# Patient Record
Sex: MALE | Race: WHITE | NOT HISPANIC OR LATINO | Employment: OTHER | URBAN - METROPOLITAN AREA
[De-identification: names, ages, dates, MRNs, and addresses within clinical notes are randomized per-mention and may not be internally consistent; named-entity substitution may affect disease eponyms.]

---

## 2019-01-28 ENCOUNTER — HOSPITAL ENCOUNTER (INPATIENT)
Facility: OTHER | Age: 54
LOS: 28 days | Discharge: HOME | DRG: 870 | End: 2019-02-25
Attending: INTERNAL MEDICINE | Admitting: INTERNAL MEDICINE
Payer: MEDICAID

## 2019-01-28 DIAGNOSIS — N17.1 ACUTE RENAL FAILURE WITH ACUTE CORTICAL NECROSIS: Primary | ICD-10-CM

## 2019-01-28 DIAGNOSIS — R00.0 TACHYCARDIA: ICD-10-CM

## 2019-01-28 DIAGNOSIS — E87.1 HYPONATREMIA: ICD-10-CM

## 2019-01-28 DIAGNOSIS — N18.6 ESRD (END STAGE RENAL DISEASE): ICD-10-CM

## 2019-01-28 DIAGNOSIS — M62.82 NON-TRAUMATIC RHABDOMYOLYSIS: ICD-10-CM

## 2019-01-28 DIAGNOSIS — R09.02 HYPOXIA: ICD-10-CM

## 2019-01-28 DIAGNOSIS — L03.115 CELLULITIS OF RIGHT LOWER EXTREMITY: ICD-10-CM

## 2019-01-28 DIAGNOSIS — J96.01 ACUTE RESPIRATORY FAILURE WITH HYPOXIA AND HYPERCAPNIA: ICD-10-CM

## 2019-01-28 DIAGNOSIS — Z49.01 ENCOUNTER FOR FITTING AND ADJUSTMENT OF DIALYSIS CATHETER: ICD-10-CM

## 2019-01-28 DIAGNOSIS — N17.9 ACUTE RENAL FAILURE: ICD-10-CM

## 2019-01-28 DIAGNOSIS — N17.9 ACUTE KIDNEY INJURY: ICD-10-CM

## 2019-01-28 DIAGNOSIS — J96.02 ACUTE RESPIRATORY FAILURE WITH HYPOXIA AND HYPERCAPNIA: ICD-10-CM

## 2019-01-28 DIAGNOSIS — R53.81 DEBILITY: ICD-10-CM

## 2019-01-28 DIAGNOSIS — I50.33 ACUTE ON CHRONIC DIASTOLIC HEART FAILURE: ICD-10-CM

## 2019-01-28 DIAGNOSIS — J96.21 ACUTE ON CHRONIC RESPIRATORY FAILURE WITH HYPOXIA: ICD-10-CM

## 2019-01-28 DIAGNOSIS — R65.21 SEPTIC SHOCK: ICD-10-CM

## 2019-01-28 DIAGNOSIS — A41.9 SEPTIC SHOCK: ICD-10-CM

## 2019-01-28 PROBLEM — N30.00 ACUTE CYSTITIS: Status: ACTIVE | Noted: 2019-01-28

## 2019-01-28 PROBLEM — I50.32 CHRONIC DIASTOLIC HEART FAILURE: Status: ACTIVE | Noted: 2019-01-28

## 2019-01-28 PROBLEM — J96.20 ACUTE ON CHRONIC RESPIRATORY FAILURE: Status: ACTIVE | Noted: 2019-01-28

## 2019-01-28 PROBLEM — I87.2 CHRONIC VENOUS INSUFFICIENCY: Status: ACTIVE | Noted: 2019-01-28

## 2019-01-28 LAB
ALBUMIN SERPL BCP-MCNC: 1.8 G/DL
ALBUMIN SERPL BCP-MCNC: 1.8 G/DL
ALLENS TEST: ABNORMAL
ALLENS TEST: ABNORMAL
ALP SERPL-CCNC: 90 U/L
ALT SERPL W/O P-5'-P-CCNC: 90 U/L
ANION GAP SERPL CALC-SCNC: 15 MMOL/L
ANION GAP SERPL CALC-SCNC: 15 MMOL/L
APTT BLDCRRT: 41 SEC
AST SERPL-CCNC: 59 U/L
BASOPHILS # BLD AUTO: 0.01 K/UL
BASOPHILS NFR BLD: 0.1 %
BILIRUB SERPL-MCNC: 0.5 MG/DL
BNP SERPL-MCNC: 1726 PG/ML
BUN SERPL-MCNC: 96 MG/DL
BUN SERPL-MCNC: 97 MG/DL
CALCIUM SERPL-MCNC: 7.6 MG/DL
CALCIUM SERPL-MCNC: 7.9 MG/DL
CHLORIDE SERPL-SCNC: 89 MMOL/L
CHLORIDE SERPL-SCNC: 89 MMOL/L
CK SERPL-CCNC: 1657 U/L
CO2 SERPL-SCNC: 19 MMOL/L
CO2 SERPL-SCNC: 19 MMOL/L
CREAT SERPL-MCNC: 12.2 MG/DL
CREAT SERPL-MCNC: 12.3 MG/DL
DELSYS: ABNORMAL
DELSYS: ABNORMAL
DIFFERENTIAL METHOD: ABNORMAL
EOSINOPHIL # BLD AUTO: 0 K/UL
EOSINOPHIL NFR BLD: 0.1 %
ERYTHROCYTE [DISTWIDTH] IN BLOOD BY AUTOMATED COUNT: 14.4 %
ERYTHROCYTE [SEDIMENTATION RATE] IN BLOOD BY WESTERGREN METHOD: 30 MM/H
ERYTHROCYTE [SEDIMENTATION RATE] IN BLOOD BY WESTERGREN METHOD: 30 MM/H
EST. GFR  (AFRICAN AMERICAN): 5 ML/MIN/1.73 M^2
EST. GFR  (AFRICAN AMERICAN): 5 ML/MIN/1.73 M^2
EST. GFR  (NON AFRICAN AMERICAN): 4 ML/MIN/1.73 M^2
EST. GFR  (NON AFRICAN AMERICAN): 4 ML/MIN/1.73 M^2
FIO2: 70
FIO2: 70
GLUCOSE SERPL-MCNC: 111 MG/DL
GLUCOSE SERPL-MCNC: 113 MG/DL
HCO3 UR-SCNC: 18.9 MMOL/L (ref 24–28)
HCO3 UR-SCNC: 19.6 MMOL/L (ref 24–28)
HCT VFR BLD AUTO: 35.6 %
HGB BLD-MCNC: 11.3 G/DL
INR PPP: 1
LACTATE SERPL-SCNC: 0.8 MMOL/L
LYMPHOCYTES # BLD AUTO: 0.8 K/UL
LYMPHOCYTES NFR BLD: 6.7 %
MCH RBC QN AUTO: 28.5 PG
MCHC RBC AUTO-ENTMCNC: 31.7 G/DL
MCV RBC AUTO: 90 FL
MODE: ABNORMAL
MODE: ABNORMAL
MONOCYTES # BLD AUTO: 0.7 K/UL
MONOCYTES NFR BLD: 5.6 %
NEUTROPHILS # BLD AUTO: 10.7 K/UL
NEUTROPHILS NFR BLD: 84.8 %
PCO2 BLDA: 65.6 MMHG (ref 35–45)
PCO2 BLDA: 74.4 MMHG (ref 35–45)
PEEP: 10
PEEP: 10
PH SMN: 7.03 [PH] (ref 7.35–7.45)
PH SMN: 7.07 [PH] (ref 7.35–7.45)
PHOSPHATE SERPL-MCNC: 10.4 MG/DL
PLATELET # BLD AUTO: 199 K/UL
PMV BLD AUTO: 10.5 FL
PO2 BLDA: 84 MMHG (ref 80–100)
PO2 BLDA: 86 MMHG (ref 80–100)
POC BE: -11 MMOL/L
POC BE: -11 MMOL/L
POC SATURATED O2: 90 % (ref 95–100)
POC SATURATED O2: 90 % (ref 95–100)
POTASSIUM SERPL-SCNC: 5 MMOL/L
POTASSIUM SERPL-SCNC: 5.1 MMOL/L
PROT SERPL-MCNC: 6.5 G/DL
PROTHROMBIN TIME: 10.6 SEC
RBC # BLD AUTO: 3.96 M/UL
SAMPLE: ABNORMAL
SAMPLE: ABNORMAL
SITE: ABNORMAL
SITE: ABNORMAL
SODIUM SERPL-SCNC: 123 MMOL/L
SODIUM SERPL-SCNC: 123 MMOL/L
SP02: 92
SP02: 92
TROPONIN I SERPL DL<=0.01 NG/ML-MCNC: 0.02 NG/ML
VANCOMYCIN SERPL-MCNC: 23.1 UG/ML
VT: 450
VT: 500
WBC # BLD AUTO: 12.57 K/UL

## 2019-01-28 PROCEDURE — 83735 ASSAY OF MAGNESIUM: CPT

## 2019-01-28 PROCEDURE — 25000003 PHARM REV CODE 250

## 2019-01-28 PROCEDURE — 94002 VENT MGMT INPAT INIT DAY: CPT

## 2019-01-28 PROCEDURE — 85610 PROTHROMBIN TIME: CPT

## 2019-01-28 PROCEDURE — 87205 SMEAR GRAM STAIN: CPT

## 2019-01-28 PROCEDURE — 83880 ASSAY OF NATRIURETIC PEPTIDE: CPT

## 2019-01-28 PROCEDURE — 94761 N-INVAS EAR/PLS OXIMETRY MLT: CPT

## 2019-01-28 PROCEDURE — 83605 ASSAY OF LACTIC ACID: CPT

## 2019-01-28 PROCEDURE — 87040 BLOOD CULTURE FOR BACTERIA: CPT | Mod: 59

## 2019-01-28 PROCEDURE — 87106 FUNGI IDENTIFICATION YEAST: CPT

## 2019-01-28 PROCEDURE — 63600175 PHARM REV CODE 636 W HCPCS: Performed by: STUDENT IN AN ORGANIZED HEALTH CARE EDUCATION/TRAINING PROGRAM

## 2019-01-28 PROCEDURE — 80202 ASSAY OF VANCOMYCIN: CPT

## 2019-01-28 PROCEDURE — 80053 COMPREHEN METABOLIC PANEL: CPT

## 2019-01-28 PROCEDURE — 80069 RENAL FUNCTION PANEL: CPT

## 2019-01-28 PROCEDURE — 25000003 PHARM REV CODE 250: Performed by: PHYSICIAN ASSISTANT

## 2019-01-28 PROCEDURE — 25000003 PHARM REV CODE 250: Performed by: INTERNAL MEDICINE

## 2019-01-28 PROCEDURE — 36415 COLL VENOUS BLD VENIPUNCTURE: CPT

## 2019-01-28 PROCEDURE — 99900026 HC AIRWAY MAINTENANCE (STAT)

## 2019-01-28 PROCEDURE — 99900035 HC TECH TIME PER 15 MIN (STAT)

## 2019-01-28 PROCEDURE — 90945 DIALYSIS ONE EVALUATION: CPT

## 2019-01-28 PROCEDURE — 63600175 PHARM REV CODE 636 W HCPCS

## 2019-01-28 PROCEDURE — 82803 BLOOD GASES ANY COMBINATION: CPT

## 2019-01-28 PROCEDURE — 20000000 HC ICU ROOM

## 2019-01-28 PROCEDURE — 84484 ASSAY OF TROPONIN QUANT: CPT

## 2019-01-28 PROCEDURE — 85730 THROMBOPLASTIN TIME PARTIAL: CPT

## 2019-01-28 PROCEDURE — 82550 ASSAY OF CK (CPK): CPT

## 2019-01-28 PROCEDURE — 87070 CULTURE OTHR SPECIMN AEROBIC: CPT

## 2019-01-28 PROCEDURE — 63600175 PHARM REV CODE 636 W HCPCS: Performed by: PHYSICIAN ASSISTANT

## 2019-01-28 PROCEDURE — 85025 COMPLETE CBC W/AUTO DIFF WBC: CPT

## 2019-01-28 RX ORDER — MAGNESIUM SULFATE HEPTAHYDRATE 40 MG/ML
2 INJECTION, SOLUTION INTRAVENOUS
Status: ACTIVE | OUTPATIENT
Start: 2019-01-28 | End: 2019-01-29

## 2019-01-28 RX ORDER — IPRATROPIUM BROMIDE AND ALBUTEROL SULFATE 2.5; .5 MG/3ML; MG/3ML
3 SOLUTION RESPIRATORY (INHALATION) EVERY 4 HOURS
Status: DISCONTINUED | OUTPATIENT
Start: 2019-01-29 | End: 2019-02-21

## 2019-01-28 RX ORDER — HEPARIN SODIUM 5000 [USP'U]/ML
5000 INJECTION, SOLUTION INTRAVENOUS; SUBCUTANEOUS EVERY 8 HOURS
Status: DISCONTINUED | OUTPATIENT
Start: 2019-01-29 | End: 2019-01-29 | Stop reason: SDUPTHER

## 2019-01-28 RX ORDER — IPRATROPIUM BROMIDE AND ALBUTEROL SULFATE 2.5; .5 MG/3ML; MG/3ML
3 SOLUTION RESPIRATORY (INHALATION) EVERY 6 HOURS
Status: DISCONTINUED | OUTPATIENT
Start: 2019-01-29 | End: 2019-01-28

## 2019-01-28 RX ORDER — PROPOFOL 10 MG/ML
INJECTION, EMULSION INTRAVENOUS
Status: COMPLETED
Start: 2019-01-28 | End: 2019-01-28

## 2019-01-28 RX ORDER — SODIUM CHLORIDE 0.9 % (FLUSH) 0.9 %
3 SYRINGE (ML) INJECTION
Status: DISCONTINUED | OUTPATIENT
Start: 2019-01-28 | End: 2019-02-25 | Stop reason: HOSPADM

## 2019-01-28 RX ORDER — FENTANYL CITRATE 50 UG/ML
100 INJECTION, SOLUTION INTRAMUSCULAR; INTRAVENOUS ONCE
Status: COMPLETED | OUTPATIENT
Start: 2019-01-28 | End: 2019-01-28

## 2019-01-28 RX ORDER — PROPOFOL 10 MG/ML
5 INJECTION, EMULSION INTRAVENOUS CONTINUOUS
Status: DISCONTINUED | OUTPATIENT
Start: 2019-01-28 | End: 2019-01-31

## 2019-01-28 RX ORDER — FENTANYL CITRATE 50 UG/ML
INJECTION, SOLUTION INTRAMUSCULAR; INTRAVENOUS
Status: DISPENSED
Start: 2019-01-28 | End: 2019-01-29

## 2019-01-28 RX ORDER — HEPARIN SODIUM 5000 [USP'U]/ML
5000 INJECTION, SOLUTION INTRAVENOUS; SUBCUTANEOUS EVERY 12 HOURS
Status: DISCONTINUED | OUTPATIENT
Start: 2019-01-28 | End: 2019-01-28

## 2019-01-28 RX ORDER — FENTANYL CITRATE 50 UG/ML
50 INJECTION, SOLUTION INTRAMUSCULAR; INTRAVENOUS
Status: DISCONTINUED | OUTPATIENT
Start: 2019-01-28 | End: 2019-02-02

## 2019-01-28 RX ORDER — NOREPINEPHRINE BITARTRATE 1 MG/ML
INJECTION, SOLUTION INTRAVENOUS
Status: COMPLETED
Start: 2019-01-28 | End: 2019-01-28

## 2019-01-28 RX ORDER — PANTOPRAZOLE SODIUM 40 MG/10ML
40 INJECTION, POWDER, LYOPHILIZED, FOR SOLUTION INTRAVENOUS DAILY
Status: DISCONTINUED | OUTPATIENT
Start: 2019-01-28 | End: 2019-01-29

## 2019-01-28 RX ORDER — FENTANYL CITRATE 50 UG/ML
100 INJECTION, SOLUTION INTRAMUSCULAR; INTRAVENOUS ONCE
Status: DISCONTINUED | OUTPATIENT
Start: 2019-01-28 | End: 2019-01-29

## 2019-01-28 RX ORDER — NOREPINEPHRINE BITARTRATE 1 MG/ML
INJECTION, SOLUTION INTRAVENOUS
Status: DISPENSED
Start: 2019-01-28 | End: 2019-01-29

## 2019-01-28 RX ADMIN — NOREPINEPHRINE BITARTRATE 8000 MCG: 1 INJECTION INTRAVENOUS at 09:01

## 2019-01-28 RX ADMIN — FENTANYL CITRATE 100 MCG: 50 INJECTION, SOLUTION INTRAMUSCULAR; INTRAVENOUS at 10:01

## 2019-01-28 RX ADMIN — PIPERACILLIN AND TAZOBACTAM 4.5 G: 4; .5 INJECTION, POWDER, LYOPHILIZED, FOR SOLUTION INTRAVENOUS; PARENTERAL at 09:01

## 2019-01-28 RX ADMIN — PROPOFOL 25 MCG/KG/MIN: 10 INJECTION, EMULSION INTRAVENOUS at 10:01

## 2019-01-28 RX ADMIN — NOREPINEPHRINE BITARTRATE 0.02 MCG/KG/MIN: 1 INJECTION INTRAVENOUS at 11:01

## 2019-01-28 NOTE — LETTER
January 31, 2019    Jones Red  [address] [address]                [address]  [facility] [address]  Phone: [phone]  Fax: [phone] 1/31/2019  [facility]    Levi Red and Kalyan Pierce acknowledge that they will have transportation from the bus station in [place], [place] to their home in [place], [place].    [facility] will provide a bus ride from [place], [place]  to [place], [place], which is the nearest bus station.  [facility] will transport from hospital to bus station.  These persons will be ready to leave the hospital as soon as arrangements are complete.    Brittnee TOBIN RN  Case Management Dept.  [phone]  [email or web address]

## 2019-01-28 NOTE — PLAN OF CARE
"      Outside Transfer Acceptance Note     Patients name:  Jones Red MRN: [id number]     Transferring Physician or Mid-Level provider/Clinic giving report:   Dr. Larry Romano at [facility] ICU, admitted 1/22/19     Accepting Physician for admission to hospital: Brandyn Carballo M.D.   Accepting Facility and physicisian: [facility].     Consulting Physicians: None    Acceptance date:  01/28/2019    Reason for transfer:   Severe sepsis and shock from right leg cellulitis and +/- CAP    Report from Physician/Mid-Level Provider:  Mr. Red is a 52yo man with a past medical history of morbid obesity and chronic venous stasis of the legs.  On presentation to the ED he was complaining of shortness of breath, cough and brown sputum.  He was altered at the time and placed on BiPAP, so a friend provided most of the history.  He was apparently found down at home and was too weak to even get up.     He was admitted to [facility] on 1/22/19 with septic shock from a right leg cellulitis, rhabdomyolysis, and metabolic encephalopathy.  He was originally placed on pressors (Levophed and Neosynephrine), and intubated the day after admission.  He had been weaned off of all pressor agents and is stable for transfer, though he remains intubated.  He was placed on antibiotic therapy with Zosyn and Vancomycin.  His WBC has decreased, though his leg is, "not really looking that much better."  Of note, he is morbidly obese with chronic woody edema of both legs at baseline.  There is now some blister formation to the RLE, but no crepitus on exam.  No CT of the leg or US doppler has been performed for evaluation of necrotizing fasciitis or DVT.  This far, all of his blood and urine cultures have remained NGTD.      Several days into his ICU stay, he was maxed out on all of his pressor agents.  Subsequent to this his Cr began to rise and has progressed to fulminant kidney failure with a current " creatinine of eleven.  [facility] has no Nephrology services or ability to dialyze, so they need a higher level of care for this.  Last week his K+ josefina to 7, so he has been given intermittent Kayexalate and shifted, now staying steady around five.      His BP was ranging from 80-90 systolic this morning, so the sending MD was considering Dopamine at 5 mics just for transfer in case things decline en route, though this was held as his BP rebounded up.   He is receiving therapy via a PICC line (no TLC in place).      Pt has NGT, mosley, rectal tube, R TLC PICC and L radial A line    Vent settings:     Assist cont 16  FI02 100%   PEEP 8    To Do List upon arrival:    Consult Pulmonary for vent management  Consult Nephrology for ATN and severe NICK needing HD  Consult ID for sepsis  Consider leg CT and US doppler of the right leg to rule out Nec Fasc and DVT  Check stat Random Vanco level on arrival   Continue renal dosed Vancomycin and Zosyn    CLINICAL REVIEW: Please refer to MEDIA tab for original H&P, labs, MAR, radiographs scanned in  Past Medical History:  HTN, Pre-diabetes hyperglycemia, morbid obesity, chronic venous insufficiency of the legs, COPD  Chronic hypoxic respiratory failure on home O2, DCHF, LYNDON  Possible ETOH withdrawal seizure in the past    Past Surgical history:  None    Review of patient's allergies indicates:   NKDA's    Social History:  1/2 PPD cigarettes  Past history of ETOH, but not daily now    Vital Signs:  Temp:  [97 °F (36.1 °C)] 97 °F (36.1 °C)  Pulse:  [84] 84  Resp:  [21] 21  SpO2:  [100 %] 100 %  BP: (140)/(63) 140/63    LABS:  Arrival: WBC 19, BUN 49, CR 4.6, , CL 85, ALB 3.3, , ALT 99, LA 3.4, BNP 3513, CPK 16, 363, TROP 0.04, U/A: 2-5 WBC, 10 RBC, 4+ BACTERIA, inr 1.4  ABG:  PH 7.020, PC02 83.6, P02 187.0, HC03 15.2   VENT SETTINGS: FIO2 100, AC, PEEP 8, RATE 16,   CMP:  , K 8.3, CL 90, C02 19.4, glucose 172   BUN 93, CR 11.4, CA 7.4, , ALT  118, AP 10.7  CBC:  WBC 15.03, H/H 11.9/38.8    Radiographs:   CXR 1/22: Mild chronic lung changes are present with no infiltrates or pleural effusion.  Impression: No acute cardiopulmonary disease   CXR 1/27/18: The heart size is within normal limts for projection.  Aortic arch calcifications ar present - ASVD.  Stble ET tube and NG tube.  The tip of the NG tube can be seen as far as the distal esophagus, with the lower mediastinum and uper abdomen under-penetrated due to lung technique.  Multiple telemetry leads are projected over the chest.  Persistent bilateral infiltrates and or edema with subtle improvement noted in the right lung base.    Medications:  Please see MAR scanned into media tab  Vancomycin and Zosyn

## 2019-01-28 NOTE — LETTER
1/31/2019  [facility]    Levi Red and Kalyan Pierce acknowledge that they will have transportation from the bus station in [place], [place] to their home in [place], [place].    [facility] will provide a bus ride from [place], [place]  to [place], [place], which is the nearest bus station.  [facility] will transport from hospital to bus station.  These persons will be ready to leave the hospital as soon as arrangements are complete.    Brittnee TOBIN RN  Case Management Dept.  [phone]  [email or web address]

## 2019-01-29 LAB
ALBUMIN SERPL BCP-MCNC: 1.5 G/DL
ALBUMIN SERPL BCP-MCNC: 1.8 G/DL
ALBUMIN SERPL BCP-MCNC: 1.9 G/DL
ALLENS TEST: ABNORMAL
ANION GAP SERPL CALC-SCNC: 12 MMOL/L
ANION GAP SERPL CALC-SCNC: 16 MMOL/L
BACTERIA #/AREA URNS HPF: ABNORMAL /HPF
BASOPHILS # BLD AUTO: 0.02 K/UL
BASOPHILS NFR BLD: 0.2 %
BILIRUB UR QL STRIP: ABNORMAL
BUN SERPL-MCNC: 85 MG/DL
BUN SERPL-MCNC: 89 MG/DL
BUN SERPL-MCNC: 94 MG/DL
BUN SERPL-MCNC: 94 MG/DL
CALCIUM SERPL-MCNC: 7.6 MG/DL
CALCIUM SERPL-MCNC: 7.7 MG/DL
CHLORIDE SERPL-SCNC: 91 MMOL/L
CHLORIDE SERPL-SCNC: 91 MMOL/L
CHLORIDE SERPL-SCNC: 92 MMOL/L
CHLORIDE SERPL-SCNC: 95 MMOL/L
CK SERPL-CCNC: 1089 U/L
CLARITY UR: ABNORMAL
CO2 SERPL-SCNC: 17 MMOL/L
CO2 SERPL-SCNC: 18 MMOL/L
CO2 SERPL-SCNC: 18 MMOL/L
CO2 SERPL-SCNC: 23 MMOL/L
COLOR UR: ABNORMAL
CREAT SERPL-MCNC: 10.8 MG/DL
CREAT SERPL-MCNC: 10.8 MG/DL
CREAT SERPL-MCNC: 9 MG/DL
CREAT SERPL-MCNC: 9.5 MG/DL
DELSYS: ABNORMAL
DIFFERENTIAL METHOD: ABNORMAL
EOSINOPHIL # BLD AUTO: 0 K/UL
EOSINOPHIL NFR BLD: 0.1 %
ERYTHROCYTE [DISTWIDTH] IN BLOOD BY AUTOMATED COUNT: 14.4 %
ERYTHROCYTE [SEDIMENTATION RATE] IN BLOOD BY WESTERGREN METHOD: 30 MM/H
ERYTHROCYTE [SEDIMENTATION RATE] IN BLOOD BY WESTERGREN METHOD: 30 MM/H
ERYTHROCYTE [SEDIMENTATION RATE] IN BLOOD BY WESTERGREN METHOD: 34 MM/H
EST. GFR  (AFRICAN AMERICAN): 6 ML/MIN/1.73 M^2
EST. GFR  (AFRICAN AMERICAN): 6 ML/MIN/1.73 M^2
EST. GFR  (AFRICAN AMERICAN): 7 ML/MIN/1.73 M^2
EST. GFR  (AFRICAN AMERICAN): 7 ML/MIN/1.73 M^2
EST. GFR  (NON AFRICAN AMERICAN): 5 ML/MIN/1.73 M^2
EST. GFR  (NON AFRICAN AMERICAN): 5 ML/MIN/1.73 M^2
EST. GFR  (NON AFRICAN AMERICAN): 6 ML/MIN/1.73 M^2
EST. GFR  (NON AFRICAN AMERICAN): 6 ML/MIN/1.73 M^2
FIO2: 70
GLUCOSE SERPL-MCNC: 102 MG/DL
GLUCOSE SERPL-MCNC: 117 MG/DL
GLUCOSE SERPL-MCNC: 122 MG/DL
GLUCOSE SERPL-MCNC: 122 MG/DL
GLUCOSE UR QL STRIP: ABNORMAL
HCO3 UR-SCNC: 18.9 MMOL/L (ref 24–28)
HCO3 UR-SCNC: 19.6 MMOL/L (ref 24–28)
HCO3 UR-SCNC: 21.1 MMOL/L (ref 24–28)
HCT VFR BLD AUTO: 34.8 %
HGB BLD-MCNC: 11.1 G/DL
HGB UR QL STRIP: ABNORMAL
HYALINE CASTS #/AREA URNS LPF: 0 /LPF
KETONES UR QL STRIP: NEGATIVE
LEUKOCYTE ESTERASE UR QL STRIP: ABNORMAL
LYMPHOCYTES # BLD AUTO: 1 K/UL
LYMPHOCYTES NFR BLD: 7.8 %
MAGNESIUM SERPL-MCNC: 2 MG/DL
MAGNESIUM SERPL-MCNC: 2.1 MG/DL
MCH RBC QN AUTO: 28.2 PG
MCHC RBC AUTO-ENTMCNC: 31.9 G/DL
MCV RBC AUTO: 89 FL
MICROSCOPIC COMMENT: ABNORMAL
MIN VOL: 16.3
MODE: ABNORMAL
MONOCYTES # BLD AUTO: 1 K/UL
MONOCYTES NFR BLD: 7.5 %
NEUTROPHILS # BLD AUTO: 10.3 K/UL
NEUTROPHILS NFR BLD: 79.6 %
NITRITE UR QL STRIP: POSITIVE
PCO2 BLDA: 54.3 MMHG (ref 35–45)
PCO2 BLDA: 56.8 MMHG (ref 35–45)
PCO2 BLDA: 58.7 MMHG (ref 35–45)
PEEP: 10
PH SMN: 7.13 [PH] (ref 7.35–7.45)
PH SMN: 7.13 [PH] (ref 7.35–7.45)
PH SMN: 7.2 [PH] (ref 7.35–7.45)
PH UR STRIP: 7 [PH] (ref 5–8)
PHOSPHATE SERPL-MCNC: 6.5 MG/DL
PHOSPHATE SERPL-MCNC: 7.1 MG/DL
PHOSPHATE SERPL-MCNC: 8.5 MG/DL
PHOSPHATE SERPL-MCNC: 8.5 MG/DL
PIP: 53
PLATELET # BLD AUTO: 192 K/UL
PMV BLD AUTO: 10.2 FL
PO2 BLDA: 115 MMHG (ref 80–100)
PO2 BLDA: 86 MMHG (ref 80–100)
PO2 BLDA: 96 MMHG (ref 80–100)
POC BE: -10 MMOL/L
POC BE: -10 MMOL/L
POC BE: -7 MMOL/L
POC SATURATED O2: 92 % (ref 95–100)
POC SATURATED O2: 94 % (ref 95–100)
POC SATURATED O2: 97 % (ref 95–100)
POTASSIUM SERPL-SCNC: 4.3 MMOL/L
POTASSIUM SERPL-SCNC: 4.4 MMOL/L
POTASSIUM SERPL-SCNC: 4.8 MMOL/L
POTASSIUM SERPL-SCNC: 4.8 MMOL/L
PROT UR QL STRIP: ABNORMAL
RBC # BLD AUTO: 3.93 M/UL
RBC #/AREA URNS HPF: >100 /HPF (ref 0–4)
SAMPLE: ABNORMAL
SITE: ABNORMAL
SODIUM SERPL-SCNC: 125 MMOL/L
SODIUM SERPL-SCNC: 125 MMOL/L
SODIUM SERPL-SCNC: 127 MMOL/L
SODIUM SERPL-SCNC: 128 MMOL/L
SP GR UR STRIP: 1.02 (ref 1–1.03)
SP02: 93
SP02: 94
SP02: 97
SQUAMOUS #/AREA URNS HPF: 1 /HPF
URN SPEC COLLECT METH UR: ABNORMAL
UROBILINOGEN UR STRIP-ACNC: NEGATIVE EU/DL
VANCOMYCIN SERPL-MCNC: 18.7 UG/ML
VT: 520
WBC # BLD AUTO: 12.88 K/UL
WBC #/AREA URNS HPF: 6 /HPF (ref 0–5)

## 2019-01-29 PROCEDURE — 25000003 PHARM REV CODE 250: Performed by: STUDENT IN AN ORGANIZED HEALTH CARE EDUCATION/TRAINING PROGRAM

## 2019-01-29 PROCEDURE — 99900035 HC TECH TIME PER 15 MIN (STAT)

## 2019-01-29 PROCEDURE — 94761 N-INVAS EAR/PLS OXIMETRY MLT: CPT

## 2019-01-29 PROCEDURE — 82803 BLOOD GASES ANY COMBINATION: CPT

## 2019-01-29 PROCEDURE — S0028 INJECTION, FAMOTIDINE, 20 MG: HCPCS | Performed by: HOSPITALIST

## 2019-01-29 PROCEDURE — 63600175 PHARM REV CODE 636 W HCPCS: Performed by: INTERNAL MEDICINE

## 2019-01-29 PROCEDURE — C9113 INJ PANTOPRAZOLE SODIUM, VIA: HCPCS | Performed by: INTERNAL MEDICINE

## 2019-01-29 PROCEDURE — 63600175 PHARM REV CODE 636 W HCPCS: Performed by: NURSE PRACTITIONER

## 2019-01-29 PROCEDURE — 82550 ASSAY OF CK (CPK): CPT

## 2019-01-29 PROCEDURE — 99223 PR INITIAL HOSPITAL CARE,LEVL III: ICD-10-PCS | Mod: ,,, | Performed by: PHYSICIAN ASSISTANT

## 2019-01-29 PROCEDURE — 85025 COMPLETE CBC W/AUTO DIFF WBC: CPT

## 2019-01-29 PROCEDURE — 83735 ASSAY OF MAGNESIUM: CPT | Mod: 91

## 2019-01-29 PROCEDURE — 63600175 PHARM REV CODE 636 W HCPCS: Performed by: STUDENT IN AN ORGANIZED HEALTH CARE EDUCATION/TRAINING PROGRAM

## 2019-01-29 PROCEDURE — 25000242 PHARM REV CODE 250 ALT 637 W/ HCPCS: Performed by: HOSPITALIST

## 2019-01-29 PROCEDURE — 90945 DIALYSIS ONE EVALUATION: CPT

## 2019-01-29 PROCEDURE — 99900026 HC AIRWAY MAINTENANCE (STAT)

## 2019-01-29 PROCEDURE — 80100008 HC CRRT DAILY MAINTENANCE

## 2019-01-29 PROCEDURE — 25000003 PHARM REV CODE 250: Performed by: HOSPITALIST

## 2019-01-29 PROCEDURE — 80069 RENAL FUNCTION PANEL: CPT | Mod: 91

## 2019-01-29 PROCEDURE — 20000000 HC ICU ROOM

## 2019-01-29 PROCEDURE — 63600175 PHARM REV CODE 636 W HCPCS: Performed by: PHYSICIAN ASSISTANT

## 2019-01-29 PROCEDURE — 94640 AIRWAY INHALATION TREATMENT: CPT

## 2019-01-29 PROCEDURE — 81000 URINALYSIS NONAUTO W/SCOPE: CPT

## 2019-01-29 PROCEDURE — 80202 ASSAY OF VANCOMYCIN: CPT

## 2019-01-29 PROCEDURE — S0077 INJECTION, CLINDAMYCIN PHOSP: HCPCS | Performed by: STUDENT IN AN ORGANIZED HEALTH CARE EDUCATION/TRAINING PROGRAM

## 2019-01-29 PROCEDURE — 99223 1ST HOSP IP/OBS HIGH 75: CPT | Mod: ,,, | Performed by: PHYSICIAN ASSISTANT

## 2019-01-29 PROCEDURE — 37799 UNLISTED PX VASCULAR SURGERY: CPT

## 2019-01-29 RX ORDER — HEPARIN SODIUM 10000 [USP'U]/100ML
4.5 INJECTION, SOLUTION INTRAVENOUS CONTINUOUS
Status: DISCONTINUED | OUTPATIENT
Start: 2019-01-29 | End: 2019-01-30

## 2019-01-29 RX ORDER — METRONIDAZOLE 500 MG/100ML
500 INJECTION, SOLUTION INTRAVENOUS
Status: DISCONTINUED | OUTPATIENT
Start: 2019-01-29 | End: 2019-01-29

## 2019-01-29 RX ORDER — CHLORHEXIDINE GLUCONATE ORAL RINSE 1.2 MG/ML
15 SOLUTION DENTAL 2 TIMES DAILY
Status: DISCONTINUED | OUTPATIENT
Start: 2019-01-29 | End: 2019-02-02

## 2019-01-29 RX ORDER — HEPARIN SODIUM 5000 [USP'U]/ML
5000 INJECTION, SOLUTION INTRAVENOUS; SUBCUTANEOUS
Status: DISCONTINUED | OUTPATIENT
Start: 2019-01-29 | End: 2019-02-25 | Stop reason: HOSPADM

## 2019-01-29 RX ORDER — CEFEPIME HYDROCHLORIDE 1 G/50ML
1 INJECTION, SOLUTION INTRAVENOUS
Status: DISCONTINUED | OUTPATIENT
Start: 2019-01-29 | End: 2019-01-30

## 2019-01-29 RX ORDER — FAMOTIDINE 10 MG/ML
20 INJECTION INTRAVENOUS DAILY
Status: DISCONTINUED | OUTPATIENT
Start: 2019-01-29 | End: 2019-02-02

## 2019-01-29 RX ORDER — HEPARIN SODIUM 5000 [USP'U]/ML
5000 INJECTION, SOLUTION INTRAVENOUS; SUBCUTANEOUS EVERY 8 HOURS
Status: DISCONTINUED | OUTPATIENT
Start: 2019-01-29 | End: 2019-02-25 | Stop reason: HOSPADM

## 2019-01-29 RX ORDER — MAGNESIUM SULFATE HEPTAHYDRATE 40 MG/ML
2 INJECTION, SOLUTION INTRAVENOUS
Status: DISCONTINUED | OUTPATIENT
Start: 2019-01-29 | End: 2019-01-30

## 2019-01-29 RX ORDER — FOLIC ACID 1 MG/1
1 TABLET ORAL DAILY
Status: DISCONTINUED | OUTPATIENT
Start: 2019-01-29 | End: 2019-02-04

## 2019-01-29 RX ORDER — THIAMINE HCL 100 MG
100 TABLET ORAL DAILY
Status: DISCONTINUED | OUTPATIENT
Start: 2019-01-29 | End: 2019-02-04

## 2019-01-29 RX ORDER — CLINDAMYCIN PHOSPHATE 900 MG/50ML
900 INJECTION, SOLUTION INTRAVENOUS
Status: DISCONTINUED | OUTPATIENT
Start: 2019-01-29 | End: 2019-01-30

## 2019-01-29 RX ORDER — HEPARIN SODIUM 10000 [USP'U]/100ML
1000 INJECTION, SOLUTION INTRAVENOUS CONTINUOUS
Status: DISCONTINUED | OUTPATIENT
Start: 2019-01-29 | End: 2019-01-29

## 2019-01-29 RX ADMIN — PROPOFOL 25 MCG/KG/MIN: 10 INJECTION, EMULSION INTRAVENOUS at 01:01

## 2019-01-29 RX ADMIN — PROPOFOL 30 MCG/KG/MIN: 10 INJECTION, EMULSION INTRAVENOUS at 11:01

## 2019-01-29 RX ADMIN — PIPERACILLIN AND TAZOBACTAM 4.5 G: 4; .5 INJECTION, POWDER, LYOPHILIZED, FOR SOLUTION INTRAVENOUS; PARENTERAL at 08:01

## 2019-01-29 RX ADMIN — PROPOFOL 25 MCG/KG/MIN: 10 INJECTION, EMULSION INTRAVENOUS at 11:01

## 2019-01-29 RX ADMIN — IPRATROPIUM BROMIDE AND ALBUTEROL SULFATE 3 ML: .5; 3 SOLUTION RESPIRATORY (INHALATION) at 01:01

## 2019-01-29 RX ADMIN — HEPARIN SODIUM 5000 UNITS: 5000 INJECTION, SOLUTION INTRAVENOUS; SUBCUTANEOUS at 06:01

## 2019-01-29 RX ADMIN — HEPARIN SODIUM AND DEXTROSE 1000 UNITS/HR: 10000; 5 INJECTION INTRAVENOUS at 11:01

## 2019-01-29 RX ADMIN — PROPOFOL 25 MCG/KG/MIN: 10 INJECTION, EMULSION INTRAVENOUS at 04:01

## 2019-01-29 RX ADMIN — PANTOPRAZOLE SODIUM 40 MG: 40 INJECTION, POWDER, FOR SOLUTION INTRAVENOUS at 01:01

## 2019-01-29 RX ADMIN — HEPARIN SODIUM 5000 UNITS: 5000 INJECTION, SOLUTION INTRAVENOUS; SUBCUTANEOUS at 07:01

## 2019-01-29 RX ADMIN — HEPARIN SODIUM 5000 UNITS: 5000 INJECTION, SOLUTION INTRAVENOUS; SUBCUTANEOUS at 09:01

## 2019-01-29 RX ADMIN — FOLIC ACID 1 MG: 1 TABLET ORAL at 10:01

## 2019-01-29 RX ADMIN — Medication 100 MG: at 10:01

## 2019-01-29 RX ADMIN — ALTEPLASE 1 MG: 2.2 INJECTION, POWDER, LYOPHILIZED, FOR SOLUTION INTRAVENOUS at 07:01

## 2019-01-29 RX ADMIN — CLINDAMYCIN IN 5 PERCENT DEXTROSE 900 MG: 18 INJECTION, SOLUTION INTRAVENOUS at 11:01

## 2019-01-29 RX ADMIN — HEPARIN SODIUM 5000 UNITS: 5000 INJECTION, SOLUTION INTRAVENOUS; SUBCUTANEOUS at 01:01

## 2019-01-29 RX ADMIN — PROPOFOL 25 MCG/KG/MIN: 10 INJECTION, EMULSION INTRAVENOUS at 07:01

## 2019-01-29 RX ADMIN — PROPOFOL 5 MCG/KG/MIN: 10 INJECTION, EMULSION INTRAVENOUS at 02:01

## 2019-01-29 RX ADMIN — IPRATROPIUM BROMIDE AND ALBUTEROL SULFATE 3 ML: .5; 3 SOLUTION RESPIRATORY (INHALATION) at 11:01

## 2019-01-29 RX ADMIN — CLINDAMYCIN IN 5 PERCENT DEXTROSE 900 MG: 18 INJECTION, SOLUTION INTRAVENOUS at 06:01

## 2019-01-29 RX ADMIN — FAMOTIDINE 20 MG: 10 INJECTION, SOLUTION INTRAVENOUS at 10:01

## 2019-01-29 RX ADMIN — PROPOFOL 25 MCG/KG/MIN: 10 INJECTION, EMULSION INTRAVENOUS at 09:01

## 2019-01-29 RX ADMIN — CEFEPIME HYDROCHLORIDE 1 G: 1 INJECTION, SOLUTION INTRAVENOUS at 06:01

## 2019-01-29 RX ADMIN — IPRATROPIUM BROMIDE AND ALBUTEROL SULFATE 3 ML: .5; 3 SOLUTION RESPIRATORY (INHALATION) at 12:01

## 2019-01-29 RX ADMIN — FENTANYL CITRATE 50 MCG: 50 INJECTION INTRAMUSCULAR; INTRAVENOUS at 10:01

## 2019-01-29 RX ADMIN — IPRATROPIUM BROMIDE AND ALBUTEROL SULFATE 3 ML: .5; 3 SOLUTION RESPIRATORY (INHALATION) at 04:01

## 2019-01-29 RX ADMIN — IPRATROPIUM BROMIDE AND ALBUTEROL SULFATE 3 ML: .5; 3 SOLUTION RESPIRATORY (INHALATION) at 07:01

## 2019-01-29 RX ADMIN — CHLORHEXIDINE GLUCONATE 15 ML: 1.2 RINSE ORAL at 11:01

## 2019-01-29 RX ADMIN — CEFEPIME HYDROCHLORIDE 1 G: 1 INJECTION, SOLUTION INTRAVENOUS at 11:01

## 2019-01-29 RX ADMIN — THERA TABS 1 TABLET: TAB at 10:01

## 2019-01-29 RX ADMIN — PROPOFOL 35 MCG/KG/MIN: 10 INJECTION, EMULSION INTRAVENOUS at 04:01

## 2019-01-29 RX ADMIN — IPRATROPIUM BROMIDE AND ALBUTEROL SULFATE 3 ML: .5; 3 SOLUTION RESPIRATORY (INHALATION) at 03:01

## 2019-01-29 NOTE — PLAN OF CARE
Problem: Adult Inpatient Plan of Care  Goal: Plan of Care Review  Outcome: Ongoing (interventions implemented as appropriate)  Pt received via transport intubated and on mechanical ventilation. ABGs done ventilator support adjusted accordingly. Sputum Culture done. Txs given and  tolerated well. Am ABG done. Will continue to monitor.

## 2019-01-29 NOTE — PROCEDURES
"Jones Red is a 53 y.o. male patient.    Temp: (!) 92.7 °F (33.7 °C) (01/28/19 1946)  Pulse: 92 (01/28/19 2200)  Resp: (!) 48 (01/28/19 2200)  BP: (!) 106/55 (01/28/19 2200)  SpO2: (!) 92 % (01/28/19 2200)  Weight: (!) 220 kg (485 lb) (01/28/19 2236)  Height: 6' 3" (190.5 cm) (01/28/19 1945)       Central Line  Date/Time: 1/28/2019 11:37 PM  Performed by: Trace Walker MD  Consent Done: Yes  Time out: Immediately prior to procedure a "time out" was called to verify the correct patient, procedure, equipment, support staff and site/side marked as required.  Indications: hemodialysis  Preparation: skin prepped with ChloraPrep  Skin prep agent dried: skin prep agent completely dried prior to procedure  Sterile barriers: all five maximum sterile barriers used - cap, mask, sterile gown, sterile gloves, and large sterile sheet  Hand hygiene: hand hygiene performed prior to central venous catheter insertion  Location details: right internal jugular  Catheter type: trialysis  Catheter Length: 15cm    Ultrasound guidance: yes  Vessel Caliber: small, patent, compressibility poor (Engorged, plump vessel - difficult to compress)  Needle advanced into vessel with real time Ultrasound guidance.  Guidewire confirmed in vessel.  Sterile sheath used.  Manometry: Yes  Number of attempts: 1  Assessment: placement verified by x-ray  Complications: none  Estimated blood loss (mL): 10  Specimens: No  Implants: No  Post-procedure: line sutured,  chlorhexidine patch,  sterile dressing applied and blood return through all ports  Complications: No        Trace Walker MD  Fellow, Pulmonary & Critical Care Medicine    "

## 2019-01-29 NOTE — NURSING
Assumed care of pt. Resting in bed, CRRT Clotted off, MD notified, order for cathflo received.  Pt on Vent OEET intact Lip line at 24cm  Sedated with propofol.  See flow sheet for full assessment

## 2019-01-29 NOTE — ASSESSMENT & PLAN NOTE
- admission labs with sodium level of 123  - currently on CRRT  - [facility] consulted   - monitor AM labs

## 2019-01-29 NOTE — ASSESSMENT & PLAN NOTE
- Nephrology consulted   - suspect multifactorial from rhabdo, dehydration, infection, sepsis  - trialysis cath placement scheduled for tomorrow  - patient started on CRRT

## 2019-01-29 NOTE — ASSESSMENT & PLAN NOTE
- patient intubated and on vent   - PCC consulted and following  - ABG upon arrival of:   ABG  Recent Labs   Lab 01/28/19 2006   PH 7.029*   PO2 86   PCO2 74.4*   HCO3 19.6*   BE -11

## 2019-01-29 NOTE — ASSESSMENT & PLAN NOTE
- continue vancomycin, dosed based upon daily random vanc level   - cultures from outside facility without growth to date   - US LE negative for DVT   - Can not obtain CT leg in this facility as patient will not fit into CT machine

## 2019-01-29 NOTE — PLAN OF CARE
CM met with pt's family who arrived with pt per ambulance from outside facility in [place], [place].    Pt is on ventilator, pressors and CRRT.    Pt's sons state they have no food, nowhere to stay and no money to purchase food.    Son did state to CM that they had an uncle who was coming and could possibly transport them home.    CM ask CM , Lynnette Teixeira, to determine if we had any resources. We are able to offer a bus ride home. However, CM returned to ICU to discuss with family, and was told the uncle had come and the boys were gone.    CM to follow.     01/29/19 9063   Discharge Assessment   Assessment Type Discharge Planning Assessment   Confirmed/corrected address and phone number on facesheet? Yes   Assessment information obtained from? Medical Record;Caregiver   Prior to hospitilization cognitive status: Coma/Sedated/Intubated   Current Functional Status: Completely Dependent   Able to Return to Prior Arrangements no   Is patient able to care for self after discharge? No   Who are your caregiver(s) and their phone number(s)? Nivia Teofilo. spouse, [phone]   Patient's perception of discharge disposition other (comments)  (pending pt outcome)   Readmission Within the Last 30 Days no previous admission in last 30 days   Patient currently being followed by outpatient case management? No   Patient currently receives any other outside agency services? No   Do you have any problems affording any of your prescribed medications? TBD   Discharge Plan A Other  (pending outcome)   DME Needed Upon Discharge  none

## 2019-01-29 NOTE — H&P
[facility]  History & Physical    Patient Name: Jones Red  MRN: [id number]  Admission Date: 1/28/2019  Attending Physician: Jones Milner MD   Primary Care Provider: No primary care provider on file.         Patient information was obtained from patient, past medical records and ER records.     Subjective:     Principal Problem:Septic shock    Chief Complaint: No chief complaint on file.       HPI: Mr. Jones Red is a 53 y.o. male, with PMH of HTN, COPD, chronic respiratory failure (on home O2), Diastolic CHF, LYNDON, chronic venous insufficiency, and questionable seizures history (suspected to be 2/2 alcohol withdrawal), who preset tia to [facility] on 1/22/19 2/2 SOB. This was associated with cough productive of brown sputum. He was started in BiPap, but did ultimately degrade, and was intubated, and maintained on a ventilator. He was additionally altered upon arrival to the ED. History was reportedly obtained by a friend, and it was reported that he was found down at home, and was unable to get up. He as admitted to the ICU, started on IV fluids and pressors.     No past medical history on file.    No past surgical history on file.    Review of patient's allergies indicates:  No Known Allergies    No current facility-administered medications on file prior to encounter.      No current outpatient medications on file prior to encounter.     Family History     None        Tobacco Use    Smoking status: Current Every Day Smoker     Packs/day: 0.50     Types: Cigarettes   Substance and Sexual Activity    Alcohol use: Yes    Drug use: Not on file    Sexual activity: Not on file     Review of Systems   Unable to perform ROS: Intubated     Objective:     Vital Signs (Most Recent):  Temp: (!) 92.7 °F (33.7 °C) (01/28/19 1946)  Pulse: 88 (01/29/19 0001)  Resp: (!) 30 (01/29/19 0001)  BP: (!) 105/56 (01/28/19 2330)  SpO2: (!) 92 % (01/29/19 0001) Vital Signs  (24h Range):  Temp:  [92.7 °F (33.7 °C)-97 °F (36.1 °C)] 92.7 °F (33.7 °C)  Pulse:  [80-92] 88  Resp:  [21-48] 30  SpO2:  [90 %-100 %] 92 %  BP: ()/(52-80) 105/56     Weight: (!) 148 kg (326 lb 4.5 oz)  Body mass index is 40.78 kg/m².    Physical Exam   Constitutional: Vital signs are normal. He appears well-developed and well-nourished.  Non-toxic appearance. He does not have a sickly appearance. He does not appear ill. No distress.   Morbidly obese male.    HENT:   Head: Normocephalic and atraumatic.   Right Ear: External ear normal.   Left Ear: External ear normal.   Eyes: Conjunctivae and EOM are normal. Pupils are equal, round, and reactive to light. No scleral icterus.   Neck: Normal range of motion. Neck supple. No JVD present. No tracheal deviation present.   Cardiovascular: Normal rate, regular rhythm, normal heart sounds and intact distal pulses. Exam reveals no gallop and no friction rub.   No murmur heard.  Pulmonary/Chest: Effort normal and breath sounds normal. No stridor. No respiratory distress. He has no wheezes. He has no rales.   Examined while intubated.   LLL wheezing. Coarse breath sounds in right lung base.    Abdominal: Soft. Bowel sounds are normal. He exhibits no distension and no mass. There is no tenderness. There is no guarding.   Musculoskeletal: Normal range of motion. He exhibits no edema or deformity.   Neurological: He exhibits normal muscle tone.   Skin: Skin is warm and dry. Rash noted. He is not diaphoretic. There is erythema.   RLE is erythematous, with blistering and edema.    Psychiatric: He has a normal mood and affect. His behavior is normal. Judgment and thought content normal.   Nursing note and vitals reviewed.        CRANIAL NERVES     CN III, IV, VI   Pupils are equal, round, and reactive to light.  Extraocular motions are normal.        Significant Labs:   BMP:   Recent Labs   Lab 01/28/19 2023 01/28/19  2343   *  113*  --    *  123*  --    K 5.1   5.0  --    CL 89*  89*  --    CO2 19*  19*  --    BUN 97*  96*  --    CREATININE 12.2*  12.3*  --    CALCIUM 7.6*  7.9*  --    MG  --  2.1     CBC:   Recent Labs   Lab 01/28/19 2023   WBC 12.57   HGB 11.3*   HCT 35.6*        CMP:   Recent Labs   Lab 01/28/19 2023   *  123*   K 5.1  5.0   CL 89*  89*   CO2 19*  19*   *  113*   BUN 97*  96*   CREATININE 12.2*  12.3*   CALCIUM 7.6*  7.9*   PROT 6.5   ALBUMIN 1.8*  1.8*   BILITOT 0.5   ALKPHOS 90   AST 59*   ALT 90*   ANIONGAP 15  15   EGFRNONAA 4*  4*     Urine Culture: No results for input(s): LABURIN in the last 48 hours.  Urine Studies: No results for input(s): COLORU, APPEARANCEUA, PHUR, SPECGRAV, PROTEINUA, GLUCUA, KETONESU, BILIRUBINUA, OCCULTUA, NITRITE, UROBILINOGEN, LEUKOCYTESUR, RBCUA, WBCUA, BACTERIA, SQUAMEPITHEL, HYALINECASTS in the last 48 hours.    Invalid input(s): WRIGHTSUR  All pertinent labs within the past 24 hours have been reviewed.    Significant Imaging: I have reviewed all pertinent imaging results/findings within the past 24 hours.        EXAMINATION:  US LOWER EXTREMITY VEINS RIGHT    CLINICAL HISTORY:  rule out DVT;    TECHNIQUE:  Duplex and color flow Doppler evaluation and graded compression of the right lower extremity veins was performed.    COMPARISON:  None    FINDINGS:  Examination limited secondary to inability to compress the vessels secondary to body habitus and skin thickening.    Right thigh veins: The common femoral, femoral, popliteal, upper greater saphenous, and deep femoral veins are patent and free of thrombus. The veins have normal phasic flow and augmentation response.  There is limited visualization of the mid and distal femoral vein.    Right calf veins: The visualized calf veins are patent.  The anterior tibial vein is not visualized.    Contralateral CFV: The contralateral (left) common femoral vein is patent and free of thrombus.    Miscellaneous: None      Impression        Limited examination.  No evidence of deep venous thrombosis in the right lower extremity.      Electronically signed by: Vanita Oviedo  Date: 01/28/2019  Time: 21:10         Assessment/Plan:     * Septic shock    - Mr. Jones Red is admitted to inpatient status to the ICU  - suspect 2/2 acute on chronic respiratory failure, UTI, RLE Cellulitis, pneumonia, acute renal failure  - has been off of pressors per sending facility notes, but did require restarting pressors upon arrival  - random vanc level is 23, will order next dose for now, renally dosed   - zosyn ordered   - continue vancomycin    - monitor random level Qday @ 8am   - d/w Dr. Pollard: patient will likely need QOD dosing of vancomycin, monitor random level QD    - dosing based upon random level:     <15 then give 1 g      -15-20 500 mg    - No dose now             Acute on chronic respiratory failure    - patient intubated and on vent   - PCC consulted and following  - ABG upon arrival of:   ABG  Recent Labs   Lab 01/28/19 2006   PH 7.029*   PO2 86   PCO2 74.4*   HCO3 19.6*   BE -11          Cellulitis of right lower extremity    - continue vancomycin, dosed based upon daily random vanc level   - cultures from outside facility without growth to date   - US LE negative for DVT   - Can not obtain CT leg in this facility as patient will not fit into CT machine     Acute cystitis    - continue vancomycin  - urine culture ordered, but patient has already been on vancomycin        Rhabdomyolysis    - continue IV fluids   - daily CPK ordered   No results found for: CKTOTAL  - AST/ALT mildly elevated, monitor       Hyponatremia    - admission labs with sodium level of 123  - currently on CRRT  - [facility] consulted   - monitor AM labs      Acute on chronic diastolic heart failure    - ECHO from 1/23/19 with EF of 63%  - BNP  Recent Labs   Lab 01/28/19 2023   BNP 1,726*      - b/l LE edema, non-pitting   - monitor with dialysis      Acute renal  failure    - Nephrology consulted   - suspect multifactorial from rhabdo, dehydration, infection, sepsis  - trialysis cath placement scheduled for tomorrow  - patient started on CRRT         VTE Risk Mitigation (From admission, onward)        Ordered     heparin (porcine) injection 5,000 Units  Every 8 hours      01/28/19 2201     IP VTE HIGH RISK PATIENT  Once      01/28/19 1947             Mitzy Chapa PA-C  Department of Hospital Medicine   [facility]

## 2019-01-29 NOTE — HPI
Mr. Joens Red is a 53 y.o. male, with PMH of HTN, COPD, chronic respiratory failure (on home O2), Diastolic CHF, LYNDON, chronic venous insufficiency, and questionable seizures history (suspected to be 2/2 alcohol withdrawal), who preset tia to [facility] on 1/22/19 2/2 SOB. This was associated with cough productive of brown sputum. He was started in BiPap, but did ultimately degrade, and was intubated, and maintained on a ventilator. He was additionally altered upon arrival to the ED. History was reportedly obtained by a friend, and it was reported that he was found down at home, and was unable to get up. He as admitted to the ICU, started on IV fluids and pressors.

## 2019-01-29 NOTE — CONSULTS
Consulted for weeping blisters to right lower extremity.  Patient transferred from [facility] 1/28/19.  Patient is vented, sedated and on pressors.  The patient is on low air loss bariatric bed with HOB elevated 30 degrees due to respiratory precautions.  The entire posterior lower leg is macerated and draining serous fluid, the heel and plantar surface of the right foot is macerated. In addition there is a blister on the anterior shin which is open and weeping serous fluid.  The right dorsal foot is covered in macerated edematous tissue.  The patient wt is 148 kg.  Currently pads which pull moisture away from skin are being used for moisture management.  Volume of drainage and size of area to cover exceeds absorbant capacity and size of our available dressings. Plan to discuss options with other wound care departments within the [facility].  Posterior Right leg:    Medial/Anterior right thigh:    Medial right foot/lower leg:

## 2019-01-29 NOTE — EICU
Brief eICU admit note: notes, labs are still pending from ICU team.     53 yr old male with hx of HTN, COPD, LYNDON, on home o2 for type 2 resp failure, Diast CHF, morbid obesity, chronic venous stasis of leg admitted to [facility] on 22 nd for septic shock from cellulitis of leg, Rhabdo and encephalopathy.   Shifted here for worsening renal failure, MOF for further nephro care.     Data:  Chest x ray film seen.   Bilateral air space density catherine hilar,and basal.  ET in place.  US lower extremity  is pending. ABG 7.02/74/86/-11.  New Blood and resp cx: from ICU pending.      Camera Eval:  On /10/30/60%. P peak 38.  Morbidly obese.  In synchrony.   Art line: 107/45, MAP 65 to 70.  On low dose levophed.    A/P:  1. Sepsis from cellulitis of leg, chronic venous stasis.  - on zosyn pending cultures.  From out side hosp cultures negative so far as per notes.     2. Type 2 resp failure with metabolic acidosis from sepsis, COPD/LYNDON/morbid obesity/CHF -diastolic vs HCAP? And renal failure  - on abx.  - follow ABG closely( vent now 500/10/30/70%)      3. On VTE-sq heparin       SUP ordered.     4. Electrolyte imbalance.  Getting Mg/phosphate being replaced.     5. Leg edema: no DVT on sonogram.

## 2019-01-29 NOTE — EICU
Dialysis started.  Non violent soft bilateral wrist restraints ordered to prevent self injury and harm from agitation, pulling lines and tubes. On vent.

## 2019-01-29 NOTE — ASSESSMENT & PLAN NOTE
- ECHO from 1/23/19 with EF of 63%  - BNP  Recent Labs   Lab 01/28/19 2023   BNP 1,726*      - b/l LE edema, non-pitting   - monitor with dialysis

## 2019-01-29 NOTE — EICU
ABG notified. Has metabolic and resp acidosis with BE -10.  Getting better.  Continue same. On CRRT.  Follow ABG at 8 am.

## 2019-01-29 NOTE — SUBJECTIVE & OBJECTIVE
No past medical history on file.    No past surgical history on file.    Review of patient's allergies indicates:  No Known Allergies    No current facility-administered medications on file prior to encounter.      No current outpatient medications on file prior to encounter.     Family History     None        Tobacco Use    Smoking status: Current Every Day Smoker     Packs/day: 0.50     Types: Cigarettes   Substance and Sexual Activity    Alcohol use: Yes    Drug use: Not on file    Sexual activity: Not on file     Review of Systems   Unable to perform ROS: Intubated     Objective:     Vital Signs (Most Recent):  Temp: (!) 92.7 °F (33.7 °C) (01/28/19 1946)  Pulse: 88 (01/29/19 0001)  Resp: (!) 30 (01/29/19 0001)  BP: (!) 105/56 (01/28/19 2330)  SpO2: (!) 92 % (01/29/19 0001) Vital Signs (24h Range):  Temp:  [92.7 °F (33.7 °C)-97 °F (36.1 °C)] 92.7 °F (33.7 °C)  Pulse:  [80-92] 88  Resp:  [21-48] 30  SpO2:  [90 %-100 %] 92 %  BP: ()/(52-80) 105/56     Weight: (!) 148 kg (326 lb 4.5 oz)  Body mass index is 40.78 kg/m².    Physical Exam   Constitutional: Vital signs are normal. He appears well-developed and well-nourished.  Non-toxic appearance. He does not have a sickly appearance. He does not appear ill. No distress.   Morbidly obese male.    HENT:   Head: Normocephalic and atraumatic.   Right Ear: External ear normal.   Left Ear: External ear normal.   Eyes: Conjunctivae and EOM are normal. Pupils are equal, round, and reactive to light. No scleral icterus.   Neck: Normal range of motion. Neck supple. No JVD present. No tracheal deviation present.   Cardiovascular: Normal rate, regular rhythm, normal heart sounds and intact distal pulses. Exam reveals no gallop and no friction rub.   No murmur heard.  Pulmonary/Chest: Effort normal and breath sounds normal. No stridor. No respiratory distress. He has no wheezes. He has no rales.   Examined while intubated.   LLL wheezing. Coarse breath sounds in right  lung base.    Abdominal: Soft. Bowel sounds are normal. He exhibits no distension and no mass. There is no tenderness. There is no guarding.   Musculoskeletal: Normal range of motion. He exhibits no edema or deformity.   Neurological: He exhibits normal muscle tone.   Skin: Skin is warm and dry. Rash noted. He is not diaphoretic. There is erythema.   RLE is erythematous, with blistering and edema.    Psychiatric: He has a normal mood and affect. His behavior is normal. Judgment and thought content normal.   Nursing note and vitals reviewed.        CRANIAL NERVES     CN III, IV, VI   Pupils are equal, round, and reactive to light.  Extraocular motions are normal.        Significant Labs:   BMP:   Recent Labs   Lab 01/28/19 2023 01/28/19  2343   *  113*  --    *  123*  --    K 5.1  5.0  --    CL 89*  89*  --    CO2 19*  19*  --    BUN 97*  96*  --    CREATININE 12.2*  12.3*  --    CALCIUM 7.6*  7.9*  --    MG  --  2.1     CBC:   Recent Labs   Lab 01/28/19 2023   WBC 12.57   HGB 11.3*   HCT 35.6*        CMP:   Recent Labs   Lab 01/28/19 2023   *  123*   K 5.1  5.0   CL 89*  89*   CO2 19*  19*   *  113*   BUN 97*  96*   CREATININE 12.2*  12.3*   CALCIUM 7.6*  7.9*   PROT 6.5   ALBUMIN 1.8*  1.8*   BILITOT 0.5   ALKPHOS 90   AST 59*   ALT 90*   ANIONGAP 15  15   EGFRNONAA 4*  4*     Urine Culture: No results for input(s): LABURIN in the last 48 hours.  Urine Studies: No results for input(s): COLORU, APPEARANCEUA, PHUR, SPECGRAV, PROTEINUA, GLUCUA, KETONESU, BILIRUBINUA, OCCULTUA, NITRITE, UROBILINOGEN, LEUKOCYTESUR, RBCUA, WBCUA, BACTERIA, SQUAMEPITHEL, HYALINECASTS in the last 48 hours.    Invalid input(s): WRIGHTSUR  All pertinent labs within the past 24 hours have been reviewed.    Significant Imaging: I have reviewed all pertinent imaging results/findings within the past 24 hours.        EXAMINATION:  US LOWER EXTREMITY VEINS RIGHT    CLINICAL HISTORY:  rule out  DVT;    TECHNIQUE:  Duplex and color flow Doppler evaluation and graded compression of the right lower extremity veins was performed.    COMPARISON:  None    FINDINGS:  Examination limited secondary to inability to compress the vessels secondary to body habitus and skin thickening.    Right thigh veins: The common femoral, femoral, popliteal, upper greater saphenous, and deep femoral veins are patent and free of thrombus. The veins have normal phasic flow and augmentation response.  There is limited visualization of the mid and distal femoral vein.    Right calf veins: The visualized calf veins are patent.  The anterior tibial vein is not visualized.    Contralateral CFV: The contralateral (left) common femoral vein is patent and free of thrombus.    Miscellaneous: None      Impression       Limited examination.  No evidence of deep venous thrombosis in the right lower extremity.      Electronically signed by: Vanita Oviedo  Date: 01/28/2019  Time: 21:10

## 2019-01-29 NOTE — EICU
Bedside nurse called for a order for more concentrated levo drip to 32 mg/bag. Informed Dr. Alexandre

## 2019-01-29 NOTE — CONSULTS
"Consult Note  Nephrology    Consult Requested By: Selwyn So MD  Reason for Consult: ATN    SUBJECTIVE:     History of Present Illness:  Patient is a 53 y.o. male presents with transfer from [facility] for renal failure evaluation and in need of CRRT.  Pt has had an extensive stay at [facility] with septic shock, rhabdo, worsening renal failure, pressors, hyperkalemia, vented, etc.  Transferred overnight and line placed by [facility].  CRRT started and clotted off earlier this am.      Pt seen and examined.  Updated family and treatment team.      Currently sedated, vented, and on pressors.    [facility] records reviewed.       Report from Physician/Mid-Level Provider:  Mr. Red is a 54yo man with a past medical history of morbid obesity and chronic venous stasis of the legs.  On presentation to the ED he was complaining of shortness of breath, cough and brown sputum.  He was altered at the time and placed on BiPAP, so a friend provided most of the history.  He was apparently found down at home and was too weak to even get up.      He was admitted to [facility] on 1/22/19 with septic shock from a right leg cellulitis, rhabdomyolysis, and metabolic encephalopathy.  He was originally placed on pressors (Levophed and Neosynephrine), and intubated the day after admission.  He had been weaned off of all pressor agents and is stable for transfer, though he remains intubated.  He was placed on antibiotic therapy with Zosyn and Vancomycin.  His WBC has decreased, though his leg is, "not really looking that much better."  Of note, he is morbidly obese with chronic woody edema of both legs at baseline.  There is now some blister formation to the RLE, but no crepitus on exam.  No CT of the leg or US doppler has been performed for evaluation of necrotizing fasciitis or DVT.  This far, all of his blood and urine cultures have remained NGTD.       Several days into his ICU stay, he was maxed out " on all of his pressor agents.  Subsequent to this his Cr began to rise and has progressed to fulminant kidney failure with a current creatinine of eleven.  [facility] has no Nephrology services or ability to dialyze, so they need a higher level of care for this.  Last week his K+ josefina to 7, so he has been given intermittent Kayexalate and shifted, now staying steady around five.          Past Medical History:   Diagnosis Date    Obesity     LYNDON (obstructive sleep apnea)     Tobacco abuse     Venous stasis      No past surgical history on file.  No family history on file.  Social History     Tobacco Use    Smoking status: Current Every Day Smoker     Packs/day: 0.50     Types: Cigarettes   Substance Use Topics    Alcohol use: Yes    Drug use: Not on file       Review of patient's allergies indicates:  No Known Allergies     Review of Systems:    unable    OBJECTIVE:     Vital Signs (Most Recent)  Temp: 96.3 °F (35.7 °C) (01/29/19 0302)  Pulse: 87 (01/29/19 0724)  Resp: (!) 30 (01/29/19 0724)  BP: (!) 122/53 (01/29/19 0556)  SpO2: (!) 93 % (01/29/19 0724)    Vital Signs Range (Last 24H):  Temp:  [92.7 °F (33.7 °C)-97 °F (36.1 °C)]   Pulse:  [80-94]   Resp:  [21-48]   BP: ()/(49-80)   SpO2:  [90 %-100 %]       Intake/Output Summary (Last 24 hours) at 1/29/2019 0911  Last data filed at 1/29/2019 0600  Gross per 24 hour   Intake 396.12 ml   Output 421 ml   Net -24.88 ml       Physical Exam:  General appearance:  Disheveled and morbidly obese   Eyes:  Conjunctivae/corneas clear. PERRL.  Lungs:  Vented with rhonchi throughout  Heart: Regular rate and rhythm, distant heart tones.  Abdomen: Soft, non-tender non-distended; bowel sounds normal; no masses,  no organomegaly, OGT, morbidly obese  Extremities:  Chronic Woody edema.    Skin:  Right lower extremity cellulitis  Neurologic:  Sedated  Abdalla catheterization  Right IJ Trialysis       Laboratory:  Recent Labs   Lab 01/29/19  0348   WBC 12.88*   RBC 3.93*    HGB 11.1*   HCT 34.8*      MCV 89   MCH 28.2   MCHC 31.9*     BMP:   Recent Labs   Lab 01/29/19  0348 01/29/19  0818   *  122*  --    *  125*  --    K 4.8  4.8  --    CL 91*  91*  --    CO2 18*  18*  --    BUN 94*  94*  --    CREATININE 10.8*  10.8*  --    CALCIUM 7.6*  7.6*  --    MG  --  2.0     Lab Results   Component Value Date    CALCIUM 7.6 (L) 01/29/2019    CALCIUM 7.6 (L) 01/29/2019    PHOS 8.5 (H) 01/29/2019    PHOS 8.5 (H) 01/29/2019     BNP  Recent Labs   Lab 01/28/19 2023   BNP 1,726*   No results found for: URICACIDNo results found for: IRON, TIBC, FERRITIN, SATURATEDIRO  Lab Results   Component Value Date    CALCIUM 7.6 (L) 01/29/2019    CALCIUM 7.6 (L) 01/29/2019    PHOS 8.5 (H) 01/29/2019    PHOS 8.5 (H) 01/29/2019       Diagnostic Results:  X-Ray Chest AP Portable   Final Result   Addendum 1 of 1      Enteric tube projects in unchanged radiographic position with tip coursing    below the diaphragm and appearing to extend beyond the field of view.         Electronically signed by: Trena Sena MD   Date:    01/29/2019   Time:    00:39      Final      As above.         Electronically signed by: Trena Sena MD   Date:    01/29/2019   Time:    00:04      US Lower Extremity Veins Right   Final Result      Limited examination.  No evidence of deep venous thrombosis in the right lower extremity.         Electronically signed by: Vanita Oviedo   Date:    01/28/2019   Time:    21:10      X-Ray Chest AP Portable   Final Result      Cardiomegaly with findings suggesting pulmonary edema, correlation advised.  Differential would include infection.         Electronically signed by: Kalyan Galvez MD   Date:    01/28/2019   Time:    20:19      X-Ray Abdomen AP 1 View    (Results Pending)       ASSESSMENT/PLAN:     1. Multifactorial anuric acute renal failure secondary to ATN from septic shock, continued vasopressors, rhabdomyolysis, hyponatremia, and hyperkalemia (N17.0, N17.9,  E87.5, E87.1, M62.82, A41.9):  Events noted at outlying facility.  Transferred for CRRT.  Line placed and started last night but having issues with clotting. Will use pre-filter Heparin drip at 1000un/hr and monitor trends.  Use 4K bath and try gentle UF as tolerated.  Avoid Vanc/Zosyn/PPI as can cause AIN.  Discussed with team. May need new access if above not successful.  Follow along. Correct electrolytes with CRRT bath.  Renally dose meds, avoid nephrotoxins, and monitor I/O's closely.  2. Septic shock and resp failure:  Vented and pressors per CCT.    3. RLE Cellulitis/woody edema (L03.115):  No IV contrast please.  Defer to wound care and ID.  4. Morbid obesity (E44):  Most of his issues caused by this.        Thanks for consult  See above  >90 min CCT.    Pt was seen during hemodialysis today, dialysis flowsheet, labs, vitals were reviewed.  On HD for removal of uremic toxins and volume.  Labs have been reviewed and the dialysate bath has been adjusted if needed. (See orders).  There are no symptoms of chest pain, dyspnea, nausea or vomiting.  Continue daily CRRT

## 2019-01-29 NOTE — NURSING
Patient on CRRT overnight since 0041.  Progressing.  Labs slightly improved.  On 25 mcg/kg/min Propofol and Levo at 0.03 mcg/kg/min.  CRRT line clotted at this time.  Will page nephro.  VSS.  Monitoring continues.

## 2019-01-29 NOTE — CONSULTS
Consult Note  Pulmonary Medicine    SUBJECTIVE     Reason for consult:     History of Present Illness  [facility] is consulted for ventilator management in this 53-yo male transferred this evening from [facility], where he was admitted on 1/22 after presenting to ED, complaining of shortness of breath, cough and brown sputum. Per records: He was apparently found down at home and was too weak to even get up. He was altered in ED and placed on BiPAP. Diagnoses included septic shock from right leg cellulitis, rhabdomyolysis, and metabolic encephalopathy. He was started on BSABX, but worsened and required pressors and mechanical ventilation. He improved and came off pressors, but became anuric and had high potassium requiring repeated shifting. He has been transferred here for evaluation for dialysis and further management.     No past medical history on file.    No past surgical history on file.    No family history on file.    Social History     Socioeconomic History    Marital status:      Spouse name: Not on file    Number of children: Not on file    Years of education: Not on file    Highest education level: Not on file   Social Needs    Financial resource strain: Not on file    Food insecurity - worry: Not on file    Food insecurity - inability: Not on file    Transportation needs - medical: Not on file    Transportation needs - non-medical: Not on file   Occupational History    Not on file   Tobacco Use    Smoking status: Current Every Day Smoker     Packs/day: 0.50     Types: Cigarettes   Substance and Sexual Activity    Alcohol use: Yes    Drug use: Not on file    Sexual activity: Not on file   Other Topics Concern    Not on file   Social History Narrative    Not on file       Current Facility-Administered Medications   Medication Dose Route Frequency Provider Last Rate Last Dose    [START ON 1/29/2019] albuterol-ipratropium 2.5 mg-0.5 mg/3 mL nebulizer solution 3 mL  3 mL  Nebulization Q4H Trace Walker MD        fentaNYL (SUBLIMAZE) 50 mcg/mL injection             fentaNYL injection 100 mcg  100 mcg Intravenous Once Addie Santiago MD        fentaNYL injection 100 mcg  100 mcg Intravenous Once Addie Santiago MD        fentaNYL injection 50 mcg  50 mcg Intravenous Q1H PRN Addie Santiago MD        [START ON 1/29/2019] heparin (porcine) injection 5,000 Units  5,000 Units Subcutaneous Q8H Addie Santiago MD        magnesium sulfate 2g in water 50mL IVPB (premix)  2 g Intravenous PRN Jose Pollard Jr., MD        norepinephrine 1 mg/mL injection             norepinephrine 32 mg in dextrose 5 % 250 mL infusion  0.02 mcg/kg/min Intravenous Continuous Scott Alexandre MD 1.2 mL/hr at 01/28/19 2349 0.02 mcg/kg/min at 01/28/19 2349    pantoprazole injection 40 mg  40 mg Intravenous Daily Scott Alexandre MD        piperacillin-tazobactam 4.5 g in dextrose 5 % 100 mL IVPB (ready to mix system)  4.5 g Intravenous Q12H Mitzy Chapa PA-C 25 mL/hr at 01/28/19 2110 4.5 g at 01/28/19 2110    propofol (DIPRIVAN) 10 mg/mL infusion  5 mcg/kg/min Intravenous Continuous Addie Santiago MD        propofol (DIPRIVAN) 10 mg/mL infusion             sodium chloride 0.9% flush 3 mL  3 mL Intravenous PRN Mitzy Chapa PA-C        sodium phosphate 20.01 mmol in dextrose 5 % 250 mL IVPB  20.01 mmol Intravenous PRN Jose Pollard Jr., MD        sodium phosphate 30 mmol in dextrose 5 % 250 mL IVPB  30 mmol Intravenous PRN Jose Pollard Jr., MD        sodium phosphate 39.99 mmol in dextrose 5 % 250 mL IVPB  39.99 mmol Intravenous PRN Jose Pollard Jr., MD           Review of patient's allergies indicates:  No Known Allergies      Review of Systems  As above. Additional ROS not obtained as Pt is intubated and sedated.         OBJECTIVE     Vitals:    01/28/19 2341   BP:    Pulse: 92   Resp: (!) 30   Temp:        Physical Exam  Constitutional: Patient is intubated and  sedated. No distress.   HENT: Head: Normocephalic and atraumatic. Right Ear: External ear normal. Left Ear: External ear normal. Nose: Nose normal.   Mouth/Throat: Oropharynx is clear and moist. No oropharyngeal exudate.   Eyes: Conjunctivae and EOM are normal. Right eye exhibits no discharge. Left eye exhibits no discharge. No scleral icterus.   Neck: Normal range of motion. Neck supple. No JVD present. No tracheal deviation present. No thyromegaly present.   Cardiovascular: Tachycardic, regular rhythm, normal heart sounds and intact distal pulses.  Exam reveals no gallop and no friction rub.  No murmur heard.  Pulmonary/Chest: No distress. Rate increased. Patient has no wheezes. Patient has bibasilar rales.   Abdominal: Soft. Bowel sounds are normal.   Musculoskeletal: Normal range of motion. Patient exhibits no edema or tenderness.   Skin: Skin is warm and dry. No rash noted. Patient is not diaphoretic. No erythema. No pallor.   Psychiatric: Patient has a normal mood and affect. Behavior is normal. Judgment and thought content normal.       Laboratory  Recent Labs   Lab 01/28/19 2023   INR 1.0       Recent Labs   Lab 01/28/19 2023   WBC 12.57   HGB 11.3*   HCT 35.6*          Recent Labs   Lab 01/28/19 2023   *  123*   K 5.1  5.0   CL 89*  89*   CO2 19*  19*   BUN 97*  96*   CREATININE 12.2*  12.3*   CALCIUM 7.6*  7.9*   PROT 6.5   BILITOT 0.5   ALKPHOS 90   ALT 90*   AST 59*     Recent Labs   Lab 01/28/19  2337   PH 7.067*   PCO2 65.6*   PO2 84   HCO3 18.9*   POCSATURATED 90*   BE -11       Diagnostic Results:  Reviewed            Assessment / Recommendations     Neuro  - no acute issues reported  - sedated for MV  - cont propofol and fentanyl during weaning    CVS  - currently fairly stable, but requiring low-dose Levo, possibly due to sedation  - obtain echo    Resp  - reported PNA, on BSABX  - cont MV, settings increased to aid with ventilation in setting of severe acidemia  - Pt is  apparently a heavy smoker; cont Duo Nebs and monitor mechanics    Renal  - NICK with anuria  - Renal consulted for HD  - trialysis placement now    GIT  - not currently receiving nutrition; consult     ID  - cellulitis and pneumonia reported  - started on Zosyn and vanc at OSH; cont these  - repeat cultures    Full code  PPx for STOKES and VTE per primary=    Thank you for this consult. Pulmonary will continue to follow.     Trace Walker MD  Fellow, Pulmonary & Critical Care Medicine

## 2019-01-29 NOTE — ASSESSMENT & PLAN NOTE
- continue IV fluids   - daily CPK ordered   No results found for: CKTOTAL  - AST/ALT mildly elevated, monitor

## 2019-01-30 LAB
ALBUMIN SERPL BCP-MCNC: 1.8 G/DL
ALLENS TEST: ABNORMAL
ALLENS TEST: NORMAL
ANION GAP SERPL CALC-SCNC: 14 MMOL/L
ANION GAP SERPL CALC-SCNC: 14 MMOL/L
ANISOCYTOSIS BLD QL SMEAR: SLIGHT
AORTIC ROOT ANNULUS: 3.07 CM
AORTIC VALVE CUSP SEPERATION: 2.48 CM
APTT BLDCRRT: 37.9 SEC
ASCENDING AORTA: 3.53 CM
AV INDEX (PROSTH): 0.8
AV MEAN GRADIENT: 9.66 MMHG
AV PEAK GRADIENT: 18.49 MMHG
AV VALVE AREA: 3.58 CM2
AV VELOCITY RATIO: 0.74
BASOPHILS # BLD AUTO: ABNORMAL K/UL
BASOPHILS NFR BLD: 0 %
BSA FOR ECHO PROCEDURE: 2.65 M2
BUN SERPL-MCNC: 79 MG/DL
BUN SERPL-MCNC: 79 MG/DL
CALCIUM SERPL-MCNC: 7.9 MG/DL
CALCIUM SERPL-MCNC: 7.9 MG/DL
CHLORIDE SERPL-SCNC: 95 MMOL/L
CHLORIDE SERPL-SCNC: 95 MMOL/L
CK SERPL-CCNC: 358 U/L
CO2 SERPL-SCNC: 20 MMOL/L
CO2 SERPL-SCNC: 20 MMOL/L
CREAT SERPL-MCNC: 8.3 MG/DL
CREAT SERPL-MCNC: 8.3 MG/DL
CV ECHO LV RWT: 0.48 CM
DELSYS: ABNORMAL
DELSYS: NORMAL
DIFFERENTIAL METHOD: ABNORMAL
DOP CALC AO PEAK VEL: 2.15 M/S
DOP CALC AO VTI: 43.11 CM
DOP CALC LVOT AREA: 4.48 CM2
DOP CALC LVOT DIAMETER: 2.39 CM
DOP CALC LVOT PEAK VEL: 1.6 M/S
DOP CALC LVOT STROKE VOLUME: 154.25 CM3
DOP CALCLVOT PEAK VEL VTI: 34.4 CM
E WAVE DECELERATION TIME: 194.72 MSEC
E/A RATIO: 1.51
E/E' RATIO: 8.91
ECHO LV POSTERIOR WALL: 1.2 CM (ref 0.6–1.1)
EOSINOPHIL # BLD AUTO: ABNORMAL K/UL
EOSINOPHIL NFR BLD: 0 %
ERYTHROCYTE [DISTWIDTH] IN BLOOD BY AUTOMATED COUNT: 14.3 %
ERYTHROCYTE [SEDIMENTATION RATE] IN BLOOD BY WESTERGREN METHOD: 34 MM/H
ERYTHROCYTE [SEDIMENTATION RATE] IN BLOOD BY WESTERGREN METHOD: 34 MM/H
EST. GFR  (AFRICAN AMERICAN): 8 ML/MIN/1.73 M^2
EST. GFR  (AFRICAN AMERICAN): 8 ML/MIN/1.73 M^2
EST. GFR  (NON AFRICAN AMERICAN): 7 ML/MIN/1.73 M^2
EST. GFR  (NON AFRICAN AMERICAN): 7 ML/MIN/1.73 M^2
FIO2: 50
FIO2: 70
FRACTIONAL SHORTENING: 27 % (ref 28–44)
GIANT PLATELETS BLD QL SMEAR: PRESENT
GLUCOSE SERPL-MCNC: 97 MG/DL
GLUCOSE SERPL-MCNC: 97 MG/DL
HAV IGM SERPL QL IA: NEGATIVE
HBV CORE AB SERPL QL IA: NEGATIVE
HBV CORE IGM SERPL QL IA: NEGATIVE
HBV SURFACE AB SER-ACNC: NEGATIVE M[IU]/ML
HBV SURFACE AG SERPL QL IA: NEGATIVE
HCO3 UR-SCNC: 21.2 MMOL/L (ref 24–28)
HCO3 UR-SCNC: 24.4 MMOL/L (ref 24–28)
HCT VFR BLD AUTO: 30.7 %
HCV AB SERPL QL IA: NEGATIVE
HGB BLD-MCNC: 10.3 G/DL
INTERVENTRICULAR SEPTUM: 1.2 CM (ref 0.6–1.1)
IT: 0.58
IVRT: 0.11 MSEC
LA MAJOR: 6.67 CM
LA MINOR: 6.27 CM
LA WIDTH: 4.81 CM
LEFT ATRIUM SIZE: 4.71 CM
LEFT ATRIUM VOLUME INDEX: 39.5 ML/M2
LEFT ATRIUM VOLUME: 124.47 CM3
LEFT INTERNAL DIMENSION IN SYSTOLE: 3.65 CM (ref 2.1–4)
LEFT VENTRICLE DIASTOLIC VOLUME INDEX: 37.03 ML/M2
LEFT VENTRICLE DIASTOLIC VOLUME: 116.55 ML
LEFT VENTRICLE MASS INDEX: 73.6 G/M2
LEFT VENTRICLE SYSTOLIC VOLUME INDEX: 17.9 ML/M2
LEFT VENTRICLE SYSTOLIC VOLUME: 56.44 ML
LEFT VENTRICULAR INTERNAL DIMENSION IN DIASTOLE: 4.97 CM (ref 3.5–6)
LEFT VENTRICULAR MASS: 231.52 G
LV LATERAL E/E' RATIO: 8.91
LV SEPTAL E/E' RATIO: 8.91
LYMPHOCYTES # BLD AUTO: ABNORMAL K/UL
LYMPHOCYTES NFR BLD: 2 %
MAP: 23
MCH RBC QN AUTO: 28.5 PG
MCHC RBC AUTO-ENTMCNC: 33.6 G/DL
MCV RBC AUTO: 85 FL
MODE: ABNORMAL
MODE: NORMAL
MONOCYTES # BLD AUTO: ABNORMAL K/UL
MONOCYTES NFR BLD: 10 %
MV PEAK A VEL: 0.65 M/S
MV PEAK E VEL: 0.98 M/S
NEUTROPHILS # BLD AUTO: ABNORMAL K/UL
NEUTROPHILS NFR BLD: 86 %
NEUTS BAND NFR BLD MANUAL: 2 %
PCO2 BLDA: 43.6 MMHG (ref 35–45)
PCO2 BLDA: 52.8 MMHG (ref 35–45)
PEEP: 10
PEEP: 8
PH SMN: 7.21 [PH] (ref 7.35–7.45)
PH SMN: 7.36 [PH] (ref 7.35–7.45)
PHOSPHATE SERPL-MCNC: 5.8 MG/DL
PHOSPHATE SERPL-MCNC: 5.8 MG/DL
PIP: 54
PLATELET # BLD AUTO: 205 K/UL
PLATELET BLD QL SMEAR: ABNORMAL
PMV BLD AUTO: 11.1 FL
PO2 BLDA: 107 MMHG (ref 80–100)
PO2 BLDA: 89 MMHG (ref 80–100)
POC BE: -1 MMOL/L
POC BE: -7 MMOL/L
POC SATURATED O2: 96 % (ref 95–100)
POC SATURATED O2: 97 % (ref 95–100)
POTASSIUM SERPL-SCNC: 4.1 MMOL/L
POTASSIUM SERPL-SCNC: 4.1 MMOL/L
PTH-INTACT SERPL-MCNC: 340.4 PG/ML
PULM VEIN S/D RATIO: 1.19
PV PEAK D VEL: 0.48 M/S
PV PEAK S VEL: 0.57 M/S
PV PEAK VELOCITY: 1.19 CM/S
RA MAJOR: 5.79 CM
RA PRESSURE: 3 MMHG
RA WIDTH: 5.07 CM
RBC # BLD AUTO: 3.62 M/UL
RIGHT VENTRICULAR END-DIASTOLIC DIMENSION: 4.27 CM
RV TISSUE DOPPLER FREE WALL SYSTOLIC VELOCITY 1 (APICAL 4 CHAMBER VIEW): 15.94 M/S
SAMPLE: ABNORMAL
SAMPLE: NORMAL
SINUS: 2.67 CM
SITE: ABNORMAL
SITE: NORMAL
SODIUM SERPL-SCNC: 129 MMOL/L
SODIUM SERPL-SCNC: 129 MMOL/L
SP02: 93
SP02: 98
STJ: 2.67 CM
TDI LATERAL: 0.11
TDI SEPTAL: 0.11
TDI: 0.11
TRICUSPID ANNULAR PLANE SYSTOLIC EXCURSION: 2.63 CM
VANCOMYCIN SERPL-MCNC: 13.7 UG/ML
VT: 500
VT: 520
WBC # BLD AUTO: 10.25 K/UL

## 2019-01-30 PROCEDURE — 25000003 PHARM REV CODE 250: Performed by: HOSPITALIST

## 2019-01-30 PROCEDURE — 99233 PR SUBSEQUENT HOSPITAL CARE,LEVL III: ICD-10-PCS | Mod: ,,, | Performed by: HOSPITALIST

## 2019-01-30 PROCEDURE — 80069 RENAL FUNCTION PANEL: CPT

## 2019-01-30 PROCEDURE — 99900026 HC AIRWAY MAINTENANCE (STAT)

## 2019-01-30 PROCEDURE — 99233 PR SUBSEQUENT HOSPITAL CARE,LEVL III: ICD-10-PCS | Mod: ,,, | Performed by: INTERNAL MEDICINE

## 2019-01-30 PROCEDURE — 85027 COMPLETE CBC AUTOMATED: CPT

## 2019-01-30 PROCEDURE — 63600175 PHARM REV CODE 636 W HCPCS: Performed by: INTERNAL MEDICINE

## 2019-01-30 PROCEDURE — 83970 ASSAY OF PARATHORMONE: CPT

## 2019-01-30 PROCEDURE — 25000003 PHARM REV CODE 250: Performed by: STUDENT IN AN ORGANIZED HEALTH CARE EDUCATION/TRAINING PROGRAM

## 2019-01-30 PROCEDURE — 82803 BLOOD GASES ANY COMBINATION: CPT

## 2019-01-30 PROCEDURE — 94640 AIRWAY INHALATION TREATMENT: CPT

## 2019-01-30 PROCEDURE — 82652 VIT D 1 25-DIHYDROXY: CPT

## 2019-01-30 PROCEDURE — 99900035 HC TECH TIME PER 15 MIN (STAT)

## 2019-01-30 PROCEDURE — 80074 ACUTE HEPATITIS PANEL: CPT

## 2019-01-30 PROCEDURE — 90935 HEMODIALYSIS ONE EVALUATION: CPT

## 2019-01-30 PROCEDURE — 99233 SBSQ HOSP IP/OBS HIGH 50: CPT | Mod: ,,, | Performed by: HOSPITALIST

## 2019-01-30 PROCEDURE — 20000000 HC ICU ROOM

## 2019-01-30 PROCEDURE — 85007 BL SMEAR W/DIFF WBC COUNT: CPT

## 2019-01-30 PROCEDURE — 94644 CONT INHLJ TX 1ST HOUR: CPT

## 2019-01-30 PROCEDURE — 63600175 PHARM REV CODE 636 W HCPCS: Performed by: NURSE PRACTITIONER

## 2019-01-30 PROCEDURE — 63600175 PHARM REV CODE 636 W HCPCS: Performed by: STUDENT IN AN ORGANIZED HEALTH CARE EDUCATION/TRAINING PROGRAM

## 2019-01-30 PROCEDURE — 82550 ASSAY OF CK (CPK): CPT

## 2019-01-30 PROCEDURE — 36415 COLL VENOUS BLD VENIPUNCTURE: CPT

## 2019-01-30 PROCEDURE — 86706 HEP B SURFACE ANTIBODY: CPT

## 2019-01-30 PROCEDURE — 94761 N-INVAS EAR/PLS OXIMETRY MLT: CPT

## 2019-01-30 PROCEDURE — 25000242 PHARM REV CODE 250 ALT 637 W/ HCPCS: Performed by: HOSPITALIST

## 2019-01-30 PROCEDURE — 37799 UNLISTED PX VASCULAR SURGERY: CPT

## 2019-01-30 PROCEDURE — S0077 INJECTION, CLINDAMYCIN PHOSP: HCPCS | Performed by: STUDENT IN AN ORGANIZED HEALTH CARE EDUCATION/TRAINING PROGRAM

## 2019-01-30 PROCEDURE — 25000003 PHARM REV CODE 250: Performed by: INTERNAL MEDICINE

## 2019-01-30 PROCEDURE — 86704 HEP B CORE ANTIBODY TOTAL: CPT

## 2019-01-30 PROCEDURE — 85730 THROMBOPLASTIN TIME PARTIAL: CPT

## 2019-01-30 PROCEDURE — S0028 INJECTION, FAMOTIDINE, 20 MG: HCPCS | Performed by: HOSPITALIST

## 2019-01-30 PROCEDURE — 99233 SBSQ HOSP IP/OBS HIGH 50: CPT | Mod: ,,, | Performed by: INTERNAL MEDICINE

## 2019-01-30 PROCEDURE — 80202 ASSAY OF VANCOMYCIN: CPT

## 2019-01-30 PROCEDURE — 94003 VENT MGMT INPAT SUBQ DAY: CPT

## 2019-01-30 RX ORDER — PARICALCITOL 5 UG/ML
2 INJECTION, SOLUTION INTRAVENOUS
Status: DISCONTINUED | OUTPATIENT
Start: 2019-01-30 | End: 2019-02-04

## 2019-01-30 RX ORDER — ALLOPURINOL 100 MG/1
100 TABLET ORAL 2 TIMES DAILY
Status: ON HOLD | COMMUNITY
End: 2019-02-25 | Stop reason: HOSPADM

## 2019-01-30 RX ORDER — METOLAZONE 5 MG/1
5 TABLET ORAL DAILY
Status: ON HOLD | COMMUNITY
End: 2019-02-25 | Stop reason: HOSPADM

## 2019-01-30 RX ORDER — ALBUTEROL SULFATE 90 UG/1
2 AEROSOL, METERED RESPIRATORY (INHALATION) EVERY 4 HOURS PRN
COMMUNITY

## 2019-01-30 RX ORDER — CEFEPIME HYDROCHLORIDE 1 G/50ML
1 INJECTION, SOLUTION INTRAVENOUS
Status: COMPLETED | OUTPATIENT
Start: 2019-01-31 | End: 2019-02-11

## 2019-01-30 RX ORDER — SODIUM CHLORIDE 9 MG/ML
INJECTION, SOLUTION INTRAVENOUS ONCE
Status: DISCONTINUED | OUTPATIENT
Start: 2019-01-30 | End: 2019-02-01

## 2019-01-30 RX ORDER — MONTELUKAST SODIUM 10 MG/1
10 TABLET ORAL DAILY
Status: ON HOLD | COMMUNITY
End: 2019-02-25 | Stop reason: HOSPADM

## 2019-01-30 RX ORDER — CARVEDILOL 25 MG/1
25 TABLET ORAL 2 TIMES DAILY WITH MEALS
Status: ON HOLD | COMMUNITY
End: 2019-02-25 | Stop reason: HOSPADM

## 2019-01-30 RX ORDER — HEPARIN SODIUM 1000 [USP'U]/ML
2000 INJECTION, SOLUTION INTRAVENOUS; SUBCUTANEOUS
Status: DISCONTINUED | OUTPATIENT
Start: 2019-01-30 | End: 2019-02-25 | Stop reason: HOSPADM

## 2019-01-30 RX ORDER — LOSARTAN POTASSIUM AND HYDROCHLOROTHIAZIDE 25; 100 MG/1; MG/1
1 TABLET ORAL DAILY
Status: ON HOLD | COMMUNITY
End: 2019-02-25 | Stop reason: HOSPADM

## 2019-01-30 RX ORDER — TRAZODONE HYDROCHLORIDE 100 MG/1
100 TABLET ORAL NIGHTLY
Status: ON HOLD | COMMUNITY
End: 2019-02-25 | Stop reason: HOSPADM

## 2019-01-30 RX ORDER — BUMETANIDE 1 MG/1
1 TABLET ORAL DAILY
Status: ON HOLD | COMMUNITY
End: 2019-02-25 | Stop reason: HOSPADM

## 2019-01-30 RX ORDER — ALBUTEROL SULFATE 0.83 MG/ML
15 SOLUTION RESPIRATORY (INHALATION) ONCE
Status: COMPLETED | OUTPATIENT
Start: 2019-01-31 | End: 2019-01-30

## 2019-01-30 RX ORDER — POTASSIUM CHLORIDE 750 MG/1
10 TABLET, EXTENDED RELEASE ORAL DAILY
Status: ON HOLD | COMMUNITY
End: 2019-02-25 | Stop reason: HOSPADM

## 2019-01-30 RX ORDER — PANTOPRAZOLE SODIUM 40 MG/1
40 TABLET, DELAYED RELEASE ORAL DAILY
Status: ON HOLD | COMMUNITY
End: 2019-02-25 | Stop reason: HOSPADM

## 2019-01-30 RX ADMIN — PROPOFOL 35 MCG/KG/MIN: 10 INJECTION, EMULSION INTRAVENOUS at 09:01

## 2019-01-30 RX ADMIN — FAMOTIDINE 20 MG: 10 INJECTION, SOLUTION INTRAVENOUS at 09:01

## 2019-01-30 RX ADMIN — Medication 100 MG: at 09:01

## 2019-01-30 RX ADMIN — IPRATROPIUM BROMIDE AND ALBUTEROL SULFATE 3 ML: .5; 3 SOLUTION RESPIRATORY (INHALATION) at 11:01

## 2019-01-30 RX ADMIN — PROPOFOL 25 MCG/KG/MIN: 10 INJECTION, EMULSION INTRAVENOUS at 01:01

## 2019-01-30 RX ADMIN — PROPOFOL 35 MCG/KG/MIN: 10 INJECTION, EMULSION INTRAVENOUS at 03:01

## 2019-01-30 RX ADMIN — CHLORHEXIDINE GLUCONATE 15 ML: 1.2 RINSE ORAL at 08:01

## 2019-01-30 RX ADMIN — PROPOFOL 25 MCG/KG/MIN: 10 INJECTION, EMULSION INTRAVENOUS at 06:01

## 2019-01-30 RX ADMIN — FOLIC ACID 1 MG: 1 TABLET ORAL at 09:01

## 2019-01-30 RX ADMIN — CLINDAMYCIN IN 5 PERCENT DEXTROSE 900 MG: 18 INJECTION, SOLUTION INTRAVENOUS at 11:01

## 2019-01-30 RX ADMIN — IPRATROPIUM BROMIDE AND ALBUTEROL SULFATE 3 ML: .5; 3 SOLUTION RESPIRATORY (INHALATION) at 07:01

## 2019-01-30 RX ADMIN — HEPARIN SODIUM 5000 UNITS: 5000 INJECTION, SOLUTION INTRAVENOUS; SUBCUTANEOUS at 06:01

## 2019-01-30 RX ADMIN — HEPARIN SODIUM 5000 UNITS: 5000 INJECTION, SOLUTION INTRAVENOUS; SUBCUTANEOUS at 01:01

## 2019-01-30 RX ADMIN — CLINDAMYCIN IN 5 PERCENT DEXTROSE 900 MG: 18 INJECTION, SOLUTION INTRAVENOUS at 02:01

## 2019-01-30 RX ADMIN — HEPARIN SODIUM 5000 UNITS: 5000 INJECTION, SOLUTION INTRAVENOUS; SUBCUTANEOUS at 09:01

## 2019-01-30 RX ADMIN — IPRATROPIUM BROMIDE AND ALBUTEROL SULFATE 3 ML: .5; 3 SOLUTION RESPIRATORY (INHALATION) at 10:01

## 2019-01-30 RX ADMIN — CHLORHEXIDINE GLUCONATE 15 ML: 1.2 RINSE ORAL at 09:01

## 2019-01-30 RX ADMIN — IPRATROPIUM BROMIDE AND ALBUTEROL SULFATE 3 ML: .5; 3 SOLUTION RESPIRATORY (INHALATION) at 03:01

## 2019-01-30 RX ADMIN — HEPARIN SODIUM 5000 UNITS: 5000 INJECTION, SOLUTION INTRAVENOUS; SUBCUTANEOUS at 05:01

## 2019-01-30 RX ADMIN — PROPOFOL 35 MCG/KG/MIN: 10 INJECTION, EMULSION INTRAVENOUS at 07:01

## 2019-01-30 RX ADMIN — PROPOFOL 45 MCG/KG/MIN: 10 INJECTION, EMULSION INTRAVENOUS at 11:01

## 2019-01-30 RX ADMIN — PROPOFOL 25 MCG/KG/MIN: 10 INJECTION, EMULSION INTRAVENOUS at 09:01

## 2019-01-30 RX ADMIN — PARICALCITOL 2 MCG: 5 INJECTION, SOLUTION INTRAVENOUS at 09:01

## 2019-01-30 RX ADMIN — PROPOFOL 35 MCG/KG/MIN: 10 INJECTION, EMULSION INTRAVENOUS at 05:01

## 2019-01-30 RX ADMIN — PROPOFOL 25 MCG/KG/MIN: 10 INJECTION, EMULSION INTRAVENOUS at 03:01

## 2019-01-30 RX ADMIN — CEFEPIME HYDROCHLORIDE 1 G: 1 INJECTION, SOLUTION INTRAVENOUS at 02:01

## 2019-01-30 RX ADMIN — NOREPINEPHRINE BITARTRATE 0.02 MCG/KG/MIN: 1 INJECTION INTRAVENOUS at 10:01

## 2019-01-30 RX ADMIN — FENTANYL CITRATE 50 MCG: 50 INJECTION INTRAMUSCULAR; INTRAVENOUS at 02:01

## 2019-01-30 RX ADMIN — FENTANYL CITRATE 50 MCG: 50 INJECTION INTRAMUSCULAR; INTRAVENOUS at 10:01

## 2019-01-30 RX ADMIN — ALBUTEROL SULFATE 15 MG: 2.5 SOLUTION RESPIRATORY (INHALATION) at 11:01

## 2019-01-30 RX ADMIN — HEPARIN SODIUM 2000 UNITS: 1000 INJECTION, SOLUTION INTRAVENOUS; SUBCUTANEOUS at 11:01

## 2019-01-30 RX ADMIN — VANCOMYCIN HYDROCHLORIDE 750 MG: 750 INJECTION, POWDER, LYOPHILIZED, FOR SOLUTION INTRAVENOUS at 01:01

## 2019-01-30 NOTE — PROGRESS NOTES
Pharmacist Renal Dose Adjustment Note    Jones Red is a 53 y.o. male being treated with the medication cefepime    Patient Data:    Vital Signs (Most Recent):  Temp: 97.5 °F (36.4 °C) (01/30/19 0530)  Pulse: 87 (01/30/19 0746)  Resp: (!) 34 (01/30/19 0746)  BP: 127/63 (01/30/19 0600)  SpO2: 95 % (01/30/19 0746) Vital Signs (72h Range):  Temp:  [92.7 °F (33.7 °C)-97.8 °F (36.6 °C)]   Pulse:  [80-94]   Resp:  [0-48]   BP: ()/(49-80)   SpO2:  [90 %-100 %]   Arterial Line BP: ()/(44-78)      Recent Labs   Lab 01/29/19  1415 01/29/19  2230 01/30/19  0356   CREATININE 9.5* 9.0* 8.3*  8.3*     Serum creatinine: 8.3 mg/dL (H) 01/30/19 0356  Estimated creatinine clearance: 20.2 mL/min (A)     Patient now on HD.     Medication Dose Route Frequency   Previous Order Cefepime 1g IV Q8h   New Order Cefepime 1g IV Q24h after dialysis     Renal dose adjustments performed as noted above.  Thank you for the consult.  We will continue monitoring and adjusting as necessary.    Pharmacist: KERRY DOMINGUEZ  [phone]

## 2019-01-30 NOTE — PROGRESS NOTES
Pulmonary & Critical Care Medicine Progress Note    Subjective:   No overnight events. Having some hypothermic episodes on and off. CRRT started overnight and clotted off in the morning. Continued throughout the day today from 11 am - 7 pm until it clotted off again. 100cc/hr being removed when CRRT is functioning. Will attempt to de-clot line. On Propofol. Off Levophed.    Vital Signs:   Vitals:    01/29/19 2000   BP: (!) 116/58   Pulse: 86   Resp: (!) 0   Temp:      Fluid Balance:     Intake/Output Summary (Last 24 hours) at 1/29/2019 2018  Last data filed at 1/29/2019 1900  Gross per 24 hour   Intake 1275.6 ml   Output 1896 ml   Net -620.4 ml     Physical Exam:   General: NAD, sedated, morbidly obese  HEENT: AT/NC, PERRL, oral mucosa moist, ET tube in place.  Neck: Supple  Cardiac: S1S2 with brisk cap refill in distal extremities.  Respiratory: Crackles and wheezes bilaterally.  Abdomen: Soft, NT/ND. +BS.   Extremities: 3-4+ edema  Neuro: Sedated    Laboratory Studies:   Recent Labs   Lab 01/29/19 2000   PH 7.197*   PCO2 54.3*   PO2 115*   HCO3 21.1*   POCSATURATED 97   BE -7     Recent Labs   Lab 01/29/19  0348   WBC 12.88*   RBC 3.93*   HGB 11.1*   HCT 34.8*      MCV 89   MCH 28.2   MCHC 31.9*     Recent Labs   Lab 01/29/19  1415   *   K 4.4   CL 92*   CO2 23   BUN 89*   CREATININE 9.5*   MG 2.0     Microbiology Data:   Microbiology Results (last 7 days)     Procedure Component Value Units Date/Time    Blood culture [[id number]] Collected:  01/28/19 2023    Order Status:  Completed Specimen:  Blood Updated:  01/29/19 0545     Blood Culture, Routine No Growth to date    Blood culture [[id number]] Collected:  01/28/19 2020    Order Status:  Completed Specimen:  Blood Updated:  01/29/19 0545     Blood Culture, Routine No Growth to date    Culture, Respiratory with Gram Stain [[id number]] Collected:  01/28/19 2026    Order Status:  Completed Specimen:  Respiratory from Sputum, Induced Updated:  01/29/19  0517     Gram Stain (Respiratory) <10 epithelial cells per low power field.     Gram Stain (Respiratory) Few WBC's     Gram Stain (Respiratory) No organisms seen         Chest Imagin/28 CXR: Bilateral pulmonary edema, possibly R-sided infiltrate.    Infusions:     heparin (porcine) in 5 % dex Stopped (19)    norepinephrine bitartrate-D5W Stopped (19)    propofol 25 mcg/kg/min (19)     Scheduled Medications:    albuterol-ipratropium  3 mL Nebulization Q4H    cefepime in dextrose 5 %  1 g Intravenous Q8H    clindamycin (CLEOCIN) IVPB  900 mg Intravenous Q8H    famotidine (PF)  20 mg Intravenous Daily    folic acid  1 mg Per OG tube Daily    multivitamin  1 tablet Per OG tube Daily    thiamine  100 mg Per OG tube Daily     PRN Medications:   fentaNYL, heparin (porcine), magnesium sulfate IVPB, sodium chloride 0.9%, sodium phosphate IVPB, sodium phosphate IVPB, sodium phosphate IVPB    Assessment & Plan:   Patient Active Problem List   Diagnosis    Septic shock    Acute renal failure    Cellulitis of right lower extremity    Rhabdomyolysis    Chronic venous insufficiency    Acute on chronic diastolic heart failure    Acute on chronic respiratory failure    Acute cystitis    Hyponatremia     52 yo M with a PMHx of HTN, tobacco use disorder (3 PPD), COPD, HFpEF, LYNDON who was transferred to [facility] on  from [facility] (admitted there on ) for septic shock secondary to RLE cellulitis complicated by AMS, respiratory failure, rhabdomyolysis, and anuric renal failure.    Neuro:  Encephalopathy  -Related to patient's sepsis and sedation with Propofol.    Pulm:  Hypoxic/Hypercapnic Respiratory Failure, Pulmonary Edema, COPD  -Significant pulmonary edema improving with CRRT  -O2 requirements coming down with fluid removal.  -Possible PNA noted, being treated with abx  -On duonebs q4h for his COPD (3 PPD smoker)  -Vent settings adjusted to help blow off more CO2  and compensate for metabolic acidosis. Improving slowly.    Cardio:  Shock, HFpEF  -Off of Levophed at this time  -Official 2D ECHO pending  -Volume removal with CRRT at this time  -BNP on admission 1,726  -Trop on admission 0.02    Renal:  Anuric Renal Failure, ATN, Rhabdomyolysis, Hyponatremia, Metabolic Acidosis  -CRRT started on 1/28 night. CRRT continues to clot off intermittently.  -CRRT for electrolyte derangements and fluid overload  -Hyponatremia likely hypervolemic and should improve (123 --> 127)  -Patient is anuric  -On pre-filter Heparin drip for the CRRT machine.    ID:  Cellulitis, Possible PNA  -Hypothermic intermittently, WBC 12 without bands  -Lactic acid WNL  -All cultures repeated with NGTD  -On Vanc and Zosyn  -RLE U/S completed does not show gas in the soft tissues.    DVT ppx: Heparin 5000U q8h for DVT ppx (will speak to nephrology about whether the pre-filter Heparin drip for CRRT affects ppx dose)  GI ppx: Famotidine  VAP ppx: Chlorhexidine    Thank you for involving us in the care of this patient. We will continue to follow along. Please call with any questions.    Addie Santiago MD  [employer] P[facility]M Fellow, PGY 5  [facility]  Pager: [phone]

## 2019-01-30 NOTE — PROGRESS NOTES
Remains intubated and on HD.  No progression of erythema/cellulitis.  No indication for surgical intervention.  Will follow.

## 2019-01-30 NOTE — PLAN OF CARE
Problem: Skin Injury Risk Increased  Goal: Skin Health and Integrity  Outcome: Ongoing (interventions implemented as appropriate)  Wound care consult placed  Right leg weeping   Keeping dry and monitoring redness

## 2019-01-30 NOTE — ASSESSMENT & PLAN NOTE
Continue with mechanical ventilation pending further volume removal with dialysis.  Fraction of inspired oxygen of 60 percent this morning.  Will attempt to wean down supplemental oxygen.  Otherwise as directed by pulmonary/critical care.

## 2019-01-30 NOTE — SUBJECTIVE & OBJECTIVE
Interval History:  No acute events overnight.    Review of Systems   Unable to perform ROS: Intubated     Objective:     Vital Signs (Most Recent):  Temp: 97.5 °F (36.4 °C) (01/30/19 0530)  Pulse: 87 (01/30/19 0746)  Resp: (!) 34 (01/30/19 0746)  BP: 127/63 (01/30/19 0600)  SpO2: 95 % (01/30/19 0746) Vital Signs (24h Range):  Temp:  [96.8 °F (36 °C)-97.8 °F (36.6 °C)] 97.5 °F (36.4 °C)  Pulse:  [80-92] 87  Resp:  [0-48] 34  SpO2:  [93 %-98 %] 95 %  BP: (102-128)/(50-76) 127/63  Arterial Line BP: ()/(44-78) 148/68     Weight: (!) 212 kg (467 lb 6 oz)  Body mass index is 58.42 kg/m².    Intake/Output Summary (Last 24 hours) at 1/30/2019 0830  Last data filed at 1/30/2019 0600  Gross per 24 hour   Intake 1412.98 ml   Output 3940 ml   Net -2527.02 ml      Physical Exam   Constitutional: Vital signs are normal. He appears well-developed.  Non-toxic appearance. He does not have a sickly appearance. He does not appear ill. No distress.   Morbid obesity.  Sedated and intubated.   HENT:   Head: Normocephalic and atraumatic.   Right Ear: External ear normal.   Left Ear: External ear normal.   Eyes: Conjunctivae and EOM are normal. Pupils are equal, round, and reactive to light. No scleral icterus.   Neck: Normal range of motion. Neck supple. No JVD present. No tracheal deviation present.   Cardiovascular: Normal rate, regular rhythm, normal heart sounds and intact distal pulses. Exam reveals no gallop and no friction rub.   No murmur heard.  Pulmonary/Chest: Effort normal and breath sounds normal. No stridor. No respiratory distress. He has no wheezes. He has no rales.   Distant lung sounds.  Moving air well.  Some crackles.   Abdominal: Soft. Bowel sounds are normal. He exhibits no distension and no mass. There is no tenderness. There is no guarding.   Musculoskeletal: Normal range of motion. He exhibits no edema or deformity.   Neurological: He exhibits normal muscle tone.   Skin: Skin is warm and dry. Rash noted. He  is not diaphoretic. There is erythema.   Right lower extremity is erythematous, with blistering and edema.  Area of erythema has not extended beyond pen marking from yesterday.    Psychiatric: He has a normal mood and affect. His behavior is normal. Judgment and thought content normal.   Nursing note and vitals reviewed.      Significant Labs: All pertinent labs within the past 24 hours have been reviewed.    Significant Imaging: I have reviewed all pertinent imaging results/findings within the past 24 hours.

## 2019-01-30 NOTE — PLAN OF CARE
Problem: Communication Impairment (Artificial Airway)  Goal: Effective Communication  Outcome: Ongoing (interventions implemented as appropriate)  Received patient orally intubated with 8.0 ETT secured on mechanical ventilation on documented settings. BBS equally coarse with MARIBELL wheeze,Q4 aerosol treatment given in line with vent tolerated well ,SBT on hold pt hemodynamically unstable at this time will monitor.

## 2019-01-30 NOTE — PROGRESS NOTES
"Nephrology  Progress Note    Admit Date: 1/28/2019   LOS: 2 days     SUBJECTIVE:     Follow-up For:  Septic shock/ATN    Interval History:     Events noted overnight.  Issues with clotting CRRT despite having Heparin pre filter infusion.  Fortunately able to wean off of vasopressors and will try conventional HD this morning.  Remains sedated on ventilator.  Discussed with treatment team.    Review of Systems:    Unable.     OBJECTIVE:     Vital Signs Range (Last 24H):  /63   Pulse 87   Temp 97.5 °F (36.4 °C) (Oral)   Resp (!) 34   Ht 6' 3" (1.905 m)   Wt (!) 212 kg (467 lb 6 oz)   SpO2 95%   BMI 58.42 kg/m²     Temp:  [96.8 °F (36 °C)-97.8 °F (36.6 °C)]   Pulse:  [80-92]   Resp:  [0-48]   BP: (102-128)/(50-76)   SpO2:  [93 %-98 %]   Arterial Line BP: ()/(44-78)     I & O (Last 24H):    Intake/Output Summary (Last 24 hours) at 1/30/2019 0817  Last data filed at 1/30/2019 0600  Gross per 24 hour   Intake 1412.98 ml   Output 3940 ml   Net -2527.02 ml       Physical Exam:  General appearance:  Disheveled and morbidly obese   Eyes:  Conjunctivae/corneas clear. PERRL.  Lungs:  Vented with rhonchi throughout  Heart: Regular rate and rhythm, distant heart tones.  Abdomen: Soft, non-tender non-distended; bowel sounds normal; no masses,  no organomegaly, OGT, morbidly obese  Extremities:  Chronic Woody edema.    Skin:  Worsening Right lower extremity cellulitis  Neurologic:  Sedated  Abdalla catheterization  Right IJ Trialysis       Laboratory Data:  Recent Labs   Lab 01/30/19  0356   WBC 10.25   RBC 3.62*   HGB 10.3*   HCT 30.7*      MCV 85   MCH 28.5   MCHC 33.6       BMP:   Recent Labs   Lab 01/29/19  2230 01/30/19  0356    97  97   * 129*  129*   K 4.3 4.1  4.1   CL 95 95  95   CO2 17* 20*  20*   BUN 85* 79*  79*   CREATININE 9.0* 8.3*  8.3*   CALCIUM 7.6* 7.9*  7.9*   MG 2.0  --      Lab Results   Component Value Date    CALCIUM 7.9 (L) 01/30/2019    CALCIUM 7.9 (L) 01/30/2019 "    PHOS 5.8 (H) 01/30/2019    PHOS 5.8 (H) 01/30/2019       Lab Results   Component Value Date    CALCIUM 7.9 (L) 01/30/2019    CALCIUM 7.9 (L) 01/30/2019    PHOS 5.8 (H) 01/30/2019    PHOS 5.8 (H) 01/30/2019       No results found for: URICACID    BNP  Recent Labs   Lab 01/28/19 2023   BNP 1,726*       Medications:  Medication list was reviewed and changes noted under Assessment/Plan.    Diagnostic Results:    US Extremity Non Vascular Limited Right   Final Result      Nonspecific subcutaneous edema, cutaneous thickening, could represent cellulitis changes.  No definite necrotizing fasciitis, gas or abscess.         Electronically signed by: Tin Leone MD   Date:    01/29/2019   Time:    13:09      X-Ray Chest AP Portable   Final Result   Addendum 1 of 1      Enteric tube projects in unchanged radiographic position with tip coursing    below the diaphragm and appearing to extend beyond the field of view.         Electronically signed by: Trena Sena MD   Date:    01/29/2019   Time:    00:39      Final      As above.         Electronically signed by: Trena Sena MD   Date:    01/29/2019   Time:    00:04      US Lower Extremity Veins Right   Final Result      Limited examination.  No evidence of deep venous thrombosis in the right lower extremity.         Electronically signed by: Vanita Oviedo   Date:    01/28/2019   Time:    21:10      X-Ray Chest AP Portable   Final Result      Cardiomegaly with findings suggesting pulmonary edema, correlation advised.  Differential would include infection.         Electronically signed by: Kalyan Galvez MD   Date:    01/28/2019   Time:    20:19      X-Ray Abdomen AP 1 View    (Results Pending)   US Extremity Non Vascular Limited Bilat    (Results Pending)   X-Ray Chest 1 View    (Results Pending)       ASSESSMENT/PLAN:     1. Multifactorial anuric acute renal failure secondary to ATN from septic shock, continued vasopressors, rhabdomyolysis, hyponatremia, and  hyperkalemia (N17.0, N17.9, E87.5, E87.1, M62.82, A41.9):  Events noted at outlying facility.  Transferred for CRRT.  Line placed and started CRRT.   Issues with clotting despite pre filter Heparin.  Now normotensive without vasopressors so will try conventional HD today.  Needs daily dialysis/UF for massive volume overload.  Correct electrolytes with CRRT bath.  Renally dose meds, avoid nephrotoxins, and monitor I/O's closely.  2. Septic shock and resp failure:  Vented and pressors per CCT.    3. RLE Cellulitis/woody edema (L03.115):  No IV contrast please.  Defer to wound care and ID.  4. Morbid obesity (E44):  Most of his issues caused by this.    5. Multifactorial hyponatremia (E87.1):  Correct with HD.     See above

## 2019-01-30 NOTE — PROGRESS NOTES
Patient taken off ventilator and bagged due to increased peak pressures to check for any plugs,no resistance when bagging lavage pt with no plugs noted.Placed on VC+ per MD at bedside.

## 2019-01-30 NOTE — PROGRESS NOTES
Pulmonary & Critical Care Medicine Progress Note    Subjective:   CRRT clotted off multiple times at night. Changed to HD this morning. Afebrile, vitals stable. Off pressors. On Propofol 30. O2 requirements coming down.    Vital Signs:   Vitals:    01/30/19 0746   BP:    Pulse: 87   Resp: (!) 34   Temp:      Fluid Balance:     Intake/Output Summary (Last 24 hours) at 1/30/2019 0822  Last data filed at 1/30/2019 0600  Gross per 24 hour   Intake 1412.98 ml   Output 3940 ml   Net -2527.02 ml     Physical Exam:   General: NAD, sedated, morbidly obese  HEENT: AT/NC, PERRL, oral mucosa moist, ET tube in place.  Neck: Supple  Cardiac: S1S2 with brisk cap refill in distal extremities.  Respiratory: Crackles and wheezes bilaterally.  Abdomen: Soft, NT/ND. +BS.   Extremities: 3-4+ edema  Neuro: Sedated    Laboratory Studies:   Recent Labs   Lab 01/30/19  0436   PH 7.211*   PCO2 52.8*   PO2 107*   HCO3 21.2*   POCSATURATED 97   BE -7     Recent Labs   Lab 01/30/19  0356   WBC 10.25   RBC 3.62*   HGB 10.3*   HCT 30.7*      MCV 85   MCH 28.5   MCHC 33.6     Recent Labs   Lab 01/29/19  2230 01/30/19  0356   * 129*  129*   K 4.3 4.1  4.1   CL 95 95  95   CO2 17* 20*  20*   BUN 85* 79*  79*   CREATININE 9.0* 8.3*  8.3*   MG 2.0  --      Microbiology Data:   Microbiology Results (last 7 days)     Procedure Component Value Units Date/Time    Blood culture [[id number]] Collected:  01/28/19 2023    Order Status:  Completed Specimen:  Blood Updated:  01/30/19 0612     Blood Culture, Routine No Growth to date     Blood Culture, Routine No Growth to date    Blood culture [[id number]] Collected:  01/28/19 2020    Order Status:  Completed Specimen:  Blood Updated:  01/30/19 0612     Blood Culture, Routine No Growth to date     Blood Culture, Routine No Growth to date    Culture, Respiratory with Gram Stain [[id number]] Collected:  01/28/19 2026    Order Status:  Completed Specimen:  Respiratory from Sputum, Induced Updated:   19 0517     Gram Stain (Respiratory) <10 epithelial cells per low power field.     Gram Stain (Respiratory) Few WBC's     Gram Stain (Respiratory) No organisms seen         Chest Imagin/30 CXR: Bilateral pulmonary edema. Difficult to say whether there is a pneumonic process, but definitely fluid up.    Infusions:     norepinephrine bitartrate-D5W Stopped (19 1900)    propofol 25 mcg/kg/min (19 0642)     Scheduled Medications:    sodium chloride 0.9%   Intravenous Once    albuterol-ipratropium  3 mL Nebulization Q4H    [START ON 2019] cefepime in dextrose 5 %  1 g Intravenous Q24H    chlorhexidine  15 mL Mouth/Throat BID    clindamycin (CLEOCIN) IVPB  900 mg Intravenous Q8H    famotidine (PF)  20 mg Intravenous Daily    folic acid  1 mg Per OG tube Daily    heparin (porcine)  5,000 Units Subcutaneous Q8H    multivitamin  1 tablet Per OG tube Daily    thiamine  100 mg Per OG tube Daily     PRN Medications:   fentaNYL, heparin (porcine), sodium chloride 0.9%    Assessment & Plan:   Patient Active Problem List   Diagnosis    Septic shock    Acute renal failure    Cellulitis of right lower extremity    Rhabdomyolysis    Chronic venous insufficiency    Acute on chronic diastolic heart failure    Acute on chronic respiratory failure    Acute cystitis    Hyponatremia     54 yo M with a PMHx of HTN, tobacco use disorder (3 PPD), COPD, HFpEF, LYNDON who was transferred to [facility] [place] on  from [facility] (admitted there on ) for septic shock secondary to RLE cellulitis complicated by AMS, respiratory failure, rhabdomyolysis, and anuric renal failure.    Neuro:  Encephalopathy  -Related to patient's sepsis and sedation with Propofol.     Pulm:  Hypoxic/Hypercapnic Respiratory Failure, Pulmonary Edema, COPD  -Vent: 520/34/10/60%  -AB.21/52/107/21/97%  -Will continue to wean down O2 requirements for goal sat 88-92%  -Significant pulmonary edema improving with CRRT  -O2  requirements coming down with fluid removal.  -Possible PNA noted, being treated with abx  -On duonebs q4h for his COPD (3 PPD smoker)  -Vent settings adjusted to help blow off more CO2 and compensate for metabolic acidosis. Improving slowly with CRRT.     Cardio:  Shock, HFpEF  -Off of Levophed at this time  -Official 2D ECHO pending  -Volume removal with CRRT at this time  -BNP on admission 1,726  -Trop on admission 0.02     Renal:  Anuric Renal Failure, ATN, Rhabdomyolysis, Hyponatremia, Metabolic Acidosis  -CRRT started on 1/28 night. CRRT continues to clot off intermittently.  -CRRT for electrolyte derangements and fluid overload  -Switching to HD this morning  -Hyponatremia likely hypervolemic and improving (123 --> 129)  -Patient is anuric     ID:  Cellulitis, Possible PNA  -Hypothermic intermittently, WBC 12 -->10 without bands  -Lactic acid WNL  -All cultures repeated with NGTD  -On Vanc and Zosyn  -RLE U/S completed does not show gas in the soft tissues.  -Area on the leg marked.  -Surgery has come evaluated the leg.     DVT ppx: Heparin 5000U q8h  GI ppx: Famotidine  VAP ppx: Chlorhexidine    Thank you for involving us in the care of this patient. We will continue to follow along. Please call with any questions.    Addie Santiago MD  [employer] P[facility]M Fellow, PGY 5  [facility]  Pager: [phone]

## 2019-01-30 NOTE — PLAN OF CARE
Problem: Inability to Wean (Mechanical Ventilation, Invasive)  Goal: Mechanical Ventilation Liberation  Outcome: Ongoing (interventions implemented as appropriate)  Patient received and remained on the mechanical ventilator throughout this shift. All ABG's reported with orders to decrease FIO2 to 60%. No other changes at this time. No change in respiratory status, will continue to monitor.

## 2019-01-30 NOTE — ASSESSMENT & PLAN NOTE
Continue with treatment and serial examinations.  Continue with wound care.  General surgery following.

## 2019-01-30 NOTE — ASSESSMENT & PLAN NOTE
Possible component of pneumonia but clear cause of his infection is the severe cellulitis involving the right lower extremity.  Ultrasound of right lower extremity did not reveal evidence of abscess.  General surgery consulted and recommending serial exams for now but no surgical intervention at this time.  Cellulitis looks about the same as yesterday.  Weaned off vasopressors since last night.  Leukocytosis resolved.  Blood cultures no growth to date. Continue with broad-spectrum antibiotics (currently on clindamycin, cefepime, and renally dosed vancomycin).  Elevate right lower extremity as tolerated.

## 2019-01-30 NOTE — CONSULTS
DATE OF CONSULTATION:  01/29/2019    HISTORY:  This 53-year-old white male was transferred to [facility] from [facility] for [facility] care.  The patient was initially admitted to   the outside hospital for a septic shock.  On 01/22/2019, he was placed on   pressors and intubated the day of admission.  He was treated at the outside   hospital with antibiotic therapy for right leg cellulitis and metabolic   encephalopathy.  His condition continued to deteriorate and was felt to be   needing dialysis, so he was transferred here on 01/28/2019.  The patient was   noted to be morbidly obese with a chronic woody edema of both legs at baseline.    He developed cellulitis and erythema, especially the right lower leg, which has   been relatively stable per the medical records since his admit at 01/22/2019.    At presentation here, he was intubated on pressors and was started on continuous   dialysis.  Evaluation of his legs revealed a weeping edema mainly of the right   extremity.  There was no crepitus per the nurse attending the patient for today.    There has been no progression of the erythematous changes of the right leg.    No CT was able to be done on this obese patient.  Ultrasound just revealed   edematous changes of the right lower extremity.  The patient has pulses in the   right leg.  There is no evidence of ischemic changes.  There is no indication at   this time that the patient has necrotizing fasciitis or compartment syndrome.    He will be observed closely for progression of the right leg cellulitis.  He   will be maintained on antibiotics and close observation.      DELROY/ROBIN  dd: 01/29/2019 18:14:08 (CST)  td: 01/30/2019 02:26:50 (CST)  Doc ID   #[id number]  Job ID #[id number]    CC:

## 2019-01-30 NOTE — NURSING
Called Dr Addie Santiago regarding vent beeping- pt stacking breaths   Medicated with Fentanyl and increased Propofol see flowsheet  Respiratory called and at the bedside assisting with ventilator issues.Son and other family member at the bedside.  Patient tolerated HD without incidence see HD flowsheet    Sedation vacation performed at the start of the shift and patient does open his eyes spontaneously but does not follow commands   He will move his legs and minimal movement to the arms- pt is not restrained at this time.   Periorbital edema noted  ET tube at 25 at the lip - OG to suction   Patient with generalized edema to bilateral arms and abdomen. Bilateral legs with severe edema noted.  Lungs with scattered crackles.   Abdomen obese   Abdalla catheter intact bladder sweat noted in bag- minimal amount  Flexi seal intact with liquid stool noted    Right upper thigh with redness and whitish looking areas markings noted no further extension of the area  Right middle thigh with redness and white looking patches noted also markings noted no extension of the area  Right lower leg area is circled and weeping white areas to the foot along with severe edema   Keeping wounds as dry as possible   Wound care here no new orders  Spoke with critical care - can restart Levo if map falls < 65  Will continue to monitor   Nurse outside patients room  Wife called and update provided

## 2019-01-30 NOTE — PROGRESS NOTES
[facility] Hospital Medicine  Progress Note    Patient Name: Jones Red  MRN: [id number]  Patient Class: IP- Inpatient   Admission Date: 1/28/2019  Length of Stay: 2 days  Attending Physician: Selwyn So MD  Primary Care Provider: No primary care provider on file.        Subjective:     Principal Problem:Septic shock    HPI:  Mr. Jones Red is a 53 y.o. male, with PMH of HTN, COPD, chronic respiratory failure (on home O2), Diastolic CHF, LYNDON, chronic venous insufficiency, and questionable seizures history (suspected to be 2/2 alcohol withdrawal), who preset tia to [facility] on 1/22/19 2/2 SOB. This was associated with cough productive of brown sputum. He was started in BiPap, but did ultimately degrade, and was intubated, and maintained on a ventilator. He was additionally altered upon arrival to the ED. History was reportedly obtained by a friend, and it was reported that he was found down at home, and was unable to get up. He as admitted to the ICU, started on IV fluids and pressors.     Hospital Course:  Patient 53-year-old gentleman with history of hypertension, diastolic heart failure, chronic obstructive pulmonary disease with chronic hypoxic respiratory failure on home oxygen, prior alcohol abuse, significant ongoing tobacco use (3 packs per day), and morbid obesity with septic shock secondary to right lower extremity cellulitis complicating chronic venous stasis.  Patient intubated at outside hospital after failing a trial of BiPAP.  Patient also with evidence of worsening renal failure with rhabdomyolysis.       Patient transferred from [facility] in [place], [place] to [facility] on 1/28/2019.  Patient started on renal replacement therapy.  Patient remains intubated and sedated but responds to stimuli when sedation held but otherwise confused.  Ultrasound of the right lower extremity did not reveal evidence of abscess, subcutaneous  gas, or evidence of necrotizing fascitits.  General surgery and wound care consulted.  No surgical intervention recommended at this time.  Patient on broad spectrum antibiotics.    Interval History:  No acute events overnight.    Review of Systems   Unable to perform ROS: Intubated     Objective:     Vital Signs (Most Recent):  Temp: 97.5 °F (36.4 °C) (01/30/19 0530)  Pulse: 87 (01/30/19 0746)  Resp: (!) 34 (01/30/19 0746)  BP: 127/63 (01/30/19 0600)  SpO2: 95 % (01/30/19 0746) Vital Signs (24h Range):  Temp:  [96.8 °F (36 °C)-97.8 °F (36.6 °C)] 97.5 °F (36.4 °C)  Pulse:  [80-92] 87  Resp:  [0-48] 34  SpO2:  [93 %-98 %] 95 %  BP: (102-128)/(50-76) 127/63  Arterial Line BP: ()/(44-78) 148/68     Weight: (!) 212 kg (467 lb 6 oz)  Body mass index is 58.42 kg/m².    Intake/Output Summary (Last 24 hours) at 1/30/2019 0830  Last data filed at 1/30/2019 0600  Gross per 24 hour   Intake 1412.98 ml   Output 3940 ml   Net -2527.02 ml      Physical Exam   Constitutional: Vital signs are normal. He appears well-developed.  Non-toxic appearance. He does not have a sickly appearance. He does not appear ill. No distress.   Morbid obesity.  Sedated and intubated.   HENT:   Head: Normocephalic and atraumatic.   Right Ear: External ear normal.   Left Ear: External ear normal.   Eyes: Conjunctivae and EOM are normal. Pupils are equal, round, and reactive to light. No scleral icterus.   Neck: Normal range of motion. Neck supple. No JVD present. No tracheal deviation present.   Cardiovascular: Normal rate, regular rhythm, normal heart sounds and intact distal pulses. Exam reveals no gallop and no friction rub.   No murmur heard.  Pulmonary/Chest: Effort normal and breath sounds normal. No stridor. No respiratory distress. He has no wheezes. He has no rales.   Distant lung sounds.  Moving air well.  Some crackles.   Abdominal: Soft. Bowel sounds are normal. He exhibits no distension and no mass. There is no tenderness. There is no  guarding.   Musculoskeletal: Normal range of motion. He exhibits no edema or deformity.   Neurological: He exhibits normal muscle tone.   Skin: Skin is warm and dry. Rash noted. He is not diaphoretic. There is erythema.   Right lower extremity is erythematous, with blistering and edema.  Area of erythema has not extended beyond pen marking from yesterday.    Psychiatric: He has a normal mood and affect. His behavior is normal. Judgment and thought content normal.   Nursing note and vitals reviewed.      Significant Labs: All pertinent labs within the past 24 hours have been reviewed.    Significant Imaging: I have reviewed all pertinent imaging results/findings within the past 24 hours.    Assessment/Plan:      * Septic shock    Possible component of pneumonia but clear cause of his infection is the severe cellulitis involving the right lower extremity.  Ultrasound of right lower extremity did not reveal evidence of abscess.  General surgery consulted and recommending serial exams for now but no surgical intervention at this time.  Cellulitis looks about the same as yesterday.  Weaned off vasopressors since last night.  Leukocytosis resolved.  Blood cultures no growth to date. Continue with broad-spectrum antibiotics (currently on clindamycin, cefepime, and renally dosed vancomycin).  Elevate right lower extremity as tolerated.     Cellulitis of right lower extremity    Continue with treatment and serial examinations.  Continue with wound care.  General surgery following.     Acute on chronic diastolic heart failure    Hypervolemic.  Continue with additional volume removal with renal replacement therapy.     Acute on chronic respiratory failure    Continue with mechanical ventilation pending further volume removal with dialysis.  Fraction of inspired oxygen of 60 percent this morning.  Will attempt to wean down supplemental oxygen.  Otherwise as directed by pulmonary/critical care.     Acute renal failure     Secondary to sepsis and rhabdomyolysis.  Continue with renal replacement therapy.     Hyponatremia    Stable.  Should improve with further dialysis.     Rhabdomyolysis    CPK trending down.  Continue with dialysis.         VTE Risk Mitigation (From admission, onward)        Ordered     heparin (porcine) injection 5,000 Units  Every 8 hours      01/29/19 2044     heparin (porcine) injection 5,000 Units  As needed (PRN)      01/29/19 1335     IP VTE HIGH RISK PATIENT  Once      01/28/19 1947              Selwyn So MD  Department of Hospital Medicine   [facility]

## 2019-01-30 NOTE — PLAN OF CARE
Problem: Adult Inpatient Plan of Care  Goal: Plan of Care Review  Outcome: Ongoing (interventions implemented as appropriate)  Patient's VSS, blood pressure maintained on CRRT. Levophed remains off the entire shift. Initially there were issues with CRRT machine clotting off. However, since 2316 the machine has been running fine. Sedation increased to 35mcg/kg/hr for comfort. RLE continues to weep with serous fluids. Patient remains free from any falls or further skin breakdown. All safety precautions maintained. RN will continue to monitor.

## 2019-01-30 NOTE — PROGRESS NOTES
Wound Care follow up for lower leg cellulitis with weeping blisters. Assisted by RN, Carol. Patient is morbidly obese and is on a bariatric low air loss mattress with head of bed elevated 30 % due to respiratory concerns.    Both lower legs are very edematous, red, firm to the touch. Right leg has numerous raised yellow/white blisters. Some blisters are weeping, while others are firm to the touch. Looking at the pictures from yesterday, there appears to be less maceration, but firm blisters. Carol felt the right lower leg appeared worse today. Changed blue padding under patient, as it was soiled with clear yellow drainage. May consider foam with silver and secure with Kerlix when drainage amount is less.    To continue to follow until discharged.    Jane Mitchell RN, Wound and Ostomy

## 2019-01-31 LAB
ALLENS TEST: ABNORMAL
ANION GAP SERPL CALC-SCNC: 17 MMOL/L
ANISOCYTOSIS BLD QL SMEAR: SLIGHT
BACTERIA SPEC AEROBE CULT: NORMAL
BASOPHILS # BLD AUTO: ABNORMAL K/UL
BASOPHILS NFR BLD: 0 %
BUN SERPL-MCNC: 62 MG/DL
CALCIUM SERPL-MCNC: 8.2 MG/DL
CHLORIDE SERPL-SCNC: 94 MMOL/L
CK SERPL-CCNC: 181 U/L
CO2 SERPL-SCNC: 22 MMOL/L
CREAT SERPL-MCNC: 7.2 MG/DL
DELSYS: ABNORMAL
DIFFERENTIAL METHOD: ABNORMAL
EOSINOPHIL # BLD AUTO: ABNORMAL K/UL
EOSINOPHIL NFR BLD: 1 %
ERYTHROCYTE [DISTWIDTH] IN BLOOD BY AUTOMATED COUNT: 13.9 %
ERYTHROCYTE [SEDIMENTATION RATE] IN BLOOD BY WESTERGREN METHOD: 30 MM/H
EST. GFR  (AFRICAN AMERICAN): 9 ML/MIN/1.73 M^2
EST. GFR  (NON AFRICAN AMERICAN): 8 ML/MIN/1.73 M^2
FIO2: 50
GLUCOSE SERPL-MCNC: 99 MG/DL
GRAM STN SPEC: NORMAL
HCO3 UR-SCNC: 25.6 MMOL/L (ref 24–28)
HCT VFR BLD AUTO: 30.4 %
HGB BLD-MCNC: 10.1 G/DL
LYMPHOCYTES # BLD AUTO: ABNORMAL K/UL
LYMPHOCYTES NFR BLD: 13 %
MCH RBC QN AUTO: 28.3 PG
MCHC RBC AUTO-ENTMCNC: 33.2 G/DL
MCV RBC AUTO: 85 FL
MIN VOL: 15.3
MODE: ABNORMAL
MONOCYTES # BLD AUTO: ABNORMAL K/UL
MONOCYTES NFR BLD: 14 %
NEUTROPHILS NFR BLD: 69 %
NEUTS BAND NFR BLD MANUAL: 3 %
PCO2 BLDA: 51.5 MMHG (ref 35–45)
PEEP: 8
PH SMN: 7.31 [PH] (ref 7.35–7.45)
PHOSPHATE SERPL-MCNC: 5.2 MG/DL
PIP: 65
PLATELET # BLD AUTO: 243 K/UL
PLATELET BLD QL SMEAR: ABNORMAL
PMV BLD AUTO: 10.9 FL
PO2 BLDA: 88 MMHG (ref 80–100)
POC BE: -1 MMOL/L
POC SATURATED O2: 96 % (ref 95–100)
POCT GLUCOSE: 108 MG/DL (ref 70–110)
POTASSIUM SERPL-SCNC: 3.6 MMOL/L
RBC # BLD AUTO: 3.57 M/UL
SAMPLE: ABNORMAL
SITE: ABNORMAL
SODIUM SERPL-SCNC: 133 MMOL/L
SP02: 93
VANCOMYCIN SERPL-MCNC: 17.5 UG/ML
VT: 480
WBC # BLD AUTO: 14.72 K/UL

## 2019-01-31 PROCEDURE — 94003 VENT MGMT INPAT SUBQ DAY: CPT

## 2019-01-31 PROCEDURE — 25000242 PHARM REV CODE 250 ALT 637 W/ HCPCS: Performed by: HOSPITALIST

## 2019-01-31 PROCEDURE — 25000003 PHARM REV CODE 250: Performed by: STUDENT IN AN ORGANIZED HEALTH CARE EDUCATION/TRAINING PROGRAM

## 2019-01-31 PROCEDURE — 27100080 HC AIRWAY ADAPTER-END TIDAL CO2

## 2019-01-31 PROCEDURE — 94761 N-INVAS EAR/PLS OXIMETRY MLT: CPT

## 2019-01-31 PROCEDURE — 25000003 PHARM REV CODE 250: Performed by: HOSPITALIST

## 2019-01-31 PROCEDURE — 20000000 HC ICU ROOM

## 2019-01-31 PROCEDURE — 84100 ASSAY OF PHOSPHORUS: CPT

## 2019-01-31 PROCEDURE — 99900035 HC TECH TIME PER 15 MIN (STAT)

## 2019-01-31 PROCEDURE — 80048 BASIC METABOLIC PNL TOTAL CA: CPT

## 2019-01-31 PROCEDURE — 31622 DX BRONCHOSCOPE/WASH: CPT

## 2019-01-31 PROCEDURE — 99233 PR SUBSEQUENT HOSPITAL CARE,LEVL III: ICD-10-PCS | Mod: ,,, | Performed by: HOSPITALIST

## 2019-01-31 PROCEDURE — 80202 ASSAY OF VANCOMYCIN: CPT

## 2019-01-31 PROCEDURE — 94668 MNPJ CHEST WALL SBSQ: CPT

## 2019-01-31 PROCEDURE — S0028 INJECTION, FAMOTIDINE, 20 MG: HCPCS | Performed by: HOSPITALIST

## 2019-01-31 PROCEDURE — 85027 COMPLETE CBC AUTOMATED: CPT

## 2019-01-31 PROCEDURE — 94640 AIRWAY INHALATION TREATMENT: CPT

## 2019-01-31 PROCEDURE — 94667 MNPJ CHEST WALL 1ST: CPT

## 2019-01-31 PROCEDURE — 25000003 PHARM REV CODE 250: Performed by: NURSE PRACTITIONER

## 2019-01-31 PROCEDURE — 99233 SBSQ HOSP IP/OBS HIGH 50: CPT | Mod: ,,, | Performed by: HOSPITALIST

## 2019-01-31 PROCEDURE — 63600175 PHARM REV CODE 636 W HCPCS: Performed by: STUDENT IN AN ORGANIZED HEALTH CARE EDUCATION/TRAINING PROGRAM

## 2019-01-31 PROCEDURE — 63600175 PHARM REV CODE 636 W HCPCS: Performed by: NURSE PRACTITIONER

## 2019-01-31 PROCEDURE — 80100014 HC HEMODIALYSIS 1:1

## 2019-01-31 PROCEDURE — 85007 BL SMEAR W/DIFF WBC COUNT: CPT

## 2019-01-31 PROCEDURE — 25000242 PHARM REV CODE 250 ALT 637 W/ HCPCS: Performed by: INTERNAL MEDICINE

## 2019-01-31 PROCEDURE — 82803 BLOOD GASES ANY COMBINATION: CPT

## 2019-01-31 PROCEDURE — 37799 UNLISTED PX VASCULAR SURGERY: CPT

## 2019-01-31 PROCEDURE — 27000221 HC OXYGEN, UP TO 24 HOURS

## 2019-01-31 PROCEDURE — 82550 ASSAY OF CK (CPK): CPT

## 2019-01-31 PROCEDURE — 99900026 HC AIRWAY MAINTENANCE (STAT)

## 2019-01-31 RX ORDER — PROPOFOL 10 MG/ML
5 INJECTION, EMULSION INTRAVENOUS CONTINUOUS
Status: DISCONTINUED | OUTPATIENT
Start: 2019-01-31 | End: 2019-02-02

## 2019-01-31 RX ORDER — HEPARIN SODIUM 5000 [USP'U]/ML
5000 INJECTION, SOLUTION INTRAVENOUS; SUBCUTANEOUS
Status: DISCONTINUED | OUTPATIENT
Start: 2019-01-31 | End: 2019-02-25 | Stop reason: HOSPADM

## 2019-01-31 RX ADMIN — IPRATROPIUM BROMIDE AND ALBUTEROL SULFATE 3 ML: .5; 3 SOLUTION RESPIRATORY (INHALATION) at 07:01

## 2019-01-31 RX ADMIN — Medication 100 MG: at 09:01

## 2019-01-31 RX ADMIN — FAMOTIDINE 20 MG: 10 INJECTION, SOLUTION INTRAVENOUS at 09:01

## 2019-01-31 RX ADMIN — HEPARIN SODIUM 5000 UNITS: 5000 INJECTION, SOLUTION INTRAVENOUS; SUBCUTANEOUS at 05:01

## 2019-01-31 RX ADMIN — PROPOFOL 5 MCG/KG/MIN: 10 INJECTION, EMULSION INTRAVENOUS at 09:01

## 2019-01-31 RX ADMIN — FOLIC ACID 1 MG: 1 TABLET ORAL at 09:01

## 2019-01-31 RX ADMIN — IPRATROPIUM BROMIDE AND ALBUTEROL SULFATE 3 ML: .5; 3 SOLUTION RESPIRATORY (INHALATION) at 03:01

## 2019-01-31 RX ADMIN — IPRATROPIUM BROMIDE AND ALBUTEROL SULFATE 3 ML: .5; 3 SOLUTION RESPIRATORY (INHALATION) at 11:01

## 2019-01-31 RX ADMIN — HEPARIN SODIUM 5000 UNITS: 5000 INJECTION, SOLUTION INTRAVENOUS; SUBCUTANEOUS at 12:01

## 2019-01-31 RX ADMIN — PROPOFOL 40 MCG/KG/MIN: 10 INJECTION, EMULSION INTRAVENOUS at 05:01

## 2019-01-31 RX ADMIN — CEFEPIME HYDROCHLORIDE 1 G: 1 INJECTION, SOLUTION INTRAVENOUS at 01:01

## 2019-01-31 RX ADMIN — FENTANYL CITRATE 50 MCG: 50 INJECTION INTRAMUSCULAR; INTRAVENOUS at 10:01

## 2019-01-31 RX ADMIN — PROPOFOL 40 MCG/KG/MIN: 10 INJECTION, EMULSION INTRAVENOUS at 10:01

## 2019-01-31 RX ADMIN — CHLORHEXIDINE GLUCONATE 15 ML: 1.2 RINSE ORAL at 09:01

## 2019-01-31 RX ADMIN — FENTANYL CITRATE 50 MCG: 50 INJECTION INTRAMUSCULAR; INTRAVENOUS at 04:01

## 2019-01-31 RX ADMIN — NOREPINEPHRINE BITARTRATE 0.02 MCG/KG/MIN: 1 INJECTION, SOLUTION, CONCENTRATE INTRAVENOUS at 07:01

## 2019-01-31 RX ADMIN — PROPOFOL 40 MCG/KG/MIN: 10 INJECTION, EMULSION INTRAVENOUS at 07:01

## 2019-01-31 RX ADMIN — PROPOFOL 50 MCG/KG/MIN: 10 INJECTION, EMULSION INTRAVENOUS at 04:01

## 2019-01-31 RX ADMIN — PROPOFOL 50 MCG/KG/MIN: 10 INJECTION, EMULSION INTRAVENOUS at 11:01

## 2019-01-31 RX ADMIN — VANCOMYCIN HYDROCHLORIDE 500 MG: 500 INJECTION, POWDER, LYOPHILIZED, FOR SOLUTION INTRAVENOUS at 01:01

## 2019-01-31 RX ADMIN — PROPOFOL 45 MCG/KG/MIN: 10 INJECTION, EMULSION INTRAVENOUS at 04:01

## 2019-01-31 RX ADMIN — IPRATROPIUM BROMIDE AND ALBUTEROL SULFATE 3 ML: .5; 3 SOLUTION RESPIRATORY (INHALATION) at 08:01

## 2019-01-31 RX ADMIN — PROPOFOL 35 MCG/KG/MIN: 10 INJECTION, EMULSION INTRAVENOUS at 01:01

## 2019-01-31 RX ADMIN — HEPARIN SODIUM 5000 UNITS: 5000 INJECTION, SOLUTION INTRAVENOUS; SUBCUTANEOUS at 10:01

## 2019-01-31 RX ADMIN — PROPOFOL 40 MCG/KG/MIN: 10 INJECTION, EMULSION INTRAVENOUS at 02:01

## 2019-01-31 RX ADMIN — THERA TABS 1 TABLET: TAB at 09:01

## 2019-01-31 RX ADMIN — HEPARIN SODIUM 5000 UNITS: 5000 INJECTION, SOLUTION INTRAVENOUS; SUBCUTANEOUS at 02:01

## 2019-01-31 RX ADMIN — PROPOFOL 50 MCG/KG/MIN: 10 INJECTION, EMULSION INTRAVENOUS at 10:01

## 2019-01-31 NOTE — PROGRESS NOTES
[facility] Hospital Medicine  Progress Note    Patient Name: Jones Red  MRN: [id number]  Patient Class: IP- Inpatient   Admission Date: 1/28/2019  Length of Stay: 3 days  Attending Physician: Selwyn So MD  Primary Care Provider: Primary Doctor No        Subjective:     Principal Problem:Septic shock    HPI:  Mr. Jones Red is a 53 y.o. male, with PMH of HTN, COPD, chronic respiratory failure (on home O2), Diastolic CHF, LYNDON, chronic venous insufficiency, and questionable seizures history (suspected to be 2/2 alcohol withdrawal), who preset tia to [facility] on 1/22/19 2/2 SOB. This was associated with cough productive of brown sputum. He was started in BiPap, but did ultimately degrade, and was intubated, and maintained on a ventilator. He was additionally altered upon arrival to the ED. History was reportedly obtained by a friend, and it was reported that he was found down at home, and was unable to get up. He as admitted to the ICU, started on IV fluids and pressors.     Hospital Course:  Patient 53-year-old gentleman with history of hypertension, diastolic heart failure, chronic obstructive pulmonary disease with chronic hypoxic respiratory failure on home oxygen, prior alcohol abuse, significant ongoing tobacco use (3 packs per day), and morbid obesity with septic shock secondary to right lower extremity cellulitis complicating chronic venous stasis.  Patient intubated at outside hospital after failing a trial of BiPAP.  Patient also with evidence of worsening renal failure with rhabdomyolysis.       Patient transferred from [facility] in [place], [place] to [facility] on 1/28/2019.  Patient started on renal replacement therapy.  Patient remains intubated and sedated but responds to stimuli when sedation held but otherwise confused.  Ultrasound of the right lower extremity did not reveal evidence of abscess, subcutaneous gas, or evidence  of necrotizing fascitits.  General surgery and wound care consulted.  No surgical intervention recommended at this time.  Patient on broad spectrum antibiotics.    Interval History:  Patient required infusion of norepinephrine last night for a short period time to address hypotension.  Patient wean off norepinephrine with reasonable me anterior pressure now.  Patient tolerated hemodialysis yesterday well.    Review of Systems   Unable to perform ROS: Intubated     Objective:     Vital Signs (Most Recent):  Temp: 98.2 °F (36.8 °C) (01/31/19 0700)  Pulse: 98 (01/31/19 0832)  Resp: (!) 33 (01/31/19 0832)  BP: (!) 143/63 (01/31/19 0700)  SpO2: (!) 94 % (01/31/19 0832) Vital Signs (24h Range):  Temp:  [96.8 °F (36 °C)-99.3 °F (37.4 °C)] 98.2 °F (36.8 °C)  Pulse:  [] 98  Resp:  [18-35] 33  SpO2:  [89 %-96 %] 94 %  BP: (101-179)/(51-88) 143/63  Arterial Line BP: ()/(46-72) 122/64     Weight: (!) 204 kg (449 lb 11.8 oz)  Body mass index is 56.21 kg/m².    Intake/Output Summary (Last 24 hours) at 1/31/2019 0843  Last data filed at 1/31/2019 0600  Gross per 24 hour   Intake 1064.07 ml   Output 4825 ml   Net -3760.93 ml      Physical Exam   Constitutional: Vital signs are normal. He appears well-developed.  Non-toxic appearance. He does not have a sickly appearance. He does not appear ill. No distress.   Morbid obesity.  Sedated and intubated.   HENT:   Head: Normocephalic and atraumatic.   Right Ear: External ear normal.   Left Ear: External ear normal.   Eyes: Conjunctivae and EOM are normal. Pupils are equal, round, and reactive to light. No scleral icterus.   Neck: Normal range of motion. Neck supple. No JVD present. No tracheal deviation present.   Cardiovascular: Normal rate, regular rhythm, normal heart sounds and intact distal pulses. Exam reveals no gallop and no friction rub.   No murmur heard.  Pulmonary/Chest: Effort normal and breath sounds normal. No stridor. No respiratory distress. He has no  wheezes. He has no rales.   Distant lung sounds.  Moving air well.  Some crackles.   Abdominal: Soft. Bowel sounds are normal. He exhibits no distension and no mass. There is no tenderness. There is no guarding.   Musculoskeletal: Normal range of motion. He exhibits no edema or deformity.   Neurological: He exhibits normal muscle tone.   Skin: Skin is warm and dry. Rash noted. He is not diaphoretic. There is erythema.   Right lower extremity is erythematous, with blistering and edema.  Area of erythema has not extended beyond pen marking from yesterday.  Less edematous following renal replacement therapy.   Psychiatric: He has a normal mood and affect. His behavior is normal. Judgment and thought content normal.   Nursing note and vitals reviewed.      Significant Labs: All pertinent labs within the past 24 hours have been reviewed.    Significant Imaging: I have reviewed all pertinent imaging results/findings within the past 24 hours.    Assessment/Plan:      * Septic shock    Possible component of pneumonia but clear cause of his infection is the severe cellulitis involving the right lower extremity.  Ultrasound of right lower extremity did not reveal evidence of abscess.  General surgery consulted and recommending serial exams for now but no surgical intervention at this time.  Cellulitis mildly improved.    Bood cultures no growth to date.  Elevate right lower extremity as tolerated.  Continue with antimicrobial therapy.     Cellulitis of right lower extremity    Continue with treatment and serial examinations.  Continue with wound care.  General surgery following.     Acute on chronic diastolic heart failure    Hypervolemic.  Continue with additional volume removal with renal replacement therapy.     Acute on chronic respiratory failure    Continue with mechanical ventilation pending further volume removal with dialysis.  Fraction of inspired oxygen of 60 percent this morning.  Will attempt to wean down  supplemental oxygen.  Otherwise as directed by pulmonary/critical care.     Acute renal failure    Secondary to sepsis and rhabdomyolysis.  Continue with renal replacement therapy.     Hyponatremia    Stable.  Should improve with further dialysis.     Rhabdomyolysis    CPK trending down.  Continue with dialysis.         VTE Risk Mitigation (From admission, onward)        Ordered     heparin (porcine) injection 2,000 Units  As needed (PRN)      01/30/19 1056     heparin (porcine) injection 5,000 Units  Every 8 hours      01/29/19 2044     heparin (porcine) injection 5,000 Units  As needed (PRN)      01/29/19 1335     IP VTE HIGH RISK PATIENT  Once      01/28/19 1947              Selwyn So MD  Department of Hospital Medicine   [facility]

## 2019-01-31 NOTE — SUBJECTIVE & OBJECTIVE
Interval History:  Patient required infusion of norepinephrine last night for a short period time to address hypotension.  Patient wean off norepinephrine with reasonable me anterior pressure now.  Patient tolerated hemodialysis yesterday well.    Review of Systems   Unable to perform ROS: Intubated     Objective:     Vital Signs (Most Recent):  Temp: 98.2 °F (36.8 °C) (01/31/19 0700)  Pulse: 98 (01/31/19 0832)  Resp: (!) 33 (01/31/19 0832)  BP: (!) 143/63 (01/31/19 0700)  SpO2: (!) 94 % (01/31/19 0832) Vital Signs (24h Range):  Temp:  [96.8 °F (36 °C)-99.3 °F (37.4 °C)] 98.2 °F (36.8 °C)  Pulse:  [] 98  Resp:  [18-35] 33  SpO2:  [89 %-96 %] 94 %  BP: (101-179)/(51-88) 143/63  Arterial Line BP: ()/(46-72) 122/64     Weight: (!) 204 kg (449 lb 11.8 oz)  Body mass index is 56.21 kg/m².    Intake/Output Summary (Last 24 hours) at 1/31/2019 0843  Last data filed at 1/31/2019 0600  Gross per 24 hour   Intake 1064.07 ml   Output 4825 ml   Net -3760.93 ml      Physical Exam   Constitutional: Vital signs are normal. He appears well-developed.  Non-toxic appearance. He does not have a sickly appearance. He does not appear ill. No distress.   Morbid obesity.  Sedated and intubated.   HENT:   Head: Normocephalic and atraumatic.   Right Ear: External ear normal.   Left Ear: External ear normal.   Eyes: Conjunctivae and EOM are normal. Pupils are equal, round, and reactive to light. No scleral icterus.   Neck: Normal range of motion. Neck supple. No JVD present. No tracheal deviation present.   Cardiovascular: Normal rate, regular rhythm, normal heart sounds and intact distal pulses. Exam reveals no gallop and no friction rub.   No murmur heard.  Pulmonary/Chest: Effort normal and breath sounds normal. No stridor. No respiratory distress. He has no wheezes. He has no rales.   Distant lung sounds.  Moving air well.  Some crackles.   Abdominal: Soft. Bowel sounds are normal. He exhibits no distension and no mass. There  is no tenderness. There is no guarding.   Musculoskeletal: Normal range of motion. He exhibits no edema or deformity.   Neurological: He exhibits normal muscle tone.   Skin: Skin is warm and dry. Rash noted. He is not diaphoretic. There is erythema.   Right lower extremity is erythematous, with blistering and edema.  Area of erythema has not extended beyond pen marking from yesterday.  Less edematous following renal replacement therapy.   Psychiatric: He has a normal mood and affect. His behavior is normal. Judgment and thought content normal.   Nursing note and vitals reviewed.      Significant Labs: All pertinent labs within the past 24 hours have been reviewed.    Significant Imaging: I have reviewed all pertinent imaging results/findings within the past 24 hours.

## 2019-01-31 NOTE — CONSULTS
[facility]  Infectious Disease  Consult Note    Patient Name: Jones Red  MRN: [id number]  Admission Date: 1/28/2019  Hospital Length of Stay: 3 days  Attending Physician: Selwyn So MD  Primary Care Provider: Primary Doctor No     Isolation Status: No active isolations    Patient information was obtained from patient and ER records.      Inpatient consult to Infectious Diseases  Consult performed by: Nancy Franco MD  Consult ordered by: Mitzy Chapa PA-C        Assessment/Plan:     Cellulitis of right lower extremity    bcx 1/28 ngtd. Will call osh to ensure bcx negative there as well. 2d echo negative for vegetations. supect souce of septic shock was lower extremity cellulitis (clinical exam more c/w strep than staph, but no positive cultures to guide therapy). No abscess noted on ultrasound or areas of fluctuance noted on exam. Per nursing and hospitalist, patient is showing signs of improvement. Oxygenation and pressor requirement improving. Agree with empiric choices of antibiotics (vanc,clinda, cefepime). continuous infusion of b-lactams are best for obese patients with large volume of distribution and severe, deep-seeded infections (but may not be possible with current renal function). Has already received clindamycin x2 days, which has theoretical benefit of anti-toxin effect with strep infections and gives some anaerobe coverage. Could consider de-escalating as patient clinically improves. Agree with iv vancomycin for empiric treatment of MRSA while in septic shock, given etiology of sepsis was cellulitis     Reference:  Comprehensive Guidance for Antibiotic Dosing in Obese Adults. Marla Trammell et al. Pharmacotherapy 2017;37(11):9109-4337) doi: 10.1002/phar.2023              Thank you for your consult. I will follow-up with patient. Please contact us if you have any additional questions.    Nancy Franco MD  Infectious Disease  [facility]    Subjective:     Principal Problem: Septic shock    HPI: 53M with h/o CHF, COPD on home o2, morbid obesity admitted as transfer from [facility] in [place], [place]) for septic shock. Patient currently intubated and sedated on propofol and history obtained by chart review. Pt with h/o chronic venous stasis and has had acute worsening of leg wounds with redness and blistering. Currently being supported by pressor support and renal replacement. Nurse reports minimal secretions. Inpatient team has tried imaging lower extremities, but CT could not be done 2/2 obesity. Ultrasound negative for abscess. Currently on cefepime, metronidazole and vancomycin. ID consulted for further abx recs.     Review of Systems   Unable to perform ROS: Intubated     Objective:     Vital Signs (Most Recent):  Temp: 98.2 °F (36.8 °C) (01/31/19 0045)  Pulse: 96 (01/31/19 0045)  Resp: (!) 30 (01/31/19 0045)  BP: 127/60 (01/31/19 0045)  SpO2: (!) 92 % (01/31/19 0045) Vital Signs (24h Range):  Temp:  [86.7 °F (30.4 °C)-99.3 °F (37.4 °C)] 98.2 °F (36.8 °C)  Pulse:  [] 96  Resp:  [17-35] 30  SpO2:  [89 %-98 %] 92 %  BP: (101-153)/(51-76) 127/60  Arterial Line BP: ()/(46-78) 106/52     Weight: (!) 212 kg (467 lb 6 oz)  Body mass index is 58.42 kg/m².    Intake/Output Summary (Last 24 hours) at 1/31/2019 0102  Last data filed at 1/30/2019 1800  Gross per 24 hour   Intake 639.5 ml   Output 5880 ml   Net -5240.5 ml      Physical Exam   Constitutional: Vital signs are normal. He appears well-developed.  Non-toxic appearance. He does not have a sickly appearance. He does not appear ill. No distress.   Morbid obesity.  Sedated and intubated.   HENT:   Head: Normocephalic and atraumatic.   Right Ear: External ear normal.   Left Ear: External ear normal.   Eyes: Conjunctivae and EOM are normal. Pupils are equal, round, and reactive to light. No scleral icterus.   Neck: Normal range of motion. Neck supple.  No JVD present. No tracheal deviation present.   Cardiovascular: Normal rate, regular rhythm, normal heart sounds and intact distal pulses. Exam reveals no gallop and no friction rub.   No murmur heard.  Pulmonary/Chest: Effort normal and breath sounds normal. No stridor. No respiratory distress. He has no wheezes. He has no rales.   Distant lung sounds.  Moving air well.  Some crackles.   Abdominal: Soft. Bowel sounds are normal. He exhibits no distension and no mass. There is no tenderness. There is no guarding.   Musculoskeletal: Normal range of motion. He exhibits no edema or deformity.   Neurological: He exhibits normal muscle tone.   Skin: Skin is warm and dry. Rash noted. He is not diaphoretic. There is erythema.   Right lower extremity is erythematous, with clear blistering and edema.  Area of erythema had been demarkated by admitting team and has not extended past lines   Psychiatric: He has a normal mood and affect. His behavior is normal. Judgment and thought content normal.   Nursing note and vitals reviewed.      Significant Labs: All pertinent labs within the past 24 hours have been reviewed.    Significant Imaging: I have reviewed all pertinent imaging results/findings within the past 24 hours.

## 2019-01-31 NOTE — PLAN OF CARE
Problem: Inability to Wean (Mechanical Ventilation, Invasive)  Goal: Mechanical Ventilation Liberation  Outcome: Ongoing (interventions implemented as appropriate)  Patient received on ventilator with settings as documented. Aerosol treatments given Q 4. BBS course with expiratory wheezes. At 2248, PIP pressures up to 69 and wheezing much more pronounced. Patient suctioned with out improvement. Q 4 treatment given early. Wheezing not improved. PIP still 67. Sat's decreased to 89-90%. Dr. Tang notified. 1 hour 15 mg aerosol treatment ordered. Patient's breath sounds, sat's, and peak pressures improved after treatment. PIP down to 51-54. ABG done this am. PIP back up to 65 at 0432. Wheezes present but not as significant as previously tonight. Patient suctioned and sedation increased per EVELIA German. PIP down to 60. Will continue to monitor.

## 2019-01-31 NOTE — PROGRESS NOTES
Patient seen and examined by me. I agree with the trainee's separate note except as modified.     Overnight, problems with wheezing and high peak pressures. Improved transiently with prolonged neb Rx.     I/O -3.8L with dialysis; Afebrile, HR 90s, MAP 78 off Levo. 93% on 50% FiO2, 8 PEEP.    WBC up to 14, 3% bands; bicarb 22, vanco 17.5, 7.31/42/88/96% on 50% 8 PEEP    Echo: grade 2 DD, mildly reduced RV fxn    On my exam: still with anasarca, less wheezing    Impression: improving respiratory failure as volume is removed. No longer in shock    Plan:   -cont to wean down FIO2 and PEEP as able. Not quite ready to consider extubation  -cont current tidal volume  -cont abx per ID  -cont q4 nebs  -heparin sub-q for prophylaxis   -TFs    Critical care time (30min) spent personally by me on the following activities: development of treatment plan with patient or surrogate and bedside caregivers, discussions with consultants, evaluation of patient's response to treatment, examination of patient, ordering and performing treatments and interventions, ordering and review of laboratory studies, ordering and review of radiographic studies, pulse oximetry, re-evaluation of patient's condition. This critical care time did not overlap with that of any other provider or involve time for any procedures.

## 2019-01-31 NOTE — NURSING
Spoke with the patents wife Nivia by phone and update provided - all questions answered. Wife is the  for all information and will pass onto family members. Son and family at the bedside throughout the day. Unsure if patients son understands the plan of care for his father.

## 2019-01-31 NOTE — PLAN OF CARE
Pts son and other family member signed letter stating they will have transportation from [place], [place] to their home in [place], [place].     Pending call from CM , Lynnette Teixeira to inform when to call cab.

## 2019-01-31 NOTE — PLAN OF CARE
Problem: Adult Inpatient Plan of Care  Goal: Plan of Care Review  Outcome: Ongoing (interventions implemented as appropriate)  Patient's VSS, afebrile. Patient with a great deal of discomfort throughout the night. Lungs with expiratory wheezes. Nurse increased propofol several times, and patient received 2 fentanyl pushes. Right lower extremity elevated on pillows; leg is still oozing with serous fluid. ET tube remains in place, 25cm at the lip. NG tube output with 300 ml of bile. Fecal tube with 160ml of green stool. Patient's MAP dropped below 65 for a short period of time, levophed started briefly and shortly discontinued, see MAR. Patient does not follow commands, but continues to move bilateral lower extremities and left upper extremity. All safety precautions maintained. RN will continue to monitor closely.

## 2019-01-31 NOTE — SUBJECTIVE & OBJECTIVE
Review of Systems   Unable to perform ROS: Intubated     Objective:     Vital Signs (Most Recent):  Temp: 98.2 °F (36.8 °C) (01/31/19 0045)  Pulse: 96 (01/31/19 0045)  Resp: (!) 30 (01/31/19 0045)  BP: 127/60 (01/31/19 0045)  SpO2: (!) 92 % (01/31/19 0045) Vital Signs (24h Range):  Temp:  [86.7 °F (30.4 °C)-99.3 °F (37.4 °C)] 98.2 °F (36.8 °C)  Pulse:  [] 96  Resp:  [17-35] 30  SpO2:  [89 %-98 %] 92 %  BP: (101-153)/(51-76) 127/60  Arterial Line BP: ()/(46-78) 106/52     Weight: (!) 212 kg (467 lb 6 oz)  Body mass index is 58.42 kg/m².    Intake/Output Summary (Last 24 hours) at 1/31/2019 0102  Last data filed at 1/30/2019 1800  Gross per 24 hour   Intake 639.5 ml   Output 5880 ml   Net -5240.5 ml      Physical Exam   Constitutional: Vital signs are normal. He appears well-developed.  Non-toxic appearance. He does not have a sickly appearance. He does not appear ill. No distress.   Morbid obesity.  Sedated and intubated.   HENT:   Head: Normocephalic and atraumatic.   Right Ear: External ear normal.   Left Ear: External ear normal.   Eyes: Conjunctivae and EOM are normal. Pupils are equal, round, and reactive to light. No scleral icterus.   Neck: Normal range of motion. Neck supple. No JVD present. No tracheal deviation present.   Cardiovascular: Normal rate, regular rhythm, normal heart sounds and intact distal pulses. Exam reveals no gallop and no friction rub.   No murmur heard.  Pulmonary/Chest: Effort normal and breath sounds normal. No stridor. No respiratory distress. He has no wheezes. He has no rales.   Distant lung sounds.  Moving air well.  Some crackles.   Abdominal: Soft. Bowel sounds are normal. He exhibits no distension and no mass. There is no tenderness. There is no guarding.   Musculoskeletal: Normal range of motion. He exhibits no edema or deformity.   Neurological: He exhibits normal muscle tone.   Skin: Skin is warm and dry. Rash noted. He is not diaphoretic. There is erythema.    Right lower extremity is erythematous, with clear blistering and edema.  Area of erythema had been demarkated by admitting team and has not extended past lines   Psychiatric: He has a normal mood and affect. His behavior is normal. Judgment and thought content normal.   Nursing note and vitals reviewed.      Significant Labs: All pertinent labs within the past 24 hours have been reviewed.    Significant Imaging: I have reviewed all pertinent imaging results/findings within the past 24 hours.

## 2019-01-31 NOTE — PROGRESS NOTES
"Nephrology  Progress Note    Admit Date: 1/28/2019   LOS: 3 days     SUBJECTIVE:     Follow-up For:  Septic shock/ATN    Interval History:     Tolerating conventional HD w/o difficulty.  Seen on HD this am.  Remains sedated on ventilator.  Discussed with team.  Issues with tachypnea reviewed in chart from overnight.      Review of Systems:    Unable.     OBJECTIVE:     Vital Signs Range (Last 24H):  BP (!) 141/60   Pulse 100   Temp 98.2 °F (36.8 °C) (Core Esophageal)   Resp (!) 30   Ht 6' 3" (1.905 m)   Wt (!) 204 kg (449 lb 11.8 oz)   SpO2 (!) 93%   BMI 56.21 kg/m²     Temp:  [96.8 °F (36 °C)-99.3 °F (37.4 °C)]   Pulse:  []   Resp:  [18-35]   BP: (101-179)/(51-88)   SpO2:  [89 %-96 %]   Arterial Line BP: ()/(46-72)     I & O (Last 24H):    Intake/Output Summary (Last 24 hours) at 1/31/2019 0932  Last data filed at 1/31/2019 0600  Gross per 24 hour   Intake 1064.07 ml   Output 4825 ml   Net -3760.93 ml       Physical Exam:  General appearance:  Disheveled and morbidly obese   Eyes:  Conjunctivae/corneas clear. PERRL.  Lungs:  Vented with rhonchi throughout  Heart: Regular rate and rhythm, distant heart tones.  Abdomen: Soft, non-tender non-distended; bowel sounds normal; no masses,  no organomegaly, OGT, morbidly obese  Extremities:  Chronic Woody edema.    Skin:  Worsening Right lower extremity cellulitis  Neurologic:  Sedated  Abdalla catheterization  Right IJ Trialysis       Laboratory Data:  Recent Labs   Lab 01/31/19  0252   WBC 14.72*   RBC 3.57*   HGB 10.1*   HCT 30.4*      MCV 85   MCH 28.3   MCHC 33.2       BMP:   Recent Labs   Lab 01/29/19 2230  01/31/19  0252      < > 99   *   < > 133*   K 4.3   < > 3.6   CL 95   < > 94*   CO2 17*   < > 22*   BUN 85*   < > 62*   CREATININE 9.0*   < > 7.2*   CALCIUM 7.6*   < > 8.2*   MG 2.0  --   --     < > = values in this interval not displayed.     Lab Results   Component Value Date    CALCIUM 8.2 (L) 01/31/2019    PHOS 5.2 (H) " 01/31/2019       Lab Results   Component Value Date    .4 (H) 01/30/2019    CALCIUM 8.2 (L) 01/31/2019    PHOS 5.2 (H) 01/31/2019       No results found for: URICACID    BNP  Recent Labs   Lab 01/28/19 2023   BNP 1,726*       Medications:  Medication list was reviewed and changes noted under Assessment/Plan.    Diagnostic Results:    X-Ray Chest 1 View   Final Result      Overall no significant interval change         Electronically signed by: Connor Babcock MD   Date:    01/30/2019   Time:    08:24      US Extremity Non Vascular Limited Right   Final Result      Nonspecific subcutaneous edema, cutaneous thickening, could represent cellulitis changes.  No definite necrotizing fasciitis, gas or abscess.         Electronically signed by: Tin Leone MD   Date:    01/29/2019   Time:    13:09      X-Ray Chest AP Portable   Final Result   Addendum 1 of 1      Enteric tube projects in unchanged radiographic position with tip coursing    below the diaphragm and appearing to extend beyond the field of view.         Electronically signed by: Trena Sena MD   Date:    01/29/2019   Time:    00:39      Final      As above.         Electronically signed by: Trena Sena MD   Date:    01/29/2019   Time:    00:04      US Lower Extremity Veins Right   Final Result      Limited examination.  No evidence of deep venous thrombosis in the right lower extremity.         Electronically signed by: Vanita Oviedo   Date:    01/28/2019   Time:    21:10      X-Ray Chest AP Portable   Final Result      Cardiomegaly with findings suggesting pulmonary edema, correlation advised.  Differential would include infection.         Electronically signed by: Kalyan Galvez MD   Date:    01/28/2019   Time:    20:19      US Extremity Non Vascular Limited Bilat    (Results Pending)       ASSESSMENT/PLAN:     1. Persistent Multifactorial anuric acute renal failure secondary to ATN from septic shock, continued vasopressors,  rhabdomyolysis, hyponatremia, and hyperkalemia (N17.0, N17.9, E87.5, E87.1, M62.82, A41.9):  Events noted at outlying facility.  Transferred for CRRT.  Line placed and started CRRT.   Issues with clotting despite pre filter Heparin.  Now normotensive without vasopressors so attempted conventional HD and tolerating well so far.  Needs daily dialysis/UF for massive volume overload.  Renally dose meds, avoid nephrotoxins, and monitor I/O's closely.  2. Septic shock and resp failure:  Vented per CCT.    3. RLE Cellulitis/woody edema (L03.115):  No IV contrast please.  Defer to wound care and ID.  4. Morbid obesity (E44):  Most of his issues caused by this.    5. Multifactorial hyponatremia (E87.1):  Correct with HD.     See above  Will follow for renal needs.  Remains critical.

## 2019-01-31 NOTE — ASSESSMENT & PLAN NOTE
Possible component of pneumonia but clear cause of his infection is the severe cellulitis involving the right lower extremity.  Ultrasound of right lower extremity did not reveal evidence of abscess.  General surgery consulted and recommending serial exams for now but no surgical intervention at this time.  Cellulitis mildly improved.    Bood cultures no growth to date.  Elevate right lower extremity as tolerated.  Continue with antimicrobial therapy.

## 2019-01-31 NOTE — ASSESSMENT & PLAN NOTE
bcx 1/28 ngtd. Will call osh to ensure bcx negative there as well. 2d echo negative for vegetations. supect souce of septic shock was lower extremity cellulitis (clinical exam more c/w strep than staph, but no positive cultures to guide therapy). No abscess noted on ultrasound or areas of fluctuance noted on exam. Per nursing and hospitalist, patient is showing signs of improvement. Oxygenation and pressor requirement improving. Agree with empiric choices of antibiotics (vanc,clinda, cefepime). continuous infusion of b-lactams are best for obese patients with large volume of distribution and severe, deep-seeded infections (but may not be possible with current renal function). Has already received clindamycin x2 days, which has theoretical benefit of anti-toxin effect with strep infections and gives some anaerobe coverage. Could consider de-escalating as patient clinically improves. Agree with iv vancomycin for empiric treatment of MRSA while in septic shock, given etiology of sepsis was cellulitis     Reference:  Comprehensive Guidance for Antibiotic Dosing in Obese Adults. Marla Trammell et al. Pharmacotherapy 2017;37(11):5762-9543) doi: 10.1002/phar.2024

## 2019-01-31 NOTE — PLAN OF CARE
CM spoke to pt's spouse, Nivia Red, [phone], to inquire about transportation for family members who are here with no resources.    Spouse states they were told hospital would put them in a room to stay.    CM informed spouse that this is not correct. Spouse states she is trying to find someone to come for family, however has had no luck so far. Spouse inquired about [facility], CM explained it is for parents of children 25 and under.    At this time there are no resources for family.     01/31/19 1223   Discharge Assessment   Assessment Type Discharge Planning Reassessment   Confirmed/corrected address and phone number on facesheet? Yes   Assessment information obtained from? Caregiver;Medical Record   Prior to hospitilization cognitive status: Coma/Sedated/Intubated   Current cognitive status: Coma/Sedated/Intubated   Lives With spouse   Is patient able to care for self after discharge? No   Readmission Within the Last 30 Days no previous admission in last 30 days

## 2019-01-31 NOTE — HPI
53M with h/o CHF, COPD on home o2, morbid obesity admitted as transfer from OSH ([facility] in [place], [place]) for septic shock. Patient currently intubated and sedated on propofol and history obtained by chart review. Pt with h/o chronic venous stasis and has had acute worsening of leg wounds with redness and blistering. Currently being supported by pressor support and renal replacement. Nurse reports minimal secretions. Inpatient team has tried imaging lower extremities, but CT could not be done 2/2 obesity. Ultrasound negative for abscess. Currently on cefepime, metronidazole and vancomycin. ID consulted for further abx recs.

## 2019-01-31 NOTE — NURSING
Patients right leg elevated on two pillows   Pad underneath the leg changed to maintain dryness   In no acute distress  Dr Santiago would like the Propofol to remain at 35mcg/kg/min  B/P maintaining within parameters and no pressors at this time

## 2019-01-31 NOTE — PROGRESS NOTES
Pulmonary & Critical Care Medicine Progress Note    Subjective:   No overnight events. Afebrile. Vitals stable. On Propofol 40. High peak pressures on vent likely due to bronchospasm (plateau pressures around 30). Obesity is likely a component as well. No mucous plugging. No ventilator problems. No PTX. No trouble with the ET tube.    Vital Signs:   Vitals:    01/31/19 0700   BP: (!) 143/63   Pulse: 98   Resp: (!) 29   Temp: 98.2 °F (36.8 °C)     Fluid Balance:     Intake/Output Summary (Last 24 hours) at 1/31/2019 0807  Last data filed at 1/31/2019 0600  Gross per 24 hour   Intake 1064.07 ml   Output 4825 ml   Net -3760.93 ml     Physical Exam:   General: NAD, sedated, morbidly obese  HEENT: AT/NC, PERRL, oral mucosa moist, ET tube in place.  Neck: Supple  Cardiac: S1S2 with brisk cap refill in distal extremities.  Respiratory: Crackles and wheezes bilaterally.  Abdomen: Soft, NT/ND. +BS.   Extremities: 3-4+ edema  Neuro: Sedated    Laboratory Studies:   Recent Labs   Lab 01/31/19  0440   PH 7.306*   PCO2 51.5*   PO2 88   HCO3 25.6   POCSATURATED 96   BE -1     Recent Labs   Lab 01/31/19  0252   WBC 14.72*   RBC 3.57*   HGB 10.1*   HCT 30.4*      MCV 85   MCH 28.3   MCHC 33.2     Recent Labs   Lab 01/31/19  0252   *   K 3.6   CL 94*   CO2 22*   BUN 62*   CREATININE 7.2*     Microbiology Data:   Microbiology Results (last 7 days)     Procedure Component Value Units Date/Time    Culture, Respiratory with Gram Stain [[id number]] Collected:  01/28/19 2026    Order Status:  Completed Specimen:  Respiratory from Sputum, Induced Updated:  01/31/19 0729     Respiratory Culture --     CANDIDA TROPICALIS  Moderate       Gram Stain (Respiratory) <10 epithelial cells per low power field.     Gram Stain (Respiratory) Few WBC's     Gram Stain (Respiratory) No organisms seen    Blood culture [[id number]] Collected:  01/28/19 2020    Order Status:  Completed Specimen:  Blood Updated:  01/31/19 0612     Blood Culture,  Routine No Growth to date     Blood Culture, Routine No Growth to date     Blood Culture, Routine No Growth to date    Blood culture [[id number]] Collected:  19    Order Status:  Completed Specimen:  Blood Updated:  19     Blood Culture, Routine No Growth to date     Blood Culture, Routine No Growth to date     Blood Culture, Routine No Growth to date         Chest Imaging:   No new imaging.     Infusions:     norepinephrine bitartrate-D5W Stopped (19)    propofol 40 mcg/kg/min (19)     Scheduled Medications:    sodium chloride 0.9%   Intravenous Once    albuterol-ipratropium  3 mL Nebulization Q4H    cefepime in dextrose 5 %  1 g Intravenous Q24H    chlorhexidine  15 mL Mouth/Throat BID    famotidine (PF)  20 mg Intravenous Daily    folic acid  1 mg Per OG tube Daily    heparin (porcine)  5,000 Units Subcutaneous Q8H    multivitamin  1 tablet Per OG tube Daily    paricalcitol  2 mcg Intravenous Q48H    thiamine  100 mg Per OG tube Daily     PRN Medications:   fentaNYL, heparin (porcine), heparin (porcine), sodium chloride 0.9%    Assessment & Plan:   Patient Active Problem List   Diagnosis    Septic shock    Acute renal failure    Cellulitis of right lower extremity    Rhabdomyolysis    Chronic venous insufficiency    Acute on chronic diastolic heart failure    Acute on chronic respiratory failure    Hyponatremia     54 yo M with a PMHx of HTN, tobacco use disorder (3 PPD), COPD, HFpEF, LYNDON who was transferred to [facility] on  from [facility] (admitted there on ) for septic shock secondary to RLE cellulitis complicated by AMS, respiratory failure, rhabdomyolysis, and anuric renal failure.     Neuro:  Encephalopathy  -Related to patient's sepsis and sedation with Propofol.     Pulm:  Hypoxic/Hypercapnic Respiratory Failure, Pulmonary Edema, COPD  -Vent: 480/30/8/50%  -AB.30/51/88/25/96%  -Will increase TV back to 500.  -Significant  pulmonary edema improving with CRRT  -O2 requirements coming down with fluid removal.  -Will continue to wean down O2 requirements for goal sat 88-92%  -Possible PNA noted, being treated with abx  -On duonebs q4h for his COPD (3 PPD smoker)  -Vent settings adjusted to help blow off more CO2 and compensate for metabolic acidosis. Improving slowly with CRRT.     Cardio:  Shock, HFpEF  -Off of Levophed at this time  -2D ECHO: LVEF 65%, grade II diastolic dysfunction, mild RV enlargement, mildly reduced RV systolic function  -Volume removal with CRRT at this time  -BNP on admission 1,726  -Trop on admission 0.02    GI:  -Starting tube feeds today at 10 cc/hr.     Renal:  Anuric Renal Failure, ATN, Rhabdomyolysis, Hyponatremia, Metabolic Acidosis  -CRRT started on 1/28 night. CRRT continues to clot off intermittently.  -HD started on 1/30.  -Hyponatremia likely hypervolemic and improving (123 --> 129 --> 133)  -Patient is anuric     ID:  Cellulitis, Possible PNA  -WBC 12 -->10 --> 14 with 3 bands  -Lactic acid WNL  -All cultures repeated with NGTD  -On Vanc and Zosyn  -RLE U/S completed does not show gas in the soft tissues.  -Area on the leg marked.  -Surgery has come evaluated the leg - no surgical debridement indicated.     DVT ppx: Heparin 5000U q8h  GI ppx: Famotidine  VAP ppx: Chlorhexidine    Thank you for involving us in the care of this patient. We will continue to follow along. Please call with any questions.    Addie Santiago MD  [facility] PCCM Fellow, PGY 5  [facility] [place]  Pager: [phone]

## 2019-01-31 NOTE — PROGRESS NOTES
Wound Care follow up for cellulitis to bilateral lower legs. Dr. So present at bedside, along with RN, Chiara. Additional pictures taken today for comparison.    Patient's lower right leg is less edematous, with more wrinkling of the skin. But patient remains very edematous, warm, redness noted. The blisters are white to honey colored, with a few weeping serous fluid. There is an area on the posterior calf that has unroofed, allowing fluid to drain. The area of demarcation with the dark pen has not increased.    Patient is having dialysis, again, today, which should help alleviate the excess fluid. The blisters should reduce in size, with possibly  most drying on their own. Would not voluntarily unroof any blisters, as this could become a portal of entry for more bacteria. Continue to keep right lower leg elevated, with a pad under to absorb any drainage. Will follow up again tomorrow.    Jane Mitchell RN, Wound and Ostomy    Right posterior calf upon admit      Right posterior calf today      Anterior lower leg today      Medial right calf on admit

## 2019-02-01 PROBLEM — K11.7 INCREASED OROPHARYNGEAL SECRETIONS: Status: ACTIVE | Noted: 2019-02-01

## 2019-02-01 PROBLEM — M62.82 RHABDOMYOLYSIS: Status: RESOLVED | Noted: 2019-01-28 | Resolved: 2019-02-01

## 2019-02-01 PROBLEM — E87.1 HYPONATREMIA: Status: RESOLVED | Noted: 2019-01-28 | Resolved: 2019-02-01

## 2019-02-01 LAB
ALLENS TEST: ABNORMAL
ALLENS TEST: ABNORMAL
ANION GAP SERPL CALC-SCNC: 17 MMOL/L
BASOPHILS # BLD AUTO: ABNORMAL K/UL
BASOPHILS NFR BLD: 0 %
BUN SERPL-MCNC: 54 MG/DL
CALCIUM SERPL-MCNC: 8.8 MG/DL
CHLORIDE SERPL-SCNC: 92 MMOL/L
CK SERPL-CCNC: 149 U/L
CO2 SERPL-SCNC: 27 MMOL/L
CREAT SERPL-MCNC: 6.9 MG/DL
DELSYS: ABNORMAL
DELSYS: ABNORMAL
DIFFERENTIAL METHOD: ABNORMAL
EOSINOPHIL # BLD AUTO: ABNORMAL K/UL
EOSINOPHIL NFR BLD: 1 %
ERYTHROCYTE [DISTWIDTH] IN BLOOD BY AUTOMATED COUNT: 13.9 %
ERYTHROCYTE [SEDIMENTATION RATE] IN BLOOD BY WESTERGREN METHOD: 30 MM/H
ERYTHROCYTE [SEDIMENTATION RATE] IN BLOOD BY WESTERGREN METHOD: 30 MM/H
EST. GFR  (AFRICAN AMERICAN): 10 ML/MIN/1.73 M^2
EST. GFR  (NON AFRICAN AMERICAN): 8 ML/MIN/1.73 M^2
FIO2: 60
FIO2: 60
GLUCOSE SERPL-MCNC: 97 MG/DL
HCO3 UR-SCNC: 30.7 MMOL/L (ref 24–28)
HCO3 UR-SCNC: 33.9 MMOL/L (ref 24–28)
HCT VFR BLD AUTO: 30.5 %
HGB BLD-MCNC: 10.2 G/DL
LYMPHOCYTES # BLD AUTO: ABNORMAL K/UL
LYMPHOCYTES NFR BLD: 10 %
MCH RBC QN AUTO: 28.5 PG
MCHC RBC AUTO-ENTMCNC: 33.4 G/DL
MCV RBC AUTO: 85 FL
METAMYELOCYTES NFR BLD MANUAL: 2 %
MIN VOL: 16
MODE: ABNORMAL
MODE: ABNORMAL
MONOCYTES # BLD AUTO: ABNORMAL K/UL
MONOCYTES NFR BLD: 2 %
NEUTROPHILS # BLD AUTO: ABNORMAL K/UL
NEUTROPHILS NFR BLD: 82 %
NEUTS BAND NFR BLD MANUAL: 3 %
PCO2 BLDA: 53 MMHG (ref 35–45)
PCO2 BLDA: 60.4 MMHG (ref 35–45)
PEEP: 10
PEEP: 10
PH SMN: 7.36 [PH] (ref 7.35–7.45)
PH SMN: 7.37 [PH] (ref 7.35–7.45)
PHOSPHATE SERPL-MCNC: 5.3 MG/DL
PIP: 40
PLATELET # BLD AUTO: 246 K/UL
PLATELET BLD QL SMEAR: ABNORMAL
PMV BLD AUTO: 10.8 FL
PO2 BLDA: 117 MMHG (ref 80–100)
PO2 BLDA: 120 MMHG (ref 80–100)
POC BE: 5 MMOL/L
POC BE: 8 MMOL/L
POC SATURATED O2: 98 % (ref 95–100)
POC SATURATED O2: 98 % (ref 95–100)
POCT GLUCOSE: 100 MG/DL (ref 70–110)
POCT GLUCOSE: 103 MG/DL (ref 70–110)
POIKILOCYTOSIS BLD QL SMEAR: SLIGHT
POLYCHROMASIA BLD QL SMEAR: ABNORMAL
POTASSIUM SERPL-SCNC: 3.4 MMOL/L
RBC # BLD AUTO: 3.58 M/UL
SAMPLE: ABNORMAL
SAMPLE: ABNORMAL
SITE: ABNORMAL
SITE: ABNORMAL
SODIUM SERPL-SCNC: 136 MMOL/L
SP02: 94
SP02: 95
STOMATOCYTES BLD QL SMEAR: PRESENT
VT: 450
VT: 500
WBC # BLD AUTO: 20.22 K/UL

## 2019-02-01 PROCEDURE — 80048 BASIC METABOLIC PNL TOTAL CA: CPT

## 2019-02-01 PROCEDURE — 99291 CRITICAL CARE FIRST HOUR: CPT | Mod: ,,, | Performed by: INTERNAL MEDICINE

## 2019-02-01 PROCEDURE — 87205 SMEAR GRAM STAIN: CPT

## 2019-02-01 PROCEDURE — 94668 MNPJ CHEST WALL SBSQ: CPT

## 2019-02-01 PROCEDURE — 94761 N-INVAS EAR/PLS OXIMETRY MLT: CPT

## 2019-02-01 PROCEDURE — 25000003 PHARM REV CODE 250: Performed by: INTERNAL MEDICINE

## 2019-02-01 PROCEDURE — 94640 AIRWAY INHALATION TREATMENT: CPT

## 2019-02-01 PROCEDURE — 63600175 PHARM REV CODE 636 W HCPCS: Performed by: HOSPITALIST

## 2019-02-01 PROCEDURE — 93010 ELECTROCARDIOGRAM REPORT: CPT | Mod: ,,, | Performed by: INTERNAL MEDICINE

## 2019-02-01 PROCEDURE — 99233 PR SUBSEQUENT HOSPITAL CARE,LEVL III: ICD-10-PCS | Mod: ,,, | Performed by: INTERNAL MEDICINE

## 2019-02-01 PROCEDURE — 63600175 PHARM REV CODE 636 W HCPCS: Performed by: STUDENT IN AN ORGANIZED HEALTH CARE EDUCATION/TRAINING PROGRAM

## 2019-02-01 PROCEDURE — 87070 CULTURE OTHR SPECIMN AEROBIC: CPT

## 2019-02-01 PROCEDURE — 99233 PR SUBSEQUENT HOSPITAL CARE,LEVL III: ICD-10-PCS | Mod: ,,, | Performed by: HOSPITALIST

## 2019-02-01 PROCEDURE — 85007 BL SMEAR W/DIFF WBC COUNT: CPT

## 2019-02-01 PROCEDURE — 63600175 PHARM REV CODE 636 W HCPCS: Performed by: NURSE PRACTITIONER

## 2019-02-01 PROCEDURE — 87040 BLOOD CULTURE FOR BACTERIA: CPT

## 2019-02-01 PROCEDURE — 20000000 HC ICU ROOM

## 2019-02-01 PROCEDURE — 82550 ASSAY OF CK (CPK): CPT

## 2019-02-01 PROCEDURE — S0028 INJECTION, FAMOTIDINE, 20 MG: HCPCS | Performed by: HOSPITALIST

## 2019-02-01 PROCEDURE — 94003 VENT MGMT INPAT SUBQ DAY: CPT

## 2019-02-01 PROCEDURE — 99233 SBSQ HOSP IP/OBS HIGH 50: CPT | Mod: ,,, | Performed by: HOSPITALIST

## 2019-02-01 PROCEDURE — 93005 ELECTROCARDIOGRAM TRACING: CPT

## 2019-02-01 PROCEDURE — 31622 DX BRONCHOSCOPE/WASH: CPT

## 2019-02-01 PROCEDURE — 93010 EKG 12-LEAD: ICD-10-PCS | Mod: ,,, | Performed by: INTERNAL MEDICINE

## 2019-02-01 PROCEDURE — 63600175 PHARM REV CODE 636 W HCPCS: Performed by: INTERNAL MEDICINE

## 2019-02-01 PROCEDURE — 25000242 PHARM REV CODE 250 ALT 637 W/ HCPCS: Performed by: HOSPITALIST

## 2019-02-01 PROCEDURE — 82803 BLOOD GASES ANY COMBINATION: CPT

## 2019-02-01 PROCEDURE — 85027 COMPLETE CBC AUTOMATED: CPT

## 2019-02-01 PROCEDURE — 99900035 HC TECH TIME PER 15 MIN (STAT)

## 2019-02-01 PROCEDURE — 87106 FUNGI IDENTIFICATION YEAST: CPT

## 2019-02-01 PROCEDURE — 99900026 HC AIRWAY MAINTENANCE (STAT)

## 2019-02-01 PROCEDURE — 25000003 PHARM REV CODE 250: Performed by: HOSPITALIST

## 2019-02-01 PROCEDURE — 80100014 HC HEMODIALYSIS 1:1

## 2019-02-01 PROCEDURE — 84100 ASSAY OF PHOSPHORUS: CPT

## 2019-02-01 PROCEDURE — 27000221 HC OXYGEN, UP TO 24 HOURS

## 2019-02-01 PROCEDURE — 99291 PR CRITICAL CARE, E/M 30-74 MINUTES: ICD-10-PCS | Mod: ,,, | Performed by: INTERNAL MEDICINE

## 2019-02-01 PROCEDURE — 99233 SBSQ HOSP IP/OBS HIGH 50: CPT | Mod: ,,, | Performed by: INTERNAL MEDICINE

## 2019-02-01 RX ORDER — BACITRACIN 500 [USP'U]/G
OINTMENT TOPICAL 3 TIMES DAILY
Status: DISCONTINUED | OUTPATIENT
Start: 2019-02-01 | End: 2019-02-16

## 2019-02-01 RX ORDER — POTASSIUM CHLORIDE 20 MEQ/15ML
20 SOLUTION ORAL ONCE
Status: COMPLETED | OUTPATIENT
Start: 2019-02-01 | End: 2019-02-01

## 2019-02-01 RX ADMIN — HEPARIN SODIUM 5000 UNITS: 5000 INJECTION, SOLUTION INTRAVENOUS; SUBCUTANEOUS at 03:02

## 2019-02-01 RX ADMIN — PROPOFOL 50 MCG/KG/MIN: 10 INJECTION, EMULSION INTRAVENOUS at 06:02

## 2019-02-01 RX ADMIN — PROPOFOL 50 MCG/KG/MIN: 10 INJECTION, EMULSION INTRAVENOUS at 04:02

## 2019-02-01 RX ADMIN — IPRATROPIUM BROMIDE AND ALBUTEROL SULFATE 3 ML: .5; 3 SOLUTION RESPIRATORY (INHALATION) at 03:02

## 2019-02-01 RX ADMIN — FENTANYL CITRATE 50 MCG: 50 INJECTION INTRAMUSCULAR; INTRAVENOUS at 01:02

## 2019-02-01 RX ADMIN — IPRATROPIUM BROMIDE AND ALBUTEROL SULFATE 3 ML: .5; 3 SOLUTION RESPIRATORY (INHALATION) at 11:02

## 2019-02-01 RX ADMIN — PROPOFOL 50 MCG/KG/MIN: 10 INJECTION, EMULSION INTRAVENOUS at 11:02

## 2019-02-01 RX ADMIN — PARICALCITOL 2 MCG: 5 INJECTION, SOLUTION INTRAVENOUS at 08:02

## 2019-02-01 RX ADMIN — IPRATROPIUM BROMIDE AND ALBUTEROL SULFATE 3 ML: .5; 3 SOLUTION RESPIRATORY (INHALATION) at 07:02

## 2019-02-01 RX ADMIN — BACITRACIN: 500 OINTMENT TOPICAL at 10:02

## 2019-02-01 RX ADMIN — HEPARIN SODIUM 5000 UNITS: 5000 INJECTION, SOLUTION INTRAVENOUS; SUBCUTANEOUS at 09:02

## 2019-02-01 RX ADMIN — FENTANYL CITRATE 50 MCG: 50 INJECTION INTRAMUSCULAR; INTRAVENOUS at 06:02

## 2019-02-01 RX ADMIN — FOLIC ACID 1 MG: 1 TABLET ORAL at 08:02

## 2019-02-01 RX ADMIN — HEPARIN SODIUM 5000 UNITS: 5000 INJECTION, SOLUTION INTRAVENOUS; SUBCUTANEOUS at 05:02

## 2019-02-01 RX ADMIN — THERA TABS 1 TABLET: TAB at 08:02

## 2019-02-01 RX ADMIN — CEFEPIME HYDROCHLORIDE 1 G: 1 INJECTION, SOLUTION INTRAVENOUS at 01:02

## 2019-02-01 RX ADMIN — PROPOFOL 40 MCG/KG/MIN: 10 INJECTION, EMULSION INTRAVENOUS at 07:02

## 2019-02-01 RX ADMIN — FAMOTIDINE 20 MG: 10 INJECTION, SOLUTION INTRAVENOUS at 08:02

## 2019-02-01 RX ADMIN — PROPOFOL 45 MCG/KG/MIN: 10 INJECTION, EMULSION INTRAVENOUS at 02:02

## 2019-02-01 RX ADMIN — PROPOFOL 45 MCG/KG/MIN: 10 INJECTION, EMULSION INTRAVENOUS at 01:02

## 2019-02-01 RX ADMIN — PROPOFOL 50 MCG/KG/MIN: 10 INJECTION, EMULSION INTRAVENOUS at 08:02

## 2019-02-01 RX ADMIN — PROPOFOL 5 MCG/KG/MIN: 10 INJECTION, EMULSION INTRAVENOUS at 09:02

## 2019-02-01 RX ADMIN — HEPARIN SODIUM 5000 UNITS: 5000 INJECTION, SOLUTION INTRAVENOUS; SUBCUTANEOUS at 01:02

## 2019-02-01 RX ADMIN — FENTANYL CITRATE 50 MCG: 50 INJECTION INTRAMUSCULAR; INTRAVENOUS at 04:02

## 2019-02-01 RX ADMIN — PROPOFOL 40 MCG/KG/MIN: 10 INJECTION, EMULSION INTRAVENOUS at 11:02

## 2019-02-01 RX ADMIN — PROPOFOL 50 MCG/KG/MIN: 10 INJECTION, EMULSION INTRAVENOUS at 02:02

## 2019-02-01 RX ADMIN — FENTANYL CITRATE 50 MCG/HR: 50 INJECTION, SOLUTION INTRAMUSCULAR; INTRAVENOUS at 08:02

## 2019-02-01 RX ADMIN — POTASSIUM CHLORIDE 20 MEQ: 40 SOLUTION ORAL at 05:02

## 2019-02-01 RX ADMIN — FENTANYL CITRATE 50 MCG: 50 INJECTION INTRAMUSCULAR; INTRAVENOUS at 07:02

## 2019-02-01 RX ADMIN — PROPOFOL 50 MCG/KG/MIN: 10 INJECTION, EMULSION INTRAVENOUS at 01:02

## 2019-02-01 RX ADMIN — PROPOFOL 5 MCG/KG/MIN: 10 INJECTION, EMULSION INTRAVENOUS at 04:02

## 2019-02-01 RX ADMIN — BACITRACIN: 500 OINTMENT TOPICAL at 09:02

## 2019-02-01 RX ADMIN — PROPOFOL 45 MCG/KG/MIN: 10 INJECTION, EMULSION INTRAVENOUS at 06:02

## 2019-02-01 RX ADMIN — BACITRACIN: 500 OINTMENT TOPICAL at 04:02

## 2019-02-01 RX ADMIN — PROPOFOL 50 MCG/KG/MIN: 10 INJECTION, EMULSION INTRAVENOUS at 09:02

## 2019-02-01 RX ADMIN — Medication 100 MG: at 08:02

## 2019-02-01 RX ADMIN — PROPOFOL 50 MCG/KG/MIN: 10 INJECTION, EMULSION INTRAVENOUS at 07:02

## 2019-02-01 RX ADMIN — FENTANYL CITRATE 50 MCG: 50 INJECTION INTRAMUSCULAR; INTRAVENOUS at 03:02

## 2019-02-01 NOTE — SIGNIFICANT EVENT
Called on telephone regarding patient's elevated peak airway pressures (60-70). Patient was difficult to bag, difficult to oxygenate, difficult to ventilate, and no secretions were coming up with suctioning. Duoneb treatments given for some wheezing/crackles that have been present. Suction catheter could not be passed through the ET tube which was concerning for ET tube kink or blockage. Bougie was used to break up whatever could be blocking the ET tube and chunks of mucous came up with improvement in peak pressures and oxygenation.    Once I arrived, bronchoscope was passed through the ET tube. ET tube was filled with thick secretions and mucous with the opening being partially closed off. I attempted suctioning as much of the secretions as I could with saline. R-sided and L-sided airways were assessed. No mucous or thick secretions noted on either side. Only clear secretions were noted. Anesthesiology was called to help switch out ET tube for another size 8.0 ET tube. Cuff leak present to ensure no swelling of the airway.    When anesthesiology arrived, bougie was used to remove the ET tube and place the new one. C-mac was used to visualize the cords to make sure we were in place. New size 8 ET tube was in place at 25 cm at the lip. Patient was ventilating and oxygenating well. Bronchoscope was used to visualize the new ET tube and make sure it was in position over the [place]. Peak pressures on the vent remain between 35-40, which will be patient's baseline for future reference.    Addie Santiago MD  [employer] PCCM Fellow

## 2019-02-01 NOTE — PLAN OF CARE
Problem: Adult Inpatient Plan of Care  Goal: Plan of Care Review  Outcome: Ongoing (interventions implemented as appropriate)  Patient intubated on mechanical ventilation. BBS coarse, expiratory wheeze. Q4 Duoneb treatments given, tolerated well. Pt FiO2 and PEEP titrated throughout shift, coordinated with pulm CC. Decreased PIP's 28-30 post dialysis noted.  Moderate-large amount of white-pale yellow sputum suctioned via ETTand oral airway, and occasionally nares.     1220: sputum sent to lab  1239: ABG completed and reported to Dr. Walker and Dr. Herrmann. FiO2 decreased to 50%.  1645: EKG done due to increased HR.

## 2019-02-01 NOTE — PROCEDURES
"Jones Red is a 53 y.o. male patient.    Temp: 99 °F (37.2 °C) (01/31/19 1245)  Pulse: 86 (01/31/19 1545)  Resp: (!) 33 (01/31/19 1545)  BP: 128/60 (01/31/19 1245)  SpO2: (!) 94 % (01/31/19 1545)  Weight: (!) 204 kg (449 lb 11.8 oz) (01/31/19 0530)  Height: 6' 3" (190.5 cm) (01/28/19 1945)       Intubation  Date/Time: 1/31/2019 6:08 PM  Location procedure was performed: [facility] ICU  Performed by: Addie Santiago MD  Authorized by: Addie Santiago MD   Assisting provider: Reji Mendoza MD  Consent Done: Emergent Situation  Indications: hypoxemia and  respiratory distress  Description of findings: Bronchoscope passed through ET tube with large amounts of thick mucous adhered to the ET tube wall. Attempted as much suctioning as possible. Airways were clear with clear secretions. Decision to switch out ET tube 8.0 for another ET tube 8.0 was made. Shiley was passed through first ET tube, it was removed, and patient was suctioned. Second ET tube was placed over shiley through vocal cords and shiley was removed. C-mac was used to visualize the airway. Bronchoscope was then used to assess airway.    Patient status: sedated  Sedatives: propofol  Paralytic: none  Laryngoscope size: Olivarez 4  Tube size: 8.0 mm  Tube type: cuffed  Number of attempts: 1  Post-procedure assessment: chest rise and ETCO2 monitor  Breath sounds: rales/crackles and equal  Cuff inflated: yes  ETT to lip: 25 cm  Tube secured with: adhesive tape and ETT benitez  Chest x-ray interpreted by me.  Patient tolerance: Patient tolerated the procedure well with no immediate complications  Complications: No  Specimens: No  Implants: No      Addie Santiago MD  [employer] PCCM Fellow  "

## 2019-02-01 NOTE — PROGRESS NOTES
Frequent repositioning, oral/ET suctioning, oral care provided. CPT and neds provided per RT. IV, a line tubing changed; dressings changed to PICC, TLC, a line. Extremities elevated, heels floated. Right leg kept as dry as possible with pads. Patient restless at times and prn fentanyl administered per order. Patient reaching for ET tube with hands and eICU updated and Md orders soft wrist restraints. NGT to gravity with around 1 Liter of drainage. Levo restarted at beginning of shift and titrated maintain MAP >65. K replaced per MD order to NGT and clamped after administered. Peak pressure at 50 since around 0330 bath has been given, breathing above Vt, sats 94%.

## 2019-02-01 NOTE — SUBJECTIVE & OBJECTIVE
Interval History:  Patient with episode of high peak pressures yesterday.  Patient's suction and endotracheal tube reposition/replaced.  Breathing more comfortably afterwards suggesting possible mucus plug.  Otherwise no other acute events.    Review of Systems   Unable to perform ROS: Intubated     Objective:     Vital Signs (Most Recent):  Temp: 98.6 °F (37 °C) (02/01/19 1345)  Pulse: 102 (02/01/19 1345)  Resp: (!) 24 (02/01/19 1345)  BP: (!) 109/59 (02/01/19 1345)  SpO2: 100 % (02/01/19 1345) Vital Signs (24h Range):  Temp:  [97.5 °F (36.4 °C)-99.3 °F (37.4 °C)] 98.6 °F (37 °C)  Pulse:  [] 102  Resp:  [13-33] 24  SpO2:  [90 %-100 %] 100 %  BP: (108-162)/(53-73) 109/59  Arterial Line BP: ()/(42-70) 106/50     Weight: (!) 204 kg (449 lb 11.8 oz)  Body mass index is 56.21 kg/m².    Intake/Output Summary (Last 24 hours) at 2/1/2019 1351  Last data filed at 2/1/2019 0600  Gross per 24 hour   Intake 1289.51 ml   Output 1935 ml   Net -645.49 ml      Physical Exam   Constitutional: Vital signs are normal. He appears well-developed.  Non-toxic appearance. He does not have a sickly appearance. He does not appear ill. No distress.   Morbid obesity.  Sedated and intubated.   HENT:   Head: Normocephalic and atraumatic.   Right Ear: External ear normal.   Left Ear: External ear normal.   Eyes: Conjunctivae and EOM are normal. Pupils are equal, round, and reactive to light. No scleral icterus.   Neck: Normal range of motion. Neck supple. No JVD present. No tracheal deviation present.   Cardiovascular: Normal rate, regular rhythm, normal heart sounds and intact distal pulses. Exam reveals no gallop and no friction rub.   No murmur heard.  Pulmonary/Chest: Effort normal and breath sounds normal. No stridor. No respiratory distress. He has no wheezes. He has no rales.   Distant lung sounds.  Moving air well.  Some crackles.   Abdominal: Soft. Bowel sounds are normal. He exhibits no distension and no mass. There is no  tenderness. There is no guarding.   Musculoskeletal: Normal range of motion. He exhibits no edema or deformity.   Neurological: He exhibits normal muscle tone.   Skin: Skin is warm and dry. Rash noted. He is not diaphoretic. There is erythema.   Right lower extremity is erythematous, with blistering and edema.  Area of erythema has not extended beyond pen marking. Less edematous following renal replacement therapy.   Psychiatric: He has a normal mood and affect. His behavior is normal. Judgment and thought content normal.   Nursing note and vitals reviewed.      Significant Labs: All pertinent labs within the past 24 hours have been reviewed.    Significant Imaging: I have reviewed all pertinent imaging results/findings within the past 24 hours.

## 2019-02-01 NOTE — ASSESSMENT & PLAN NOTE
Patient with episode yesterday with tight peak flow pressures.  Following suctioning and removal and replacement of endotracheal tube patient improved.  Patient ventilating reasonably well this morning.  Suspect likely mucus plug.  Continue with ventilator management as per Pulmonary Critical Care.

## 2019-02-01 NOTE — PLAN OF CARE
Problem: Adult Inpatient Plan of Care  Goal: Plan of Care Review  Outcome: Ongoing (interventions implemented as appropriate)  Patient received on mechanical vent, settings as documented.  Sats 92 to 96%. Txs given. Cpt done. ABG done, no changes made to settings. Pt. in no distress, will continue to monitor.

## 2019-02-01 NOTE — PLAN OF CARE
15:35 Patient on mechanical ventilation aerosol treatment was given during that time pt became clammy and increased peak pressure set off  Alarms on vent.Suctioned and lavage pt on and off vent with no return of sections attempted to bag pt with difficultly Colorimetric end tidal placed to see if tube was patent.Positive color change I then requested a x-ray to see if tube was in correctly result showed tube was intact.Dr. Santiago was call and notified of pt status as well anesthesiology to possibly change out ETT.Suction catheter could not be passed through the ET tube which was concerning for ET tube kink or blockage. Bougie was used to break up blockage in ETT a bronchoscope was passed via MD at bedside.Tube later exchanged per MD at bedside with 8.0 secured at 25 @ inner lip. Xray called for tube placement

## 2019-02-01 NOTE — PROGRESS NOTES
[facility] Hospital Medicine  Progress Note    Patient Name: Jones Red  MRN: [id number]  Patient Class: IP- Inpatient   Admission Date: 1/28/2019  Length of Stay: 4 days  Attending Physician: Selwyn So MD  Primary Care Provider: Primary Doctor No        Subjective:     Principal Problem:Septic shock    HPI:  Mr. Jones Red is a 53 y.o. male, with PMH of HTN, COPD, chronic respiratory failure (on home O2), Diastolic CHF, LYNDON, chronic venous insufficiency, and questionable seizures history (suspected to be 2/2 alcohol withdrawal), who preset tia to [facility] on 1/22/19 2/2 SOB. This was associated with cough productive of brown sputum. He was started in BiPap, but did ultimately degrade, and was intubated, and maintained on a ventilator. He was additionally altered upon arrival to the ED. History was reportedly obtained by a friend, and it was reported that he was found down at home, and was unable to get up. He as admitted to the ICU, started on IV fluids and pressors.     Hospital Course:  Patient 53-year-old gentleman with history of hypertension, diastolic heart failure, chronic obstructive pulmonary disease with chronic hypoxic respiratory failure on home oxygen, prior alcohol abuse, significant ongoing tobacco use (3 packs per day), and morbid obesity with septic shock secondary to right lower extremity cellulitis complicating chronic venous stasis.  Patient intubated at outside hospital after failing a trial of BiPAP.  Patient also with evidence of worsening renal failure with rhabdomyolysis.       Patient transferred from [facility] in [place], [place] to [facility] on 1/28/2019.  Patient started on renal replacement therapy.  Patient remains intubated and sedated but responds to stimuli when sedation held but otherwise confused.  Ultrasound of the right lower extremity did not reveal evidence of abscess, subcutaneous gas, or evidence  of necrotizing fascitits.  General surgery and wound care consulted.  No surgical intervention recommended at this time.  Patient on broad spectrum antibiotics.    Interval History:  Patient with episode of high peak pressures yesterday.  Patient's suction and endotracheal tube reposition/replaced.  Breathing more comfortably afterwards suggesting possible mucus plug.  Otherwise no other acute events.    Review of Systems   Unable to perform ROS: Intubated     Objective:     Vital Signs (Most Recent):  Temp: 98.6 °F (37 °C) (02/01/19 1345)  Pulse: 102 (02/01/19 1345)  Resp: (!) 24 (02/01/19 1345)  BP: (!) 109/59 (02/01/19 1345)  SpO2: 100 % (02/01/19 1345) Vital Signs (24h Range):  Temp:  [97.5 °F (36.4 °C)-99.3 °F (37.4 °C)] 98.6 °F (37 °C)  Pulse:  [] 102  Resp:  [13-33] 24  SpO2:  [90 %-100 %] 100 %  BP: (108-162)/(53-73) 109/59  Arterial Line BP: ()/(42-70) 106/50     Weight: (!) 204 kg (449 lb 11.8 oz)  Body mass index is 56.21 kg/m².    Intake/Output Summary (Last 24 hours) at 2/1/2019 1351  Last data filed at 2/1/2019 0600  Gross per 24 hour   Intake 1289.51 ml   Output 1935 ml   Net -645.49 ml      Physical Exam   Constitutional: Vital signs are normal. He appears well-developed.  Non-toxic appearance. He does not have a sickly appearance. He does not appear ill. No distress.   Morbid obesity.  Sedated and intubated.   HENT:   Head: Normocephalic and atraumatic.   Right Ear: External ear normal.   Left Ear: External ear normal.   Eyes: Conjunctivae and EOM are normal. Pupils are equal, round, and reactive to light. No scleral icterus.   Neck: Normal range of motion. Neck supple. No JVD present. No tracheal deviation present.   Cardiovascular: Normal rate, regular rhythm, normal heart sounds and intact distal pulses. Exam reveals no gallop and no friction rub.   No murmur heard.  Pulmonary/Chest: Effort normal and breath sounds normal. No stridor. No respiratory distress. He has no wheezes. He has  no rales.   Distant lung sounds.  Moving air well.  Some crackles.   Abdominal: Soft. Bowel sounds are normal. He exhibits no distension and no mass. There is no tenderness. There is no guarding.   Musculoskeletal: Normal range of motion. He exhibits no edema or deformity.   Neurological: He exhibits normal muscle tone.   Skin: Skin is warm and dry. Rash noted. He is not diaphoretic. There is erythema.   Right lower extremity is erythematous, with blistering and edema.  Area of erythema has not extended beyond pen marking. Less edematous following renal replacement therapy.   Psychiatric: He has a normal mood and affect. His behavior is normal. Judgment and thought content normal.   Nursing note and vitals reviewed.      Significant Labs: All pertinent labs within the past 24 hours have been reviewed.    Significant Imaging: I have reviewed all pertinent imaging results/findings within the past 24 hours.    Assessment/Plan:      * Septic shock    Possible component of pneumonia but clear cause of his infection is the severe cellulitis involving the right lower extremity.  Ultrasound of right lower extremity did not reveal evidence of abscess.  General surgery consulted and recommending serial exams for now but no surgical intervention at this time.  Cellulitis mildly improved.    Bood cultures no growth to date.  Elevate right lower extremity as tolerated.  Continue with antimicrobial therapy.     Cellulitis of right lower extremity    Continue with treatment and serial examinations.  Continue with wound care.  General surgery following.     Acute on chronic diastolic heart failure    Hypervolemic.  Continue with additional volume removal with renal replacement therapy.     Acute on chronic respiratory failure    Patient with episode yesterday with tight peak flow pressures.  Following suctioning and removal and replacement of endotracheal tube patient improved.  Patient ventilating reasonably well this morning.   Suspect likely mucus plug.  Continue with ventilator management as per Pulmonary Critical Care.     Acute renal failure    Secondary to sepsis and rhabdomyolysis.  Continue with renal replacement therapy.       VTE Risk Mitigation (From admission, onward)        Ordered     heparin (porcine) injection 5,000 Units  As needed (PRN)      01/31/19 1825     heparin (porcine) injection 2,000 Units  As needed (PRN)      01/30/19 1056     heparin (porcine) injection 5,000 Units  Every 8 hours      01/29/19 2044     heparin (porcine) injection 5,000 Units  As needed (PRN)      01/29/19 1335     IP VTE HIGH RISK PATIENT  Once      01/28/19 1947              Selwyn So MD  Department of Hospital Medicine   [facility]

## 2019-02-01 NOTE — PROGRESS NOTES
Progress Note  Pulmonary Medicine      SUBJECTIVE     Reason for consult: MSOF secondary to cellulitis    LOS: 4 days    Interval history  Bronched yesterday and ETT exchanged, no complications. No acute events overnight. VSS, afebrile; but WBC rising. Pt remains intubated, sedated, and on RRT.     Scheduled Medications   albuterol-ipratropium  3 mL Nebulization Q4H    bacitracin   Topical (Top) TID    cefepime in dextrose 5 %  1 g Intravenous Q24H    chlorhexidine  15 mL Mouth/Throat BID    famotidine (PF)  20 mg Intravenous Daily    folic acid  1 mg Per OG tube Daily    heparin (porcine)  5,000 Units Subcutaneous Q8H    multivitamin  1 tablet Per OG tube Daily    paricalcitol  2 mcg Intravenous Q48H    thiamine  100 mg Per OG tube Daily       Medications PRN  fentaNYL, heparin (porcine), heparin (porcine), heparin (porcine), sodium chloride 0.9%      OBJECTIVE     Temp:  [97.5 °F (36.4 °C)-99.3 °F (37.4 °C)]   Pulse:  []   Resp:  [13-33]   BP: (121-162)/(57-73)   SpO2:  [90 %-97 %]   Arterial Line BP: ()/(42-70)     Vitals:    02/01/19 1100   BP:    Pulse: 100   Resp: (!) 30   Temp:        I & O (Last 24H)  I/O last 3 completed shifts:  In: 2313.6 [I.V.:2293.6; NG/GT:20]  Out: 6895 [Urine:35; Drains:2200; Other:4500; Stool:160]    No intake/output data recorded.      Physical Exam  Constitutional: Patient is intubated and sedated. No distress.   Cardiovascular: Normal rate, regular rhythm, normal heart sounds and intact distal pulses.  Exam reveals no gallop and no friction rub.  No murmur heard.  Pulmonary/Chest: Effort normal and breath sounds normal. No respiratory distress. Patient has no wheezes. Patient has bibasilar rales and central rhonchi.   Abdominal: Soft. Bowel sounds are normal.   Musculoskeletal: Patient exhibits no edema or tenderness. LLE with cellulitis appearance, slightly improved.   Skin: Skin is warm and dry. No rash noted. Patient is not diaphoretic. No erythema. No  pallor.     Laboratory  Recent Labs   Lab 01/28/19 2023   INR 1.0     Recent Labs   Lab 01/30/19 0356 01/31/19 0252 02/01/19 0310   WBC 10.25 14.72* 20.22*   HGB 10.3* 10.1* 10.2*   HCT 30.7* 30.4* 30.5*    243 246     No results found for: TSH, P4ZGPHN, L0SQTND, THYROIDAB  Recent Labs   Lab 01/28/19 2023 01/30/19 0356 01/31/19 0252 02/01/19 0310   *  123*   < > 129*  129* 133* 136   K 5.1  5.0   < > 4.1  4.1 3.6 3.4*   CL 89*  89*   < > 95  95 94* 92*   CO2 19*  19*   < > 20*  20* 22* 27   BUN 97*  96*   < > 79*  79* 62* 54*   CREATININE 12.2*  12.3*   < > 8.3*  8.3* 7.2* 6.9*   CALCIUM 7.6*  7.9*   < > 7.9*  7.9* 8.2* 8.8   PROT 6.5  --   --   --   --    BILITOT 0.5  --   --   --   --    ALKPHOS 90  --   --   --   --    ALT 90*  --   --   --   --    AST 59*  --   --   --   --     < > = values in this interval not displayed.     Recent Labs   Lab 01/29/19  0818 01/29/19  1415 01/29/19  2230   MG 2.0 2.0 2.0     Lab Results   Component Value Date    CALCIUM 8.8 02/01/2019    PHOS 5.3 (H) 02/01/2019     No results found for: HGBA1C    Recent Labs   Lab 01/28/19 2023 02/01/19 0310   CPK 1657*   < > 149   TROPONINI 0.024  --   --     < > = values in this interval not displayed.       ABG  Recent Labs   Lab 02/01/19  1239   PH 7.357   PO2 120*   PCO2 60.4*   HCO3 33.9*   BE 8       Diagnostic Results:  Reviewed      Assessment / Recommendations        Diagnosis    Septic shock    Acute renal failure    Cellulitis of right lower extremity    Rhabdomyolysis    Chronic venous insufficiency    Acute on chronic diastolic heart failure    Acute on chronic respiratory failure    Hyponatremia      54 yo M with a PMHx of HTN, tobacco use disorder (3 PPD), COPD, HFpEF, LYNDON who was transferred to [facility] on 1/28 from [facility] (admitted there on 1/22) for septic shock secondary to RLE cellulitis complicated by MSOF including acute encephalopathy, rhabdomyolysis, anuric renal  failure, and respiratory failure 2/2 aspiration followed by ARDS.     Neuro:  Encephalopathy  -Related to patient's sepsis and sedation with Propofol.  -cont daily SAT/SBT     Pulm:  Hypoxic/Hypercapnic Respiratory Failure, ARDS, Pulmonary Edema, COPD  -O2 requirements coming down with fluid removal.  -resume LPC with aim of 6 cc/kg tidal volume; monitor by ABG  -Possible PNA noted, being treated with abx  -On duonebs q4h for COPD (3 PPD smoker)     Cardio:  Shock, HFpEF  -Off of Levophed; septic shock vs sedations as indication  -Volume removal ongoing with CRRT      GI:  -cont tube feeds today at 10 cc/hr  -start advancing to goal rate     Renal:  Anuric Renal Failure, ATN, Rhabdomyolysis, Hyponatremia, Metabolic Acidosis  -CRRT started 1/28.   -HD started 1/30.  -cont RRT with hope for recovering in UOP     ID:  Cellulitis, Possible PNA  -Surgery has come evaluated the leg - no surgical debridement indicated.  -WBC rising, possibly due to bronch yesterday, but also concern for new infection  -All cultures repeated with NGTD  -cont Vanc and cefepime    DVT ppx: Heparin 5000U q8h  GI ppx: Famotidine  VAP ppx: Chlorhexidine     Pt seen and plan discussed with Dr. Herrmann, staff.     Trace Walker MD  Fellow, Pulmonary & Critical Care Medicine

## 2019-02-01 NOTE — PROGRESS NOTES
Wound Care follow up for cellulitis and blisters to right leg. Assisted by RN, Chiara.     Many of the blisters have flatten, with some having little to no fluid. Dorsal side of foot and the posterior leg have considerably less fluid in the blisters. Improving. The medial thigh does have more fluid filled blisters than yesterday, with several having unroofed since then. One large partially unroofed blister noted to lateral side of calf, with moist white to yellow base.     Patient's leg is less edematous than yesterday. Dialysis is improving the blisters and edema in leg. Discussed with Dr. Ramos, ordering Bacitracin for the open areas to prevent bacteria from developing.     Recommend: Right leg open blisters - Apply Bacitracin 3 times daily. No cover dressing needed.    Jane Mitchell RN, Wound and Ostomy

## 2019-02-01 NOTE — PROGRESS NOTES
"Nephrology  Progress Note    Admit Date: 1/28/2019   LOS: 4 days     SUBJECTIVE:     Follow-up For:  Septic shock/ATN    Interval History:     Events noted of yesterday.  Issues with mucus plugs.  On low-dose pressors due to high need of Propofol.  Continues to tolerate conventional HD.  Still with massive edema.  Sedated on ventilator      Review of Systems:    Unable.     OBJECTIVE:     Vital Signs Range (Last 24H):  BP (!) 121/57   Pulse 98   Temp 97.5 °F (36.4 °C)   Resp (!) 30   Ht 6' 3" (1.905 m)   Wt (!) 204 kg (449 lb 11.8 oz)   SpO2 (!) 94%   BMI 56.21 kg/m²     Temp:  [97.5 °F (36.4 °C)-99.3 °F (37.4 °C)]   Pulse:  []   Resp:  [13-33]   BP: (110-162)/(54-73)   SpO2:  [90 %-97 %]   Arterial Line BP: ()/(42-70)     I & O (Last 24H):    Intake/Output Summary (Last 24 hours) at 2/1/2019 0840  Last data filed at 2/1/2019 0600  Gross per 24 hour   Intake 1289.51 ml   Output 6435 ml   Net -5145.49 ml       Physical Exam:  General appearance:  Disheveled and morbidly obese.  Sedated on ventilator   Eyes:  Conjunctivae/corneas clear. PERRL.  Lungs:  Vented with rhonchi throughout  Heart: Regular rate and rhythm, distant heart tones.  Abdomen: Soft, non-tender non-distended; bowel sounds normal; no masses,  no organomegaly, OGT, morbidly obese  Extremities:  Chronic Woody edema.    Skin:  Right lower extremity cellulitis  Neurologic:  Sedated  Abdalla catheterization  Right IJ Trialysis       Laboratory Data:  Recent Labs   Lab 02/01/19  0310   WBC 20.22*   RBC 3.58*   HGB 10.2*   HCT 30.5*      MCV 85   MCH 28.5   MCHC 33.4       BMP:   Recent Labs   Lab 01/29/19  2230  02/01/19  0310      < > 97   *   < > 136   K 4.3   < > 3.4*   CL 95   < > 92*   CO2 17*   < > 27   BUN 85*   < > 54*   CREATININE 9.0*   < > 6.9*   CALCIUM 7.6*   < > 8.8   MG 2.0  --   --     < > = values in this interval not displayed.     Lab Results   Component Value Date    CALCIUM 8.8 02/01/2019    PHOS 5.3 " (H) 02/01/2019       Lab Results   Component Value Date    .4 (H) 01/30/2019    CALCIUM 8.8 02/01/2019    PHOS 5.3 (H) 02/01/2019       No results found for: URICACID    BNP  Recent Labs   Lab 01/28/19 2023   BNP 1,726*       Medications:  Medication list was reviewed and changes noted under Assessment/Plan.    Diagnostic Results:    X-Ray Chest 1 View   Final Result      As above.         Electronically signed by: Keon Jonas MD   Date:    01/31/2019   Time:    19:14      X-Ray Chest AP Portable   Final Result      Lines and tubes are grossly unchanged.      Interval improvement in aeration of the lungs.         Electronically signed by: Connor Babcock MD   Date:    01/31/2019   Time:    16:42      X-Ray Chest 1 View   Final Result      Overall no significant interval change         Electronically signed by: Connor Babcock MD   Date:    01/30/2019   Time:    08:24      US Extremity Non Vascular Limited Right   Final Result      Nonspecific subcutaneous edema, cutaneous thickening, could represent cellulitis changes.  No definite necrotizing fasciitis, gas or abscess.         Electronically signed by: Tin Leone MD   Date:    01/29/2019   Time:    13:09      X-Ray Chest AP Portable   Final Result   Addendum 1 of 1      Enteric tube projects in unchanged radiographic position with tip coursing    below the diaphragm and appearing to extend beyond the field of view.         Electronically signed by: Trena Sena MD   Date:    01/29/2019   Time:    00:39      Final      As above.         Electronically signed by: Trena Sena MD   Date:    01/29/2019   Time:    00:04      US Lower Extremity Veins Right   Final Result      Limited examination.  No evidence of deep venous thrombosis in the right lower extremity.         Electronically signed by: Vanita Oviedo   Date:    01/28/2019   Time:    21:10      X-Ray Chest AP Portable   Final Result      Cardiomegaly with findings suggesting pulmonary  edema, correlation advised.  Differential would include infection.         Electronically signed by: Kalyan Galvez MD   Date:    01/28/2019   Time:    20:19      US Extremity Non Vascular Limited Bilat    (Results Pending)       ASSESSMENT/PLAN:     1. Persistent Multifactorial anuric acute renal failure secondary to ATN from shock, vasopressors, rhabdomyolysis, hyponatremia, and hyperkalemia (N17.0, N17.9, E87.5, E87.1, M62.82, A41.9):  Events noted at outlying facility.  Transferred for CRRT.  Line placed and started CRRT.   Issues with clotting despite pre filter Heparin.  Now normotensive without vasopressors at times so attempted conventional HD and tolerating well so far.  Needs daily dialysis/UF for massive volume overload.  Renally dose meds, avoid nephrotoxins, and monitor I/O's closely.  2. Septic shock and resp failure:  Vented per CCT.  WBC worsening.   3. RLE Cellulitis/woody edema (L03.115):  No IV contrast please.  Defer to wound care and ID.  4. Morbid obesity (E44):  Most of his issues caused by this.    5. Multifactorial hyponatremia (E87.1):  Correct with HD.     See above  Will follow for renal needs.  Remains critical.

## 2019-02-01 NOTE — PLAN OF CARE
Spoke with micro at [facility]. bcx from 1/23 in 2 sets negative and final

## 2019-02-01 NOTE — PLAN OF CARE
Pt began to have AF/RVR during RRT. MAP dropped below 65 transiently.   Bolus amiodarone and start ggt protocol.   Trace Walker MD  Fellow, Pulmonary & Critical Care Medicine

## 2019-02-02 LAB
ALLENS TEST: ABNORMAL
ANION GAP SERPL CALC-SCNC: 17 MMOL/L
BASOPHILS # BLD AUTO: ABNORMAL K/UL
BASOPHILS NFR BLD: 0 %
BUN SERPL-MCNC: 47 MG/DL
CALCIUM SERPL-MCNC: 9.1 MG/DL
CHLORIDE SERPL-SCNC: 93 MMOL/L
CO2 SERPL-SCNC: 27 MMOL/L
CREAT SERPL-MCNC: 6.6 MG/DL
DELSYS: ABNORMAL
DIFFERENTIAL METHOD: ABNORMAL
EOSINOPHIL # BLD AUTO: ABNORMAL K/UL
EOSINOPHIL NFR BLD: 2 %
ERYTHROCYTE [DISTWIDTH] IN BLOOD BY AUTOMATED COUNT: 14 %
ERYTHROCYTE [SEDIMENTATION RATE] IN BLOOD BY WESTERGREN METHOD: 30 MM/H
EST. GFR  (AFRICAN AMERICAN): 10 ML/MIN/1.73 M^2
EST. GFR  (NON AFRICAN AMERICAN): 9 ML/MIN/1.73 M^2
FIO2: 40
GIANT PLATELETS BLD QL SMEAR: PRESENT
GLUCOSE SERPL-MCNC: 103 MG/DL
HCO3 UR-SCNC: 35.7 MMOL/L (ref 24–28)
HCT VFR BLD AUTO: 30.7 %
HGB BLD-MCNC: 10.1 G/DL
LYMPHOCYTES # BLD AUTO: ABNORMAL K/UL
LYMPHOCYTES NFR BLD: 11 %
MCH RBC QN AUTO: 28.5 PG
MCHC RBC AUTO-ENTMCNC: 32.9 G/DL
MCV RBC AUTO: 87 FL
METAMYELOCYTES NFR BLD MANUAL: 2 %
MIN VOL: 15.6
MODE: ABNORMAL
MONOCYTES # BLD AUTO: ABNORMAL K/UL
MONOCYTES NFR BLD: 4 %
NEUTROPHILS # BLD AUTO: ABNORMAL K/UL
NEUTROPHILS NFR BLD: 76 %
NEUTS BAND NFR BLD MANUAL: 5 %
PCO2 BLDA: 59.4 MMHG (ref 35–45)
PEEP: 5
PH SMN: 7.39 [PH] (ref 7.35–7.45)
PHOSPHATE SERPL-MCNC: 6.4 MG/DL
PIP: 38
PLATELET # BLD AUTO: 281 K/UL
PLATELET BLD QL SMEAR: ABNORMAL
PMV BLD AUTO: 10.8 FL
PO2 BLDA: 88 MMHG (ref 80–100)
POC BE: 11 MMOL/L
POC SATURATED O2: 96 % (ref 95–100)
POIKILOCYTOSIS BLD QL SMEAR: SLIGHT
POLYCHROMASIA BLD QL SMEAR: ABNORMAL
POTASSIUM SERPL-SCNC: 3.4 MMOL/L
RBC # BLD AUTO: 3.55 M/UL
SAMPLE: ABNORMAL
SITE: ABNORMAL
SODIUM SERPL-SCNC: 137 MMOL/L
SP02: 99
STOMATOCYTES BLD QL SMEAR: PRESENT
VANCOMYCIN SERPL-MCNC: 13.4 UG/ML
VT: 450
WBC # BLD AUTO: 25.44 K/UL

## 2019-02-02 PROCEDURE — 27000190 HC CPAP FULL FACE MASK W/VALVE

## 2019-02-02 PROCEDURE — 86703 HIV-1/HIV-2 1 RESULT ANTBDY: CPT

## 2019-02-02 PROCEDURE — 99233 PR SUBSEQUENT HOSPITAL CARE,LEVL III: ICD-10-PCS | Mod: ,,, | Performed by: HOSPITALIST

## 2019-02-02 PROCEDURE — 27000221 HC OXYGEN, UP TO 24 HOURS

## 2019-02-02 PROCEDURE — 94668 MNPJ CHEST WALL SBSQ: CPT

## 2019-02-02 PROCEDURE — 20000000 HC ICU ROOM

## 2019-02-02 PROCEDURE — 37799 UNLISTED PX VASCULAR SURGERY: CPT

## 2019-02-02 PROCEDURE — S0028 INJECTION, FAMOTIDINE, 20 MG: HCPCS | Performed by: HOSPITALIST

## 2019-02-02 PROCEDURE — 94003 VENT MGMT INPAT SUBQ DAY: CPT

## 2019-02-02 PROCEDURE — 25000242 PHARM REV CODE 250 ALT 637 W/ HCPCS: Performed by: HOSPITALIST

## 2019-02-02 PROCEDURE — 99233 SBSQ HOSP IP/OBS HIGH 50: CPT | Mod: ,,, | Performed by: HOSPITALIST

## 2019-02-02 PROCEDURE — 99291 CRITICAL CARE FIRST HOUR: CPT | Mod: ,,, | Performed by: INTERNAL MEDICINE

## 2019-02-02 PROCEDURE — 94640 AIRWAY INHALATION TREATMENT: CPT

## 2019-02-02 PROCEDURE — 85027 COMPLETE CBC AUTOMATED: CPT

## 2019-02-02 PROCEDURE — 80100014 HC HEMODIALYSIS 1:1

## 2019-02-02 PROCEDURE — 99291 PR CRITICAL CARE, E/M 30-74 MINUTES: ICD-10-PCS | Mod: ,,, | Performed by: INTERNAL MEDICINE

## 2019-02-02 PROCEDURE — 99900026 HC AIRWAY MAINTENANCE (STAT)

## 2019-02-02 PROCEDURE — 85007 BL SMEAR W/DIFF WBC COUNT: CPT

## 2019-02-02 PROCEDURE — 99900035 HC TECH TIME PER 15 MIN (STAT)

## 2019-02-02 PROCEDURE — 63600175 PHARM REV CODE 636 W HCPCS: Performed by: STUDENT IN AN ORGANIZED HEALTH CARE EDUCATION/TRAINING PROGRAM

## 2019-02-02 PROCEDURE — 80048 BASIC METABOLIC PNL TOTAL CA: CPT | Mod: 91

## 2019-02-02 PROCEDURE — 80048 BASIC METABOLIC PNL TOTAL CA: CPT

## 2019-02-02 PROCEDURE — 25000003 PHARM REV CODE 250: Performed by: HOSPITALIST

## 2019-02-02 PROCEDURE — 94660 CPAP INITIATION&MGMT: CPT

## 2019-02-02 PROCEDURE — 25000003 PHARM REV CODE 250: Performed by: STUDENT IN AN ORGANIZED HEALTH CARE EDUCATION/TRAINING PROGRAM

## 2019-02-02 PROCEDURE — 80202 ASSAY OF VANCOMYCIN: CPT

## 2019-02-02 PROCEDURE — 63600175 PHARM REV CODE 636 W HCPCS: Performed by: NURSE PRACTITIONER

## 2019-02-02 PROCEDURE — 82803 BLOOD GASES ANY COMBINATION: CPT

## 2019-02-02 PROCEDURE — 94761 N-INVAS EAR/PLS OXIMETRY MLT: CPT

## 2019-02-02 PROCEDURE — 84100 ASSAY OF PHOSPHORUS: CPT

## 2019-02-02 PROCEDURE — 63600175 PHARM REV CODE 636 W HCPCS: Performed by: INTERNAL MEDICINE

## 2019-02-02 RX ORDER — DEXMEDETOMIDINE HYDROCHLORIDE 4 UG/ML
0.2 INJECTION, SOLUTION INTRAVENOUS CONTINUOUS
Status: DISCONTINUED | OUTPATIENT
Start: 2019-02-02 | End: 2019-02-02

## 2019-02-02 RX ADMIN — PROPOFOL 50 MCG/KG/MIN: 10 INJECTION, EMULSION INTRAVENOUS at 02:02

## 2019-02-02 RX ADMIN — BACITRACIN: 500 OINTMENT TOPICAL at 02:02

## 2019-02-02 RX ADMIN — HEPARIN SODIUM 5000 UNITS: 5000 INJECTION, SOLUTION INTRAVENOUS; SUBCUTANEOUS at 02:02

## 2019-02-02 RX ADMIN — NOREPINEPHRINE BITARTRATE 0.03 MCG/KG/MIN: 1 INJECTION, SOLUTION, CONCENTRATE INTRAVENOUS at 05:02

## 2019-02-02 RX ADMIN — IPRATROPIUM BROMIDE AND ALBUTEROL SULFATE 3 ML: .5; 3 SOLUTION RESPIRATORY (INHALATION) at 07:02

## 2019-02-02 RX ADMIN — IPRATROPIUM BROMIDE AND ALBUTEROL SULFATE 3 ML: .5; 3 SOLUTION RESPIRATORY (INHALATION) at 11:02

## 2019-02-02 RX ADMIN — HEPARIN SODIUM 5000 UNITS: 5000 INJECTION, SOLUTION INTRAVENOUS; SUBCUTANEOUS at 05:02

## 2019-02-02 RX ADMIN — METHYLPREDNISOLONE SODIUM SUCCINATE 40 MG: 40 INJECTION, POWDER, FOR SOLUTION INTRAMUSCULAR; INTRAVENOUS at 03:02

## 2019-02-02 RX ADMIN — NOREPINEPHRINE BITARTRATE 0.03 MCG/KG/MIN: 1 INJECTION, SOLUTION, CONCENTRATE INTRAVENOUS at 01:02

## 2019-02-02 RX ADMIN — PROPOFOL 50 MCG/KG/MIN: 10 INJECTION, EMULSION INTRAVENOUS at 04:02

## 2019-02-02 RX ADMIN — PROPOFOL 50 MCG/KG/MIN: 10 INJECTION, EMULSION INTRAVENOUS at 06:02

## 2019-02-02 RX ADMIN — PROPOFOL 50 MCG/KG/MIN: 10 INJECTION, EMULSION INTRAVENOUS at 07:02

## 2019-02-02 RX ADMIN — PROPOFOL 50 MCG/KG/MIN: 10 INJECTION, EMULSION INTRAVENOUS at 01:02

## 2019-02-02 RX ADMIN — CEFEPIME HYDROCHLORIDE 1 G: 1 INJECTION, SOLUTION INTRAVENOUS at 12:02

## 2019-02-02 RX ADMIN — BACITRACIN: 500 OINTMENT TOPICAL at 09:02

## 2019-02-02 RX ADMIN — DEXMEDETOMIDINE HYDROCHLORIDE 0.8 MCG/KG/HR: 4 INJECTION, SOLUTION INTRAVENOUS at 01:02

## 2019-02-02 RX ADMIN — THERA TABS 1 TABLET: TAB at 08:02

## 2019-02-02 RX ADMIN — HEPARIN SODIUM 2000 UNITS: 1000 INJECTION, SOLUTION INTRAVENOUS; SUBCUTANEOUS at 11:02

## 2019-02-02 RX ADMIN — HEPARIN SODIUM 5000 UNITS: 5000 INJECTION, SOLUTION INTRAVENOUS; SUBCUTANEOUS at 09:02

## 2019-02-02 RX ADMIN — HEPARIN SODIUM 2000 UNITS: 1000 INJECTION, SOLUTION INTRAVENOUS; SUBCUTANEOUS at 08:02

## 2019-02-02 RX ADMIN — VANCOMYCIN HYDROCHLORIDE 750 MG: 750 INJECTION, POWDER, LYOPHILIZED, FOR SOLUTION INTRAVENOUS at 02:02

## 2019-02-02 RX ADMIN — FAMOTIDINE 20 MG: 10 INJECTION, SOLUTION INTRAVENOUS at 08:02

## 2019-02-02 RX ADMIN — DEXMEDETOMIDINE HYDROCHLORIDE 0.2 MCG/KG/HR: 4 INJECTION, SOLUTION INTRAVENOUS at 09:02

## 2019-02-02 RX ADMIN — IPRATROPIUM BROMIDE AND ALBUTEROL SULFATE 3 ML: .5; 3 SOLUTION RESPIRATORY (INHALATION) at 03:02

## 2019-02-02 RX ADMIN — Medication 100 MG: at 08:02

## 2019-02-02 RX ADMIN — FOLIC ACID 1 MG: 1 TABLET ORAL at 08:02

## 2019-02-02 RX ADMIN — BACITRACIN: 500 OINTMENT TOPICAL at 08:02

## 2019-02-02 RX ADMIN — DEXMEDETOMIDINE HYDROCHLORIDE 0.9 MCG/KG/HR: 4 INJECTION, SOLUTION INTRAVENOUS at 02:02

## 2019-02-02 NOTE — ASSESSMENT & PLAN NOTE
Pt with septic shock 2/2 severe non-purulent cellulitis. bcx 1/28 ngtd. bcx negative at outside hospital. 2d echo negative for vegetations. No abscess noted on ultrasound or areas of fluctuance noted on exam. Erythema now receeding from demarcated areas. Day # 5 abx (currently on vanc/cefepime). Agree with broad spectrum staph and antipseudomonal coverage while critically ill, but most likely etiology strep. Pt from [facility] and no reported exposures to water/seafood. Risk factor=chronic lymphedema. Plan to treat for 14 days but will depend on clinical improvement.

## 2019-02-02 NOTE — PROGRESS NOTES
[facility] Hospital Medicine  Progress Note    Patient Name: Jones Red  MRN: [id number]  Patient Class: IP- Inpatient   Admission Date: 1/28/2019  Length of Stay: 5 days  Attending Physician: Selwyn So MD  Primary Care Provider: Primary Doctor No        Subjective:     Principal Problem:Septic shock    HPI:  Mr. Jones Red is a 53 y.o. male, with PMH of HTN, COPD, chronic respiratory failure (on home O2), Diastolic CHF, LYNDON, chronic venous insufficiency, and questionable seizures history (suspected to be 2/2 alcohol withdrawal), who preset tia to [facility] on 1/22/19 2/2 SOB. This was associated with cough productive of brown sputum. He was started in BiPap, but did ultimately degrade, and was intubated, and maintained on a ventilator. He was additionally altered upon arrival to the ED. History was reportedly obtained by a friend, and it was reported that he was found down at home, and was unable to get up. He as admitted to the ICU, started on IV fluids and pressors.     Hospital Course:  Patient 53-year-old gentleman with history of hypertension, diastolic heart failure, chronic obstructive pulmonary disease with chronic hypoxic respiratory failure on home oxygen, prior alcohol abuse, significant ongoing tobacco use (3 packs per day), and morbid obesity with septic shock secondary to right lower extremity cellulitis complicating chronic venous stasis.  Patient intubated at outside hospital after failing a trial of BiPAP.  Patient also with evidence of worsening renal failure with rhabdomyolysis.       Patient transferred from [facility] in [place], [place] to [facility] on 1/28/2019.  Patient started on renal replacement therapy.  Patient remains intubated and sedated but responds to stimuli when sedation held but otherwise confused.  Ultrasound of the right lower extremity did not reveal evidence of abscess, subcutaneous gas, or evidence  of necrotizing fascitits.  General surgery and wound care consulted.  No surgical intervention recommended at this time.  Patient on broad spectrum antibiotics.    Interval History:  No acute events.  Receiving hemodialysis and started on tube feedings.  Volume status improving.     Review of Systems   Unable to perform ROS: Intubated     Objective:     Vital Signs (Most Recent):  Temp: 99 °F (37.2 °C) (02/02/19 1530)  Pulse: 90 (02/02/19 1530)  Resp: (!) 29 (02/02/19 1530)  BP: (!) 103/54 (02/02/19 1530)  SpO2: 98 % (02/02/19 1530) Vital Signs (24h Range):  Temp:  [97.9 °F (36.6 °C)-99 °F (37.2 °C)] 99 °F (37.2 °C)  Pulse:  [] 90  Resp:  [24-32] 29  SpO2:  [96 %-100 %] 98 %  BP: ()/(50-78) 103/54  Arterial Line BP: ()/(42-68) 96/46     Weight: (!) 204 kg (449 lb 11.8 oz)  Body mass index is 56.21 kg/m².    Intake/Output Summary (Last 24 hours) at 2/2/2019 1600  Last data filed at 2/2/2019 1511  Gross per 24 hour   Intake 2718.69 ml   Output 4000 ml   Net -1281.31 ml      Physical Exam   Constitutional: Vital signs are normal. He appears well-developed.  Non-toxic appearance. He does not have a sickly appearance. He does not appear ill. No distress.   Morbid obesity.  Sedated and intubated.   HENT:   Head: Normocephalic and atraumatic.   Right Ear: External ear normal.   Left Ear: External ear normal.   Eyes: Conjunctivae and EOM are normal. Pupils are equal, round, and reactive to light. No scleral icterus.   Neck: Normal range of motion. Neck supple. No JVD present. No tracheal deviation present.   Cardiovascular: Normal rate, regular rhythm, normal heart sounds and intact distal pulses. Exam reveals no gallop and no friction rub.   No murmur heard.  Pulmonary/Chest: Effort normal and breath sounds normal. No stridor. No respiratory distress. He has no wheezes. He has no rales.   Distant lung sounds.  Moving air well.  Some crackles.   Abdominal: Soft. Bowel sounds are normal. He exhibits no  distension and no mass. There is no tenderness. There is no guarding.   Musculoskeletal: Normal range of motion. He exhibits no edema or deformity.   Neurological: He exhibits normal muscle tone.   Skin: Skin is warm and dry. Rash noted. He is not diaphoretic. There is erythema.   Right lower extremity is erythematous, with blistering and edema.  Area of erythema has not extended beyond pen marking. Less edematous following renal replacement therapy.   Psychiatric: He has a normal mood and affect. His behavior is normal. Judgment and thought content normal.   Nursing note and vitals reviewed.      Significant Labs: All pertinent labs within the past 24 hours have been reviewed.    Significant Imaging: I have reviewed all pertinent imaging results/findings within the past 24 hours.    Assessment/Plan:      * Septic shock    Possible component of pneumonia but clear cause of his infection is the severe cellulitis involving the right lower extremity.  Ultrasound of right lower extremity did not reveal evidence of abscess.  General surgery consulted and recommending serial exams for now but no surgical intervention at this time.  Cellulitis mildly improved.    Bood cultures no growth to date.  Continue to elevate right lower extremity as tolerated.  Continue with antimicrobial therapy.     Cellulitis of right lower extremity    Continue with treatment and serial examinations.  Continue with wound care.  General surgery following.     Acute on chronic diastolic heart failure    Hypervolemic.  Continue with additional volume removal with renal replacement therapy.  Improving with dialysis.     Acute on chronic respiratory failure    Patient with episode yesterday with tight peak flow pressures.  Following suctioning and removal and replacement of endotracheal tube patient improved.  Patient ventilating reasonably well this morning.  Suspect likely mucus plug.  Continue with ventilator management as per Pulmonary Critical  Care.     Acute renal failure    Secondary to sepsis and rhabdomyolysis.  Continue with renal replacement therapy.       VTE Risk Mitigation (From admission, onward)        Ordered     heparin (porcine) injection 5,000 Units  As needed (PRN)      01/31/19 1825     heparin (porcine) injection 2,000 Units  As needed (PRN)      01/30/19 1056     heparin (porcine) injection 5,000 Units  Every 8 hours      01/29/19 2044     heparin (porcine) injection 5,000 Units  As needed (PRN)      01/29/19 1335     IP VTE HIGH RISK PATIENT  Once      01/28/19 1947              Selwyn So MD  Department of Hospital Medicine   [facility]

## 2019-02-02 NOTE — PROGRESS NOTES
"Nephrology  Progress Note    Admit Date: 1/28/2019   LOS: 5 days     SUBJECTIVE:     Follow-up For:  Septic shock/ATN    Interval History:     Events noted of yesterday. Continues to tolerate conventional HD.  Still with massive edema.  Sedated on ventilator      Review of Systems:    Unable.     OBJECTIVE:     Vital Signs Range (Last 24H):  BP (!) 104/55   Pulse 100   Temp 98.2 °F (36.8 °C)   Resp (!) 29   Ht 6' 3" (1.905 m)   Wt (!) 204 kg (449 lb 11.8 oz)   SpO2 97%   BMI 56.21 kg/m²     Temp:  [98.2 °F (36.8 °C)-99 °F (37.2 °C)]   Pulse:  []   Resp:  [22-31]   BP: ()/(50-78)   SpO2:  [91 %-100 %]   Arterial Line BP: ()/(42-68)     I & O (Last 24H):    Intake/Output Summary (Last 24 hours) at 2/2/2019 0932  Last data filed at 2/2/2019 0715  Gross per 24 hour   Intake 1605.18 ml   Output 3917 ml   Net -2311.82 ml       Physical Exam:  General appearance:  Disheveled and morbidly obese.  Sedated on ventilator   Eyes:  Conjunctivae/corneas clear. PERRL.  Lungs:  Vented with rhonchi throughout  Heart: Regular rate and rhythm, distant heart tones.  Abdomen: Soft, non-tender non-distended; bowel sounds normal; no masses,  no organomegaly, OGT, morbidly obese  Extremities:  Chronic Woody edema.    Skin:  Right lower extremity cellulitis  Neurologic:  Sedated  Abdalla catheterization  Right IJ Trialysis       Laboratory Data:  Recent Labs   Lab 02/02/19  0356   WBC 25.44*   RBC 3.55*   HGB 10.1*   HCT 30.7*      MCV 87   MCH 28.5   MCHC 32.9       BMP:   Recent Labs   Lab 01/29/19  2230  02/02/19  0356      < > 103   *   < > 137   K 4.3   < > 3.4*   CL 95   < > 93*   CO2 17*   < > 27   BUN 85*   < > 47*   CREATININE 9.0*   < > 6.6*   CALCIUM 7.6*   < > 9.1   MG 2.0  --   --     < > = values in this interval not displayed.     Lab Results   Component Value Date    CALCIUM 9.1 02/02/2019    PHOS 6.4 (H) 02/02/2019       Lab Results   Component Value Date    .4 (H) " 01/30/2019    CALCIUM 9.1 02/02/2019    PHOS 6.4 (H) 02/02/2019       No results found for: URICACID    BNP  Recent Labs   Lab 01/28/19 2023   BNP 1,726*       Medications:  Medication list was reviewed and changes noted under Assessment/Plan.    Diagnostic Results:    X-Ray Chest 1 View   Final Result      As above.         Electronically signed by: Keon Jonas MD   Date:    01/31/2019   Time:    19:14      X-Ray Chest AP Portable   Final Result      Lines and tubes are grossly unchanged.      Interval improvement in aeration of the lungs.         Electronically signed by: Connor Babcock MD   Date:    01/31/2019   Time:    16:42      X-Ray Chest 1 View   Final Result      Overall no significant interval change         Electronically signed by: Connor Babcock MD   Date:    01/30/2019   Time:    08:24      US Extremity Non Vascular Limited Right   Final Result      Nonspecific subcutaneous edema, cutaneous thickening, could represent cellulitis changes.  No definite necrotizing fasciitis, gas or abscess.         Electronically signed by: Tin Leone MD   Date:    01/29/2019   Time:    13:09      X-Ray Chest AP Portable   Final Result   Addendum 1 of 1      Enteric tube projects in unchanged radiographic position with tip coursing    below the diaphragm and appearing to extend beyond the field of view.         Electronically signed by: Trena Sena MD   Date:    01/29/2019   Time:    00:39      Final      As above.         Electronically signed by: Trena Sena MD   Date:    01/29/2019   Time:    00:04      US Lower Extremity Veins Right   Final Result      Limited examination.  No evidence of deep venous thrombosis in the right lower extremity.         Electronically signed by: Vanita Oviedo   Date:    01/28/2019   Time:    21:10      X-Ray Chest AP Portable   Final Result      Cardiomegaly with findings suggesting pulmonary edema, correlation advised.  Differential would include infection.          Electronically signed by: Kalyan Galvez MD   Date:    01/28/2019   Time:    20:19      US Extremity Non Vascular Limited Bilat    (Results Pending)       ASSESSMENT/PLAN:     1. Persistent Multifactorial anuric acute renal failure secondary to ATN from shock, vasopressors, rhabdomyolysis, hyponatremia, and hyperkalemia (N17.0, N17.9, E87.5, E87.1, M62.82, A41.9):  Transferred for CRRT and transition to standard hemodialysis.    Some issues with clotting of dialysis catheter and moved to the left side.  Needs daily dialysis/UF for massive volume overload.  Seen on dialysis.  Renally dose meds, avoid nephrotoxins, and monitor I/O's closely.  2. Hyperphosphatemia.  No need to treat at this point.  3. Secondary hyperparathyroidism due to acute kidney injury.  Calcium within normal limits.  No need to treat at this point.  4. Septic shock and resp failure:  Vented per CCT.  WBC worsening.   5. RLE Cellulitis/woody edema (L03.115):  No IV contrast please.  Defer to wound care and ID.  6. Morbid obesity (E44):  Most of his issues caused by this.    7. Multifactorial hyponatremia (E87.1):  Correct with HD.     See above  Will follow for renal needs.  Remains critical.

## 2019-02-02 NOTE — ASSESSMENT & PLAN NOTE
Hypervolemic.  Continue with additional volume removal with renal replacement therapy.  Improving with dialysis.

## 2019-02-02 NOTE — PROGRESS NOTES
Aneta wife called and updated on POC and questions answered. Fent gtt added per eICU MD dt patient being agitated and restless. Opens eyes spontaneously; moves all extremities; does not follow commands. Frequent repositioning, oral/ET suctioning, oral care provided. CPT and nebs provided per RT. Dressings changed to PICC, TLC. Extremities elevated, heels floated. Right leg kept as dry as possible with pads; WC provided. Patient coughing and some blood coming from nose- eICU MD updated. TF @ 10 cc/he tolerating well. Abdalla to gravity with minimal output.

## 2019-02-02 NOTE — SUBJECTIVE & OBJECTIVE
Interval History:  No acute events.  Receiving hemodialysis and started on tube feedings.  Volume status improving.     Review of Systems   Unable to perform ROS: Intubated     Objective:     Vital Signs (Most Recent):  Temp: 99 °F (37.2 °C) (02/02/19 1530)  Pulse: 90 (02/02/19 1530)  Resp: (!) 29 (02/02/19 1530)  BP: (!) 103/54 (02/02/19 1530)  SpO2: 98 % (02/02/19 1530) Vital Signs (24h Range):  Temp:  [97.9 °F (36.6 °C)-99 °F (37.2 °C)] 99 °F (37.2 °C)  Pulse:  [] 90  Resp:  [24-32] 29  SpO2:  [96 %-100 %] 98 %  BP: ()/(50-78) 103/54  Arterial Line BP: ()/(42-68) 96/46     Weight: (!) 204 kg (449 lb 11.8 oz)  Body mass index is 56.21 kg/m².    Intake/Output Summary (Last 24 hours) at 2/2/2019 1600  Last data filed at 2/2/2019 1511  Gross per 24 hour   Intake 2718.69 ml   Output 4000 ml   Net -1281.31 ml      Physical Exam   Constitutional: Vital signs are normal. He appears well-developed.  Non-toxic appearance. He does not have a sickly appearance. He does not appear ill. No distress.   Morbid obesity.  Sedated and intubated.   HENT:   Head: Normocephalic and atraumatic.   Right Ear: External ear normal.   Left Ear: External ear normal.   Eyes: Conjunctivae and EOM are normal. Pupils are equal, round, and reactive to light. No scleral icterus.   Neck: Normal range of motion. Neck supple. No JVD present. No tracheal deviation present.   Cardiovascular: Normal rate, regular rhythm, normal heart sounds and intact distal pulses. Exam reveals no gallop and no friction rub.   No murmur heard.  Pulmonary/Chest: Effort normal and breath sounds normal. No stridor. No respiratory distress. He has no wheezes. He has no rales.   Distant lung sounds.  Moving air well.  Some crackles.   Abdominal: Soft. Bowel sounds are normal. He exhibits no distension and no mass. There is no tenderness. There is no guarding.   Musculoskeletal: Normal range of motion. He exhibits no edema or deformity.   Neurological: He  exhibits normal muscle tone.   Skin: Skin is warm and dry. Rash noted. He is not diaphoretic. There is erythema.   Right lower extremity is erythematous, with blistering and edema.  Area of erythema has not extended beyond pen marking. Less edematous following renal replacement therapy.   Psychiatric: He has a normal mood and affect. His behavior is normal. Judgment and thought content normal.   Nursing note and vitals reviewed.      Significant Labs: All pertinent labs within the past 24 hours have been reviewed.    Significant Imaging: I have reviewed all pertinent imaging results/findings within the past 24 hours.

## 2019-02-02 NOTE — ASSESSMENT & PLAN NOTE
Possible component of pneumonia but clear cause of his infection is the severe cellulitis involving the right lower extremity.  Ultrasound of right lower extremity did not reveal evidence of abscess.  General surgery consulted and recommending serial exams for now but no surgical intervention at this time.  Cellulitis mildly improved.    Bood cultures no growth to date.  Continue to elevate right lower extremity as tolerated.  Continue with antimicrobial therapy.

## 2019-02-02 NOTE — PLAN OF CARE
Problem: Adult Inpatient Plan of Care  Goal: Plan of Care Review  Outcome: Ongoing (interventions implemented as appropriate)  No changes at this time.  Will continue to monitor.

## 2019-02-02 NOTE — PROGRESS NOTES
0715:  Patient received on  Vent with settings as documented.  All alarms on and audible.  Ambu and mask at bedside.  Vent wheels locked and vent plugged into red outlet.  Patient sedated and restrained.  Oral care done at this time.  ETT secure at 26CM and moved to left side.  Will continue to monitor.    0916:  ABG done and reported to Addie Santiago MD    1800:  Patient extubated to BIPAP without complications

## 2019-02-02 NOTE — PLAN OF CARE
Problem: Adult Inpatient Plan of Care  Goal: Plan of Care Review  Outcome: Ongoing (interventions implemented as appropriate)  Patient received on mechanical vent, settings as documented. Sats 96 to 97%. Txs given, Cpt done. Pt. in no distress, will continue to monitor.

## 2019-02-02 NOTE — CONSULTS
[facility]  Infectious Disease  Consult Note    Patient Name: Jones Red  MRN: [id number]  Admission Date: 1/28/2019  Hospital Length of Stay: 4 days  Attending Physician: Selwyn So MD  Primary Care Provider: Primary Doctor No     Isolation Status: No active isolations    Patient information was obtained from patient and ER records.      Consults  Assessment/Plan:     Increased oropharyngeal secretions    F/u resp culture. On abx for cellulitis     Cellulitis of right lower extremity    Pt with septic shock 2/2 severe non-purulent cellulitis. bcx 1/28 ngtd. bcx negative at outside hospital. 2d echo negative for vegetations. No abscess noted on ultrasound or areas of fluctuance noted on exam. Erythema now receeding from demarcated areas. Day # 5 abx (currently on vanc/cefepime). Agree with broad spectrum staph and antipseudomonal coverage while critically ill, but most likely etiology strep. Pt from [facility] and no reported exposures to water/seafood. Risk factor=chronic lymphedema. Plan to treat for 14 days but will depend on clinical improvement.           needs routine hiv screen per cdc guidelines    Thank you for your consult. I will follow-up with patient. Please contact us if you have any additional questions.    Nancy Franco MD  Infectious Disease  [facility]    Subjective:     Principal Problem: Septic shock    HPI: 53M with h/o CHF, COPD on home o2, morbid obesity admitted as transfer from [facility] ([facility] in [place], [place]) for septic shock. Patient currently intubated and sedated on propofol and history obtained by chart review. Pt with h/o chronic venous stasis and has had acute worsening of leg wounds with redness and blistering. Currently being supported by pressor support and renal replacement. Nurse reports minimal secretions. Inpatient team has tried imaging lower extremities, but CT could not be done 2/2 obesity.  Ultrasound negative for abscess. Currently on cefepime, metronidazole and vancomycin. ID consulted for further abx recs.     Interval History: intubated and sedated on ventiator. Increasing secretions- Bronch cultures sent. Intermittently on pressors with HD. Cellulitis and volume overload improving. Liquid green stoools with fecal management system. On tube feeds. Afebrile.     Review of Systems   Unable to perform ROS: Intubated     Objective:     Vital Signs (Most Recent):  Temp: 99 °F (37.2 °C) (02/01/19 2144)  Pulse: 100 (02/01/19 2144)  Resp: (!) 29 (02/01/19 2144)  BP: 135/62 (02/01/19 2100)  SpO2: 98 % (02/01/19 2144) Vital Signs (24h Range):  Temp:  [97.5 °F (36.4 °C)-99 °F (37.2 °C)] 99 °F (37.2 °C)  Pulse:  [] 100  Resp:  [13-30] 29  SpO2:  [91 %-100 %] 98 %  BP: ()/(51-71) 135/62  Arterial Line BP: ()/(42-70) 144/64     Weight: (!) 204 kg (449 lb 11.8 oz)  Body mass index is 56.21 kg/m².    Intake/Output Summary (Last 24 hours) at 2/1/2019 2253  Last data filed at 2/1/2019 1600  Gross per 24 hour   Intake 788.61 ml   Output 4912 ml   Net -4123.39 ml      Physical Exam   Constitutional: Vital signs are normal. He appears well-developed.  Non-toxic appearance. He does not have a sickly appearance. He does not appear ill. No distress.   Morbid obesity.  Sedated and intubated.   HENT:   Head: Normocephalic and atraumatic.   Right Ear: External ear normal.   Left Ear: External ear normal.   Eyes: Conjunctivae and EOM are normal. Pupils are equal, round, and reactive to light. No scleral icterus.   Neck: Normal range of motion. Neck supple. No JVD present. No tracheal deviation present.   Cardiovascular: Normal rate, regular rhythm, normal heart sounds and intact distal pulses. Exam reveals no gallop and no friction rub.   No murmur heard.  Pulmonary/Chest: Effort normal and breath sounds normal. No stridor. No respiratory distress. He has no wheezes. He has no rales.   Distant lung sounds.   Moving air well.  Some crackles.   Abdominal: Soft. Bowel sounds are normal. He exhibits no distension and no mass. There is no tenderness. There is no guarding.   Musculoskeletal: Normal range of motion. He exhibits no edema or deformity.   Neurological: He exhibits normal muscle tone.   Skin: Skin is warm and dry. Rash noted. He is not diaphoretic. There is erythema.   Right lower extremity is erythematous, with blistering and edema.  Area of erythema has not extended beyond pen marking. Less edematous following renal replacement therapy.   Psychiatric: He has a normal mood and affect. His behavior is normal. Judgment and thought content normal.   Nursing note and vitals reviewed.      Significant Labs: All pertinent labs within the past 24 hours have been reviewed.    Significant Imaging: I have reviewed all pertinent imaging results/findings within the past 24 hours.

## 2019-02-02 NOTE — PT/OT/SLP PROGRESS
Occupational Therapy  Not Seen Note    Patient Name:  Jones Red   MRN:  [id number]    OT evaluation order received. Patient not seen today secondary to Other (pt failed the MOVE Screen, does not meet criteria for OT service). Will follow-up Jim 2/3/19.    FIONA Can  2/2/2019

## 2019-02-02 NOTE — PROGRESS NOTES
Pulmonary & Critical Care Medicine Progress Note    Subjective:   No overnight events. Afebrile, on Levo 0.03, Fentanyl 100 mcg, and Propofol 50. Starting to wean down drips this morning and replace with Precedex. ABG this mornin.38/59/88/35/96%    Vital Signs:   Vitals:    19 0734   BP: 127/60   Pulse: 104   Resp: (!) 29   Temp: 98.2 °F (36.8 °C)     Fluid Balance:     Intake/Output Summary (Last 24 hours) at 2019 0854  Last data filed at 2019 0715  Gross per 24 hour   Intake 1605.18 ml   Output 3917 ml   Net -2311.82 ml     Physical Exam:   General: NAD, sedated, morbidly obese  HEENT: AT/NC, PERRL, oral mucosa moist, ET tube in place.  Neck: Supple  Cardiac: S1S2 with brisk cap refill in distal extremities.  Respiratory: Crackles and wheezes bilaterally.  Abdomen: Soft, NT/ND. +BS.   Extremities: 3+ edema  Neuro: Sedated    Laboratory Studies:   Recent Labs   Lab 19  1239   PH 7.357   PCO2 60.4*   PO2 120*   HCO3 33.9*   POCSATURATED 98   BE 8     Recent Labs   Lab 19  0356   WBC 25.44*   RBC 3.55*   HGB 10.1*   HCT 30.7*      MCV 87   MCH 28.5   MCHC 32.9     Recent Labs   Lab 19  0356      K 3.4*   CL 93*   CO2 27   BUN 47*   CREATININE 6.6*     Microbiology Data:   Microbiology Results (last 7 days)     Procedure Component Value Units Date/Time    Blood culture [[id number]] Collected:  19    Order Status:  Sent Specimen:  Blood from Peripheral, Hand, Left Updated:  19    Blood culture [[id number]] Collected:  19    Order Status:  Completed Specimen:  Blood Updated:  19     Blood Culture, Routine No Growth to date     Blood Culture, Routine No Growth to date     Blood Culture, Routine No Growth to date     Blood Culture, Routine No Growth to date     Blood Culture, Routine No Growth to date    Blood culture [[id number]] Collected:  19    Order Status:  Completed Specimen:  Blood Updated:  19     Blood  Culture, Routine No Growth to date     Blood Culture, Routine No Growth to date     Blood Culture, Routine No Growth to date     Blood Culture, Routine No Growth to date     Blood Culture, Routine No Growth to date    Blood culture [[id number]] Collected:  02/01/19 0956    Order Status:  Completed Specimen:  Blood from Line, PICC Right Brachial Updated:  02/02/19 0115     Blood Culture, Routine No Growth to date    Narrative:       Central line    Culture, Respiratory with Gram Stain [[id number]] Collected:  02/01/19 0956    Order Status:  Completed Specimen:  Respiratory from Endotracheal Aspirate Updated:  02/01/19 2329     Gram Stain (Respiratory) <10 epithelial cells per low power field.     Gram Stain (Respiratory) No WBC's     Gram Stain (Respiratory) No organisms seen    Culture, Respiratory with Gram Stain [[id number]] Collected:  01/28/19 2026    Order Status:  Completed Specimen:  Respiratory from Sputum, Induced Updated:  01/31/19 0729     Respiratory Culture --     CANDIDA TROPICALIS  Moderate       Gram Stain (Respiratory) <10 epithelial cells per low power field.     Gram Stain (Respiratory) Few WBC's     Gram Stain (Respiratory) No organisms seen        Chest Imaging:   No new imaging.     Infusions:     amiodarone in dextrose 5%      fentanyl 100 mcg/hr (02/02/19 0850)    norepinephrine bitartrate-D5W 0.03 mcg/kg/min (02/02/19 0631)    propofol 50 mcg/kg/min (02/02/19 0736)     Scheduled Medications:    albuterol-ipratropium  3 mL Nebulization Q4H    amiodarone in dextrose  150 mg Intravenous Once    bacitracin   Topical (Top) TID    cefepime in dextrose 5 %  1 g Intravenous Q24H    chlorhexidine  15 mL Mouth/Throat BID    famotidine (PF)  20 mg Intravenous Daily    folic acid  1 mg Per OG tube Daily    heparin (porcine)  5,000 Units Subcutaneous Q8H    multivitamin  1 tablet Per OG tube Daily    paricalcitol  2 mcg Intravenous Q48H    thiamine  100 mg Per OG tube Daily     PRN  Medications:   fentaNYL, heparin (porcine), heparin (porcine), heparin (porcine), sodium chloride 0.9%    Assessment & Plan:   Patient Active Problem List   Diagnosis    Septic shock    Acute renal failure    Cellulitis of right lower extremity    Chronic venous insufficiency    Acute on chronic diastolic heart failure    Acute on chronic respiratory failure    Increased oropharyngeal secretions     54 yo M with a PMHx of morbid obesity (450 lbs), HTN, tobacco use disorder (3 PPD), COPD, HFpEF, LYNDON who was transferred to [facility] on  from [facility] (admitted there on ) for septic shock secondary to RLE cellulitis complicated by AMS, respiratory failure, rhabdomyolysis, and anuric renal failure.     Neuro:  Encephalopathy  -Related to patient's sepsis and sedation with Fentanyl, Propofol.  -Weaning down Fent and Propofol today and starting Precedex.     Pulm:  Hypoxic/Hypercapnic Respiratory Failure, Pulmonary Edema, COPD  -Vent: 500/30/5/40%  -AB.38/59/88/35/96%  -Pulmonary edema has improved with CRRT  -O2 requirements now minimal with fluid removal.  -Will continue to wean down O2 requirements for goal sat 88-92%  -Being treated with Vanc and Cefepime for possible PNA.   -On duonebs q4h for his COPD (3 PPD smoker)  -Will attempt SBT this morning.     Cardio:  Shock, HFpEF, Atrial fibrillation  -On a touch of Levo at 0.03 likely related to large amounts of sedation.  -2D ECHO: LVEF 65%, grade II diastolic dysfunction, mild RV enlargement, mildly reduced RV systolic function  -Volume removal with CRRT at this time  -On Carvedilol 25 mg BID at home. Held at this time. Went into SVT on . Amio was not started.  -BNP on admission 1,726  -Trop on admission 0.02     GI:  -Tube feeds today at 10 cc/hr.     Renal:  Anuric Renal Failure, ATN, Rhabdomyolysis, Hyponatremia, Metabolic Acidosis  -CRRT started on  night. CRRT continued to clot off intermittently.  -HD started on .  -Hyponatremia  resolved.  -Patient is anuric (only made 10 cc of urine over 24 hours period).     ID:  Cellulitis, Possible PNA  -WBC 25 with 5 bands  -All cultures repeated with NGTD  -On Vanc and Cefepime.  -RLE U/S completed does not show gas in the soft tissues.  -Surgery has come evaluated the leg - no surgical debridement indicated.    DVT ppx: Heparin 5000U q8h  GI ppx: Famotidine  VAP ppx: Chlorhexidine    Thank you for involving us in the care of this patient. We will continue to follow along. Please call with any questions.    Addie Santiago MD  [employer] PCCM Fellow, PGY 5  [facility]  Pager: [phone]

## 2019-02-02 NOTE — SUBJECTIVE & OBJECTIVE
Interval History: intubated and sedated on ventiator. Increasing secretions- Bronch cultures sent. Intermittently on pressors with HD. Cellulitis and volume overload improving. Liquid green stoools with fecal management system. On tube feeds. Afebrile.     Review of Systems   Unable to perform ROS: Intubated     Objective:     Vital Signs (Most Recent):  Temp: 99 °F (37.2 °C) (02/01/19 2144)  Pulse: 100 (02/01/19 2144)  Resp: (!) 29 (02/01/19 2144)  BP: 135/62 (02/01/19 2100)  SpO2: 98 % (02/01/19 2144) Vital Signs (24h Range):  Temp:  [97.5 °F (36.4 °C)-99 °F (37.2 °C)] 99 °F (37.2 °C)  Pulse:  [] 100  Resp:  [13-30] 29  SpO2:  [91 %-100 %] 98 %  BP: ()/(51-71) 135/62  Arterial Line BP: ()/(42-70) 144/64     Weight: (!) 204 kg (449 lb 11.8 oz)  Body mass index is 56.21 kg/m².    Intake/Output Summary (Last 24 hours) at 2/1/2019 2253  Last data filed at 2/1/2019 1600  Gross per 24 hour   Intake 788.61 ml   Output 4912 ml   Net -4123.39 ml      Physical Exam   Constitutional: Vital signs are normal. He appears well-developed.  Non-toxic appearance. He does not have a sickly appearance. He does not appear ill. No distress.   Morbid obesity.  Sedated and intubated.   HENT:   Head: Normocephalic and atraumatic.   Right Ear: External ear normal.   Left Ear: External ear normal.   Eyes: Conjunctivae and EOM are normal. Pupils are equal, round, and reactive to light. No scleral icterus.   Neck: Normal range of motion. Neck supple. No JVD present. No tracheal deviation present.   Cardiovascular: Normal rate, regular rhythm, normal heart sounds and intact distal pulses. Exam reveals no gallop and no friction rub.   No murmur heard.  Pulmonary/Chest: Effort normal and breath sounds normal. No stridor. No respiratory distress. He has no wheezes. He has no rales.   Distant lung sounds.  Moving air well.  Some crackles.   Abdominal: Soft. Bowel sounds are normal. He exhibits no distension and no mass. There is  no tenderness. There is no guarding.   Musculoskeletal: Normal range of motion. He exhibits no edema or deformity.   Neurological: He exhibits normal muscle tone.   Skin: Skin is warm and dry. Rash noted. He is not diaphoretic. There is erythema.   Right lower extremity is erythematous, with blistering and edema.  Area of erythema has not extended beyond pen marking. Less edematous following renal replacement therapy.   Psychiatric: He has a normal mood and affect. His behavior is normal. Judgment and thought content normal.   Nursing note and vitals reviewed.      Significant Labs: All pertinent labs within the past 24 hours have been reviewed.    Significant Imaging: I have reviewed all pertinent imaging results/findings within the past 24 hours.

## 2019-02-02 NOTE — PT/OT/SLP PROGRESS
Physical Therapy      Patient Name:  Jones Red   MRN:  [id number]    Patient not seen today secondary to Nursing care pt failed move screen, intubated, sedated and on bed rest. Will follow-up 2-3-19.    Jones Cifuentes, PT

## 2019-02-03 PROBLEM — Z72.0 TOBACCO ABUSE: Status: ACTIVE | Noted: 2019-02-03

## 2019-02-03 PROBLEM — K11.7 INCREASED OROPHARYNGEAL SECRETIONS: Status: RESOLVED | Noted: 2019-02-01 | Resolved: 2019-02-03

## 2019-02-03 LAB
ANION GAP SERPL CALC-SCNC: 17 MMOL/L
ANION GAP SERPL CALC-SCNC: 18 MMOL/L
ANISOCYTOSIS BLD QL SMEAR: SLIGHT
BACTERIA BLD CULT: NORMAL
BACTERIA BLD CULT: NORMAL
BASOPHILS # BLD AUTO: ABNORMAL K/UL
BASOPHILS NFR BLD: 0 %
BUN SERPL-MCNC: 48 MG/DL
BUN SERPL-MCNC: 52 MG/DL
CALCIUM SERPL-MCNC: 9.2 MG/DL
CALCIUM SERPL-MCNC: 9.2 MG/DL
CHLORIDE SERPL-SCNC: 93 MMOL/L
CHLORIDE SERPL-SCNC: 94 MMOL/L
CO2 SERPL-SCNC: 27 MMOL/L
CO2 SERPL-SCNC: 28 MMOL/L
CREAT SERPL-MCNC: 6.5 MG/DL
CREAT SERPL-MCNC: 6.9 MG/DL
DIFFERENTIAL METHOD: ABNORMAL
EOSINOPHIL # BLD AUTO: ABNORMAL K/UL
EOSINOPHIL NFR BLD: 0 %
ERYTHROCYTE [DISTWIDTH] IN BLOOD BY AUTOMATED COUNT: 14.1 %
EST. GFR  (AFRICAN AMERICAN): 10 ML/MIN/1.73 M^2
EST. GFR  (AFRICAN AMERICAN): 10 ML/MIN/1.73 M^2
EST. GFR  (NON AFRICAN AMERICAN): 8 ML/MIN/1.73 M^2
EST. GFR  (NON AFRICAN AMERICAN): 9 ML/MIN/1.73 M^2
GIANT PLATELETS BLD QL SMEAR: PRESENT
GLUCOSE SERPL-MCNC: 105 MG/DL
GLUCOSE SERPL-MCNC: 86 MG/DL
HCT VFR BLD AUTO: 31.5 %
HGB BLD-MCNC: 10.4 G/DL
LYMPHOCYTES # BLD AUTO: ABNORMAL K/UL
LYMPHOCYTES NFR BLD: 11 %
MCH RBC QN AUTO: 28.3 PG
MCHC RBC AUTO-ENTMCNC: 33 G/DL
MCV RBC AUTO: 86 FL
METAMYELOCYTES NFR BLD MANUAL: 4 %
MONOCYTES # BLD AUTO: ABNORMAL K/UL
MONOCYTES NFR BLD: 8 %
MYELOCYTES NFR BLD MANUAL: 3 %
NEUTROPHILS # BLD AUTO: ABNORMAL K/UL
NEUTROPHILS NFR BLD: 68 %
NEUTS BAND NFR BLD MANUAL: 6 %
PHOSPHATE SERPL-MCNC: 6.7 MG/DL
PLATELET # BLD AUTO: 282 K/UL
PLATELET BLD QL SMEAR: ABNORMAL
PMV BLD AUTO: 10.6 FL
POLYCHROMASIA BLD QL SMEAR: ABNORMAL
POTASSIUM SERPL-SCNC: 3.6 MMOL/L
POTASSIUM SERPL-SCNC: 3.9 MMOL/L
RBC # BLD AUTO: 3.67 M/UL
SODIUM SERPL-SCNC: 138 MMOL/L
SODIUM SERPL-SCNC: 139 MMOL/L
VANCOMYCIN SERPL-MCNC: 21.6 UG/ML
WBC # BLD AUTO: 22.47 K/UL

## 2019-02-03 PROCEDURE — 99233 SBSQ HOSP IP/OBS HIGH 50: CPT | Mod: ,,, | Performed by: HOSPITALIST

## 2019-02-03 PROCEDURE — 94668 MNPJ CHEST WALL SBSQ: CPT

## 2019-02-03 PROCEDURE — 97162 PT EVAL MOD COMPLEX 30 MIN: CPT

## 2019-02-03 PROCEDURE — 80202 ASSAY OF VANCOMYCIN: CPT

## 2019-02-03 PROCEDURE — 97165 OT EVAL LOW COMPLEX 30 MIN: CPT

## 2019-02-03 PROCEDURE — 25000003 PHARM REV CODE 250: Performed by: HOSPITALIST

## 2019-02-03 PROCEDURE — 92610 EVALUATE SWALLOWING FUNCTION: CPT

## 2019-02-03 PROCEDURE — 99291 PR CRITICAL CARE, E/M 30-74 MINUTES: ICD-10-PCS | Mod: ,,, | Performed by: INTERNAL MEDICINE

## 2019-02-03 PROCEDURE — 27000221 HC OXYGEN, UP TO 24 HOURS

## 2019-02-03 PROCEDURE — 25000003 PHARM REV CODE 250: Performed by: STUDENT IN AN ORGANIZED HEALTH CARE EDUCATION/TRAINING PROGRAM

## 2019-02-03 PROCEDURE — 84100 ASSAY OF PHOSPHORUS: CPT

## 2019-02-03 PROCEDURE — 20000000 HC ICU ROOM

## 2019-02-03 PROCEDURE — 94660 CPAP INITIATION&MGMT: CPT

## 2019-02-03 PROCEDURE — 99233 PR SUBSEQUENT HOSPITAL CARE,LEVL III: ICD-10-PCS | Mod: ,,, | Performed by: HOSPITALIST

## 2019-02-03 PROCEDURE — 63600175 PHARM REV CODE 636 W HCPCS: Performed by: NURSE PRACTITIONER

## 2019-02-03 PROCEDURE — 99900035 HC TECH TIME PER 15 MIN (STAT)

## 2019-02-03 PROCEDURE — 99291 CRITICAL CARE FIRST HOUR: CPT | Mod: ,,, | Performed by: INTERNAL MEDICINE

## 2019-02-03 PROCEDURE — 63600175 PHARM REV CODE 636 W HCPCS: Performed by: STUDENT IN AN ORGANIZED HEALTH CARE EDUCATION/TRAINING PROGRAM

## 2019-02-03 PROCEDURE — 94761 N-INVAS EAR/PLS OXIMETRY MLT: CPT

## 2019-02-03 PROCEDURE — 85027 COMPLETE CBC AUTOMATED: CPT

## 2019-02-03 PROCEDURE — 85007 BL SMEAR W/DIFF WBC COUNT: CPT

## 2019-02-03 PROCEDURE — 25000242 PHARM REV CODE 250 ALT 637 W/ HCPCS: Performed by: HOSPITALIST

## 2019-02-03 PROCEDURE — 97530 THERAPEUTIC ACTIVITIES: CPT

## 2019-02-03 PROCEDURE — 94640 AIRWAY INHALATION TREATMENT: CPT

## 2019-02-03 PROCEDURE — 25000242 PHARM REV CODE 250 ALT 637 W/ HCPCS: Performed by: STUDENT IN AN ORGANIZED HEALTH CARE EDUCATION/TRAINING PROGRAM

## 2019-02-03 PROCEDURE — 80048 BASIC METABOLIC PNL TOTAL CA: CPT

## 2019-02-03 RX ORDER — SODIUM CHLORIDE FOR INHALATION 3 %
4 VIAL, NEBULIZER (ML) INHALATION 2 TIMES DAILY
Status: DISCONTINUED | OUTPATIENT
Start: 2019-02-03 | End: 2019-02-21

## 2019-02-03 RX ORDER — CARVEDILOL 12.5 MG/1
25 TABLET ORAL 2 TIMES DAILY
Status: DISCONTINUED | OUTPATIENT
Start: 2019-02-03 | End: 2019-02-04

## 2019-02-03 RX ORDER — IPRATROPIUM BROMIDE AND ALBUTEROL SULFATE 2.5; .5 MG/3ML; MG/3ML
3 SOLUTION RESPIRATORY (INHALATION) EVERY 4 HOURS PRN
Status: DISCONTINUED | OUTPATIENT
Start: 2019-02-03 | End: 2019-02-25 | Stop reason: HOSPADM

## 2019-02-03 RX ADMIN — IPRATROPIUM BROMIDE AND ALBUTEROL SULFATE 3 ML: .5; 3 SOLUTION RESPIRATORY (INHALATION) at 03:02

## 2019-02-03 RX ADMIN — HEPARIN SODIUM 5000 UNITS: 5000 INJECTION, SOLUTION INTRAVENOUS; SUBCUTANEOUS at 09:02

## 2019-02-03 RX ADMIN — BACITRACIN: 500 OINTMENT TOPICAL at 09:02

## 2019-02-03 RX ADMIN — FOLIC ACID 1 MG: 1 TABLET ORAL at 08:02

## 2019-02-03 RX ADMIN — PARICALCITOL 2 MCG: 5 INJECTION, SOLUTION INTRAVENOUS at 08:02

## 2019-02-03 RX ADMIN — CEFEPIME HYDROCHLORIDE 1 G: 1 INJECTION, SOLUTION INTRAVENOUS at 01:02

## 2019-02-03 RX ADMIN — IPRATROPIUM BROMIDE AND ALBUTEROL SULFATE 3 ML: .5; 3 SOLUTION RESPIRATORY (INHALATION) at 07:02

## 2019-02-03 RX ADMIN — Medication 100 MG: at 08:02

## 2019-02-03 RX ADMIN — CARVEDILOL 25 MG: 12.5 TABLET, FILM COATED ORAL at 07:02

## 2019-02-03 RX ADMIN — IPRATROPIUM BROMIDE AND ALBUTEROL SULFATE 3 ML: .5; 3 SOLUTION RESPIRATORY (INHALATION) at 11:02

## 2019-02-03 RX ADMIN — PREDNISONE 50 MG: 10 TABLET ORAL at 11:02

## 2019-02-03 RX ADMIN — HEPARIN SODIUM 5000 UNITS: 5000 INJECTION, SOLUTION INTRAVENOUS; SUBCUTANEOUS at 05:02

## 2019-02-03 RX ADMIN — SODIUM CHLORIDE SOLN NEBU 3% 4 ML: 3 NEBU SOLN at 04:02

## 2019-02-03 RX ADMIN — BACITRACIN: 500 OINTMENT TOPICAL at 08:02

## 2019-02-03 RX ADMIN — THERA TABS 1 TABLET: TAB at 08:02

## 2019-02-03 RX ADMIN — HEPARIN SODIUM 5000 UNITS: 5000 INJECTION, SOLUTION INTRAVENOUS; SUBCUTANEOUS at 02:02

## 2019-02-03 RX ADMIN — BACITRACIN: 500 OINTMENT TOPICAL at 03:02

## 2019-02-03 NOTE — PROGRESS NOTES
Pulmonary & Critical Care Medicine Progress Note    Subjective:   No overnight events. Afebrile, vitals stable. Extubated yesterday evening to BIPAP. Saturating well on 3L NC this morning. Bedside swallow eval pending. RLE cellulitis seems to be improving.    Vital Signs:   Vitals:    02/03/19 0800   BP:    Pulse: 108   Resp: (!) 23   Temp:      Fluid Balance:     Intake/Output Summary (Last 24 hours) at 2/3/2019 0825  Last data filed at 2/3/2019 0800  Gross per 24 hour   Intake 1646.46 ml   Output 4310 ml   Net -2663.54 ml     Physical Exam:   General: NAD, sedated, morbidly obese  HEENT: AT/NC, PERRL, oral mucosa moist, ET tube in place.  Neck: Supple  Cardiac: S1S2 with brisk cap refill in distal extremities.  Respiratory: Crackles and wheezes bilaterally.  Abdomen: Soft, NT/ND. +BS.   Extremities: 3+ edema  Neuro: Sedated    Laboratory Studies:   Recent Labs   Lab 02/02/19 0916   PH 7.387   PCO2 59.4*   PO2 88   HCO3 35.7*   POCSATURATED 96   BE 11     Recent Labs   Lab 02/03/19 0419   WBC 22.47*   RBC 3.67*   HGB 10.4*   HCT 31.5*      MCV 86   MCH 28.3   MCHC 33.0     Recent Labs   Lab 02/03/19 0419      K 3.6   CL 94*   CO2 27   BUN 52*   CREATININE 6.9*     Microbiology Data:   Microbiology Results (last 7 days)     Procedure Component Value Units Date/Time    Blood culture [[id number]] Collected:  01/28/19 2020    Order Status:  Completed Specimen:  Blood Updated:  02/03/19 0612     Blood Culture, Routine No growth after 5 days.    Blood culture [[id number]] Collected:  01/28/19 2023    Order Status:  Completed Specimen:  Blood Updated:  02/03/19 0612     Blood Culture, Routine No growth after 5 days.    Blood culture [[id number]] Collected:  02/01/19 0956    Order Status:  Completed Specimen:  Blood from Line, PICC Right Brachial Updated:  02/02/19 2012     Blood Culture, Routine No Growth to date     Blood Culture, Routine No Growth to date    Narrative:       Central line    Blood culture  [[id number]] Collected:  02/01/19 2000    Order Status:  Completed Specimen:  Blood from Peripheral, Hand, Left Updated:  02/02/19 1545     Blood Culture, Routine No Growth to date    Narrative:       Peripheral site    Culture, Respiratory with Gram Stain [[id number]] Collected:  02/01/19 0956    Order Status:  Completed Specimen:  Respiratory from Endotracheal Aspirate Updated:  02/01/19 2329     Gram Stain (Respiratory) <10 epithelial cells per low power field.     Gram Stain (Respiratory) No WBC's     Gram Stain (Respiratory) No organisms seen    Culture, Respiratory with Gram Stain [[id number]] Collected:  01/28/19 2026    Order Status:  Completed Specimen:  Respiratory from Sputum, Induced Updated:  01/31/19 0729     Respiratory Culture --     CANDIDA TROPICALIS  Moderate       Gram Stain (Respiratory) <10 epithelial cells per low power field.     Gram Stain (Respiratory) Few WBC's     Gram Stain (Respiratory) No organisms seen        Chest Imaging:   No new imaging.     Scheduled Medications:    albuterol-ipratropium  3 mL Nebulization Q4H    bacitracin   Topical (Top) TID    cefepime in dextrose 5 %  1 g Intravenous Q24H    folic acid  1 mg Per OG tube Daily    heparin (porcine)  5,000 Units Subcutaneous Q8H    multivitamin  1 tablet Per OG tube Daily    paricalcitol  2 mcg Intravenous Q48H    thiamine  100 mg Per OG tube Daily     PRN Medications:   heparin (porcine), heparin (porcine), heparin (porcine), sodium chloride 0.9%    Assessment & Plan:   Patient Active Problem List   Diagnosis    Septic shock    Acute renal failure    Cellulitis of right lower extremity    Chronic venous insufficiency    Acute on chronic diastolic heart failure    Acute on chronic respiratory failure    Tobacco abuse     54 yo M with a PMHx of morbid obesity (450 lbs), HTN, tobacco use disorder (3 PPD), COPD, HFpEF, LYNDON who was transferred to [facility] on 1/28 from [facility] (admitted there on 1/22) for septic  shock secondary to RLE cellulitis complicated by AMS, respiratory failure, rhabdomyolysis, and anuric renal failure.     Neuro:  Encephalopathy  -Resolved. Off of all sedation, awake and alert, answering questions, following commands.     Pulm:  Hypoxic/Hypercapnic Respiratory Failure, Pulmonary Edema, COPD  -Pulmonary edema has improved with CRRT  -Will continue to wean down O2 requirements for goal sat 88-92%  -Being treated with Vanc and Cefepime for possible PNA.   -On duonebs q4h and chest PT q4h for his likely COPD (3 PPD smoker)  -Continue Prednisone 50 mg x5 days.  -Will need outpatient PFTs and probable LAMA/LABA. When patient is being discharged, would start him on Spiriva.     Cardio:  Shock, HFpEF, Atrial fibrillation  -2D ECHO: LVEF 65%, grade II diastolic dysfunction, mild RV enlargement, mildly reduced RV systolic function  -Volume removal with HD at this time  -On Carvedilol 25 mg BID at home. Re-starting.  -Will need better BP control (was on Losartan-HCTZ at home).  -Please remove A-line.  -BNP on admission 1,726  -Trop on admission 0.02     GI:  -Tube feeds today at 10 cc/hr.  -Pulled out NG tube.  -Can swallow small sips of water with medication. However, having trouble swallowing large amounts of liquid or food. Ok with jello/apple sauce for now.     Renal:  Anuric Renal Failure, ATN, Rhabdomyolysis, Hyponatremia, Metabolic Acidosis  -CRRT started on 1/28 night. CRRT continued to clot off intermittently.  -HD started on 1/30.  -Hyponatremia resolved.  -Patient is anuric (only made 30 cc of urine over 24 hours period).     ID:  Cellulitis, Possible PNA  -WBC 22 with 6 bands   -All cultures repeated with NGTD  -On Vanc and Cefepime. Will defer to ID in regards to treatment with negative culture data. ID was planning on checking on OSH culture data.  -RLE U/S completed does not show gas in the soft tissues.  -Surgery has come evaluated the leg - no surgical debridement indicated.     DVT ppx:  Heparin 5000U q8h  Code status: Full Code    Thank you for involving us in the care of this patient. We will sign off. Please call with any questions.    Addie Santiago MD  [employer] PCCM Fellow, PGY 5  [facility]  Pager: [phone]

## 2019-02-03 NOTE — PT/OT/SLP PROGRESS
Speech Language Pathology      Jones Red  MRN: [id number]    Consult received, medical chart reviewed. Bedside evaluation of swallowing defed due to onset of desats and need for period of BIPAP. SLP and RN discussed re attempt later this date.        Stephanie Camarena, [facility]-SLP

## 2019-02-03 NOTE — ASSESSMENT & PLAN NOTE
Successfully extubated yesterday.  Patient did well on BiPAP.  Following extubation.  BiPAP discontinue this morning from exam and he was able to breathe relatively comfortable reasonable oxygen saturation with some supplemental oxygen.  Continue with bronchodilator breathing treatments as I suspect patient has a component of chronic obstructive pulmonary disease in light of his wheezing and significant tobacco smoking history.

## 2019-02-03 NOTE — PLAN OF CARE
Pt extubated @1800. Placed on BiPAP. Tolerating well.  NGT still in place. Stopped tube feedings @1640, will resume later this evening. Abdalla intact. Rectal tube intact. Pt following commands. Remained free from falls or injuries. Call light within reach. Will call for assistance. No distress noted. Will continue to monitor.

## 2019-02-03 NOTE — NURSING
Patient remains on nasal cannula between 2-3 l/nc, with saturation in the high 90's. Continues to cough up thick secretions .NT suctioning ordered and completed. Poor hand eye coordination noted. Patient remains confused to time. Asking for liquid and food, speech evaluated. Poor urine output plans for dialysis tomorrow. Tube feeds continue at trickle rate tolerating well without residual. Patient impulsive and needs direction. Sitting outside of the patients room , call bell within reach .

## 2019-02-03 NOTE — PLAN OF CARE
Problem: Occupational Therapy Goal  Goal: Occupational Therapy Goal  Goals to be met by 3/3/19  1. Max A self-feeding  2. Mod A G/H (wash face and hands) supported sitting  3. 50% assist with UE ROM exercises  4.  Assess bed mobility  Outcome: Ongoing (interventions implemented as appropriate)  OT evaluation completed with activities limited by congestion and drop in Sp02 to 84%   Discharge recommendations TBD based on progress during therapy,IRF is a probable need..  DME:TBD.  FIONA Can 2/3/2019

## 2019-02-03 NOTE — PT/OT/SLP EVAL
Occupational Therapy   Evaluation    Name: Jones Red  MRN: [id number]  Admitting Diagnosis:  Septic shock      Recommendations:     Discharge Recommendations: (TBD based on progress during therapy)  Discharge Equipment Recommendations:  (TBD)  Barriers to discharge:  Inaccessible home environment    Assessment:     Jones Red is a 53 y.o. male with a medical diagnosis of Septic shock.  He presents with loss of functional mobility and self-care skill with the present illness.. Performance deficits affecting function: weakness, impaired functional mobilty, decreased safety awareness, impaired cognition, impaired coordination, impaired cardiopulmonary response to activity, impaired endurance, gait instability, impaired fine motor, impaired balance, decreased upper extremity function, decreased lower extremity function, impaired self care skills, edema.  He had very limited ability to participate in tasks due to weakness and diminished cognition.  Evaluation was limited to supine tasks, bed mobility limited due to drop in Sp02 to 84%.  OT treatment is needed to maximize patient function while in the hospital.    Rehab Prognosis: Fair; patient would benefit from acute skilled OT services to address these deficits and reach maximum level of function.       Plan:     Patient to be seen 5 x/week to address the above listed problems via self-care/home management, therapeutic activities, therapeutic exercises, neuromuscular re-education, cognitive retraining, sensory integration  · Plan of Care Expires: 03/03/19  · Plan of Care Reviewed with: patient    Subjective     Chief Complaint: hungry, wants to eat (NG feeding in process)  Patient/Family Comments/goals: Not reported    Occupational Profile:  Living Environment: lives alone in a single story house with 3 ALISON and no HR at entry  Previous level of function: ambulated with SW, pt's wife assists with self-care, 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  Patient found supine with SCD, telemetry, pulse ox (continuous), blood pressure cuff, mosley catheter, oxygen upon OT entry to room.  He was agreeable to the OT evaluation.    General Precautions: Standard, fall   Orthopedic Precautions:N/A   Braces: N/A     Occupational Performance:    Bed Mobility: (drop in Sp02 to 84% prohibited assessment of mobility)          Total A (x4) to move put up in the bed (done in preporation for respiratory treatment)    Functional Mobility/Transfers:        None    Activities of Daily Living:  · Dependent self-feeding (NG feeding) bed level  · Total A G/H (wash face and hands) bed level  · Total A UBD (don/doff hospital gown) bed level    Cognitive/Visual Perceptual:  Cognitive/Psychosocial Skills:     -       Oriented to: Person and general place and time   -       Follows Commands/attention:Follows one-step commands and repeat requests often needed  -       Communication: muffles speech - difficult to understand  -       Memory: Impaired STM and Impaired LTM  -       Safety awareness/insight to disability: impaired   -       Mood/Affect/Coping skills/emotional control: Cooperative, Flat affect and Lethargic  Visual/Perceptual:      -Intact upper dentures    Physical Exam:  Postural examination/scapula alignment:    -       UT due to supine positioning  Skin integrity: Dry  Edema:  Moderate BUE and BLE  Sensation:    -      Grossly  Intact for light touch  "both hands  Motor Planning:    -       poor  Dominant hand:    -       Right  UE ROM: WFL BUE  UE Strength and coordination: strength 2-/5 with incoordination  (poor directional reach) BUE  Hand Function:  strength WFL dexterity poor both hands (hand edema noted)  Gross motor coordination:   Not Tested - bed level evaluation only  Endurance: very poor    AM PAC 6 Click ADL:  AM PAC Total Score: 6    Patient left with bed in chair position with all lines intact, call button in reach, bed alarm off and nurse and respritory therapist present    GOALS:   Multidisciplinary Problems     Occupational Therapy Goals        Problem: Occupational Therapy Goal    Goal Priority Disciplines Outcome Interventions   Occupational Therapy Goal     OT, PT/OT Ongoing (interventions implemented as appropriate)    Description:  Goals to be met by 3/3/19  1. Max A self-feeding  2. Mod A G/H (wash face and hands) supported sitting  3. 50% assist with UE ROM exercises  4.  Assess bed mobility                    History:     Past Medical History:   Diagnosis Date    Obesity     LYNDON (obstructive sleep apnea)     Rhabdomyolysis 2019    Tobacco abuse     Venous stasis        No past surgical history on file.    Clinical Decision Makin.  OT Low:  "Pt evaluation falls under low complexity for evaluation coding due to performance deficits noted in 1-3 areas as stated above and 0 co-morbities affecting current functional status. Data obtained from problem focused assessments. No modifications or assistance was required for completion of evaluation. Only brief occupational profile and history review completed."     Time Tracking:     OT Date of Treatment: 19  OT Start Time: 1445  OT Stop Time: 1508  OT Total Time (min): 23 min    Billable Minutes:Evaluation 23    FIONA Can  2/3/2019    "

## 2019-02-03 NOTE — ASSESSMENT & PLAN NOTE
Marked improvement with dialysis  Continue with additional volume removal with renal replacement therapy as recommended by Nephrology.

## 2019-02-03 NOTE — PT/OT/SLP EVAL
Speech Language Pathology Evaluation  Bedside Swallow    Patient Name:  Jones Red   MRN:  [id number]  Admitting Diagnosis: Septic shock    Recommendations:                 General Recommendations:     1. Speech to follow 5-6x/week for remediation of oral and pharyngeal dysphagia, cognitive communication deficits  Diet recommendations:   ,  1. Begin  Clears liquids and ice chips in moderation  2. Continue NG tube as main source of nutrition and hydration  3. Speech to continue to evaluation for ability to advance diet    Aspiration Precautions:   1. Thin, clear liquids in small single sips via cup or straw  2. No rapid successive sips of liquids  3. No large volume sips  4. Upright for all oral intake  5. Feed only when awake and alert  6. Defer all oral intake if RR is elevated and if he is desating    General Precautions: Standard, aspiration    History:     54 yo M with a PMHx of morbid obesity (450 lbs), HTN, tobacco use disorder (3 PPD), COPD, HFpEF, LYNDON who was transferred to [facility] on 1/28 from [facility] (admitted there on 1/22) for septic shock secondary to RLE cellulitis complicated by AMS, respiratory failure, rhabdomyolysis, and anuric renal failure.      Past Medical History:   Diagnosis Date    Obesity     LYNDON (obstructive sleep apnea)     Rhabdomyolysis 1/28/2019    Tobacco abuse     Venous stasis        No past surgical history on file.    Prior Intubation HX:  Orally intubated for approx 2 weeks. Extubated 2/2/19 to Bipap. Off Bipap today, however, having instances of desats with PT and OT as well as intermittently during day. BIPAP on standby    Chest X-Rays:     Prior diet: regular      Subjective     Pt is awake, upright in bed. S/p respiratory treatment.  RN reporting desat after PT and OT this date.    Pain/Comfort:  · Pain Rating 1: other (see comments)(Pt unable to reliably state pain level this session)    Objective:     Oral Musculature Evaluation  · Oral Musculature: general  weakness  · Volitional Cough: productive cough, bringing up sputum  · Volitional Swallow: delayed  · Voice Prior to PO Intake: low volume, raspy, course  · Oral Musculature Comments: Face is symmetrical at rest and during smile. Generalized oral motor weakness and slow oral motor movements. Pt able to protrude tongue midline, lateralize, elevate and retract tongue. Lingual movements are slow and labored. Vocal quality is raspy, low volume. Instances of wet vocal quality due to secretion level. Speech was approx 50% intelligible at the simple sentence level. Dcr self monitoring of rate, volume, listener comprehension. Difficulty repairing communication breakdowns.    Bedside Swallow Eval:   Consistencies Assessed:  · Thin liquids 3, 5,10 mls, unmeasuresd sips via cup and straw, ice chips   · Puree in 1/2 tsp amounts  · Small amounts of dry solids    Oral Phase:   · Delayed on set of oral swallow  · Instances of holding liquids and purees in oral cavity  · Mildly ability to form a cohesive bolus  · Mildly slow a-p transport  · Slow mastication of solids  · dcr coordination of mastication, respiration and swallow  · RR ranged from 13-24 during this eval  · SPO2 levels remained above 94%  ·   Pharyngeal Phase:   · Trigger of the swallow reflex appears delayed  · dcr hyolaryngeal elevation and excursion noted on subjective palpation of larynx durig swallow  · No overt signs of airway threat or aspiration on ice chips, thin liquids in 3-5 mls sips from spoon or cup  · Onset of coughing after swallow, eye watering, strained and strangled vocal quality on larger volume sips of thin liquids, large volume and rapid successive sips from a straw  · Signs of pharyngeal residuals with purees and solids with need for 3-4 swallows per trial, intermitted cough   · Pt s/p respiratory tx and bringing up large volumes of mucus.   · Will continue to assess safety of oral intake    COGNITIVE STATUS: awake, mildly lethargic. Confusional  state: dcr orientation to person, place, date and reason for hospitalization. Able to state his name and , not age or address. Oriented to the city and that [place] . Unable to state name of hospital, place, month, date or year. Distractible with sustained attention of approx 1 min before redirection required. Mildly restless and pulling at lines, hands in soft restraints. Able to follow simple commands approx 50% of time. Verbal expression is fluent, however, able to express basic wants and needs less than 50% of time.        Assessment:     Jones Red is a 53 y.o. male with an SLP diagnosis of oral and pharyngeal  Dysphagia, Cognitive-Communication Impairment and Dysphonia.    Goals:   Multidisciplinary Problems     SLP Goals        Problem: SLP Goal    Goal Priority Disciplines Outcome   SLP Goal     SLP Ongoing (interventions implemented as appropriate)   Description:  1. Pt will be able to follow simple 1 step commands 75% of time with moderate verbal and visual cues.  2. Increase speech intelligibility to 75% at the simple sentence level with moderate verbal cues to slow rate, raise volume.  3. Pt will be able to consume thin liquids in small single sips via cup or straw, with no signs of airway threat or aspiration given max assistance.  4. Pt will be able to advance to purees and solid consistencies with no signs of airway threat or aspiration given max assistance  5. Increase orientation to month, date, day of week, place and reason for hospitalization with 75% acc given max verbal and written cues.  6. Increase ability to express basic wants and needs 75% of time given min assistance                    Plan:     · Patient to be seen:  5 x/week, 6 x/week   · Plan of Care expires:  19  · Plan of Care reviewed with:  (MD, RN)   · SLP Follow-Up:  Yes       Discharge recommendations:    to be determined      Time Tracking:     SLP Treatment Date:   19  Speech Start Time:   1540  Speech Stop Time:  1602     Speech Total Time (min):  22 min    Billable Minutes: Eval Swallow and Oral Function 22 min    Stephanie Camarena CCC-SLP  02/03/2019

## 2019-02-03 NOTE — PLAN OF CARE
Problem: Skin Injury Risk Increased  Goal: Skin Health and Integrity  Outcome: Ongoing (interventions implemented as appropriate)  Patient with cellulitis and weeping blisters to the right leg  From upper thigh down the leg  Skin is marked and contained in that area  Wound care consulted and recommendations given   bacitracin applied   Keeping right leg elevated and dry with use of blue pads

## 2019-02-03 NOTE — PROGRESS NOTES
"Nephrology  Progress Note    Admit Date: 1/28/2019   LOS: 6 days     SUBJECTIVE:     Follow-up For:  Septic shock/ATN    Interval History:    Now extubated and speaking.      Review of Systems:    Difficult to obtain due to garbled speech    OBJECTIVE:     Vital Signs Range (Last 24H):  BP (!) 169/73 (BP Location: Left leg)   Pulse (!) 112   Temp 98.4 °F (36.9 °C) (Axillary)   Resp (!) 22   Ht 6' 3" (1.905 m)   Wt (!) 204 kg (449 lb 11.8 oz)   SpO2 (!) 94%   BMI 56.21 kg/m²     Temp:  [98.4 °F (36.9 °C)-99.3 °F (37.4 °C)]   Pulse:  []   Resp:  [15-30]   BP: ()/(50-73)   SpO2:  [88 %-100 %]   Arterial Line BP: ()/(44-72)     I & O (Last 24H):    Intake/Output Summary (Last 24 hours) at 2/3/2019 1043  Last data filed at 2/3/2019 0900  Gross per 24 hour   Intake 1423.81 ml   Output 4310 ml   Net -2886.19 ml       Physical Exam:  General appearance:  Disheveled and morbidly obese.  Awake moves all extremities   Eyes:  Conjunctivae/corneas clear. PERRL.  Lungs: rhonchi throughout  Heart: Regular rate and rhythm, distant heart tones.  Abdomen: Soft, non-tender non-distended; bowel sounds normal; no masses,  no organomegaly, OGT, morbidly obese  Extremities:  Chronic Woody edema.   with blisters bilateral lower extremities worse on the right  Skin:  Right lower extremity cellulitis  Abdalla catheterization  Right IJ Trialysis       Laboratory Data:  Recent Labs   Lab 02/03/19  0419   WBC 22.47*   RBC 3.67*   HGB 10.4*   HCT 31.5*      MCV 86   MCH 28.3   MCHC 33.0       BMP:   Recent Labs   Lab 01/29/19 2230  02/03/19  0419      < > 86   *   < > 139   K 4.3   < > 3.6   CL 95   < > 94*   CO2 17*   < > 27   BUN 85*   < > 52*   CREATININE 9.0*   < > 6.9*   CALCIUM 7.6*   < > 9.2   MG 2.0  --   --     < > = values in this interval not displayed.     Lab Results   Component Value Date    CALCIUM 9.2 02/03/2019    PHOS 6.7 (H) 02/03/2019       Lab Results   Component Value Date    PTH " 340.4 (H) 01/30/2019    CALCIUM 9.2 02/03/2019    PHOS 6.7 (H) 02/03/2019       No results found for: URICACID    BNP  Recent Labs   Lab 01/28/19 2023   BNP 1,726*       Medications:  Medication list was reviewed and changes noted under Assessment/Plan.    Diagnostic Results:    X-Ray Chest AP Portable   Final Result      NG tube in place.         Electronically signed by: Viviane Persaud MD   Date:    02/02/2019   Time:    23:59      X-Ray Chest 1 View   Final Result      As above.         Electronically signed by: Keon Jonas MD   Date:    01/31/2019   Time:    19:14      X-Ray Chest AP Portable   Final Result      Lines and tubes are grossly unchanged.      Interval improvement in aeration of the lungs.         Electronically signed by: Connor Babcock MD   Date:    01/31/2019   Time:    16:42      X-Ray Chest 1 View   Final Result      Overall no significant interval change         Electronically signed by: Connor Babcock MD   Date:    01/30/2019   Time:    08:24      US Extremity Non Vascular Limited Right   Final Result      Nonspecific subcutaneous edema, cutaneous thickening, could represent cellulitis changes.  No definite necrotizing fasciitis, gas or abscess.         Electronically signed by: Tin Leone MD   Date:    01/29/2019   Time:    13:09      X-Ray Chest AP Portable   Final Result   Addendum 1 of 1      Enteric tube projects in unchanged radiographic position with tip coursing    below the diaphragm and appearing to extend beyond the field of view.         Electronically signed by: Trena Sena MD   Date:    01/29/2019   Time:    00:39      Final      As above.         Electronically signed by: Trena Sena MD   Date:    01/29/2019   Time:    00:04      US Lower Extremity Veins Right   Final Result      Limited examination.  No evidence of deep venous thrombosis in the right lower extremity.         Electronically signed by: Vanita Oviedo   Date:    01/28/2019   Time:    21:10       X-Ray Chest AP Portable   Final Result      Cardiomegaly with findings suggesting pulmonary edema, correlation advised.  Differential would include infection.         Electronically signed by: Kalyan Galvez MD   Date:    01/28/2019   Time:    20:19          ASSESSMENT/PLAN:     1. Persistent Multifactorial anuric acute renal failure secondary to ATN from shock, vasopressors, rhabdomyolysis, hyponatremia, and hyperkalemia (N17.0, N17.9, E87.5, E87.1, M62.82, A41.9):  Transferred for CRRT and transition to standard hemodialysis.    Some issues with clotting of dialysis catheter and it was moved to the left side.  Will dialyze tomorrow.  Renally dose meds, avoid nephrotoxins, and monitor I/O's closely.  2. Hyperphosphatemia.  No need to treat at this point.  3. Secondary hyperparathyroidism due to acute kidney injury.  Calcium within normal limits.  No need to treat at this point.  4. Septic shock and resp failure:  Vented per CCT.  WBC worsening.   5. RLE Cellulitis/woody edema (L03.115):  No IV contrast please.  Defer to wound care and ID.  6. Morbid obesity (E44):  Most of his issues caused by this.      Total critical care time (exclusive of procedural time) : 40 minutes  Critical care was necessary to treat or prevent imminent or life-threatening deterioration of the following conditions: respiratory and renal failure. Case discussed with CC team.  Critical care was time spent personally by me on the following activities: development of treatment plan with patient or surrogate, discussions with consultants, interpretation of cardiac output measurements, examination of patient, ordering and performing treatments and interventions, evaluation of patient's response to treatment, obtaining history from patient or surrogate, ordering and review of laboratory studies, ordering and review of radiographic studies, re-evaluation of patient's condition, pulse oximetry and blood draw for specimens       .

## 2019-02-03 NOTE — NURSING
Patient lying in bed hooked to cardiac monitoring. Bipap changed to nasal cannula 3 l/nc and patient tolerating well with respirations even and unlabored with saturation in the mid 90's. Bilateral hands with noted edema - arms elevated on pillows. Right nare with trickle tube feeds tolerating well no residual. Speech is difficult to understand at time- he is aware he is in [place], and knows his name but states it is 1990. Reoriented the patient. Right jugular triaylisis catheter intact , right arm triple lumen picc line- one port is taped off. Left radial rula leveled/zeroed/ flushed appears to correlate with the cuff pressure. Lungs are decreased with productive cough. Abdomen is large and obese and tight. Patient with fluid to the abdomen and bilateral legs , right leg more swollen than the left. Abdalla catheter in place with scant amount of urine vs bladder sweat. Flexiseal intact with dark colored liquid stool. Right upper thigh with markings and area is within the parameters. Scabbed areas scatter on the leg along with white  Looking areas possible blisters. Some redness noted to the area- wound care following . Bilateral wrist restraints remain on as patient attempts to grab for lines.

## 2019-02-03 NOTE — EICU
NG position recheck with CXR and if ok and pt comfortable to restart TF as planned  Leave A line in for ABG   K low earlier-recheck  D/w RN    0320 Bilateral soft wrist restraints ordered for pt safety per RN request. Video review done; d/w RN

## 2019-02-03 NOTE — PROGRESS NOTES
[facility] Hospital Medicine  Progress Note    Patient Name: Jones Red  MRN: [id number]  Patient Class: IP- Inpatient   Admission Date: 1/28/2019  Length of Stay: 6 days  Attending Physician: Selwyn So MD  Primary Care Provider: Primary Doctor No        Subjective:     Principal Problem:Septic shock    HPI:  Mr. Jones Red is a 53 y.o. male, with PMH of HTN, COPD, chronic respiratory failure (on home O2), Diastolic CHF, LYNDON, chronic venous insufficiency, and questionable seizures history (suspected to be 2/2 alcohol withdrawal), who preset tia to [facility] on 1/22/19 2/2 SOB. This was associated with cough productive of brown sputum. He was started in BiPap, but did ultimately degrade, and was intubated, and maintained on a ventilator. He was additionally altered upon arrival to the ED. History was reportedly obtained by a friend, and it was reported that he was found down at home, and was unable to get up. He as admitted to the ICU, started on IV fluids and pressors.     Hospital Course:  Patient 53-year-old gentleman with history of hypertension, diastolic heart failure, chronic obstructive pulmonary disease with chronic hypoxic respiratory failure on home oxygen, prior alcohol abuse, significant ongoing tobacco use (3 packs per day), and morbid obesity with septic shock secondary to right lower extremity cellulitis complicating chronic venous stasis.  Patient intubated at outside hospital after failing a trial of BiPAP.  Patient also with evidence of worsening renal failure with rhabdomyolysis.       Patient transferred from [facility] in [place], [place] to [facility] on 1/28/2019.  Patient started on renal replacement therapy.  Patient remains intubated and sedated but responds to stimuli when sedation held but otherwise confused.  Ultrasound of the right lower extremity did not reveal evidence of abscess, subcutaneous gas, or evidence  of necrotizing fascitits.  General surgery and wound care consulted.  No surgical intervention recommended at this time.  Patient on broad spectrum antibiotics.    Patient clinically improved in terms his volume status and was successfully extubated on 2/2/2019.    Interval History:  Patient extubated to BiPAP yesterday afternoon.  Patient has done well on the BiPAP.    Review of Systems   Constitutional: Negative for chills and fever.   Respiratory: Negative for shortness of breath.    Cardiovascular: Negative for chest pain.   Gastrointestinal: Negative for abdominal pain, constipation, diarrhea, nausea and vomiting.     Objective:     Vital Signs (Most Recent):  Temp: 98.4 °F (36.9 °C) (02/03/19 0700)  Pulse: 104 (02/03/19 0700)  Resp: 19 (02/03/19 0700)  BP: (!) 169/73 (02/03/19 0700)  SpO2: 98 % (02/03/19 0700) Vital Signs (24h Range):  Temp:  [97.9 °F (36.6 °C)-99.3 °F (37.4 °C)] 98.4 °F (36.9 °C)  Pulse:  [] 104  Resp:  [15-32] 19  SpO2:  [88 %-100 %] 98 %  BP: ()/(50-73) 169/73  Arterial Line BP: ()/(44-72) 148/64     Weight: (!) 204 kg (449 lb 11.8 oz)  Body mass index is 56.21 kg/m².    Intake/Output Summary (Last 24 hours) at 2/3/2019 0730  Last data filed at 2/3/2019 0700  Gross per 24 hour   Intake 1636.46 ml   Output 4310 ml   Net -2673.54 ml      Physical Exam   Constitutional: Vital signs are normal. He appears well-developed.  Non-toxic appearance. He does not have a sickly appearance. He does not appear ill. No distress.   Morbid obesity.   HENT:   Head: Normocephalic and atraumatic.   Right Ear: External ear normal.   Left Ear: External ear normal.   Eyes: Conjunctivae and EOM are normal. Pupils are equal, round, and reactive to light. No scleral icterus.   Neck: Normal range of motion. Neck supple. No JVD present. No tracheal deviation present.   Cardiovascular: Normal rate, regular rhythm, normal heart sounds and intact distal pulses. Exam reveals no gallop and no friction rub.    No murmur heard.  Pulmonary/Chest: Effort normal and breath sounds normal. No stridor. No respiratory distress. He has no wheezes. He has no rales.   Distant lung sounds.  Moving air well.  Some crackles.   Abdominal: Soft. Bowel sounds are normal. He exhibits no distension and no mass. There is no tenderness. There is no guarding.   Musculoskeletal: Normal range of motion. He exhibits no edema or deformity.   Neurological: He is alert. He exhibits normal muscle tone.   Oriented to self place but not year.  Patient otherwise following commands answering questions appropriately.   Skin: Skin is warm and dry. Rash noted. He is not diaphoretic. There is erythema.   Right lower extremity is erythematous, with blistering and edema.  Area of erythema has not extended beyond pen marking. Less edematous following renal replacement therapy.   Psychiatric: He has a normal mood and affect. His behavior is normal. Judgment and thought content normal.   Nursing note and vitals reviewed.      Significant Labs: All pertinent labs within the past 24 hours have been reviewed.    Significant Imaging: I have reviewed all pertinent imaging results/findings within the past 24 hours.    Assessment/Plan:      * Septic shock    Possible component of pneumonia but clear cause of his infection is the severe cellulitis involving the right lower extremity.  Ultrasound of right lower extremity did not reveal evidence of abscess.  General surgery consulted and recommending serial exams for now but no surgical intervention at this time.  Cellulitis mildly improved.    Bood cultures no growth to date.  Continue to elevate right lower extremity as tolerated.  Continue with antimicrobial therapy.     Cellulitis of right lower extremity    Continue with treatment and serial examinations.  Continue with wound care.  General surgery following.     Acute on chronic diastolic heart failure    Marked improvement with dialysis  Continue with additional  volume removal with renal replacement therapy as recommended by Nephrology.     Acute on chronic respiratory failure    Successfully extubated yesterday.  Patient did well on BiPAP.  Following extubation.  BiPAP discontinue this morning from exam and he was able to breathe relatively comfortable reasonable oxygen saturation with some supplemental oxygen.  Continue with bronchodilator breathing treatments as I suspect patient has a component of chronic obstructive pulmonary disease in light of his wheezing and significant tobacco smoking history.     Acute renal failure    Secondary to sepsis and rhabdomyolysis.  Continue with renal replacement therapy.     Tobacco abuse    Patient counseled on the importance of smoking cessation.       VTE Risk Mitigation (From admission, onward)        Ordered     heparin (porcine) injection 5,000 Units  As needed (PRN)      01/31/19 1825     heparin (porcine) injection 2,000 Units  As needed (PRN)      01/30/19 1056     heparin (porcine) injection 5,000 Units  Every 8 hours      01/29/19 2044     heparin (porcine) injection 5,000 Units  As needed (PRN)      01/29/19 1335     IP VTE HIGH RISK PATIENT  Once      01/28/19 1947              Selwyn So MD  Department of Hospital Medicine   [facility]

## 2019-02-03 NOTE — SUBJECTIVE & OBJECTIVE
Interval History:  Patient extubated to BiPAP yesterday afternoon.  Patient has done well on the BiPAP.    Review of Systems   Constitutional: Negative for chills and fever.   Respiratory: Negative for shortness of breath.    Cardiovascular: Negative for chest pain.   Gastrointestinal: Negative for abdominal pain, constipation, diarrhea, nausea and vomiting.     Objective:     Vital Signs (Most Recent):  Temp: 98.4 °F (36.9 °C) (02/03/19 0700)  Pulse: 104 (02/03/19 0700)  Resp: 19 (02/03/19 0700)  BP: (!) 169/73 (02/03/19 0700)  SpO2: 98 % (02/03/19 0700) Vital Signs (24h Range):  Temp:  [97.9 °F (36.6 °C)-99.3 °F (37.4 °C)] 98.4 °F (36.9 °C)  Pulse:  [] 104  Resp:  [15-32] 19  SpO2:  [88 %-100 %] 98 %  BP: ()/(50-73) 169/73  Arterial Line BP: ()/(44-72) 148/64     Weight: (!) 204 kg (449 lb 11.8 oz)  Body mass index is 56.21 kg/m².    Intake/Output Summary (Last 24 hours) at 2/3/2019 0730  Last data filed at 2/3/2019 0700  Gross per 24 hour   Intake 1636.46 ml   Output 4310 ml   Net -2673.54 ml      Physical Exam   Constitutional: Vital signs are normal. He appears well-developed.  Non-toxic appearance. He does not have a sickly appearance. He does not appear ill. No distress.   Morbid obesity.   HENT:   Head: Normocephalic and atraumatic.   Right Ear: External ear normal.   Left Ear: External ear normal.   Eyes: Conjunctivae and EOM are normal. Pupils are equal, round, and reactive to light. No scleral icterus.   Neck: Normal range of motion. Neck supple. No JVD present. No tracheal deviation present.   Cardiovascular: Normal rate, regular rhythm, normal heart sounds and intact distal pulses. Exam reveals no gallop and no friction rub.   No murmur heard.  Pulmonary/Chest: Effort normal and breath sounds normal. No stridor. No respiratory distress. He has no wheezes. He has no rales.   Distant lung sounds.  Moving air well.  Some crackles.   Abdominal: Soft. Bowel sounds are normal. He exhibits no  distension and no mass. There is no tenderness. There is no guarding.   Musculoskeletal: Normal range of motion. He exhibits no edema or deformity.   Neurological: He is alert. He exhibits normal muscle tone.   Oriented to self place but not year.  Patient otherwise following commands answering questions appropriately.   Skin: Skin is warm and dry. Rash noted. He is not diaphoretic. There is erythema.   Right lower extremity is erythematous, with blistering and edema.  Area of erythema has not extended beyond pen marking. Less edematous following renal replacement therapy.   Psychiatric: He has a normal mood and affect. His behavior is normal. Judgment and thought content normal.   Nursing note and vitals reviewed.      Significant Labs: All pertinent labs within the past 24 hours have been reviewed.    Significant Imaging: I have reviewed all pertinent imaging results/findings within the past 24 hours.

## 2019-02-03 NOTE — NURSING
Patient finished working with Wendy from therapy and in attempting to pull him up he desaturated to the mid 80's. Tube feeds were on hold and oxygen on at 2 l/nc. Dr Real at the bedside and respiratory therapy present neb treatment given - bipap placed back on the patient with oxygen increased and saturation up to the mid 90's. CPT performed and placed back on the oxygen at 3l/nc. Patient does desat when coughing and color changes to dusky as he coughs and suctions his mouth out. Patients wife states he smokes 3 packs a day. Patient needs aggressive pulmonary toileting . Repositioned in the bed and more of a chair position will continue to montior

## 2019-02-03 NOTE — PLAN OF CARE
Problem: SLP Goal  Goal: SLP Goal  1. Pt will be able to follow simple 1 step commands 75% of time with moderate verbal and visual cues.  2. Increase speech intelligibility to 75% at the simple sentence level with moderate verbal cues to slow rate, raise volume.  3. Pt will be able to consume thin liquids in small single sips via cup or straw, with no signs of airway threat or aspiration given max assistance.  4. Pt will be able to advance to purees and solid consistencies with no signs of airway threat or aspiration given max assistance  5. Increase orientation to month, date, day of week, place and reason for hospitalization with 75% acc given max verbal and written cues.  6. Increase ability to express basic wants and needs 75% of time given min assistance  Outcome: Ongoing (interventions implemented as appropriate)  Pt seen for bedside swallow evaluation s/p extubation    Comments: Speech to follow 5-6x/week for remediation of dysphagia, dcr orientation, dcr attention, cognitive communication deficits s/p septic shock and oral intubation

## 2019-02-04 LAB
1,25(OH)2D3 SERPL-MCNC: 68 PG/ML
ALLENS TEST: ABNORMAL
ANION GAP SERPL CALC-SCNC: 18 MMOL/L
ANISOCYTOSIS BLD QL SMEAR: SLIGHT
BACTERIA SPEC AEROBE CULT: NORMAL
BASOPHILS # BLD AUTO: ABNORMAL K/UL
BASOPHILS NFR BLD: 0 %
BUN SERPL-MCNC: 73 MG/DL
CALCIUM SERPL-MCNC: 9.4 MG/DL
CHLORIDE SERPL-SCNC: 94 MMOL/L
CO2 SERPL-SCNC: 27 MMOL/L
CREAT SERPL-MCNC: 8.7 MG/DL
DELSYS: ABNORMAL
DIFFERENTIAL METHOD: ABNORMAL
EOSINOPHIL # BLD AUTO: ABNORMAL K/UL
EOSINOPHIL NFR BLD: 1 %
EP: 10
ERYTHROCYTE [DISTWIDTH] IN BLOOD BY AUTOMATED COUNT: 14.4 %
ERYTHROCYTE [SEDIMENTATION RATE] IN BLOOD BY WESTERGREN METHOD: 22 MM/H
EST. GFR  (AFRICAN AMERICAN): 7 ML/MIN/1.73 M^2
EST. GFR  (NON AFRICAN AMERICAN): 6 ML/MIN/1.73 M^2
FIO2: 21
GLUCOSE SERPL-MCNC: 85 MG/DL
GRAM STN SPEC: NORMAL
HCO3 UR-SCNC: 22.9 MMOL/L (ref 24–28)
HCT VFR BLD AUTO: 30 %
HGB BLD-MCNC: 9.9 G/DL
HIV 1+2 AB+HIV1 P24 AG SERPL QL IA: NEGATIVE
IP: 16
LYMPHOCYTES # BLD AUTO: ABNORMAL K/UL
LYMPHOCYTES NFR BLD: 7 %
MCH RBC QN AUTO: 28.5 PG
MCHC RBC AUTO-ENTMCNC: 33 G/DL
MCV RBC AUTO: 87 FL
METAMYELOCYTES NFR BLD MANUAL: 3 %
MODE: ABNORMAL
MONOCYTES # BLD AUTO: ABNORMAL K/UL
MONOCYTES NFR BLD: 5 %
NEUTROPHILS NFR BLD: 83 %
NEUTS BAND NFR BLD MANUAL: 1 %
PCO2 BLDA: 38.9 MMHG (ref 35–45)
PH SMN: 7.38 [PH] (ref 7.35–7.45)
PHOSPHATE SERPL-MCNC: 8.8 MG/DL
PLATELET # BLD AUTO: 351 K/UL
PLATELET BLD QL SMEAR: ABNORMAL
PMV BLD AUTO: 10.3 FL
PO2 BLDA: 53 MMHG (ref 80–100)
POC BE: -2 MMOL/L
POC SATURATED O2: 86 % (ref 95–100)
POLYCHROMASIA BLD QL SMEAR: ABNORMAL
POTASSIUM SERPL-SCNC: 3.7 MMOL/L
RBC # BLD AUTO: 3.47 M/UL
SAMPLE: ABNORMAL
SITE: ABNORMAL
SODIUM SERPL-SCNC: 139 MMOL/L
SP02: 95
SPONT RATE: 32
VANCOMYCIN SERPL-MCNC: 20 UG/ML
WBC # BLD AUTO: 16.67 K/UL

## 2019-02-04 PROCEDURE — 63600175 PHARM REV CODE 636 W HCPCS: Performed by: CLINIC/CENTER

## 2019-02-04 PROCEDURE — 94761 N-INVAS EAR/PLS OXIMETRY MLT: CPT

## 2019-02-04 PROCEDURE — 85027 COMPLETE CBC AUTOMATED: CPT

## 2019-02-04 PROCEDURE — 99233 PR SUBSEQUENT HOSPITAL CARE,LEVL III: ICD-10-PCS | Mod: ,,, | Performed by: HOSPITALIST

## 2019-02-04 PROCEDURE — 63600175 PHARM REV CODE 636 W HCPCS: Performed by: INTERNAL MEDICINE

## 2019-02-04 PROCEDURE — 99900035 HC TECH TIME PER 15 MIN (STAT)

## 2019-02-04 PROCEDURE — 25000242 PHARM REV CODE 250 ALT 637 W/ HCPCS: Performed by: STUDENT IN AN ORGANIZED HEALTH CARE EDUCATION/TRAINING PROGRAM

## 2019-02-04 PROCEDURE — 25000003 PHARM REV CODE 250: Performed by: HOSPITALIST

## 2019-02-04 PROCEDURE — 63600175 PHARM REV CODE 636 W HCPCS: Performed by: STUDENT IN AN ORGANIZED HEALTH CARE EDUCATION/TRAINING PROGRAM

## 2019-02-04 PROCEDURE — 63600175 PHARM REV CODE 636 W HCPCS: Performed by: NURSE PRACTITIONER

## 2019-02-04 PROCEDURE — 20000000 HC ICU ROOM

## 2019-02-04 PROCEDURE — 99233 SBSQ HOSP IP/OBS HIGH 50: CPT | Mod: ,,, | Performed by: HOSPITALIST

## 2019-02-04 PROCEDURE — 85007 BL SMEAR W/DIFF WBC COUNT: CPT

## 2019-02-04 PROCEDURE — 99233 PR SUBSEQUENT HOSPITAL CARE,LEVL III: ICD-10-PCS | Mod: ,,, | Performed by: INTERNAL MEDICINE

## 2019-02-04 PROCEDURE — 63600175 PHARM REV CODE 636 W HCPCS: Performed by: HOSPITALIST

## 2019-02-04 PROCEDURE — 99233 SBSQ HOSP IP/OBS HIGH 50: CPT | Mod: ,,, | Performed by: INTERNAL MEDICINE

## 2019-02-04 PROCEDURE — 94668 MNPJ CHEST WALL SBSQ: CPT

## 2019-02-04 PROCEDURE — 84100 ASSAY OF PHOSPHORUS: CPT

## 2019-02-04 PROCEDURE — 82803 BLOOD GASES ANY COMBINATION: CPT

## 2019-02-04 PROCEDURE — 80202 ASSAY OF VANCOMYCIN: CPT

## 2019-02-04 PROCEDURE — 25000242 PHARM REV CODE 250 ALT 637 W/ HCPCS: Performed by: HOSPITALIST

## 2019-02-04 PROCEDURE — 80048 BASIC METABOLIC PNL TOTAL CA: CPT

## 2019-02-04 PROCEDURE — 94640 AIRWAY INHALATION TREATMENT: CPT

## 2019-02-04 PROCEDURE — 92526 ORAL FUNCTION THERAPY: CPT

## 2019-02-04 PROCEDURE — 80100014 HC HEMODIALYSIS 1:1

## 2019-02-04 PROCEDURE — 27000221 HC OXYGEN, UP TO 24 HOURS

## 2019-02-04 PROCEDURE — 94660 CPAP INITIATION&MGMT: CPT

## 2019-02-04 RX ORDER — HYDRALAZINE HYDROCHLORIDE 20 MG/ML
10 INJECTION INTRAMUSCULAR; INTRAVENOUS EVERY 8 HOURS PRN
Status: DISCONTINUED | OUTPATIENT
Start: 2019-02-04 | End: 2019-02-25 | Stop reason: HOSPADM

## 2019-02-04 RX ORDER — METOPROLOL TARTRATE 1 MG/ML
5 INJECTION, SOLUTION INTRAVENOUS EVERY 6 HOURS
Status: DISCONTINUED | OUTPATIENT
Start: 2019-02-04 | End: 2019-02-05

## 2019-02-04 RX ADMIN — CEFEPIME HYDROCHLORIDE 1 G: 1 INJECTION, SOLUTION INTRAVENOUS at 03:02

## 2019-02-04 RX ADMIN — HYDRALAZINE HYDROCHLORIDE 10 MG: 20 INJECTION INTRAMUSCULAR; INTRAVENOUS at 03:02

## 2019-02-04 RX ADMIN — SODIUM CHLORIDE SOLN NEBU 3% 4 ML: 3 NEBU SOLN at 08:02

## 2019-02-04 RX ADMIN — IPRATROPIUM BROMIDE AND ALBUTEROL SULFATE 3 ML: .5; 3 SOLUTION RESPIRATORY (INHALATION) at 07:02

## 2019-02-04 RX ADMIN — IPRATROPIUM BROMIDE AND ALBUTEROL SULFATE 3 ML: .5; 3 SOLUTION RESPIRATORY (INHALATION) at 08:02

## 2019-02-04 RX ADMIN — HEPARIN SODIUM 2000 UNITS: 1000 INJECTION, SOLUTION INTRAVENOUS; SUBCUTANEOUS at 09:02

## 2019-02-04 RX ADMIN — IPRATROPIUM BROMIDE AND ALBUTEROL SULFATE 3 ML: .5; 3 SOLUTION RESPIRATORY (INHALATION) at 11:02

## 2019-02-04 RX ADMIN — BACITRACIN: 500 OINTMENT TOPICAL at 09:02

## 2019-02-04 RX ADMIN — IPRATROPIUM BROMIDE AND ALBUTEROL SULFATE 3 ML: .5; 3 SOLUTION RESPIRATORY (INHALATION) at 03:02

## 2019-02-04 RX ADMIN — THIAMINE HYDROCHLORIDE 100 ML/HR: 100 INJECTION, SOLUTION INTRAMUSCULAR; INTRAVENOUS at 06:02

## 2019-02-04 RX ADMIN — HEPARIN SODIUM 5000 UNITS: 5000 INJECTION, SOLUTION INTRAVENOUS; SUBCUTANEOUS at 05:02

## 2019-02-04 RX ADMIN — HYDRALAZINE HYDROCHLORIDE 10 MG: 20 INJECTION INTRAMUSCULAR; INTRAVENOUS at 05:02

## 2019-02-04 RX ADMIN — METHYLPREDNISOLONE SODIUM SUCCINATE 40 MG: 40 INJECTION, POWDER, FOR SOLUTION INTRAMUSCULAR; INTRAVENOUS at 05:02

## 2019-02-04 RX ADMIN — HEPARIN SODIUM 5000 UNITS: 5000 INJECTION, SOLUTION INTRAVENOUS; SUBCUTANEOUS at 09:02

## 2019-02-04 RX ADMIN — METOPROLOL TARTRATE 5 MG: 5 INJECTION, SOLUTION INTRAVENOUS at 05:02

## 2019-02-04 RX ADMIN — HEPARIN SODIUM 5000 UNITS: 5000 INJECTION, SOLUTION INTRAVENOUS; SUBCUTANEOUS at 03:02

## 2019-02-04 RX ADMIN — BACITRACIN: 500 OINTMENT TOPICAL at 03:02

## 2019-02-04 NOTE — PT/OT/SLP PROGRESS
Physical Therapy      Patient Name:  Jones Red   MRN:  [id number]    Patient not seen today secondary to Dialysis. Will follow-up this PM as time permits.    Sho Conde, PT

## 2019-02-04 NOTE — PT/OT/SLP PROGRESS
Speech Language Pathology      Jones Red  MRN: [id number]    Patient not seen this morning secondary to Other (Comment)(Pt on BIPAP and receiving dialysis). RN stating pt requiring BIPAP for oxygenation. SLP and RN discussed deferral of clear liquid trials while on BIPAP and if he comes off this am. Speech to follow-up later this afternoon.    Stephanie Camarena, JOSSELIN-SLP

## 2019-02-04 NOTE — PROGRESS NOTES
"Nephrology  Progress Note    Admit Date: 1/28/2019   LOS: 7 days     SUBJECTIVE:     Follow-up For:  Septic shock/ATN    Interval History:    Uneventful night.  Currently on Bipap.  Labs noted.  Discussed with team.  Awaiting HD/UF this am.  Garbled speech.         Review of Systems:    Difficult to obtain due to garbled speech    OBJECTIVE:     Vital Signs Range (Last 24H):  BP (!) 141/63   Pulse 105   Temp 98.3 °F (36.8 °C) (Axillary)   Resp (!) 24   Ht 6' 3" (1.905 m)   Wt (!) 178 kg (392 lb 6.7 oz)   SpO2 (!) 91%   BMI 49.05 kg/m²     Temp:  [97.2 °F (36.2 °C)-98.4 °F (36.9 °C)]   Pulse:  []   Resp:  [18-33]   BP: (141-192)/(63-86)   SpO2:  [83 %-99 %]   Arterial Line BP: (132-178)/(64-82)     I & O (Last 24H):    Intake/Output Summary (Last 24 hours) at 2/4/2019 0756  Last data filed at 2/4/2019 0539  Gross per 24 hour   Intake 380 ml   Output 30 ml   Net 350 ml       Physical Exam:  General appearance:  Disheveled and morbidly obese.  Awake moves all extremities   Eyes:  Conjunctivae/corneas clear. PERRL.  Lungs: rhonchi throughout  Heart: Regular rate and rhythm, distant heart tones.  Abdomen: Soft, non-tender non-distended; bowel sounds normal; no masses,  no organomegaly, morbidly obese  Extremities:  Chronic Woody edema.   with blisters bilateral lower extremities worse on the right  Skin:  Right lower extremity cellulitis  Abdalla catheterization  Right IJ Trialysis       Laboratory Data:  Recent Labs   Lab 02/04/19  0338   WBC 16.67*   RBC 3.47*   HGB 9.9*   HCT 30.0*   *   MCV 87   MCH 28.5   MCHC 33.0       BMP:   Recent Labs   Lab 01/29/19 2230  02/04/19  0338      < > 85   *   < > 139   K 4.3   < > 3.7   CL 95   < > 94*   CO2 17*   < > 27   BUN 85*   < > 73*   CREATININE 9.0*   < > 8.7*   CALCIUM 7.6*   < > 9.4   MG 2.0  --   --     < > = values in this interval not displayed.     Lab Results   Component Value Date    CALCIUM 9.4 02/04/2019    PHOS 8.8 (H) 02/04/2019 "       Lab Results   Component Value Date    .4 (H) 01/30/2019    CALCIUM 9.4 02/04/2019    PHOS 8.8 (H) 02/04/2019       No results found for: URICACID    BNP  Recent Labs   Lab 01/28/19 2023   BNP 1,726*       Medications:  Medication list was reviewed and changes noted under Assessment/Plan.    Diagnostic Results:    X-Ray Chest AP Portable   Final Result      NG tube in place.         Electronically signed by: Viviane Persaud MD   Date:    02/02/2019   Time:    23:59      X-Ray Chest 1 View   Final Result      As above.         Electronically signed by: Keon Jonas MD   Date:    01/31/2019   Time:    19:14      X-Ray Chest AP Portable   Final Result      Lines and tubes are grossly unchanged.      Interval improvement in aeration of the lungs.         Electronically signed by: Connor Babcock MD   Date:    01/31/2019   Time:    16:42      X-Ray Chest 1 View   Final Result      Overall no significant interval change         Electronically signed by: Connor Babcock MD   Date:    01/30/2019   Time:    08:24      US Extremity Non Vascular Limited Right   Final Result      Nonspecific subcutaneous edema, cutaneous thickening, could represent cellulitis changes.  No definite necrotizing fasciitis, gas or abscess.         Electronically signed by: Tin Leone MD   Date:    01/29/2019   Time:    13:09      X-Ray Chest AP Portable   Final Result   Addendum 1 of 1      Enteric tube projects in unchanged radiographic position with tip coursing    below the diaphragm and appearing to extend beyond the field of view.         Electronically signed by: Trena Sena MD   Date:    01/29/2019   Time:    00:39      Final      As above.         Electronically signed by: Trena Sena MD   Date:    01/29/2019   Time:    00:04      US Lower Extremity Veins Right   Final Result      Limited examination.  No evidence of deep venous thrombosis in the right lower extremity.         Electronically signed by: Vanita  Preet   Date:    01/28/2019   Time:    21:10      X-Ray Chest AP Portable   Final Result      Cardiomegaly with findings suggesting pulmonary edema, correlation advised.  Differential would include infection.         Electronically signed by: Kalyan Galvez MD   Date:    01/28/2019   Time:    20:19          ASSESSMENT/PLAN:     1. Persistent Multifactorial anuric acute renal failure secondary to ATN from shock, vasopressors, rhabdomyolysis, hyponatremia, and hyperkalemia (N17.0, N17.9, E87.5, E87.1, M62.82, A41.9):  [facility] and has transitioned to standard hemodialysis.   Some issues with clotting of dialysis catheter and it was moved to the left side.  Continue daily HD/UF until signs of renal recovery.  Timeframe unknown at this time and may require tunneled line with outpt HD.  Will address daily.  Renally dose meds, avoid nephrotoxins, and monitor I/O's closely.  2. Hyperphosphatemia.  Binders when eating.  HD.   3. Secondary hyperparathyroidism due to acute kidney injury.  Calcium within normal limits.  No need to treat at this point.  4. Septic shock and resp failure:  defer to CCT.    5. RLE Cellulitis/woody edema (L03.115):  No IV contrast please.  Defer to wound care and ID.  6. Morbid obesity (E44):  Most of his issues caused by this.        See above.        Addendum:  Tolerated UF of 4L today without problem. Still much more to go. Discussed concentrating IVPB as best can as well with Dr. So

## 2019-02-04 NOTE — PT/OT/SLP EVAL
Physical Therapy Evaluation    Patient Name:  Jones Red   MRN:  [id number]    Recommendations:     Discharge Recommendations:  (TBD pending progress with therapy)   Discharge Equipment Recommendations: (TBD)   Barriers to discharge: Increased caregiver burden with pt's decreased independence with functional mobility    Assessment:     Jones Red is a 53 y.o. male admitted with a medical diagnosis of Septic shock with cellulitis of RLE, acute on chronic respiratory failure requiring intubation with successful extubation 2/2, and acute renal failure 2/2 sepsis and rhabdomyolysis. He presents with the following impairments/functional limitations:  weakness, impaired endurance, impaired self care skills, impaired functional mobilty, gait instability, impaired balance, impaired cognition, decreased coordination, decreased upper extremity function, decreased lower extremity function, decreased safety awareness, decreased ROM, impaired fine motor, impaired skin, edema, impaired cardiopulmonary response to activity.    Patient evaluated by PT and goals established. Due to the above impairments, the patient is limited in his ability to modified independently ambulate with standard walker, transfer, and participate in his chosen activities. Patient with inconsistent command following/question answering, PLOF and home environment confirmed with significant other Aneta. Patient expresses wish to walk, but unable to lift legs against gravity. Requires maxA x1 person for rolling in bed and total A x2 people for scooting to HOB with bed in Trendelenburg. After scoot to HOB and dependent A for personal hygiene after BM leaked from bowel management system, patient remained with SpO2 in mid 80s with -120s and with NC 2 L. EVELIA Medina present in room monitoring patient's vitals at end of session.     Rehab Prognosis: Fair+; patient would benefit from acute skilled PT services to address these deficits and reach maximum level  "of function.    Recent Surgery: * No surgery found *      Plan:     During this hospitalization, patient to be seen 5 x/week to address the identified rehab impairments via gait training, therapeutic activities, therapeutic exercises, neuromuscular re-education, wheelchair management/training and progress toward the following goals:    · Plan of Care Expires:  03/03/19    Subjective     Chief Complaint: Uncomfortable in bed  Patient/Family Comments/goals: Pt with difficult to understand speech, states he wishes to walk. When PT requesting pt roll, pt states "I won't cheat on my wife"  Pain/Comfort:  · Pain Rating 1: 0/10(Pt denies pain, grimaces with rolling)  · Pain Rating Post-Intervention 1: 0/10    Patients cultural, spiritual, Confucianist conflicts given the current situation: no(none stated)    Living Environment:  Patient resides in a single story home with 3 ALISON with no HR and a tub-shower combo. He is  from his wife x13 years but she provides assistance as he needs and often stays at his home.     Prior to admission, patients level of function was modified independent with household ambulation with use of standard walker. Pt's wife provides assistance with bathing and dressing. Pt reports he cooks. He is retired from his previous employment "working for the city."  Equipment used at home: walker, standard.  DME owned (not currently used): none and pt reports he has W/C but pt's wife denies he has W/C.  Upon discharge, patient will have assistance from his wife.    Objective:     Communicated with EVELIA Medina prior to session.  Patient found all lines intact, call button in reach and HOB and foot of bed elevated with arterial line, central line, PICC line, peripheral IV, pulse ox (continuous), restraints, SCD, telemetry, oxygen, NG tube, mosley catheter, bowel management system, blood pressure cuff  upon PT entry to room. Patient agreeable to evaluation.    General Precautions: Standard, " aspiration(fall risk)   Orthopedic Precautions:N/A   Braces: N/A     Exams:  · Cognition:   · Patient is oriented to person, , general location ([facility]).  · Pt follows approximately 75% of one step commands.    · Mood: Pleasant and cooperative.  · Patient with difficult to understand speech  · Musculoskeletal:  · Posture:    · Not assessed with bed mobility  · LE ROM/Strength:   · R ROM: Limited 2/2 edema, ankle DF -5-5 (noted cracking in skin on dorsum of ankle with PF), knee flexion 0-90, hip flexion 0-100  · L ROM: Limited 2/2 edema, ankle DF -10-5, knee flexion 0-100, hip flexion 0-100  · R Strength: Grossly 3/5 pt with limited ability to lift thigh off bed with hip flexion,  not formally assessed 2/2 pt unable to achieve sitting position at this time  · L Strength: Grossly 3/5, pt with limited ability to lift thigh off bed with hip flexion, not formally assessed 2/2 pt unable to achieve sitting position at this time  · Neuromuscular:  · Sensation: Intact to light touch bilateral LEs. Pt denies paresthesias.   · Tone/Reflexes: No impairments identified with functional mobility. No formal testing performed.   · Coordination: Not formally assessed. Requires VCs for hand placement/use of UE and LE for rolling  · Balance: Not assessed at this time.   · Visual-vestibular: No impairments identified with functional mobility. No formal testing performed.  · Integument: Blistring of RLE anterior and medial shank and rash of R medial thigh. Reddened skin noted to BLE  · Cardiopulmonary:  · Vital signs: On 2 L O2 via NC  · Pre (supine): /65  SpO2 92% RR 21  · During (roll R):  SpO2 87%  · During (roll L):  SpO2 86%  · Post (after scoot to HOB): /78  SpO2 87% RR 23  · Edema: Notable in BLE and BUE      Functional Mobility:  · Bed Mobility:     · Rolling Left:  maximal assistance  · Rolling Right: maximal assistance  · Scooting: dependence and of 2 persons with bed in  Trendelenburg (NG tube stopped)  · Supine to Sit: Not assessed 2/2 decreased SpO2 and increased HR with rolling      Therapeutic Activities and Exercises:   Rolling R&L, scoot to HOB, totalA x2-3 people for personal hygiene    PT educated patient re: PT plan of care, role of PT, inhale thru nose for increased O2 intake      AM-PAC 6 CLICK MOBILITY  Total Score:7     Patient left HOB elevated with all lines intact, call button in reach and RN Carol present.    GOALS:   Multidisciplinary Problems     Physical Therapy Goals        Problem: Physical Therapy Goal    Goal Priority Disciplines Outcome Goal Variances Interventions   Physical Therapy Goal     PT, PT/OT      Description:  Goals to be met by: 3/3/19    Patient will increase functional independence with mobility by performin. Supine to sit with min A.  2. Sit to supine with min A.   3. Rolling R and L with min A.   4. Sitting at edge of bed >5 minutes with min A.   5. PT will assess sit<>stand.                       History:     Past Medical History:   Diagnosis Date    Obesity     LYNDON (obstructive sleep apnea)     Rhabdomyolysis 2019    Tobacco abuse     Venous stasis        No past surgical history on file.    Clinical Decision Making:     History  Co-morbidities and personal factors that may impact the plan of care Examination  Body Structures and Functions, activity limitations and participation restrictions that may impact the plan of care Clinical Presentation   Decision Making/ Complexity Score   Co-morbidities:   [] Time since onset of injury / illness / exacerbation  [] Status of current condition  []Patient's cognitive status and safety concerns    [] Multiple Medical Problems (see med hx)  Personal Factors:   [] Patient's age  [] Prior Level of function   [] Patient's home situation (environment and family support)  [] Patient's level of motivation  [] Expected progression of patient      HISTORY:(criteria)    [] 50755 - no personal  factors/history    [] 69818 - has 1-2 personal factor/comorbidity     [] 46020 - has >3 personal factor/comorbidity     Body Regions:  [] Objective examination findings  [] Head     []  Neck  [] Trunk   [] Upper Extremity  [] Lower Extremity    Body Systems:  [] For communication ability, affect, cognition, language, and learning style: the assessment of the ability to make needs known, consciousness, orientation (person, place, and time), expected emotional /behavioral responses, and learning preferences (eg, learning barriers, education  needs)  [] For the neuromuscular system: a general assessment of gross coordinated movement (eg, balance, gait, locomotion, transfers, and transitions) and motor function  (motor control and motor learning)  [] For the musculoskeletal system: the assessment of gross symmetry, gross range of motion, gross strength, height, and weight  [] For the integumentary system: the assessment of pliability(texture), presence of scar formation, skin color, and skin integrity  [] For cardiovascular/pulmonary system: the assessment of heart rate, respiratory rate, blood pressure, and edema     Activity limitations:    [] Patient's cognitive status and saf ety concerns          [] Status of current condition      [] Weight bearing restriction  [] Cardiopulmunary Restriction    Participation Restrictions:   [] Goals and goal agreement with the patient     [] Rehab potential (prognosis) and probable outcome      Examination of Body System: (criteria)    [] 24156 - addressing 1-2 elements    [] 34698 - addressing a total of 3 or more elements     [] 71188 -  Addressing a total of 4 or more elements         Clinical Presentation: (criteria)  Choose one     On examination of body system using standardized tests and measures patient presents with (CHOOSE ONE) elements from any of the following: body structures and functions, activity limitations, and/or participation restrictions.  Leading to a  clinical presentation that is considered (CHOOSE ONE)                              Clinical Decision Making  (Eval Complexity):  Choose One     Time Tracking:     PT Received On: 02/03/19  PT Start Time: 1206     PT Stop Time: 1300  PT Total Time (min): 54 min     Billable Minutes: Evaluation 20 and Therapeutic Activity 34      Wendy Santillan, PT  02/03/2019

## 2019-02-04 NOTE — PROGRESS NOTES
DR BEASLEY PAGED NOTIFED PT UNCOOPERATIVE WITH TAKING PO MEDS,  HE WANT TO GIVE MEDS . DIALYSIS COMPLETED IV, SPEECH THERAPY CALLED TO RE EVAL. DIALYSIS COMPLETED, NC @ 3L RESTARTED

## 2019-02-04 NOTE — ASSESSMENT & PLAN NOTE
- with associated septic shock  - improved  - blood cultures sterile  - continue cefepime and vancomycin for another 7 days

## 2019-02-04 NOTE — PLAN OF CARE
Problem: Noninvasive Ventilation Acute  Goal: Effective Unassisted Ventilation and Oxygenation  Outcome: Ongoing (interventions implemented as appropriate)  Patient received on nasal cannula at 3 lpm. Patient in mild distress, using abdominal muscles and pt remains tachypneic. Aerosol treatments given Q 4 with CPT. Patient placed on BIPAP with settings as documented for night. BBS diminished with expiratory wheezes present at the start of the shift. NT suctioning not needed. Will continue to monitor.

## 2019-02-04 NOTE — SUBJECTIVE & OBJECTIVE
Interval History: Events noted. Extubated on BiPap. Tolerating abx. Blood cultures remains sterile.    Review of Systems   Unable to perform ROS: Other     Objective:     Vital Signs (Most Recent):  Temp: 98.2 °F (36.8 °C) (02/04/19 0930)  Pulse: 108 (02/04/19 1330)  Resp: (!) 25 (02/04/19 1330)  BP: (!) 154/70 (02/04/19 1330)  SpO2: 97 % (02/04/19 1330) Vital Signs (24h Range):  Temp:  [97.2 °F (36.2 °C)-98.3 °F (36.8 °C)] 98.2 °F (36.8 °C)  Pulse:  [] 108  Resp:  [18-33] 25  SpO2:  [83 %-98 %] 97 %  BP: (138-192)/(60-86) 154/70  Arterial Line BP: (140-178)/(68-82) 140/72     Weight: (!) 178 kg (392 lb 6.7 oz)  Body mass index is 49.05 kg/m².    Estimated Creatinine Clearance: 17.3 mL/min (A) (based on SCr of 8.7 mg/dL (H)).    Physical Exam   Constitutional: He appears well-developed and well-nourished. No distress.   HENT:   Head: Normocephalic and atraumatic.   Right Ear: External ear normal.   Eyes: EOM are normal. Pupils are equal, round, and reactive to light.   Cardiovascular: Normal heart sounds.   Pulmonary/Chest: Effort normal.   BiPap   Abdominal: Soft.   Musculoskeletal: Normal range of motion. He exhibits edema and tenderness.   Neurological: He is alert.   Skin: He is not diaphoretic.   blistering skin; less edema; no areas of paresthesia or dark skin necrosis   Psychiatric: He has a normal mood and affect. His behavior is normal. Thought content normal.   Nursing note and vitals reviewed.      Significant Labs:   Blood Culture:   Recent Labs   Lab 01/28/19 2020 01/28/19 2023 02/01/19  0956 02/01/19 2000   LABBLOO No growth after 5 days. No growth after 5 days. No Growth to date  No Growth to date  No Growth to date No Growth to date  No Growth to date  No Growth to date     CBC:   Recent Labs   Lab 02/03/19  0419 02/04/19  0338   WBC 22.47* 16.67*   HGB 10.4* 9.9*   HCT 31.5* 30.0*    351*     CMP:   Recent Labs   Lab 02/02/19  2328 02/03/19 0419 02/04/19  0338    139 139    K 3.9 3.6 3.7   CL 93* 94* 94*   CO2 28 27 27    86 85   BUN 48* 52* 73*   CREATININE 6.5* 6.9* 8.7*   CALCIUM 9.2 9.2 9.4   ANIONGAP 17* 18* 18*   EGFRNONAA 9* 8* 6*       Significant Imaging: I have reviewed all pertinent imaging results/findings within the past 24 hours.

## 2019-02-04 NOTE — NURSING
Patient had an uneventful evening.  BP increasing throughout night.  Hydralazine 10 mg given X 1. On BIPAP overnight.  Sats 95% this AM after transitioning to 3 L NC.  Patient swallowing sips of water and ice chips with no issues.  Afebrile.  Restraints discontinued this AM.  Bed bath given.  Linens changed.

## 2019-02-04 NOTE — NURSING
Spoke with Dr Real as patient has pulled out his NG tube. Speech has cleared him for ice chips and clears with supervision. Left radial rula removed by RN per MD order

## 2019-02-04 NOTE — SUBJECTIVE & OBJECTIVE
Interval History:  Patient with some confusion.  Requiring intermittent BiPAP during the day in addition to nighttime BiPAP.    Review of Systems   Constitutional: Negative for chills and fever.   Respiratory: Positive for shortness of breath.    Cardiovascular: Negative for chest pain.   Gastrointestinal: Negative for abdominal pain, constipation, diarrhea, nausea and vomiting.     Objective:     Vital Signs (Most Recent):  Temp: 98.3 °F (36.8 °C) (02/04/19 0715)  Pulse: 104 (02/04/19 0847)  Resp: (!) 26 (02/04/19 0847)  BP: (!) 141/63 (02/04/19 0700)  SpO2: 98 % (02/04/19 0847) Vital Signs (24h Range):  Temp:  [97.2 °F (36.2 °C)-98.4 °F (36.9 °C)] 98.3 °F (36.8 °C)  Pulse:  [] 104  Resp:  [18-33] 26  SpO2:  [83 %-99 %] 98 %  BP: (141-192)/(63-86) 141/63  Arterial Line BP: (132-178)/(64-82) 140/72     Weight: (!) 178 kg (392 lb 6.7 oz)  Body mass index is 49.05 kg/m².    Intake/Output Summary (Last 24 hours) at 2/4/2019 0907  Last data filed at 2/4/2019 0539  Gross per 24 hour   Intake 360 ml   Output 30 ml   Net 330 ml      Physical Exam   Constitutional: Vital signs are normal. He appears well-developed.  Non-toxic appearance. He does not have a sickly appearance. He does not appear ill. No distress.   Morbid obesity.   HENT:   Head: Normocephalic and atraumatic.   Right Ear: External ear normal.   Left Ear: External ear normal.   Eyes: Conjunctivae and EOM are normal. Pupils are equal, round, and reactive to light. No scleral icterus.   Neck: Normal range of motion. Neck supple. No JVD present. No tracheal deviation present.   Cardiovascular: Normal rate, regular rhythm, normal heart sounds and intact distal pulses. Exam reveals no gallop and no friction rub.   No murmur heard.  Pulmonary/Chest: Effort normal and breath sounds normal. No stridor. No respiratory distress. He has no wheezes. He has no rales.   Distant lung sounds.  Moving air well.  Some crackles.   Abdominal: Soft. Bowel sounds are normal.  He exhibits no distension and no mass. There is no tenderness. There is no guarding.   Musculoskeletal: Normal range of motion. He exhibits no edema or deformity.   Neurological: He is alert. He exhibits normal muscle tone.   Oriented to self place but not year.  Patient otherwise following commands answering questions appropriately.   Skin: Skin is warm and dry. Rash noted. He is not diaphoretic. There is erythema.   Right lower extremity is erythematous, with blistering and edema.  Area of erythema has not extended beyond pen marking. Less edematous following renal replacement therapy.   Psychiatric: He has a normal mood and affect. His behavior is normal. Judgment and thought content normal.   Nursing note and vitals reviewed.      Significant Labs: All pertinent labs within the past 24 hours have been reviewed.    Significant Imaging: I have reviewed all pertinent imaging results/findings within the past 24 hours.

## 2019-02-04 NOTE — ASSESSMENT & PLAN NOTE
Successfully extubated 2/2/2018.  Patient did well on BiPAP following extubation.  He has needed intermittent BiPAP.  Continue with bronchodilator breathing treatments as I suspect patient has a component of chronic obstructive pulmonary disease in light of his wheezing and significant tobacco smoking history.  Patient also still needs additional volume removal.

## 2019-02-04 NOTE — PLAN OF CARE
Problem: Physical Therapy Goal  Goal: Physical Therapy Goal  Goals to be met by: 3/3/19    Patient will increase functional independence with mobility by performin. Supine to sit with min A.  2. Sit to supine with min A.   3. Rolling R and L with min A.   4. Sitting at edge of bed >5 minutes with min A.   5. PT will assess sit<>stand.     Patient evaluated by PT and goals established. Patient requires maxA for rolling right and left and total A x2 people for scooting to HOB with bed in Trendelenburg. PT will continue to follow and progress as tolerated. Please see progress note for detailed plan of care and recommendations.

## 2019-02-04 NOTE — PT/OT/SLP PROGRESS
"Speech Language Pathology Treatment    Patient Name:  Jones Red   MRN:  [id number]  Admitting Diagnosis: Septic shock    Recommendations:                 General Recommendations:                1. Speech to follow 5-6x/week for remediation of oral and pharyngeal dysphagia, cognitive communication deficits  Diet recommendations:   ,  1.  Clears liquids and ice chips in moderation  2. Continue NG tube as main source of nutrition and hydration  3. Speech to continue to evaluate for ability to advance diet     Aspiration Precautions:   1. Thin, clear liquids in small single sips via cup or straw  2. No rapid successive sips of liquids  3. No large volume sips  4. Upright for all oral intake  5. Feed only when awake and alert  6. Defer all oral intake if RR is elevated and if he is desating      Subjective     Pt off bipap, awake and sitting up in bed.    RN reporting BIPAP on standby due to instances of SOB.    Pain/Comfort:  · Pain Rating Post-Intervention 2: (Pt unable to reliably answer pain questions)    Objective:     Has the patient been evaluated by SLP for swallowing?   Yes  Keep patient NPO? No   Current Respiratory Status: nasal cannula, bi-pap((on stand by))  RR ranging from 22-36, SOB and increased WOB noted.    COGNITIVE STATUS: Awake. Confusional state noted. Pt perseverative on the door being closed so he can get up and "take a crap." Highly distracted by and perseverative on self wants and needs, difficult to redirect. Able to state that he is on a hospital in [place]. Not oriented to date, time, reason for hospitalization. Seeing doors and objects in room that are not there.    SWALLOWING: Pt agreeable to ice chips x2 only this session. Declining all trials of liquids, purees and solids. Perseverative and mildly agitated re garding being able to see his "boy," go to the bathroom and closing the door. Elevation of RR past 22 with increase WOB and SOB. Feeding trials deferred.      Assessment: "     Jones Red is a 53 y.o. male with an SLP diagnosis of Dysphagia, Dysarthria and Cognitive-Linguistic Impairment.     Goals:   Multidisciplinary Problems     SLP Goals        Problem: SLP Goal    Goal Priority Disciplines Outcome   SLP Goal     SLP Ongoing (interventions implemented as appropriate)   Description:  1. Pt will be able to follow simple 1 step commands 75% of time with moderate verbal and visual cues.  2. Increase speech intelligibility to 75% at the simple sentence level with moderate verbal cues to slow rate, raise volume.  3. Pt will be able to consume thin liquids in small single sips via cup or straw, with no signs of airway threat or aspiration given max assistance.  4. Pt will be able to advance to purees and solid consistencies with no signs of airway threat or aspiration given max assistance  5. Increase orientation to month, date, day of week, place and reason for hospitalization with 75% acc given max verbal and written cues.  6. Increase ability to express basic wants and needs 75% of time given min assistance                    Plan:     · Patient to be seen:  5 x/week, 6 x/week   · Plan of Care expires:  02/28/19  · Plan of Care reviewed with:  patient(RN)   · SLP Follow-Up:  Yes       Discharge recommendations:          Time Tracking:     SLP Treatment Date:   02/04/19  Speech Start Time:  1540  Speech Stop Time:  1603     Speech Total Time (min):  23 min    Billable Minutes: Treatment Swallowing Dysfunction 23 min    Stephanie Camarena, CCC-SLP  02/04/2019

## 2019-02-04 NOTE — PROGRESS NOTES
[facility]  Infectious Disease  Progress Note    Patient Name: Jones Red  MRN: [id number]  Admission Date: 1/28/2019  Length of Stay: 7 days  Attending Physician: Selwyn So MD  Primary Care Provider: Primary Doctor No    Isolation Status: No active isolations     Assessment/Plan:      Cellulitis of right lower extremity    - with associated septic shock  - improved  - blood cultures sterile  - continue cefepime and vancomycin for another 7 days            Thank you for your consult. I will follow-up with patient. Please contact us if you have any additional questions.    Abraham Romero MD  Infectious Disease  [facility]    Subjective:     Principal Problem:Septic shock    HPI: 53M with h/o CHF, COPD on home o2, morbid obesity admitted as transfer from [facility] ([facility] in [place], [place]) for septic shock. Patient currently intubated and sedated on propofol and history obtained by chart review. Pt with h/o chronic venous stasis and has had acute worsening of leg wounds with redness and blistering. Currently being supported by pressor support and renal replacement. Nurse reports minimal secretions. Inpatient team has tried imaging lower extremities, but CT could not be done 2/2 obesity. Ultrasound negative for abscess. Currently on cefepime, metronidazole and vancomycin. ID consulted for further abx recs.   Interval History: Events noted. Extubated on BiPap. Tolerating abx. Blood cultures remains sterile.    Review of Systems   Unable to perform ROS: Other     Objective:     Vital Signs (Most Recent):  Temp: 98.2 °F (36.8 °C) (02/04/19 0930)  Pulse: 108 (02/04/19 1330)  Resp: (!) 25 (02/04/19 1330)  BP: (!) 154/70 (02/04/19 1330)  SpO2: 97 % (02/04/19 1330) Vital Signs (24h Range):  Temp:  [97.2 °F (36.2 °C)-98.3 °F (36.8 °C)] 98.2 °F (36.8 °C)  Pulse:  [] 108  Resp:  [18-33] 25  SpO2:  [83 %-98 %] 97 %  BP: (138-192)/(60-86)  154/70  Arterial Line BP: (140-178)/(68-82) 140/72     Weight: (!) 178 kg (392 lb 6.7 oz)  Body mass index is 49.05 kg/m².    Estimated Creatinine Clearance: 17.3 mL/min (A) (based on SCr of 8.7 mg/dL (H)).    Physical Exam   Constitutional: He appears well-developed and well-nourished. No distress.   HENT:   Head: Normocephalic and atraumatic.   Right Ear: External ear normal.   Eyes: EOM are normal. Pupils are equal, round, and reactive to light.   Cardiovascular: Normal heart sounds.   Pulmonary/Chest: Effort normal.   BiPap   Abdominal: Soft.   Musculoskeletal: Normal range of motion. He exhibits edema and tenderness.   Neurological: He is alert.   Skin: He is not diaphoretic.   blistering skin; less edema; no areas of paresthesia or dark skin necrosis   Psychiatric: He has a normal mood and affect. His behavior is normal. Thought content normal.   Nursing note and vitals reviewed.      Significant Labs:   Blood Culture:   Recent Labs   Lab 01/28/19 2020 01/28/19 2023 02/01/19  0956 02/01/19 2000   LABBLOO No growth after 5 days. No growth after 5 days. No Growth to date  No Growth to date  No Growth to date No Growth to date  No Growth to date  No Growth to date     CBC:   Recent Labs   Lab 02/03/19 0419 02/04/19  0338   WBC 22.47* 16.67*   HGB 10.4* 9.9*   HCT 31.5* 30.0*    351*     CMP:   Recent Labs   Lab 02/02/19 2328 02/03/19 0419 02/04/19  0338    139 139   K 3.9 3.6 3.7   CL 93* 94* 94*   CO2 28 27 27    86 85   BUN 48* 52* 73*   CREATININE 6.5* 6.9* 8.7*   CALCIUM 9.2 9.2 9.4   ANIONGAP 17* 18* 18*   EGFRNONAA 9* 8* 6*       Significant Imaging: I have reviewed all pertinent imaging results/findings within the past 24 hours.

## 2019-02-04 NOTE — PT/OT/SLP PROGRESS
Occupational Therapy      Patient Name:  Jones Red   MRN:  [id number]    Patient not seen today secondary to Dialysis. Will follow-up later as time permits.    Matilda Xie, OT  2/4/2019

## 2019-02-04 NOTE — ASSESSMENT & PLAN NOTE
Possible component of pneumonia but clear cause of his infection is the severe cellulitis involving the right lower extremity.  Ultrasound of right lower extremity did not reveal evidence of abscess.  General surgery consulted and recommending serial exams for now but no surgical intervention at this time.  Cellulitis mildly improved.    Bood cultures no growth to date.  Continue to elevate right lower extremity as tolerated.  Continue with antimicrobial therapy and wound care.

## 2019-02-04 NOTE — PLAN OF CARE
Problem: SLP Goal  Goal: SLP Goal  1. Pt will be able to follow simple 1 step commands 75% of time with moderate verbal and visual cues.  2. Increase speech intelligibility to 75% at the simple sentence level with moderate verbal cues to slow rate, raise volume.  3. Pt will be able to consume thin liquids in small single sips via cup or straw, with no signs of airway threat or aspiration given max assistance.  4. Pt will be able to advance to purees and solid consistencies with no signs of airway threat or aspiration given max assistance  5. Increase orientation to month, date, day of week, place and reason for hospitalization with 75% acc given max verbal and written cues.  6. Increase ability to express basic wants and needs 75% of time given min assistance   Outcome: Ongoing (interventions implemented as appropriate)  Pt seen this date for ongoing assessment of cognitive communication and swallowing status    Comments: Speech to follow 5-6x/week

## 2019-02-04 NOTE — PROGRESS NOTES
[facility] Hospital Medicine  Progress Note    Patient Name: Jones Red  MRN: [id number]  Patient Class: IP- Inpatient   Admission Date: 1/28/2019  Length of Stay: 7 days  Attending Physician: Selwyn So MD  Primary Care Provider: Primary Doctor No        Subjective:     Principal Problem:Septic shock    HPI:  Mr. Jones Red is a 53 y.o. male, with PMH of HTN, COPD, chronic respiratory failure (on home O2), Diastolic CHF, LYNDON, chronic venous insufficiency, and questionable seizures history (suspected to be 2/2 alcohol withdrawal), who preset tia to [facility] on 1/22/19 2/2 SOB. This was associated with cough productive of brown sputum. He was started in BiPap, but did ultimately degrade, and was intubated, and maintained on a ventilator. He was additionally altered upon arrival to the ED. History was reportedly obtained by a friend, and it was reported that he was found down at home, and was unable to get up. He as admitted to the ICU, started on IV fluids and pressors.     Hospital Course:  Patient 53-year-old gentleman with history of hypertension, diastolic heart failure, chronic obstructive pulmonary disease with chronic hypoxic respiratory failure on home oxygen, prior alcohol abuse, significant ongoing tobacco use (3 packs per day), and morbid obesity with septic shock secondary to right lower extremity cellulitis complicating chronic venous stasis.  Patient intubated at outside hospital after failing a trial of BiPAP.  Patient also with evidence of worsening renal failure with rhabdomyolysis.       Patient transferred from [facility] in [place], [place] to [facility] on 1/28/2019.  Patient started on renal replacement therapy.  Patient remains intubated and sedated but responds to stimuli when sedation held but otherwise confused.  Ultrasound of the right lower extremity did not reveal evidence of abscess, subcutaneous gas, or evidence  of necrotizing fascitits.  General surgery and wound care consulted.  No surgical intervention recommended at this time.  Patient on broad spectrum antibiotics.    Patient clinically improved in terms his volume status and was successfully extubated on 2/2/2019.    Interval History:  Patient with some confusion.  Requiring intermittent BiPAP during the day in addition to nighttime BiPAP.    Review of Systems   Constitutional: Negative for chills and fever.   Respiratory: Positive for shortness of breath.    Cardiovascular: Negative for chest pain.   Gastrointestinal: Negative for abdominal pain, constipation, diarrhea, nausea and vomiting.     Objective:     Vital Signs (Most Recent):  Temp: 98.3 °F (36.8 °C) (02/04/19 0715)  Pulse: 104 (02/04/19 0847)  Resp: (!) 26 (02/04/19 0847)  BP: (!) 141/63 (02/04/19 0700)  SpO2: 98 % (02/04/19 0847) Vital Signs (24h Range):  Temp:  [97.2 °F (36.2 °C)-98.4 °F (36.9 °C)] 98.3 °F (36.8 °C)  Pulse:  [] 104  Resp:  [18-33] 26  SpO2:  [83 %-99 %] 98 %  BP: (141-192)/(63-86) 141/63  Arterial Line BP: (132-178)/(64-82) 140/72     Weight: (!) 178 kg (392 lb 6.7 oz)  Body mass index is 49.05 kg/m².    Intake/Output Summary (Last 24 hours) at 2/4/2019 0907  Last data filed at 2/4/2019 0539  Gross per 24 hour   Intake 360 ml   Output 30 ml   Net 330 ml      Physical Exam   Constitutional: Vital signs are normal. He appears well-developed.  Non-toxic appearance. He does not have a sickly appearance. He does not appear ill. No distress.   Morbid obesity.   HENT:   Head: Normocephalic and atraumatic.   Right Ear: External ear normal.   Left Ear: External ear normal.   Eyes: Conjunctivae and EOM are normal. Pupils are equal, round, and reactive to light. No scleral icterus.   Neck: Normal range of motion. Neck supple. No JVD present. No tracheal deviation present.   Cardiovascular: Normal rate, regular rhythm, normal heart sounds and intact distal pulses. Exam reveals no gallop and no  friction rub.   No murmur heard.  Pulmonary/Chest: Effort normal and breath sounds normal. No stridor. No respiratory distress. He has no wheezes. He has no rales.   Distant lung sounds.  Moving air well.  Some crackles.   Abdominal: Soft. Bowel sounds are normal. He exhibits no distension and no mass. There is no tenderness. There is no guarding.   Musculoskeletal: Normal range of motion. He exhibits no edema or deformity.   Neurological: He is alert. He exhibits normal muscle tone.   Oriented to self place but not year.  Patient otherwise following commands answering questions appropriately.   Skin: Skin is warm and dry. Rash noted. He is not diaphoretic. There is erythema.   Right lower extremity is erythematous, with blistering and edema.  Area of erythema has not extended beyond pen marking. Less edematous following renal replacement therapy.   Psychiatric: He has a normal mood and affect. His behavior is normal. Judgment and thought content normal.   Nursing note and vitals reviewed.      Significant Labs: All pertinent labs within the past 24 hours have been reviewed.    Significant Imaging: I have reviewed all pertinent imaging results/findings within the past 24 hours.    Assessment/Plan:      * Septic shock    Possible component of pneumonia but clear cause of his infection is the severe cellulitis involving the right lower extremity.  Ultrasound of right lower extremity did not reveal evidence of abscess.  General surgery consulted and recommending serial exams for now but no surgical intervention at this time.  Cellulitis mildly improved.    Bood cultures no growth to date.  Continue to elevate right lower extremity as tolerated.  Continue with antimicrobial therapy and wound care.     Cellulitis of right lower extremity    Continue with treatment and serial examinations.  Continue with wound care.  General surgery following.     Acute on chronic diastolic heart failure    Marked improvement with  dialysis  Continue with additional volume removal with renal replacement therapy as recommended by Nephrology.     Acute on chronic respiratory failure    Successfully extubated 2/2/2018.  Patient did well on BiPAP following extubation.  He has needed intermittent BiPAP.  Continue with bronchodilator breathing treatments as I suspect patient has a component of chronic obstructive pulmonary disease in light of his wheezing and significant tobacco smoking history.  Patient also still needs additional volume removal.     Acute renal failure    Secondary to sepsis and rhabdomyolysis.  Continue with renal replacement therapy.     Tobacco abuse    Patient counseled on the importance of smoking cessation.       VTE Risk Mitigation (From admission, onward)        Ordered     heparin (porcine) injection 5,000 Units  As needed (PRN)      01/31/19 1825     heparin (porcine) injection 2,000 Units  As needed (PRN)      01/30/19 1056     heparin (porcine) injection 5,000 Units  Every 8 hours      01/29/19 2044     heparin (porcine) injection 5,000 Units  As needed (PRN)      01/29/19 1335     IP VTE HIGH RISK PATIENT  Once      01/28/19 1947              Selwyn So MD  Department of Hospital Medicine   [facility]

## 2019-02-04 NOTE — PLAN OF CARE
Problem: Adult Inpatient Plan of Care  Goal: Plan of Care Review  Outcome: Ongoing (interventions implemented as appropriate)  Pt received on Bipap settings 16/10 @25% with SpO2% 98%. Titrate FiO2 per saturation to 21%. BBS diminished expiratory wheeze. Duoneb nebulizer and 3% saline nebulized, tolerated well. CPT with pneumatic percussor completed. Pt appears comfortable on bipap.

## 2019-02-04 NOTE — PROGRESS NOTES
Wound Care follow up for cellulitis to right lower leg. Assisted by RN, Vania. Pictures taken. Patient is no longer intubated, but with BiPap.    Blisters on patient's leg have continued to improve. A number of areas have open dry areas with skin lifting revealing intact, clear skin, especially on the ankle and medial right thigh. Heavy wrinkling on plantar surface of foot, indicating further lessening of edema. Mixture of dry, scaling skin with partially filled fluid blisters circumferentially on ankle. No more new developing areas of blistering.    Posterior calf has greatly improved with dark, dry skin lifting, revealing much healthier skin. Several areas of open blisters still in place with some clear yellow weeping of drainage. These areas continue to have Bacitracin applied by nursing staff. Overall, patient's leg has greatly improved. Lower leg is elevated on pillows. Patient's eyes were open today, with patient moving arms.    Jane Mitchell RN, Wound and Ostomy    Right ankle area      Right medial thigh      Right lower leg and foot      Right lateral leg with open blister, healing

## 2019-02-05 PROBLEM — J96.21 ACUTE ON CHRONIC RESPIRATORY FAILURE WITH HYPOXIA AND HYPERCAPNIA: Status: ACTIVE | Noted: 2019-01-28

## 2019-02-05 PROBLEM — I10 ESSENTIAL HYPERTENSION: Status: ACTIVE | Noted: 2019-02-05

## 2019-02-05 PROBLEM — J96.22 ACUTE ON CHRONIC RESPIRATORY FAILURE WITH HYPOXIA AND HYPERCAPNIA: Status: ACTIVE | Noted: 2019-01-28

## 2019-02-05 LAB
ANION GAP SERPL CALC-SCNC: 17 MMOL/L
BASOPHILS # BLD AUTO: 0.04 K/UL
BASOPHILS NFR BLD: 0.3 %
BUN SERPL-MCNC: 71 MG/DL
CALCIUM SERPL-MCNC: 9.3 MG/DL
CHLORIDE SERPL-SCNC: 100 MMOL/L
CO2 SERPL-SCNC: 21 MMOL/L
CREAT SERPL-MCNC: 7.9 MG/DL
DIFFERENTIAL METHOD: ABNORMAL
EOSINOPHIL # BLD AUTO: 0.1 K/UL
EOSINOPHIL NFR BLD: 0.3 %
ERYTHROCYTE [DISTWIDTH] IN BLOOD BY AUTOMATED COUNT: 14.7 %
EST. GFR  (AFRICAN AMERICAN): 8 ML/MIN/1.73 M^2
EST. GFR  (NON AFRICAN AMERICAN): 7 ML/MIN/1.73 M^2
GLUCOSE SERPL-MCNC: 82 MG/DL
HCT VFR BLD AUTO: 30.9 %
HGB BLD-MCNC: 10.1 G/DL
LYMPHOCYTES # BLD AUTO: 1.5 K/UL
LYMPHOCYTES NFR BLD: 10.3 %
MCH RBC QN AUTO: 28.2 PG
MCHC RBC AUTO-ENTMCNC: 32.7 G/DL
MCV RBC AUTO: 86 FL
MONOCYTES # BLD AUTO: 0.7 K/UL
MONOCYTES NFR BLD: 5 %
NEUTROPHILS # BLD AUTO: 11.9 K/UL
NEUTROPHILS NFR BLD: 81.8 %
PHOSPHATE SERPL-MCNC: 7.6 MG/DL
PLATELET # BLD AUTO: 397 K/UL
PMV BLD AUTO: 10.1 FL
POTASSIUM SERPL-SCNC: 4.5 MMOL/L
RBC # BLD AUTO: 3.58 M/UL
SODIUM SERPL-SCNC: 138 MMOL/L
VANCOMYCIN SERPL-MCNC: 15.2 UG/ML
WBC # BLD AUTO: 14.53 K/UL

## 2019-02-05 PROCEDURE — 25000242 PHARM REV CODE 250 ALT 637 W/ HCPCS: Performed by: STUDENT IN AN ORGANIZED HEALTH CARE EDUCATION/TRAINING PROGRAM

## 2019-02-05 PROCEDURE — 27000221 HC OXYGEN, UP TO 24 HOURS

## 2019-02-05 PROCEDURE — 94660 CPAP INITIATION&MGMT: CPT

## 2019-02-05 PROCEDURE — 84100 ASSAY OF PHOSPHORUS: CPT

## 2019-02-05 PROCEDURE — 63600175 PHARM REV CODE 636 W HCPCS: Performed by: STUDENT IN AN ORGANIZED HEALTH CARE EDUCATION/TRAINING PROGRAM

## 2019-02-05 PROCEDURE — 25000242 PHARM REV CODE 250 ALT 637 W/ HCPCS: Performed by: HOSPITALIST

## 2019-02-05 PROCEDURE — 80202 ASSAY OF VANCOMYCIN: CPT

## 2019-02-05 PROCEDURE — 63600175 PHARM REV CODE 636 W HCPCS: Performed by: HOSPITALIST

## 2019-02-05 PROCEDURE — 94761 N-INVAS EAR/PLS OXIMETRY MLT: CPT

## 2019-02-05 PROCEDURE — 25000003 PHARM REV CODE 250: Performed by: HOSPITALIST

## 2019-02-05 PROCEDURE — 92526 ORAL FUNCTION THERAPY: CPT

## 2019-02-05 PROCEDURE — 25000003 PHARM REV CODE 250: Performed by: NURSE PRACTITIONER

## 2019-02-05 PROCEDURE — 94668 MNPJ CHEST WALL SBSQ: CPT

## 2019-02-05 PROCEDURE — 94640 AIRWAY INHALATION TREATMENT: CPT

## 2019-02-05 PROCEDURE — 97802 MEDICAL NUTRITION INDIV IN: CPT

## 2019-02-05 PROCEDURE — 11000001 HC ACUTE MED/SURG PRIVATE ROOM

## 2019-02-05 PROCEDURE — 63600175 PHARM REV CODE 636 W HCPCS: Performed by: NURSE PRACTITIONER

## 2019-02-05 PROCEDURE — 80100014 HC HEMODIALYSIS 1:1

## 2019-02-05 PROCEDURE — 80048 BASIC METABOLIC PNL TOTAL CA: CPT

## 2019-02-05 PROCEDURE — 97110 THERAPEUTIC EXERCISES: CPT

## 2019-02-05 PROCEDURE — 85025 COMPLETE CBC W/AUTO DIFF WBC: CPT

## 2019-02-05 PROCEDURE — 63600175 PHARM REV CODE 636 W HCPCS: Performed by: CLINIC/CENTER

## 2019-02-05 PROCEDURE — 99233 SBSQ HOSP IP/OBS HIGH 50: CPT | Mod: ,,, | Performed by: HOSPITALIST

## 2019-02-05 PROCEDURE — 99900035 HC TECH TIME PER 15 MIN (STAT)

## 2019-02-05 PROCEDURE — 99233 PR SUBSEQUENT HOSPITAL CARE,LEVL III: ICD-10-PCS | Mod: ,,, | Performed by: HOSPITALIST

## 2019-02-05 RX ORDER — SEVELAMER CARBONATE FOR ORAL SUSPENSION 2400 MG/1
2.4 POWDER, FOR SUSPENSION ORAL 2 TIMES DAILY
Status: DISCONTINUED | OUTPATIENT
Start: 2019-02-05 | End: 2019-02-11

## 2019-02-05 RX ORDER — CARVEDILOL 12.5 MG/1
25 TABLET ORAL 2 TIMES DAILY WITH MEALS
Status: DISCONTINUED | OUTPATIENT
Start: 2019-02-05 | End: 2019-02-17

## 2019-02-05 RX ADMIN — SODIUM CHLORIDE SOLN NEBU 3% 4 ML: 3 NEBU SOLN at 07:02

## 2019-02-05 RX ADMIN — HYDRALAZINE HYDROCHLORIDE 10 MG: 20 INJECTION INTRAMUSCULAR; INTRAVENOUS at 03:02

## 2019-02-05 RX ADMIN — THIAMINE HYDROCHLORIDE 100 ML/HR: 100 INJECTION, SOLUTION INTRAMUSCULAR; INTRAVENOUS at 10:02

## 2019-02-05 RX ADMIN — HEPARIN SODIUM 5000 UNITS: 5000 INJECTION, SOLUTION INTRAVENOUS; SUBCUTANEOUS at 03:02

## 2019-02-05 RX ADMIN — SEVELAMER CARBONATE 2.4 G: 2400 POWDER, FOR SUSPENSION ORAL at 08:02

## 2019-02-05 RX ADMIN — IPRATROPIUM BROMIDE AND ALBUTEROL SULFATE 3 ML: .5; 3 SOLUTION RESPIRATORY (INHALATION) at 04:02

## 2019-02-05 RX ADMIN — IPRATROPIUM BROMIDE AND ALBUTEROL SULFATE 3 ML: .5; 3 SOLUTION RESPIRATORY (INHALATION) at 03:02

## 2019-02-05 RX ADMIN — CARVEDILOL 25 MG: 12.5 TABLET, FILM COATED ORAL at 04:02

## 2019-02-05 RX ADMIN — CEFEPIME HYDROCHLORIDE 1 G: 1 INJECTION, SOLUTION INTRAVENOUS at 04:02

## 2019-02-05 RX ADMIN — METOPROLOL TARTRATE 5 MG: 5 INJECTION, SOLUTION INTRAVENOUS at 12:02

## 2019-02-05 RX ADMIN — IPRATROPIUM BROMIDE AND ALBUTEROL SULFATE 3 ML: .5; 3 SOLUTION RESPIRATORY (INHALATION) at 07:02

## 2019-02-05 RX ADMIN — BACITRACIN: 500 OINTMENT TOPICAL at 10:02

## 2019-02-05 RX ADMIN — SEVELAMER CARBONATE 2.4 G: 2400 POWDER, FOR SUSPENSION ORAL at 03:02

## 2019-02-05 RX ADMIN — METOPROLOL TARTRATE 5 MG: 5 INJECTION, SOLUTION INTRAVENOUS at 05:02

## 2019-02-05 RX ADMIN — HEPARIN SODIUM 5000 UNITS: 5000 INJECTION, SOLUTION INTRAVENOUS; SUBCUTANEOUS at 10:02

## 2019-02-05 RX ADMIN — VANCOMYCIN HYDROCHLORIDE 750 MG: 750 INJECTION, POWDER, LYOPHILIZED, FOR SOLUTION INTRAVENOUS at 03:02

## 2019-02-05 RX ADMIN — HEPARIN SODIUM 5000 UNITS: 5000 INJECTION, SOLUTION INTRAVENOUS; SUBCUTANEOUS at 05:02

## 2019-02-05 RX ADMIN — BACITRACIN: 500 OINTMENT TOPICAL at 06:02

## 2019-02-05 RX ADMIN — IPRATROPIUM BROMIDE AND ALBUTEROL SULFATE 3 ML: .5; 3 SOLUTION RESPIRATORY (INHALATION) at 11:02

## 2019-02-05 RX ADMIN — METHYLPREDNISOLONE SODIUM SUCCINATE 40 MG: 40 INJECTION, POWDER, FOR SOLUTION INTRAMUSCULAR; INTRAVENOUS at 10:02

## 2019-02-05 NOTE — ASSESSMENT & PLAN NOTE
-Admitted to inpatient status in ICU upon transfer from [facility] with septic shock  -Possible component of pneumonia but clear cause of his infection is the severe cellulitis involving the right lower extremity.    -Ultrasound of right lower extremity did not reveal evidence of abscess or gas.    -General surgery consulted and recommending serial exams for now but no surgical intervention at this time.    -Cellulitis mildly improved.  WBC improving.  No longer on pressors.      -Bood cultures no growth to date.    -Continue to elevate right lower extremity as tolerated.    -Appreciate input from ID.  Per their recs will continue cefepime and vancomycin 7 more days (EOC 2/11)  -Continue with wound care.  -Transfer to the floor today.

## 2019-02-05 NOTE — PROGRESS NOTES
Wound Care follow up for cellulitis and blisters to right lower leg. Patient's leg has continued to improve, with less edema and no increase in additional blister development. Continue with present Wound Care orders.    Discussed patient at rounds today. Patient will be transferring to the floor later today.    Stephen Lara RN, Wound and Ostomy

## 2019-02-05 NOTE — SUBJECTIVE & OBJECTIVE
Interval History:  No acute events overnight.  Still confused at time and requiring nocturnal bipap.  BP elevated overnight.  States he is hungry.    Review of Systems   Constitutional: Negative for chills and fever.   HENT: Negative for congestion and dental problem.    Eyes: Negative for discharge and itching.   Respiratory: Positive for shortness of breath.    Cardiovascular: Negative for chest pain.   Gastrointestinal: Negative for abdominal pain, constipation, diarrhea, nausea and vomiting.   Endocrine: Negative for cold intolerance and heat intolerance.   Musculoskeletal: Negative for arthralgias and back pain.   Neurological: Negative for dizziness and facial asymmetry.   Hematological: Negative for adenopathy.   Psychiatric/Behavioral: Negative for agitation and behavioral problems.     Objective:     Vital Signs (Most Recent):  Temp: 98.5 °F (36.9 °C) (02/05/19 0330)  Pulse: 99 (02/05/19 0718)  Resp: (!) 25 (02/05/19 0718)  BP: (!) 174/79 (02/05/19 0500)  SpO2: 96 % (02/05/19 0718) Vital Signs (24h Range):  Temp:  [98.2 °F (36.8 °C)-98.7 °F (37.1 °C)] 98.5 °F (36.9 °C)  Pulse:  [] 99  Resp:  [19-34] 25  SpO2:  [86 %-99 %] 96 %  BP: (138-192)/(58-88) 174/79     Weight: (!) 178 kg (392 lb 6.7 oz)  Body mass index is 49.05 kg/m².    Intake/Output Summary (Last 24 hours) at 2/5/2019 0719  Last data filed at 2/5/2019 0700  Gross per 24 hour   Intake 1053.33 ml   Output 4568 ml   Net -3514.67 ml      Physical Exam   Constitutional: Vital signs are normal. He appears well-developed.  Non-toxic appearance. He does not have a sickly appearance. He does not appear ill. No distress.   Morbid obesity.   HENT:   Head: Normocephalic and atraumatic.   Right Ear: External ear normal.   Left Ear: External ear normal.   Eyes: Conjunctivae and EOM are normal. Pupils are equal, round, and reactive to light. No scleral icterus.   Neck: Normal range of motion. Neck supple. No JVD present. No tracheal deviation present.    Cardiovascular: Normal rate, regular rhythm, normal heart sounds and intact distal pulses. Exam reveals no gallop and no friction rub.   No murmur heard.  Pulmonary/Chest: Effort normal and breath sounds normal. No stridor. No respiratory distress. He has no wheezes. He has no rales.   Distant lung sounds.  Moving air well.  Some crackles.   Abdominal: Soft. Bowel sounds are normal. He exhibits no distension and no mass. There is no tenderness. There is no guarding.   Musculoskeletal: Normal range of motion. He exhibits no edema or deformity.   Neurological: He is alert. He exhibits normal muscle tone.   Oriented to self place but not year.  Patient otherwise following commands answering questions appropriately.   Skin: Skin is warm and dry. Rash noted. He is not diaphoretic. There is erythema.   Right lower extremity is erythematous, with blistering and edema.  Area of erythema has not extended beyond pen marking. Less edematous following renal replacement therapy.   Psychiatric:   Confused at times and at risk for pulling at dialysis catheter.  Presently in soft wrist restraints.   Nursing note and vitals reviewed.      Significant Labs: All pertinent labs within the past 24 hours have been reviewed.    Significant Imaging: I have reviewed all pertinent imaging results/findings within the past 24 hours.

## 2019-02-05 NOTE — PT/OT/SLP PROGRESS
Physical Therapy Treatment    Patient Name:  Jones Red   MRN:  [id number]    Recommendations:     Discharge Recommendations:  (TBD pending progress)   Discharge Equipment Recommendations: (TBD)   Barriers to discharge: Increased caregiver burden    Assessment:     Jones Red is a 53 y.o. male admitted with a medical diagnosis of Septic shock.  He presents with the following impairments/functional limitations:  weakness, impaired functional mobilty, impaired cognition, decreased safety awareness, impaired coordination, decreased coordination, impaired balance, decreased ROM, decreased lower extremity function, edema, gait instability.    Patient with minimal to no progress towards PT goals. Patient disoriented and confused throughout entire session, speaking in incoherent sentences and unable to state name or . Session limited to bed mobility and therapeutic exercises 2/2 safety concerns as patient is unable to follow commands and found with soft  Wrist restraints. Patient able to perform ankle pumps 20x on each LE when provided with verbal/tactile cues. Patient required mod/max A at each respective LE while performing heel slides 10x on each LE. Patient tolerated bicep curls 20x on each UE when provided with tactile cues. Patient is unsafe to perform OOB mobility at this time 2/2 increased risk of fall and poor safety awaress/cognitive status. D/C rec pending progress.     Plan to co-treat with OT next session to optimize safety and mobility    Rehab Prognosis: Fair; patient would benefit from acute skilled PT services to address these deficits and reach maximum level of function.    Recent Surgery: * No surgery found *      Plan:     During this hospitalization, patient to be seen 5 x/week to address the identified rehab impairments via gait training, therapeutic activities, therapeutic exercises and progress toward the following goals:    · Plan of Care Expires:  19    Subjective     Chief Complaint:  "Incomprehensible speech throughout session; unable to articulate any discomfort  Patient/Family Comments/goals: "Where is my 's license?" Asking about wife. Other comments unclear to PT.    Pain/Comfort:  Pain Rating 1: other (see comments)(Not able to quantify pain)  Pain Rating Post-Intervention 1: other (see comments)(Not able to quantify pain)      Objective:     Communicated with RN prior to session.  Patient found all lines intact, call button in reach, RN notified and wrist restraints donned blood pressure cuff, central line, mosley catheter, bowel management system, peripheral IV, telemetry, pulse ox (continuous)  upon PT entry to room.     General Precautions: Standard, fall, aspiration   Orthopedic Precautions:N/A     Functional Mobility:  · Bed Mobility: unable to follow commands at this time -- dependent for all bed mobility     AM-PAC 6 CLICK MOBILITY  Turning over in bed (including adjusting bedclothes, sheets and blankets)?: 2  Sitting down on and standing up from a chair with arms (e.g., wheelchair, bedside commode, etc.): 1  Moving from lying on back to sitting on the side of the bed?: 1  Moving to and from a bed to a chair (including a wheelchair)?: 1  Need to walk in hospital room?: 1  Climbing 3-5 steps with a railing?: 1  Basic Mobility Total Score: 7       Therapeutic Activities and Exercises:  · Tolerated ankle pumps 20x on each LE with verbal/tactile cues  · 10x heel slides/knee flx.ext ROM with Mod A/Max A  · Max A to initiate movement  · Mod A to continue movement   · 20x bicep curls on each UE with tactile cues, CGA    Patient left supine with all lines intact, call button in reach, restraints reapplied at end of session and RN notified..    GOALS:   Multidisciplinary Problems     Physical Therapy Goals        Problem: Physical Therapy Goal    Goal Priority Disciplines Outcome Goal Variances Interventions   Physical Therapy Goal     PT, PT/OT Ongoing (interventions implemented as " appropriate)     Description:  Goals to be met by: 3/3/19    Patient will increase functional independence with mobility by performin. Supine to sit with min A.  2. Sit to supine with min A.   3. Rolling R and L with min A.   4. Sitting at edge of bed >5 minutes with min A.   5. PT will assess sit<>stand.                       Time Tracking:     PT Received On: 19  PT Start Time: 937     PT Stop Time: 955  PT Total Time (min): 18 min     Billable Minutes: Therapeutic Exercise 18    Treatment Type: Treatment  PT/PTA: PT     PTA Visit Number: 0     Sho Conde, PT  2019

## 2019-02-05 NOTE — ASSESSMENT & PLAN NOTE
-At home takes coreg, losartan, hctz, metolazone and bumex  -BP now elevated again  -Continue scheduled metoprolol and PRN hydralazine  -When taking PO will resume home coreg and likely start po hydralazine.

## 2019-02-05 NOTE — PROGRESS NOTES
"Nephrology  Progress Note    Admit Date: 1/28/2019   LOS: 8 days     SUBJECTIVE:     Follow-up For:  Septic shock/ATN    Interval History:    Uneventful night.  Remains confused and altered.  Discussed with treatment team.  Awaiting daily dialysis but issues with plumbing in room will probably dialyze on floor given he's being transferred.  Patient in restraints but currently working with PT.    Review of Systems:    Unable to obtain due to garbled speech    OBJECTIVE:     Vital Signs Range (Last 24H):  BP (!) 158/77   Pulse 99   Temp 98.9 °F (37.2 °C) (Axillary)   Resp (!) 25   Ht 6' 3" (1.905 m)   Wt (!) 178 kg (392 lb 6.7 oz)   SpO2 96%   BMI 49.05 kg/m²     Temp:  [98.2 °F (36.8 °C)-98.9 °F (37.2 °C)]   Pulse:  []   Resp:  [16-34]   BP: (139-192)/(58-88)   SpO2:  [86 %-99 %]     I & O (Last 24H):    Intake/Output Summary (Last 24 hours) at 2/5/2019 0852  Last data filed at 2/5/2019 0700  Gross per 24 hour   Intake 1053.33 ml   Output 4596 ml   Net -3542.67 ml       Physical Exam:  General appearance:  Disheveled and morbidly obese.  Confused   Eyes:  Conjunctivae/corneas clear. PERRL.  Lungs: rhonchi throughout but improved  Heart: Regular rate and rhythm, distant heart tones.  Abdomen: Soft, non-tender non-distended; bowel sounds normal; no masses,  no organomegaly, morbidly obese  Extremities:  Chronic Woody edema.   with blisters bilateral lower extremities worse on the right  Skin:  Right lower extremity cellulitis  Abdalla catheterization  Right IJ Trialysis       Laboratory Data:  Recent Labs   Lab 02/05/19  0540   WBC 14.53*   RBC 3.58*   HGB 10.1*   HCT 30.9*   *   MCV 86   MCH 28.2   MCHC 32.7       BMP:   Recent Labs   Lab 01/29/19  2230  02/05/19  0540      < > 82   *   < > 138   K 4.3   < > 4.5   CL 95   < > 100   CO2 17*   < > 21*   BUN 85*   < > 71*   CREATININE 9.0*   < > 7.9*   CALCIUM 7.6*   < > 9.3   MG 2.0  --   --     < > = values in this interval not displayed. "     Lab Results   Component Value Date    CALCIUM 9.3 02/05/2019    PHOS 7.6 (H) 02/05/2019       Lab Results   Component Value Date    .4 (H) 01/30/2019    CALCIUM 9.3 02/05/2019    PHOS 7.6 (H) 02/05/2019       No results found for: URICACID    BNP  No results for input(s): BNP, BNPTRIAGEBLO in the last 168 hours.    Medications:  Medication list was reviewed and changes noted under Assessment/Plan.    Diagnostic Results:    X-Ray Chest AP Portable   Final Result      NG tube in place.         Electronically signed by: Viviane Persaud MD   Date:    02/02/2019   Time:    23:59      X-Ray Chest 1 View   Final Result      As above.         Electronically signed by: Keon Jonas MD   Date:    01/31/2019   Time:    19:14      X-Ray Chest AP Portable   Final Result      Lines and tubes are grossly unchanged.      Interval improvement in aeration of the lungs.         Electronically signed by: Connor Babcock MD   Date:    01/31/2019   Time:    16:42      X-Ray Chest 1 View   Final Result      Overall no significant interval change         Electronically signed by: Connor Babcock MD   Date:    01/30/2019   Time:    08:24      US Extremity Non Vascular Limited Right   Final Result      Nonspecific subcutaneous edema, cutaneous thickening, could represent cellulitis changes.  No definite necrotizing fasciitis, gas or abscess.         Electronically signed by: Tin Leone MD   Date:    01/29/2019   Time:    13:09      X-Ray Chest AP Portable   Final Result   Addendum 1 of 1      Enteric tube projects in unchanged radiographic position with tip coursing    below the diaphragm and appearing to extend beyond the field of view.         Electronically signed by: Trena Sena MD   Date:    01/29/2019   Time:    00:39      Final      As above.         Electronically signed by: Trena Sena MD   Date:    01/29/2019   Time:    00:04      US Lower Extremity Veins Right   Final Result      Limited examination.  No  evidence of deep venous thrombosis in the right lower extremity.         Electronically signed by: Vanita Oviedo   Date:    01/28/2019   Time:    21:10      X-Ray Chest AP Portable   Final Result      Cardiomegaly with findings suggesting pulmonary edema, correlation advised.  Differential would include infection.         Electronically signed by: Kalyan Galvez MD   Date:    01/28/2019   Time:    20:19          ASSESSMENT/PLAN:     1. Persistent Multifactorial anuric acute renal failure secondary to ATN from septic shock, rhabdomyolysis, with hyponatremia, and hyperkalemia (N17.0, N17.9, E87.5, E87.1, M62.82, A41.9):  Transferred from outlying facility for CRRT and has transitioned to standard hemodialysis.   Some issues with clotting of dialysis catheter.  Continue daily HD/UF until signs of renal recovery.  Timeframe unknown at this time and may require tunneled line with outpt HD.  Will assess daily.  Renally dose meds, avoid nephrotoxins, and monitor I/O's closely.  2. Hyperphosphatemia.  Binders when eating.  HD.   3. Secondary hyperparathyroidism due to acute kidney injury.  Calcium within normal limits.  No need to treat at this point.  4. Septic shock and resp failure:  Defer to CCT.    5. RLE Cellulitis/woody edema (L03.115): Defer to wound care and ID.  6. Morbid obesity (E44):  Severe morbid obesity the root of most of this patients' medical issues.  Defer.

## 2019-02-05 NOTE — PLAN OF CARE
Problem: Adult Inpatient Plan of Care  Goal: Plan of Care Review  Outcome: Ongoing (interventions implemented as appropriate)  Patient remains confused. Elevated HR and RR noted and reported to EICU. Pt compliant with Bipap throughout the night. PRN BP medications given. Turned q 2 hours with safety rounds. Safety maintained.

## 2019-02-05 NOTE — PROGRESS NOTES
[facility] Hospital Medicine  Progress Note    Patient Name: Jones Red  MRN: [id number]  Patient Class: IP- Inpatient   Admission Date: 1/28/2019  Length of Stay: 8 days  Attending Physician: Jones Milner MD  Primary Care Provider: Primary Doctor No        Subjective:     Principal Problem:Septic shock    HPI:  Mr. Jones Red is a 53 y.o. male, with PMH of HTN, COPD, chronic respiratory failure (on home O2), Diastolic CHF, YLNDON, chronic venous insufficiency, and questionable seizures history (suspected to be 2/2 alcohol withdrawal), who preset tia to [facility] on 1/22/19 2/2 SOB. This was associated with cough productive of brown sputum. He was started in BiPap, but did ultimately degrade, and was intubated, and maintained on a ventilator. He was additionally altered upon arrival to the ED. History was reportedly obtained by a friend, and it was reported that he was found down at home, and was unable to get up. He as admitted to the ICU, started on IV fluids and pressors.     Hospital Course:  Patient 53-year-old gentleman with history of hypertension, diastolic heart failure, chronic obstructive pulmonary disease with chronic hypoxic respiratory failure on home oxygen, prior alcohol abuse, significant ongoing tobacco use (3 packs per day), and morbid obesity with septic shock secondary to right lower extremity cellulitis complicating chronic venous stasis.  Patient intubated at outside hospital after failing a trial of BiPAP.  Patient also with evidence of worsening renal failure with rhabdomyolysis.       Patient transferred from [facility] in [place], [place] to [facility] on 1/28/2019.  Patient started on renal replacement therapy.  Patient remains intubated and sedated but responds to stimuli when sedation held but otherwise confused.  Ultrasound of the right lower extremity did not reveal evidence of abscess, subcutaneous gas, or evidence of  necrotizing fascitits.  General surgery and wound care consulted.  No surgical intervention recommended at this time.  Patient on broad spectrum antibiotics.    Patient clinically improved in terms his volume status and was successfully extubated on 2/2/2019.    Interval History:  No acute events overnight.  Still confused at time and requiring nocturnal bipap.  BP elevated overnight.  States he is hungry.    Review of Systems   Constitutional: Negative for chills and fever.   HENT: Negative for congestion and dental problem.    Eyes: Negative for discharge and itching.   Respiratory: Positive for shortness of breath.    Cardiovascular: Negative for chest pain.   Gastrointestinal: Negative for abdominal pain, constipation, diarrhea, nausea and vomiting.   Endocrine: Negative for cold intolerance and heat intolerance.   Musculoskeletal: Negative for arthralgias and back pain.   Neurological: Negative for dizziness and facial asymmetry.   Hematological: Negative for adenopathy.   Psychiatric/Behavioral: Negative for agitation and behavioral problems.     Objective:     Vital Signs (Most Recent):  Temp: 98.5 °F (36.9 °C) (02/05/19 0330)  Pulse: 99 (02/05/19 0718)  Resp: (!) 25 (02/05/19 0718)  BP: (!) 174/79 (02/05/19 0500)  SpO2: 96 % (02/05/19 0718) Vital Signs (24h Range):  Temp:  [98.2 °F (36.8 °C)-98.7 °F (37.1 °C)] 98.5 °F (36.9 °C)  Pulse:  [] 99  Resp:  [19-34] 25  SpO2:  [86 %-99 %] 96 %  BP: (138-192)/(58-88) 174/79     Weight: (!) 178 kg (392 lb 6.7 oz)  Body mass index is 49.05 kg/m².    Intake/Output Summary (Last 24 hours) at 2/5/2019 0719  Last data filed at 2/5/2019 0700  Gross per 24 hour   Intake 1053.33 ml   Output 4568 ml   Net -3514.67 ml      Physical Exam   Constitutional: Vital signs are normal. He appears well-developed.  Non-toxic appearance. He does not have a sickly appearance. He does not appear ill. No distress.   Morbid obesity.   HENT:   Head: Normocephalic and atraumatic.   Right  Ear: External ear normal.   Left Ear: External ear normal.   Eyes: Conjunctivae and EOM are normal. Pupils are equal, round, and reactive to light. No scleral icterus.   Neck: Normal range of motion. Neck supple. No JVD present. No tracheal deviation present.   Cardiovascular: Normal rate, regular rhythm, normal heart sounds and intact distal pulses. Exam reveals no gallop and no friction rub.   No murmur heard.  Pulmonary/Chest: Effort normal and breath sounds normal. No stridor. No respiratory distress. He has no wheezes. He has no rales.   Distant lung sounds.  Moving air well.  Some crackles.   Abdominal: Soft. Bowel sounds are normal. He exhibits no distension and no mass. There is no tenderness. There is no guarding.   Musculoskeletal: Normal range of motion. He exhibits no edema or deformity.   Neurological: He is alert. He exhibits normal muscle tone.   Oriented to self place but not year.  Patient otherwise following commands answering questions appropriately.   Skin: Skin is warm and dry. Rash noted. He is not diaphoretic. There is erythema.   Right lower extremity is erythematous, with blistering and edema.  Area of erythema has not extended beyond pen marking. Less edematous following renal replacement therapy.   Psychiatric:   Confused at times and at risk for pulling at dialysis catheter.  Presently in soft wrist restraints.   Nursing note and vitals reviewed.      Significant Labs: All pertinent labs within the past 24 hours have been reviewed.    Significant Imaging: I have reviewed all pertinent imaging results/findings within the past 24 hours.    Assessment/Plan:      * Septic shock    -Admitted to inpatient status in ICU upon transfer from [place] with septic shock  -Possible component of pneumonia but clear cause of his infection is the severe cellulitis involving the right lower extremity.    -Ultrasound of right lower extremity did not reveal evidence of abscess or gas.    -General  surgery consulted and recommending serial exams for now but no surgical intervention at this time.    -Cellulitis mildly improved.  WBC improving.  No longer on pressors.      -Bood cultures no growth to date.    -Continue to elevate right lower extremity as tolerated.    -Appreciate input from ID.  Per their recs will continue cefepime and vancomycin 7 more days (EOC 2/11)  -Continue with wound care.  -Transfer to the floor today.     Essential hypertension    -At home takes coreg, losartan, hctz, metolazone and bumex  -BP now elevated again  -Continue scheduled metoprolol and PRN hydralazine  -When taking PO will resume home coreg and likely start po hydralazine.       Tobacco abuse    -Patient counseled on the importance of smoking cessation.  -NRT offered     Acute on chronic respiratory failure with hypoxia and hypercapnia    -Successfully extubated 2/2/2018.  Patient did well on BiPAP following extubation.  He has needed intermittent BiPAP.    -Continue with bronchodilator treatments as I suspect patient has COPD given wheezing and significant tobacco smoking history.    -Continue volume removal with CRRT  -continue supplemental O2 requirements for goal sat 88-92%  -Continue steroids x 3 more days.  When able to take PO will change to prednisone Prednisone 50 mg.  -Will need outpatient PFTs and probable LAMA/LABA. When patient is being discharged will start him on Spiriva.         Acute on chronic diastolic heart failure    -Marked improvement with dialysis    -Continue with additional volume removal with renal replacement therapy as recommended by Nephrology.  -When taking oral reliably will need to start toprol.  No ARB due to renal dysfunction.     Cellulitis of right lower extremity    -As above for septic shock     Acute renal failure    -Due to ATN from sepsis with shock and rhabdomyolysis.    -Continues to have elevated anion gap.  -Continue with dialysis per renal  -Appreciate input from nephrology.        VTE Risk Mitigation (From admission, onward)        Ordered     heparin (porcine) injection 5,000 Units  As needed (PRN)      01/31/19 1825     heparin (porcine) injection 2,000 Units  As needed (PRN)      01/30/19 1056     heparin (porcine) injection 5,000 Units  Every 8 hours      01/29/19 2044     heparin (porcine) injection 5,000 Units  As needed (PRN)      01/29/19 1335     IP VTE HIGH RISK PATIENT  Once      01/28/19 1947              Jones Milner MD  Department of Hospital Medicine   [facility]

## 2019-02-05 NOTE — PLAN OF CARE
Problem: Noninvasive Ventilation Acute  Goal: Effective Unassisted Ventilation and Oxygenation  Outcome: Ongoing (interventions implemented as appropriate)  Patient received on Bipap settings as documented.  Sats 93 to 96%. Txs given, cpt done. Passed along message that Dr. Bullock wants patient to have an extra large mask. Pt. in no distress, will continue to monitor.

## 2019-02-05 NOTE — PLAN OF CARE
Problem: Adult Inpatient Plan of Care  Goal: Plan of Care Review  Recommendation:   1. Advance diet as tolerated to renal with texture per SLP   2. Consider restarting enteral nutrition if pt unable to pass swallow evaluation in 48h      Goals:   1. Meet >75% EEN and EPN within 48h   2. Promote nutrition related labs wnl

## 2019-02-05 NOTE — ASSESSMENT & PLAN NOTE
-Marked improvement with dialysis    -Continue with additional volume removal with renal replacement therapy as recommended by Nephrology.  -When taking oral reliably will need to start toprol.  No ARB due to renal dysfunction.

## 2019-02-05 NOTE — ASSESSMENT & PLAN NOTE
-Successfully extubated 2/2/2018.  Patient did well on BiPAP following extubation.  He has needed intermittent BiPAP.    -Continue with bronchodilator treatments as I suspect patient has COPD given wheezing and significant tobacco smoking history.    -Continue volume removal with CRRT  -continue supplemental O2 requirements for goal sat 88-92%  -Continue steroids x 3 more days.  When able to take PO will change to prednisone Prednisone 50 mg.  -Will need outpatient PFTs and probable LAMA/LABA. When patient is being discharged will start him on Spiriva.

## 2019-02-05 NOTE — NURSING
Received patient from ICU via bed with ICU nurse and security. Met pt at bedside in dialysis. Disorientedx4. No acute distress noted. Telemetry monitor placed on patient. Will continue to monitor.

## 2019-02-05 NOTE — PT/OT/SLP PROGRESS
"Speech Language Pathology Treatment    Patient Name:  Jones Red   MRN:  [id number]  Admitting Diagnosis: Septic shock    Recommendations:                 General Recommendations:                1. Speech to follow 5-6x/week for remediation of oral and pharyngeal dysphagia, cognitive communication deficits  Diet recommendations:   ,  1.  Clears liquids and ice chips ; speech attempting to offer purees and solids. Due to confusional state, pt declining trials of purees and solids  2. Speech to continue to evaluate for ability to advance diet     Aspiration Precautions:   1. Thin, clear liquids in small single sips via cup or straw  2. No rapid successive sips of liquids  3. No large volume sips  4. Upright for all oral intake  5. Feed only when awake and alert  6. Defer all oral intake if RR is elevated and if he is desating      Subjective     Pt off bipap, awake and sitting up in bed. NG tube removed    RN reporting BIPAP on standby due to instances of SOB.    Pain/Comfort:    pt starting "No. You in pain?" in response to pain questionnaire    Objective:     Has the patient been evaluated by SLP for swallowing?   Yes  Keep patient NPO? No   Current Respiratory Status: nasal cannula, bi-pap((on stand by))  RR ranging from 22-36, SOB and increased WOB noted.    COGNITIVE STATUS: Awake. Confusional state noted. Pt perseverative on requesting apple juice. When offered juice he states he "already had some." Oriented to place. Not oriented to date, time or reason for hospitalization. Inconsistently able to follow simple commands or answer basic yes/no question. Verbal expression is fluent, however, characterized by unintelligible utterances with occasional clear phrases and sentences.    MOTOR SPEECH: Pt is approx 25% intelligible at the conversational level. Severely dcr self monitoring of rate and clarity. Pt with dcr awareness of listener comprehension and does not slow rate or attempt to repair communication " "breakdown, despite max verbal cues.    SWALLOWING: Pt agreeable to sips of water x2 only this session. Declining all trials of  purees and solids stating "no, you eat that. " Or "I already ate dinner."  Perseverative on wanting juice, then refusing when juice offerd.  Mildly agitated repeated attempts or being offered a variety of solids (decline ember crackers, turkey sandwhich." Unable to state items that he would be willing to eat.    Assessment:     Jones Red is a 53 y.o. male with an SLP diagnosis of oral and pharyngeal  Dysphagia, Dysarthria and Cognitive-Linguistic Impairment.     Goals:   Multidisciplinary Problems     SLP Goals        Problem: SLP Goal    Goal Priority Disciplines Outcome   SLP Goal     SLP Ongoing (interventions implemented as appropriate)   Description:  1. Pt will be able to follow simple 1 step commands 75% of time with moderate verbal and visual cues.  2. Increase speech intelligibility to 75% at the simple sentence level with moderate verbal cues to slow rate, raise volume.  3. Pt will be able to consume thin liquids in small single sips via cup or straw, with no signs of airway threat or aspiration given max assistance.  4. Pt will be able to advance to purees and solid consistencies with no signs of airway threat or aspiration given max assistance  5. Increase orientation to month, date, day of week, place and reason for hospitalization with 75% acc given max verbal and written cues.  6. Increase ability to express basic wants and needs 75% of time given min assistance                    Plan:     · Patient to be seen:  5 x/week, 6 x/week   · Plan of Care expires:  02/28/19  · Plan of Care reviewed with:  patient(RN)   · SLP Follow-Up:  Yes       Discharge recommendations:          Time Tracking:     SLP Treatment Date:   02/05/19  Speech Start Time:  1000  Speech Stop Time:  1029     Speech Total Time (min):  29 min    Billable Minutes: Treatment Swallowing Dysfunction 29 " ebony Camarena, CCC-SLP  02/05/2019

## 2019-02-05 NOTE — ASSESSMENT & PLAN NOTE
-Due to ATN from sepsis with shock and rhabdomyolysis.    -Continues to have elevated anion gap.  -Continue with dialysis per renal  -Appreciate input from nephrology.

## 2019-02-05 NOTE — PLAN OF CARE
Problem: SLP Goal  Goal: SLP Goal  1. Pt will be able to follow simple 1 step commands 75% of time with moderate verbal and visual cues.  2. Increase speech intelligibility to 75% at the simple sentence level with moderate verbal cues to slow rate, raise volume.  3. Pt will be able to consume thin liquids in small single sips via cup or straw, with no signs of airway threat or aspiration given max assistance.  4. Pt will be able to advance to purees and solid consistencies with no signs of airway threat or aspiration given max assistance  5. Increase orientation to month, date, day of week, place and reason for hospitalization with 75% acc given max verbal and written cues.  6. Increase ability to express basic wants and needs 75% of time given min assistance   Outcome: Ongoing (interventions implemented as appropriate)  Pt seen for ongoing evaluation and treatment of dysphagia, attempt to upgrade diet    Comments: Speech to follow 5-6x/week

## 2019-02-05 NOTE — PROGRESS NOTES
"[facility]  Adult Nutrition  Progress Note    SUMMARY       Recommendations    Recommendation:   1. Advance diet as tolerated to renal with texture per SLP   2. Consider restarting enteral nutrition if pt unable to pass swallow evaluation in 48h     Goals:   1. Meet >75% EEN and EPN within 48h   2. Promote nutrition related labs wnl    Nutrition Goal Status: new  Communication of YESENIA Recs: discussed on rounds    Reason for Assessment    Reason For Assessment: new tube feeding  Diagnosis: infection/sepsis(Septic shock/ATN)  Relevant Medical History: HTN, COPD, Chronic respiratory failure (on home O2), Diastolic CHF, LYNDON, chronic venous insufficiency, and questionable seizures history (suspected to be 2/2 alcohol withdrawal)  Interdisciplinary Rounds: attended    General Information Comments: Pt in dialysis, remote assessment completed. Pt was extubated 2/2. Has been on clear liquid diet and TF at low rate. Per SLP, pt is declining trials of purees and solids at this time. NGT removed today. Elevated phos noted, pt on sevelamer. No weight history in chart. LBM yesterday. Will perform NFPE and attempt to obtain additional information at follow-up.    Nutrition Discharge Planning: pending medical course    Nutrition Risk Screen    Nutrition Risk Screen: no indicators present    Nutrition/Diet History    Spiritual, Cultural Beliefs, Anglican Practices, Values that Affect Care: no  Factors Affecting Nutritional Intake: impaired cognitive status/motor control, clear liquid diet    Anthropometrics    Temp: 98.5 °F (36.9 °C)  Height Method: Estimated  Height: 6' 3" (190.5 cm)  Height (inches): 75 in  Weight Method: Bed Scale  Weight: (!) 178 kg (392 lb 6.7 oz)  Weight (lb): (!) 392.42 lb  Ideal Body Weight (IBW), Male: 196 lb  % Ideal Body Weight, Male (lb): 200.21 lb  BMI (Calculated): 49.2  BMI Grade: greater than 40 - morbid obesity     Lab/Procedures/Meds    Pertinent Labs: Reviewed  Lab Results "   Component Value Date     02/05/2019    K 4.5 02/05/2019     02/05/2019    CO2 21 (L) 02/05/2019    BUN 71 (H) 02/05/2019    CREATININE 7.9 (H) 02/05/2019    CALCIUM 9.3 02/05/2019    PHOS 7.6 (H) 02/05/2019    ESTGFRAFRICA 8 (A) 02/05/2019    EGFRNONAA 7 (A) 02/05/2019     Pertinent Meds: Reviewed  Scheduled Meds:   cefepime in dextrose 5 %  1 g Intravenous Q24H    methylPREDNISolone sodium succinate  40 mg Intravenous Daily    sevelamer carbonate  2.4 g Oral BID    Banana bag  100 mL/hr Intravenous Daily    sodium chloride 3%  4 mL Nebulization BID    vancomycin (VANCOCIN) IVPB  750 mg Intravenous Once     Estimated/Assessed Needs    Weight Used For Calorie Calculations: (!) 178 kg (392 lb 6.7 oz)  Energy Calorie Requirements (kcal): 2720  Energy Need Method: [place]- Mikeor  Protein Requirements: 143-178 gm/d (0.8-1 gm/kg)  Weight Used For Protein Calculations: (!) 178 kg (392 lb 6.7 oz)  Fluid Requirements (mL): 2720(or per team)  Estimated Fluid Requirement Method: RDA Method  RDA Method (mL): 2720    Nutrition Prescription Ordered    Current Diet Order: clear liquid  Current Nutrition Support Formula Ordered: Novasource Renal  Current Nutrition Support Rate Ordered: 10 (ml)  Current Nutrition Support Frequency Ordered: ml/hr x 24h    Evaluation of Received Nutrient/Fluid Intake    % Intake of Estimated Energy Needs: 0 - 25 %  % Meal Intake: 0 - 25 %    Nutrition Risk    Level of Risk/Frequency of Follow-up: high     Assessment and Plan    Nutrition Problem  inadequate oral intake  Related to (etiology):   Altered cognitive function  Signs and Symptoms (as evidenced by):   Pt declining trials of purees and solids at his time; on clear liquid diet   Interventions (treatment strategy):  Collaboration with other providers  Nutrition Diagnosis Status:   New    Monitor and Evaluation    Food and Nutrient Intake: energy intake, food and beverage intake, enteral nutrition intake  Food and Nutrient  Adminstration: diet order, enteral and parenteral nutrition administration  Physical Activity and Function: nutrition-related ADLs and IADLs  Anthropometric Measurements: weight, weight change  Biochemical Data, Medical Tests and Procedures: electrolyte and renal panel, gastrointestinal profile, glucose/endocrine profile, inflammatory profile, lipid profile  Nutrition-Focused Physical Findings: overall appearance, extremities, muscles and bones, skin     Nutrition Follow-Up    RD Follow-up?: Yes    Patience High, MS, RD, LDN   Dietitian, [facility]  [phone]

## 2019-02-05 NOTE — PLAN OF CARE
Problem: Physical Therapy Goal  Goal: Physical Therapy Goal  Goals to be met by: 3/3/19    Patient will increase functional independence with mobility by performin. Supine to sit with min A.  2. Sit to supine with min A.   3. Rolling R and L with min A.   4. Sitting at edge of bed >5 minutes with min A.   5. PT will assess sit<>stand.      Outcome: Ongoing (interventions implemented as appropriate)  Patient with minimal to no progress towards PT goals. Patient disoriented and confused throughout entire session, speaking in incoherent sentences and unable to state name or . Session limited to bed mobility and therapeutic exercises 2/2 safety concerns as patient is unable to follow commands and found with soft  Wrist restraints. Patient able to perform ankle pumps 20x on each LE when provided with verbal/tactile cues. Patient required mod/max A at each respective LE while performing heel slides 10x on each LE. Patient tolerated bicep curls 20x on each UE when provided with tactile cues. Patient is unsafe to perform OOB mobility at this time 2/2 increased risk of fall and poor safety awaress/cognitive status. D/C rec pending progress.

## 2019-02-06 LAB
ANION GAP SERPL CALC-SCNC: 17 MMOL/L
BACTERIA BLD CULT: NORMAL
BASOPHILS # BLD AUTO: 0.05 K/UL
BASOPHILS NFR BLD: 0.4 %
BUN SERPL-MCNC: 95 MG/DL
CALCIUM SERPL-MCNC: 9.2 MG/DL
CHLORIDE SERPL-SCNC: 101 MMOL/L
CO2 SERPL-SCNC: 21 MMOL/L
CREAT SERPL-MCNC: 9.5 MG/DL
DIFFERENTIAL METHOD: ABNORMAL
EOSINOPHIL # BLD AUTO: 0.1 K/UL
EOSINOPHIL NFR BLD: 0.7 %
ERYTHROCYTE [DISTWIDTH] IN BLOOD BY AUTOMATED COUNT: 14.7 %
EST. GFR  (AFRICAN AMERICAN): 7 ML/MIN/1.73 M^2
EST. GFR  (NON AFRICAN AMERICAN): 6 ML/MIN/1.73 M^2
GLUCOSE SERPL-MCNC: 82 MG/DL
HCT VFR BLD AUTO: 28.1 %
HGB BLD-MCNC: 9.2 G/DL
LYMPHOCYTES # BLD AUTO: 1.4 K/UL
LYMPHOCYTES NFR BLD: 11.5 %
MAGNESIUM SERPL-MCNC: 2.8 MG/DL
MCH RBC QN AUTO: 28.4 PG
MCHC RBC AUTO-ENTMCNC: 32.7 G/DL
MCV RBC AUTO: 87 FL
MONOCYTES # BLD AUTO: 1.1 K/UL
MONOCYTES NFR BLD: 8.8 %
NEUTROPHILS # BLD AUTO: 9.3 K/UL
NEUTROPHILS NFR BLD: 77.4 %
PHOSPHATE SERPL-MCNC: 9.3 MG/DL
PLATELET # BLD AUTO: 425 K/UL
PMV BLD AUTO: 9.8 FL
POTASSIUM SERPL-SCNC: 4.5 MMOL/L
RBC # BLD AUTO: 3.24 M/UL
SODIUM SERPL-SCNC: 139 MMOL/L
VANCOMYCIN SERPL-MCNC: 21.3 UG/ML
WBC # BLD AUTO: 12.05 K/UL

## 2019-02-06 PROCEDURE — 80048 BASIC METABOLIC PNL TOTAL CA: CPT

## 2019-02-06 PROCEDURE — 99233 SBSQ HOSP IP/OBS HIGH 50: CPT | Mod: ,,, | Performed by: HOSPITALIST

## 2019-02-06 PROCEDURE — 63600175 PHARM REV CODE 636 W HCPCS: Performed by: NURSE PRACTITIONER

## 2019-02-06 PROCEDURE — 36558 INSERT TUNNELED CV CATH: CPT | Performed by: INTERNAL MEDICINE

## 2019-02-06 PROCEDURE — 63600175 PHARM REV CODE 636 W HCPCS: Performed by: INTERNAL MEDICINE

## 2019-02-06 PROCEDURE — 25000003 PHARM REV CODE 250: Performed by: INTERNAL MEDICINE

## 2019-02-06 PROCEDURE — 94761 N-INVAS EAR/PLS OXIMETRY MLT: CPT

## 2019-02-06 PROCEDURE — 36415 COLL VENOUS BLD VENIPUNCTURE: CPT

## 2019-02-06 PROCEDURE — 11000001 HC ACUTE MED/SURG PRIVATE ROOM

## 2019-02-06 PROCEDURE — 27000221 HC OXYGEN, UP TO 24 HOURS

## 2019-02-06 PROCEDURE — C1750 CATH, HEMODIALYSIS,LONG-TERM: HCPCS | Performed by: INTERNAL MEDICINE

## 2019-02-06 PROCEDURE — 25000003 PHARM REV CODE 250: Performed by: HOSPITALIST

## 2019-02-06 PROCEDURE — 90935 HEMODIALYSIS ONE EVALUATION: CPT

## 2019-02-06 PROCEDURE — 94640 AIRWAY INHALATION TREATMENT: CPT

## 2019-02-06 PROCEDURE — 99233 PR SUBSEQUENT HOSPITAL CARE,LEVL III: ICD-10-PCS | Mod: ,,, | Performed by: HOSPITALIST

## 2019-02-06 PROCEDURE — 83735 ASSAY OF MAGNESIUM: CPT

## 2019-02-06 PROCEDURE — 63600175 PHARM REV CODE 636 W HCPCS: Performed by: STUDENT IN AN ORGANIZED HEALTH CARE EDUCATION/TRAINING PROGRAM

## 2019-02-06 PROCEDURE — 25000242 PHARM REV CODE 250 ALT 637 W/ HCPCS: Performed by: STUDENT IN AN ORGANIZED HEALTH CARE EDUCATION/TRAINING PROGRAM

## 2019-02-06 PROCEDURE — 84100 ASSAY OF PHOSPHORUS: CPT

## 2019-02-06 PROCEDURE — 25000003 PHARM REV CODE 250: Performed by: NURSE PRACTITIONER

## 2019-02-06 PROCEDURE — 94660 CPAP INITIATION&MGMT: CPT

## 2019-02-06 PROCEDURE — 80202 ASSAY OF VANCOMYCIN: CPT

## 2019-02-06 PROCEDURE — 85025 COMPLETE CBC W/AUTO DIFF WBC: CPT

## 2019-02-06 PROCEDURE — 94668 MNPJ CHEST WALL SBSQ: CPT

## 2019-02-06 PROCEDURE — 99900035 HC TECH TIME PER 15 MIN (STAT)

## 2019-02-06 PROCEDURE — 25000242 PHARM REV CODE 250 ALT 637 W/ HCPCS: Performed by: HOSPITALIST

## 2019-02-06 PROCEDURE — C1769 GUIDE WIRE: HCPCS | Performed by: INTERNAL MEDICINE

## 2019-02-06 PROCEDURE — 63600175 PHARM REV CODE 636 W HCPCS: Performed by: HOSPITALIST

## 2019-02-06 DEVICE — PRECISION RT CHRONIC CATHETER KIT,SYMMETRICAL TIP, REVERSE-TUNNELED,15 FR/CH (5.0 MM) X 23 CM
Type: IMPLANTABLE DEVICE | Site: CHEST | Status: FUNCTIONAL
Brand: PALINDROME

## 2019-02-06 RX ORDER — LIDOCAINE HYDROCHLORIDE 10 MG/ML
INJECTION INFILTRATION; PERINEURAL
Status: DISCONTINUED | OUTPATIENT
Start: 2019-02-06 | End: 2019-02-06 | Stop reason: HOSPADM

## 2019-02-06 RX ORDER — HEPARIN SODIUM 5000 [USP'U]/ML
2000 INJECTION, SOLUTION INTRAVENOUS; SUBCUTANEOUS ONCE
Status: DISCONTINUED | OUTPATIENT
Start: 2019-02-07 | End: 2019-02-25 | Stop reason: HOSPADM

## 2019-02-06 RX ORDER — HEPARIN SOD,PORCINE/0.9 % NACL 1000/500ML
INTRAVENOUS SOLUTION INTRAVENOUS
Status: DISCONTINUED | OUTPATIENT
Start: 2019-02-06 | End: 2019-02-06 | Stop reason: HOSPADM

## 2019-02-06 RX ORDER — HEPARIN SODIUM 1000 [USP'U]/ML
INJECTION, SOLUTION INTRAVENOUS; SUBCUTANEOUS
Status: DISCONTINUED | OUTPATIENT
Start: 2019-02-06 | End: 2019-02-06 | Stop reason: HOSPADM

## 2019-02-06 RX ADMIN — BACITRACIN: 500 OINTMENT TOPICAL at 03:02

## 2019-02-06 RX ADMIN — CARVEDILOL 25 MG: 12.5 TABLET, FILM COATED ORAL at 05:02

## 2019-02-06 RX ADMIN — CEFEPIME HYDROCHLORIDE 1 G: 1 INJECTION, SOLUTION INTRAVENOUS at 12:02

## 2019-02-06 RX ADMIN — HEPARIN SODIUM 5000 UNITS: 5000 INJECTION, SOLUTION INTRAVENOUS; SUBCUTANEOUS at 11:02

## 2019-02-06 RX ADMIN — BACITRACIN: 500 OINTMENT TOPICAL at 09:02

## 2019-02-06 RX ADMIN — IPRATROPIUM BROMIDE AND ALBUTEROL SULFATE 3 ML: .5; 3 SOLUTION RESPIRATORY (INHALATION) at 03:02

## 2019-02-06 RX ADMIN — IPRATROPIUM BROMIDE AND ALBUTEROL SULFATE 3 ML: .5; 3 SOLUTION RESPIRATORY (INHALATION) at 11:02

## 2019-02-06 RX ADMIN — SODIUM CHLORIDE SOLN NEBU 3% 4 ML: 3 NEBU SOLN at 09:02

## 2019-02-06 RX ADMIN — BACITRACIN: 500 OINTMENT TOPICAL at 10:02

## 2019-02-06 RX ADMIN — SEVELAMER CARBONATE 2.4 G: 2400 POWDER, FOR SUSPENSION ORAL at 10:02

## 2019-02-06 RX ADMIN — METHYLPREDNISOLONE SODIUM SUCCINATE 40 MG: 40 INJECTION, POWDER, FOR SOLUTION INTRAMUSCULAR; INTRAVENOUS at 09:02

## 2019-02-06 RX ADMIN — SODIUM CHLORIDE SOLN NEBU 3% 4 ML: 3 NEBU SOLN at 08:02

## 2019-02-06 RX ADMIN — IPRATROPIUM BROMIDE AND ALBUTEROL SULFATE 3 ML: .5; 3 SOLUTION RESPIRATORY (INHALATION) at 07:02

## 2019-02-06 RX ADMIN — HEPARIN SODIUM 5000 UNITS: 5000 INJECTION, SOLUTION INTRAVENOUS; SUBCUTANEOUS at 06:02

## 2019-02-06 RX ADMIN — Medication 1 CAPSULE: at 09:02

## 2019-02-06 RX ADMIN — CARVEDILOL 25 MG: 12.5 TABLET, FILM COATED ORAL at 09:02

## 2019-02-06 RX ADMIN — SEVELAMER CARBONATE 2.4 G: 2400 POWDER, FOR SUSPENSION ORAL at 09:02

## 2019-02-06 RX ADMIN — IPRATROPIUM BROMIDE AND ALBUTEROL SULFATE 3 ML: .5; 3 SOLUTION RESPIRATORY (INHALATION) at 08:02

## 2019-02-06 RX ADMIN — HEPARIN SODIUM 5000 UNITS: 5000 INJECTION, SOLUTION INTRAVENOUS; SUBCUTANEOUS at 01:02

## 2019-02-06 NOTE — PLAN OF CARE
Problem: Noninvasive Ventilation Acute  Goal: Effective Unassisted Ventilation and Oxygenation  Outcome: Ongoing (interventions implemented as appropriate)  Patient in no apparent distress. Sat's 90-91 %. Patient on 3 lpm nasal cannula. Aerosol treatments given Q 4 with CPT. Patient placed on BIPAP with X-Large mask for night with settings as documented. NT suctioning not needed . Will continue to monitor.

## 2019-02-06 NOTE — ASSESSMENT & PLAN NOTE
-Successfully extubated 2/2/2018.  Patient did well on BiPAP following extubation.  He has needed intermittent BiPAP.    -Continue with bronchodilator treatments as I suspect patient has COPD given wheezing and significant tobacco smoking history.    -Continue volume removal with CRRT as able.  -continue supplemental O2 requirements for goal sat 88-92%  -Continue steroids x 2 more days.  When able to take PO will change to prednisone Prednisone 50 mg.  -Will need outpatient PFTs and probable ICS/LAMA/LABA. When patient is being discharged will start him on Spiriva.

## 2019-02-06 NOTE — PLAN OF CARE
Problem: Adult Inpatient Plan of Care  Goal: Plan of Care Review  Outcome: Ongoing (interventions implemented as appropriate)  Patient lying in bed with eyes closed, television and Bipap on. Sinus rhythm on telemetry. Right internal jugular trialysis and right arm triple lumen PICC line intact. Call light within reach and bed alarm on. Bilateral wrist restraints on and mosley catheter intact. Patient has had one moderate dark brown liquid stool noted thus far during shift. Encouraged patient to call for any and all needs. Patient verbalized understanding of instructions. Will continue to monitor patient.

## 2019-02-06 NOTE — PT/OT/SLP PROGRESS
Physical Therapy  Not Seen    Patient Name:  Jones Red   MRN:  [id number]    Patient not seen today secondary to Dialysis. Will follow-up tomorrow, 2/7/19.    Wendy Santillan, PT, DPT

## 2019-02-06 NOTE — H&P
[facility]  History & Physical    Subjective:      Chief Complaint/Reason for Admission: Here for tunneled dialysis catheter conversion    Jones Red is a 53 y.o. male with NICK (requiring renal replacement therapy) due to cellulitis, currently being treated.  We were consulted because his temporary R internal jugular dialysis catheter has ant malfunctioning, and he still requires renal replacement therapy.  We will convert this temporary dialysis catheter for a tunneled dialysis catheter.    Past Medical History:   Diagnosis Date    Obesity     LYNDON (obstructive sleep apnea)     Rhabdomyolysis 1/28/2019    Tobacco abuse     Venous stasis      History reviewed. No pertinent surgical history.  History reviewed. No pertinent family history.  Social History     Tobacco Use    Smoking status: Current Every Day Smoker     Packs/day: 0.50     Types: Cigarettes   Substance Use Topics    Alcohol use: Yes    Drug use: Not on file       PTA Medications   Medication Sig    albuterol (VENTOLIN HFA) 90 mcg/actuation inhaler Inhale 2 puffs into the lungs every 4 (four) hours as needed for Wheezing or Shortness of Breath. Rescue    allopurinol (ZYLOPRIM) 100 MG tablet Take 100 mg by mouth 2 (two) times daily.    bumetanide (BUMEX) 1 MG tablet Take 1 mg by mouth once daily.    carvedilol (COREG) 25 MG tablet Take 25 mg by mouth 2 (two) times daily with meals.    losartan-hydrochlorothiazide 100-25 mg (HYZAAR) 100-25 mg per tablet Take 1 tablet by mouth once daily.    metOLazone (ZAROXOLYN) 5 MG tablet Take 5 mg by mouth once daily.    montelukast (SINGULAIR) 10 mg tablet Take 10 mg by mouth once daily.    pantoprazole (PROTONIX) 40 MG tablet Take 40 mg by mouth once daily.    potassium chloride (KLOR-CON) 10 MEQ TbSR Take 10 mEq by mouth once daily.    ranitidine (ZANTAC) 150 MG tablet Take 150 mg by mouth 2 (two) times daily.    traZODone (DESYREL) 100 MG tablet Take 100 mg by mouth every  evening.     Review of patient's allergies indicates:  No Known Allergies     Review of Systems   Unable to perform ROS: Mental acuity       Objective:      Vital Signs (Most Recent)  Temp: 97.7 °F (36.5 °C) (02/06/19 0735)  Pulse: 102 (02/06/19 0735)  Resp: (!) 26 (02/06/19 0735)  BP: (!) 140/70 (02/06/19 0735)  SpO2: (!) 91 % (02/06/19 0735)    Vital Signs Range (Last 24H):  Temp:  [96.4 °F (35.8 °C)-98.8 °F (37.1 °C)]   Pulse:  []   Resp:  [16-27]   BP: (136-145)/(68-72)   SpO2:  [86 %-100 %]     Physical Exam   Constitutional:   Appears confused, altered, in no acute distress   Eyes: EOM are normal.   Neck: Neck supple.   Cardiovascular:   R internal jugular temporary dialysis catheter in place   Pulmonary/Chest: Effort normal. No respiratory distress.   Neurological:   Alert, but not oriented to place or time   Skin: Skin is warm and dry.       Assessment:      Active Hospital Problems    Diagnosis  POA    *Septic shock [A41.9, R65.21]  Yes    Essential hypertension [I10]  No    Tobacco abuse [Z72.0]  Yes    Acute renal failure [N17.9]  Yes    Cellulitis of right lower extremity [L03.115]  Yes    Acute on chronic diastolic heart failure [I50.33]  Yes    Acute on chronic respiratory failure with hypoxia and hypercapnia [J96.21, J96.22]  Yes      Resolved Hospital Problems    Diagnosis Date Resolved POA    Increased oropharyngeal secretions [K11.7] 02/03/2019 Unknown    Acute respiratory failure with hypoxia and hypercapnia [J96.01, J96.02] 02/01/2019 Yes    Rhabdomyolysis [M62.82] 02/01/2019 Yes    Hyponatremia [E87.1] 02/01/2019 Yes       Plan:      Conversion of temporary dialysis catheter to tunneled dialysis catheter today.  Risks explained to wife Aneta, and consent given over the telephone as the patient is too altered to consent for himself.

## 2019-02-06 NOTE — PT/OT/SLP PROGRESS
Speech Language Pathology      Jones Red  MRN: [id number]    Patient not seen today secondary to a procedure and dialysis. Attempted pt in AM and pt was off the floor for a procedure. Spoke to RN about seeing pt in PM and RN stating pt was about to be transported to dialysis. Discussed current diet recs and for follow up of swallow evaluation in AM 2/7/19 if pt is not available before end of day 2/6/19.     Deanna Mcnair, CF-SLP

## 2019-02-06 NOTE — ASSESSMENT & PLAN NOTE
-Marked improvement with dialysis which we need to continue.    -Continue with additional volume removal with renal replacement therapy as recommended by Nephrology.  -When taking oral reliably will need to start toprol.  No ARB due to renal dysfunction.

## 2019-02-06 NOTE — PROGRESS NOTES
Pt received on BIPAP;SPO2 normal. Placed pt on 3LNC;SPO2 91%. Treatments were given and tolerated well. 1515 pt is in dialysis nurse and RT is at pts bedside;pt is slouched down in bed;SPO2 84% on 4LNC.. Liann, dialysis nurse and RT pulled pt up in bed; pt pocketing secretions in mouth;suctioned pts mouth. BBS expiratory wheezing. Treatment was given;Pt sounds congested and is mouth breathing;SPO2 91-92% on 4LNC. Nurse will let Dr. Smith know of pt's current status. Will continue to monitor.

## 2019-02-06 NOTE — PROGRESS NOTES
"Nephrology  Progress Note    Admit Date: 1/28/2019   LOS: 9 days     SUBJECTIVE:     Follow-up For:  Septic shock/ATN    Interval History:    Transferred to floor yesterday pm.  Issues with HD cath.  Plans for tunneled line this am.  Currently getting bedbath and more alert.  Oriented X 2.  Discussed with team.     Review of Systems:    Neg.     OBJECTIVE:     Vital Signs Range (Last 24H):  BP (!) 140/70 (BP Location: Left leg, Patient Position: Lying)   Pulse 102   Temp 97.7 °F (36.5 °C) (Oral)   Resp (!) 26   Ht 6' 3" (1.905 m)   Wt (!) 169 kg (372 lb 9.2 oz)   SpO2 (!) 91%   BMI 46.57 kg/m²     Temp:  [96.4 °F (35.8 °C)-98.8 °F (37.1 °C)]   Pulse:  []   Resp:  [16-27]   BP: (136-148)/(65-72)   SpO2:  [86 %-100 %]     I & O (Last 24H):    Intake/Output Summary (Last 24 hours) at 2/6/2019 0913  Last data filed at 2/6/2019 0650  Gross per 24 hour   Intake --   Output 75 ml   Net -75 ml       Physical Exam:  General appearance:  Disheveled and morbidly obese.     Eyes:  Conjunctivae/corneas clear. PERRL.  Lungs: diminished.   Heart: Regular rate and rhythm, distant heart tones.  Abdomen: Soft, non-tender non-distended; bowel sounds normal; no masses,  no organomegaly, morbidly obese  Extremities:  Chronic Woody edema.   peeling/flaky LE's (R>L).    Skin:  Right lower extremity cellulitis improved.   Abdalla catheterization  Right IJ Trialysis       Laboratory Data:  Recent Labs   Lab 02/06/19  0350   WBC 12.05   RBC 3.24*   HGB 9.2*   HCT 28.1*   *   MCV 87   MCH 28.4   MCHC 32.7       BMP:   Recent Labs   Lab 02/06/19  0350   GLU 82      K 4.5      CO2 21*   BUN 95*   CREATININE 9.5*   CALCIUM 9.2   MG 2.8*     Lab Results   Component Value Date    CALCIUM 9.2 02/06/2019    PHOS 9.3 (HH) 02/06/2019       Lab Results   Component Value Date    .4 (H) 01/30/2019    CALCIUM 9.2 02/06/2019    PHOS 9.3 (HH) 02/06/2019       No results found for: URICACID    BNP  No results for " input(s): BNP, BNPTRIAGEBLO in the last 168 hours.    Medications:  Medication list was reviewed and changes noted under Assessment/Plan.    Diagnostic Results:    X-Ray Chest AP Portable   Final Result      NG tube in place.         Electronically signed by: Viviane Persaud MD   Date:    02/02/2019   Time:    23:59      X-Ray Chest 1 View   Final Result      As above.         Electronically signed by: Keon Jonas MD   Date:    01/31/2019   Time:    19:14      X-Ray Chest AP Portable   Final Result      Lines and tubes are grossly unchanged.      Interval improvement in aeration of the lungs.         Electronically signed by: Connor Babcock MD   Date:    01/31/2019   Time:    16:42      X-Ray Chest 1 View   Final Result      Overall no significant interval change         Electronically signed by: Connor Babcock MD   Date:    01/30/2019   Time:    08:24      US Extremity Non Vascular Limited Right   Final Result      Nonspecific subcutaneous edema, cutaneous thickening, could represent cellulitis changes.  No definite necrotizing fasciitis, gas or abscess.         Electronically signed by: Tin Leone MD   Date:    01/29/2019   Time:    13:09      X-Ray Chest AP Portable   Final Result   Addendum 1 of 1      Enteric tube projects in unchanged radiographic position with tip coursing    below the diaphragm and appearing to extend beyond the field of view.         Electronically signed by: Trena Sena MD   Date:    01/29/2019   Time:    00:39      Final      As above.         Electronically signed by: Trena Sena MD   Date:    01/29/2019   Time:    00:04      US Lower Extremity Veins Right   Final Result      Limited examination.  No evidence of deep venous thrombosis in the right lower extremity.         Electronically signed by: Vanita Oviedo   Date:    01/28/2019   Time:    21:10      X-Ray Chest AP Portable   Final Result      Cardiomegaly with findings suggesting pulmonary edema, correlation  advised.  Differential would include infection.         Electronically signed by: Kalyan Galvez MD   Date:    01/28/2019   Time:    20:19          ASSESSMENT/PLAN:     1. Persistent Multifactorial essentially anuric acute renal failure secondary to ATN from septic shock, rhabdomyolysis, with hyponatremia, and hyperkalemia (N17.0, N17.9, E87.5, E87.1, M62.82, A41.9):  Transferred from outlying facility for CRRT and has transitioned to standard hemodialysis.   Some issues with clotting of dialysis catheter.  Continue daily HD/UF until signs of renal recovery.  Timeframe unknown at this time.  Plan for tunneled line today as no bacteremia.  Consulted SW to start process of outpt placement for HD unit near home or place of rehab/SNF.  Renally dose meds, avoid nephrotoxins, and monitor I/O's closely.  2. Hyperphosphatemia.  Binders.  HD.   3. Secondary hyperparathyroidism due to acute kidney injury.  Calcium within normal limits.  No need to treat at this point.  4. Septic shock and resp failure:  improving daily. .    5. RLE Cellulitis/woody edema (L03.115): Defer to wound care and ID.  6. Anemia of illness:  Will check iron/b12/folate.  Start ONDINA if trends continue.    7. Morbid obesity (E44):  Severe morbid obesity the root of most of this patients' medical issues.  Defer.       See above  Will continue to follow for renal needs.

## 2019-02-06 NOTE — ASSESSMENT & PLAN NOTE
-At home takes coreg, losartan, hctz, metolazone and bumex  -BP improved today.  -Continue scheduled metoprolol and PRN hydralazine  -When taking PO will resume home coreg and likely start po hydralazine.

## 2019-02-06 NOTE — PROCEDURES
[facility] Interventional Nephrology Service     Date of Procedure:  02/06/2019 12:01 PM    Procedure: Tunneled Dialysis Central Catheter Insertion     Indication: Malfunctioning catheter, need for tunneling     Primary Surgeon: Varinder Barcenas MD   Assist: none    Referring Provider: Dr. Quinonez and Sterling Lopez    Blood Loss: 15 mL    Procedure in Detail:   Patient was prepped and draped in usual sterile fashion. 1% lidocaine was used for local sedation to anesthetize soft tissues. A glidewire was inserted into the trialysis catheter and was advanced to the IVC.  This was done under fluoroscopic guidance. Using sequential dilator under flouroscopy, the venotomy site was dilated, then using a breakaway sheath, the Reverse Tunnel 23 cm Palindrome catheter was advanced into the correct location confirmed under flouroscopy. Glidewire removed.  Tunnel tract was then created; mapped in location in the right chest wall; injected prior with 1% lidocaine; then using a dilator, the tunnel was created, catheter clamped at venotomy site, tip severed and attached to tunnel creator and catheter easily pulled through tunnel, with new clamp placed at exit site after which the venotomy clamp was released.  Then, the catheter was cut to desired length, and the RT port tip apparatus was attached according to  instructions.  Both ports flushed and aspirated well.  The catheter was packed with 1000 units/mL heparin. Vicryl suture was used to close the venotomy site, and a #2 ethilon suture was used to secure the catheter at the exit site and to anchor to the patient's skin.     A total of 4 sutures were placed.    Patient tolerated the procedure well. The catheter was dressed with tegaderm sterile dressing.    Varinder Barcenas MD  [phone]

## 2019-02-06 NOTE — ASSESSMENT & PLAN NOTE
-Due to ATN from sepsis with shock and rhabdomyolysis.    -Continues to have elevated anion gap.  -Unfortunately he does need HD.  We were unable to complete this on 2/5 due to clotted trialysis catheter.  -Plan is for new catheter today to be placed by [facility] nephrology.  -Continue with dialysis per nephrology.

## 2019-02-06 NOTE — PT/OT/SLP PROGRESS
Occupational Therapy  Not Seen Note    Patient Name:  Jones Red   MRN:  [id number]    Patient not seen today secondary to Unavailable in AM  (medical procedure) and PM (dialysis). Will follow-up on his next scheduled therapy day.  FIONA Can  2/6/2019

## 2019-02-06 NOTE — SUBJECTIVE & OBJECTIVE
Interval History:  Stepped down to the floor on 2/5.  Unable to complete HD yesterday due to clotted trialysis catheter.  No acute events overnight.  Seen today after placement of new dialysis line.  Much more appropriate mental status.  Denies pain and sob today.    Review of Systems   Constitutional: Negative for chills and fever.   HENT: Negative for congestion and dental problem.    Eyes: Negative for discharge and itching.   Respiratory: Negative for shortness of breath.    Cardiovascular: Negative for chest pain.   Gastrointestinal: Negative for abdominal pain, constipation, diarrhea, nausea and vomiting.   Endocrine: Negative for cold intolerance and heat intolerance.   Musculoskeletal: Negative for arthralgias and back pain.   Neurological: Negative for dizziness and facial asymmetry.   Hematological: Negative for adenopathy.   Psychiatric/Behavioral: Negative for agitation and behavioral problems.     Objective:     Vital Signs (Most Recent):  Temp: 97.5 °F (36.4 °C) (02/06/19 0435)  Pulse: 95 (02/06/19 0600)  Resp: (!) 22 (02/06/19 0435)  BP: 138/71 (02/06/19 0435)  SpO2: 95 % (02/06/19 0435) Vital Signs (24h Range):  Temp:  [96.4 °F (35.8 °C)-98.8 °F (37.1 °C)] 97.5 °F (36.4 °C)  Pulse:  [] 95  Resp:  [16-27] 22  SpO2:  [86 %-100 %] 95 %  BP: (136-161)/(65-75) 138/71     Weight: (!) 169 kg (372 lb 9.2 oz)  Body mass index is 46.57 kg/m².    Intake/Output Summary (Last 24 hours) at 2/6/2019 0723  Last data filed at 2/6/2019 0650  Gross per 24 hour   Intake --   Output 75 ml   Net -75 ml      Physical Exam   Constitutional: Vital signs are normal. He appears well-developed.  Non-toxic appearance. He does not have a sickly appearance. He does not appear ill. No distress.   Morbid obesity.   HENT:   Head: Normocephalic and atraumatic.   Right Ear: External ear normal.   Left Ear: External ear normal.   Eyes: Conjunctivae and EOM are normal. Pupils are equal, round, and reactive to light. No scleral  icterus.   Neck: Normal range of motion. Neck supple. No JVD present. No tracheal deviation present.   Cardiovascular: Normal rate, regular rhythm, normal heart sounds and intact distal pulses. Exam reveals no gallop and no friction rub.   No murmur heard.  Pulmonary/Chest: Effort normal and breath sounds normal. No stridor. No respiratory distress. He has no wheezes. He has no rales.   Distant lung sounds.  Moving air well.  Some crackles.   Abdominal: Soft. Bowel sounds are normal. He exhibits no distension and no mass. There is no tenderness. There is no guarding.   Musculoskeletal: Normal range of motion. He exhibits no edema or deformity.   Neurological: He is alert. He exhibits normal muscle tone.   Oriented to self place but not year.  Patient otherwise following commands answering questions appropriately.   Skin: Skin is warm and dry. No rash noted. He is not diaphoretic. No erythema.   Right lower extremity is erythematous, with blistering and edema.  Area of erythema has not extended beyond pen marking. Less edematous following renal replacement therapy.   Psychiatric:   Much more appropriate today.  Oriented x2.    Nursing note and vitals reviewed.      Significant Labs: All pertinent labs within the past 24 hours have been reviewed.    Significant Imaging: I have reviewed all pertinent imaging results/findings within the past 24 hours.

## 2019-02-06 NOTE — ASSESSMENT & PLAN NOTE
-Admitted to inpatient status in ICU upon transfer from [place] with septic shock.  Treated with pressors and IV antibiotics.  Ultimately weaned off of pressors and transferred to the floor on 2/5.  -Possible component of pneumonia but clear cause of his infection is the severe cellulitis involving the right lower extremity.    -Ultrasound of right lower extremity did not reveal evidence of abscess or gas.    -General surgery was consulted and recommended serial exams for now and no surgical intervention at this time.    -Cellulitis continues to improve.  WBC improving.    -Bood cultures no growth to date.    -Continue to elevate right lower extremity as tolerated.    -Appreciate input from ID.  Per their recs will continue cefepime and vancomycin 7 more days (EOC 2/11).  -Continue pulse dosing of vancomycin.  -Continue with wound care.

## 2019-02-06 NOTE — PLAN OF CARE
Problem: Adult Inpatient Plan of Care  Goal: Plan of Care Review  Outcome: Ongoing (interventions implemented as appropriate)  Pt resting in bed. NAD, VSS on 4L O2, venti mask and bipap at bedside. Alert to self. Periods of confusion  Pt not complaining of pain. Pt bedfast, working PT/OT. Pt tolerated dialysis and new cath. POC reviewed with pt. Bed low and locked. Personal items and call light within reach. Will continue to monitor.

## 2019-02-06 NOTE — PROGRESS NOTES
[facility] Medicine  Progress Note    Patient Name: Jones Red  MRN: [id number]  Patient Class: IP- Inpatient   Admission Date: 1/28/2019  Length of Stay: 9 days  Attending Physician: Jones Milner MD  Primary Care Provider: Primary Doctor No        Subjective:     Principal Problem:Septic shock    HPI:  Mr. Jones Red is a 53 y.o. male, with PMH of HTN, COPD, chronic respiratory failure (on home O2), Diastolic CHF, LYNDON, chronic venous insufficiency, and questionable seizures history (suspected to be 2/2 alcohol withdrawal), who preset tia to [facility] on 1/22/19 2/2 SOB. This was associated with cough productive of brown sputum. He was started in BiPap, but did ultimately degrade, and was intubated, and maintained on a ventilator. He was additionally altered upon arrival to the ED. History was reportedly obtained by a friend, and it was reported that he was found down at home, and was unable to get up. He as admitted to the ICU, started on IV fluids and pressors.     Hospital Course:  Patient 53-year-old gentleman with history of hypertension, diastolic heart failure, chronic obstructive pulmonary disease with chronic hypoxic respiratory failure on home oxygen, prior alcohol abuse, significant ongoing tobacco use (3 packs per day), and morbid obesity with septic shock secondary to right lower extremity cellulitis complicating chronic venous stasis.  Patient intubated at outside hospital after failing a trial of BiPAP.  Patient also with evidence of worsening renal failure with rhabdomyolysis.       Patient transferred from [facility] in [place], [place] to [facility] on 1/28/2019.  Patient started on renal replacement therapy.  Patient remains intubated and sedated but responds to stimuli when sedation held but otherwise confused.  Ultrasound of the right lower extremity did not reveal evidence of abscess, subcutaneous gas, or evidence of  necrotizing fascitits.  General surgery and wound care consulted.  No surgical intervention recommended at this time.  Patient on broad spectrum antibiotics.    Patient clinically improved in terms his volume status and was successfully extubated on 2/2/2019.    Interval History:  Stepped down to the floor on 2/5.  Unable to complete HD yesterday due to clotted trialysis catheter.  No acute events overnight.  Seen today after placement of new dialysis line.  Much more appropriate mental status.  Denies pain and sob today.    Review of Systems   Constitutional: Negative for chills and fever.   HENT: Negative for congestion and dental problem.    Eyes: Negative for discharge and itching.   Respiratory: Negative for shortness of breath.    Cardiovascular: Negative for chest pain.   Gastrointestinal: Negative for abdominal pain, constipation, diarrhea, nausea and vomiting.   Endocrine: Negative for cold intolerance and heat intolerance.   Musculoskeletal: Negative for arthralgias and back pain.   Neurological: Negative for dizziness and facial asymmetry.   Hematological: Negative for adenopathy.   Psychiatric/Behavioral: Negative for agitation and behavioral problems.     Objective:     Vital Signs (Most Recent):  Temp: 97.5 °F (36.4 °C) (02/06/19 0435)  Pulse: 95 (02/06/19 0600)  Resp: (!) 22 (02/06/19 0435)  BP: 138/71 (02/06/19 0435)  SpO2: 95 % (02/06/19 0435) Vital Signs (24h Range):  Temp:  [96.4 °F (35.8 °C)-98.8 °F (37.1 °C)] 97.5 °F (36.4 °C)  Pulse:  [] 95  Resp:  [16-27] 22  SpO2:  [86 %-100 %] 95 %  BP: (136-161)/(65-75) 138/71     Weight: (!) 169 kg (372 lb 9.2 oz)  Body mass index is 46.57 kg/m².    Intake/Output Summary (Last 24 hours) at 2/6/2019 0723  Last data filed at 2/6/2019 0650  Gross per 24 hour   Intake --   Output 75 ml   Net -75 ml      Physical Exam   Constitutional: Vital signs are normal. He appears well-developed.  Non-toxic appearance. He does not have a sickly appearance. He does  not appear ill. No distress.   Morbid obesity.   HENT:   Head: Normocephalic and atraumatic.   Right Ear: External ear normal.   Left Ear: External ear normal.   Eyes: Conjunctivae and EOM are normal. Pupils are equal, round, and reactive to light. No scleral icterus.   Neck: Normal range of motion. Neck supple. No JVD present. No tracheal deviation present.   Cardiovascular: Normal rate, regular rhythm, normal heart sounds and intact distal pulses. Exam reveals no gallop and no friction rub.   No murmur heard.  Pulmonary/Chest: Effort normal and breath sounds normal. No stridor. No respiratory distress. He has no wheezes. He has no rales.   Distant lung sounds.  Moving air well.  Some crackles.   Abdominal: Soft. Bowel sounds are normal. He exhibits no distension and no mass. There is no tenderness. There is no guarding.   Musculoskeletal: Normal range of motion. He exhibits no edema or deformity.   Neurological: He is alert. He exhibits normal muscle tone.   Oriented to self place but not year.  Patient otherwise following commands answering questions appropriately.   Skin: Skin is warm and dry. No rash noted. He is not diaphoretic. No erythema.   Right lower extremity is erythematous, with blistering and edema.  Area of erythema has not extended beyond pen marking. Less edematous following renal replacement therapy.   Psychiatric:   Much more appropriate today.  Oriented x2.    Nursing note and vitals reviewed.      Significant Labs: All pertinent labs within the past 24 hours have been reviewed.    Significant Imaging: I have reviewed all pertinent imaging results/findings within the past 24 hours.    Assessment/Plan:      * Septic shock    -Admitted to inpatient status in ICU upon transfer from [place] with septic shock.  Treated with pressors and IV antibiotics.  Ultimately weaned off of pressors and transferred to the floor on 2/5.  -Possible component of pneumonia but clear cause of his infection is the  severe cellulitis involving the right lower extremity.    -Ultrasound of right lower extremity did not reveal evidence of abscess or gas.    -General surgery was consulted and recommended serial exams for now and no surgical intervention at this time.    -Cellulitis continues to improve.  WBC improving.    -Bood cultures no growth to date.    -Continue to elevate right lower extremity as tolerated.    -Appreciate input from ID.  Per their recs will continue cefepime and vancomycin 7 more days (EOC 2/11).  -Continue pulse dosing of vancomycin.  -Continue with wound care.     Essential hypertension    -At home takes coreg, losartan, hctz, metolazone and bumex  -BP improved today.  -Continue scheduled metoprolol and PRN hydralazine  -When taking PO will resume home coreg and likely start po hydralazine.       Tobacco abuse    -Patient counseled on the importance of smoking cessation.  -NRT offered     Acute on chronic respiratory failure with hypoxia and hypercapnia    -Successfully extubated 2/2/2018.  Patient did well on BiPAP following extubation.  He has needed intermittent BiPAP.    -Continue with bronchodilator treatments as I suspect patient has COPD given wheezing and significant tobacco smoking history.    -Continue volume removal with CRRT as able.  -continue supplemental O2 requirements for goal sat 88-92%  -Continue steroids x 2 more days.  When able to take PO will change to prednisone Prednisone 50 mg.  -Will need outpatient PFTs and probable ICS/LAMA/LABA. When patient is being discharged will start him on Spiriva.         Acute on chronic diastolic heart failure    -Marked improvement with dialysis which we need to continue.    -Continue with additional volume removal with renal replacement therapy as recommended by Nephrology.  -When taking oral reliably will need to start toprol.  No ARB due to renal dysfunction.     Cellulitis of right lower extremity    -As above for septic shock     Acute renal  failure    -Due to ATN from sepsis with shock and rhabdomyolysis.    -Continues to have elevated anion gap.  -Unfortunately he does need HD.  We were unable to complete this on 2/5 due to clotted trialysis catheter.  -Plan is for new catheter today to be placed by [facility] nephrology.  -Continue with dialysis per nephrology.       VTE Risk Mitigation (From admission, onward)        Ordered     heparin (porcine) injection 5,000 Units  As needed (PRN)      01/31/19 1825     heparin (porcine) injection 2,000 Units  As needed (PRN)      01/30/19 1056     heparin (porcine) injection 5,000 Units  Every 8 hours      01/29/19 2044     heparin (porcine) injection 5,000 Units  As needed (PRN)      01/29/19 1335     IP VTE HIGH RISK PATIENT  Once      01/28/19 1947              Jones Milner MD  Department of Hospital Medicine   [facility]

## 2019-02-06 NOTE — BRIEF OP NOTE
[facility] [place] Cath Lab [facility]  Brief Operative Note    SUMMARY     Surgery Date: 2/6/2019     Surgeon(s) and Role:     * Varinder Barcenas MD - Primary    Assisting Surgeon: None    Pre-op Diagnosis:  ESRD (end stage renal disease) [N18.6]    Post-op Diagnosis:  Post-Op Diagnosis Codes:     * ESRD (end stage renal disease) [N18.6]    Procedure(s) (LRB):  Insertion,catheter,tunneled (Right)    Anesthesia: 1% lidocaine    Description of Procedure: Patient was prepped and draped in a sterile fashion.  This was a successful conversion of his R IJ trialysis catheter to a R IJ tunneled dialysis catheter.  Patient tolerated the procedure well and no immediate complications noted.    Description of the findings of the procedure: Patient was prepped and draped in a sterile fashion.  This was a successful conversion of his R IJ trialysis catheter to a R IJ tunneled dialysis catheter.  Patient tolerated the procedure well and no immediate complications noted.      Estimated Blood Loss: 15 mL         Specimens:   Specimen (12h ago, onward)    None

## 2019-02-06 NOTE — PLAN OF CARE
Patient's blood work drawn from right arm triple lumen PICC line. The white port is taped. The red port flushes and blood return is noted. Blood work drawn from red port. The gray port flushes, but no blood return noted. Received a phone call from Franky in the lab about patient's phosphorus level 9.3. Notified Mitzy Chapa PA-C. Received an order to have day nurse notify nephrology for further orders. Received an order from Mitzy Chapa PA-C to hold morning dose of heparin in preparation for nephrology to address elevated phosphorus. Will continue to monitor patient.

## 2019-02-07 PROBLEM — G93.41 ENCEPHALOPATHY, METABOLIC: Status: ACTIVE | Noted: 2019-02-07

## 2019-02-07 LAB
AMMONIA PLAS-SCNC: 46 UMOL/L
ANION GAP SERPL CALC-SCNC: 15 MMOL/L
BACTERIA BLD CULT: NORMAL
BASOPHILS # BLD AUTO: 0.06 K/UL
BASOPHILS NFR BLD: 0.5 %
BUN SERPL-MCNC: 85 MG/DL
CALCIUM SERPL-MCNC: 9.2 MG/DL
CHLORIDE SERPL-SCNC: 99 MMOL/L
CO2 SERPL-SCNC: 25 MMOL/L
CREAT SERPL-MCNC: 8.2 MG/DL
DIFFERENTIAL METHOD: ABNORMAL
EOSINOPHIL # BLD AUTO: 0.1 K/UL
EOSINOPHIL NFR BLD: 1 %
ERYTHROCYTE [DISTWIDTH] IN BLOOD BY AUTOMATED COUNT: 15 %
EST. GFR  (AFRICAN AMERICAN): 8 ML/MIN/1.73 M^2
EST. GFR  (NON AFRICAN AMERICAN): 7 ML/MIN/1.73 M^2
FERRITIN SERPL-MCNC: 363 NG/ML
FOLATE SERPL-MCNC: 6.5 NG/ML
GLUCOSE SERPL-MCNC: 83 MG/DL
HCT VFR BLD AUTO: 28.6 %
HGB BLD-MCNC: 9.2 G/DL
IRON SERPL-MCNC: 52 UG/DL
LYMPHOCYTES # BLD AUTO: 1.7 K/UL
LYMPHOCYTES NFR BLD: 13.5 %
MAGNESIUM SERPL-MCNC: 2.6 MG/DL
MCH RBC QN AUTO: 28.6 PG
MCHC RBC AUTO-ENTMCNC: 32.2 G/DL
MCV RBC AUTO: 89 FL
MONOCYTES # BLD AUTO: 1.2 K/UL
MONOCYTES NFR BLD: 9.3 %
NEUTROPHILS # BLD AUTO: 9.5 K/UL
NEUTROPHILS NFR BLD: 75.1 %
PHOSPHATE SERPL-MCNC: 9.8 MG/DL
PLATELET # BLD AUTO: 387 K/UL
PMV BLD AUTO: 9.9 FL
POTASSIUM SERPL-SCNC: 4.4 MMOL/L
RBC # BLD AUTO: 3.22 M/UL
SATURATED IRON: 20 %
SODIUM SERPL-SCNC: 139 MMOL/L
TOTAL IRON BINDING CAPACITY: 266 UG/DL
TRANSFERRIN SERPL-MCNC: 180 MG/DL
TSH SERPL DL<=0.005 MIU/L-ACNC: 2.5 UIU/ML
VANCOMYCIN SERPL-MCNC: 16.3 UG/ML
VIT B12 SERPL-MCNC: 1400 PG/ML
WBC # BLD AUTO: 12.63 K/UL

## 2019-02-07 PROCEDURE — 84443 ASSAY THYROID STIM HORMONE: CPT

## 2019-02-07 PROCEDURE — 99233 SBSQ HOSP IP/OBS HIGH 50: CPT | Mod: ,,, | Performed by: HOSPITALIST

## 2019-02-07 PROCEDURE — 80202 ASSAY OF VANCOMYCIN: CPT

## 2019-02-07 PROCEDURE — 99233 PR SUBSEQUENT HOSPITAL CARE,LEVL III: ICD-10-PCS | Mod: ,,, | Performed by: HOSPITALIST

## 2019-02-07 PROCEDURE — 82728 ASSAY OF FERRITIN: CPT

## 2019-02-07 PROCEDURE — 25000003 PHARM REV CODE 250: Performed by: HOSPITALIST

## 2019-02-07 PROCEDURE — 25000242 PHARM REV CODE 250 ALT 637 W/ HCPCS: Performed by: STUDENT IN AN ORGANIZED HEALTH CARE EDUCATION/TRAINING PROGRAM

## 2019-02-07 PROCEDURE — 83735 ASSAY OF MAGNESIUM: CPT

## 2019-02-07 PROCEDURE — 25000003 PHARM REV CODE 250: Performed by: NURSE PRACTITIONER

## 2019-02-07 PROCEDURE — 80048 BASIC METABOLIC PNL TOTAL CA: CPT

## 2019-02-07 PROCEDURE — 82140 ASSAY OF AMMONIA: CPT

## 2019-02-07 PROCEDURE — 94640 AIRWAY INHALATION TREATMENT: CPT

## 2019-02-07 PROCEDURE — 63600175 PHARM REV CODE 636 W HCPCS: Performed by: HOSPITALIST

## 2019-02-07 PROCEDURE — 63600175 PHARM REV CODE 636 W HCPCS: Performed by: NURSE PRACTITIONER

## 2019-02-07 PROCEDURE — 11000001 HC ACUTE MED/SURG PRIVATE ROOM

## 2019-02-07 PROCEDURE — 82607 VITAMIN B-12: CPT

## 2019-02-07 PROCEDURE — 94761 N-INVAS EAR/PLS OXIMETRY MLT: CPT

## 2019-02-07 PROCEDURE — 86592 SYPHILIS TEST NON-TREP QUAL: CPT

## 2019-02-07 PROCEDURE — 85025 COMPLETE CBC W/AUTO DIFF WBC: CPT

## 2019-02-07 PROCEDURE — 80100016 HC MAINTENANCE HEMODIALYSIS

## 2019-02-07 PROCEDURE — 63600175 PHARM REV CODE 636 W HCPCS: Performed by: STUDENT IN AN ORGANIZED HEALTH CARE EDUCATION/TRAINING PROGRAM

## 2019-02-07 PROCEDURE — 27000221 HC OXYGEN, UP TO 24 HOURS

## 2019-02-07 PROCEDURE — 84100 ASSAY OF PHOSPHORUS: CPT

## 2019-02-07 PROCEDURE — 83540 ASSAY OF IRON: CPT

## 2019-02-07 PROCEDURE — 94660 CPAP INITIATION&MGMT: CPT

## 2019-02-07 PROCEDURE — 94668 MNPJ CHEST WALL SBSQ: CPT

## 2019-02-07 PROCEDURE — 25000242 PHARM REV CODE 250 ALT 637 W/ HCPCS: Performed by: HOSPITALIST

## 2019-02-07 PROCEDURE — 82746 ASSAY OF FOLIC ACID SERUM: CPT

## 2019-02-07 PROCEDURE — 99900035 HC TECH TIME PER 15 MIN (STAT)

## 2019-02-07 RX ADMIN — SODIUM CHLORIDE SOLN NEBU 3% 4 ML: 3 NEBU SOLN at 07:02

## 2019-02-07 RX ADMIN — BACITRACIN: 500 OINTMENT TOPICAL at 04:02

## 2019-02-07 RX ADMIN — HEPARIN SODIUM 5000 UNITS: 5000 INJECTION, SOLUTION INTRAVENOUS; SUBCUTANEOUS at 04:02

## 2019-02-07 RX ADMIN — BACITRACIN: 500 OINTMENT TOPICAL at 08:02

## 2019-02-07 RX ADMIN — HEPARIN SODIUM 5000 UNITS: 5000 INJECTION, SOLUTION INTRAVENOUS; SUBCUTANEOUS at 10:02

## 2019-02-07 RX ADMIN — SEVELAMER CARBONATE 2.4 G: 2400 POWDER, FOR SUSPENSION ORAL at 08:02

## 2019-02-07 RX ADMIN — IPRATROPIUM BROMIDE AND ALBUTEROL SULFATE 3 ML: .5; 3 SOLUTION RESPIRATORY (INHALATION) at 11:02

## 2019-02-07 RX ADMIN — IPRATROPIUM BROMIDE AND ALBUTEROL SULFATE 3 ML: .5; 3 SOLUTION RESPIRATORY (INHALATION) at 03:02

## 2019-02-07 RX ADMIN — IPRATROPIUM BROMIDE AND ALBUTEROL SULFATE 3 ML: .5; 3 SOLUTION RESPIRATORY (INHALATION) at 07:02

## 2019-02-07 RX ADMIN — HEPARIN SODIUM 5000 UNITS: 5000 INJECTION, SOLUTION INTRAVENOUS; SUBCUTANEOUS at 06:02

## 2019-02-07 RX ADMIN — CEFEPIME HYDROCHLORIDE 1 G: 1 INJECTION, SOLUTION INTRAVENOUS at 04:02

## 2019-02-07 RX ADMIN — METHYLPREDNISOLONE SODIUM SUCCINATE 40 MG: 40 INJECTION, POWDER, FOR SOLUTION INTRAMUSCULAR; INTRAVENOUS at 08:02

## 2019-02-07 RX ADMIN — PREDNISONE 50 MG: 20 TABLET ORAL at 04:02

## 2019-02-07 RX ADMIN — BACITRACIN: 500 OINTMENT TOPICAL at 09:02

## 2019-02-07 RX ADMIN — CARVEDILOL 25 MG: 12.5 TABLET, FILM COATED ORAL at 04:02

## 2019-02-07 RX ADMIN — Medication 1 CAPSULE: at 08:02

## 2019-02-07 RX ADMIN — SEVELAMER CARBONATE 2.4 G: 2400 POWDER, FOR SUSPENSION ORAL at 09:02

## 2019-02-07 RX ADMIN — CARVEDILOL 25 MG: 12.5 TABLET, FILM COATED ORAL at 06:02

## 2019-02-07 NOTE — PT/OT/SLP PROGRESS
Speech Language Pathology      Jones Red  MRN: [id number]    Speech therapy attempted 3x this date. Patient not seen secondary to Nursing Care and then in dialysis. Will follow-up per POC.     Nolan Aguirre, JOSSELIN-SLP

## 2019-02-07 NOTE — ASSESSMENT & PLAN NOTE
-Marked improvement with dialysis which we need to continue.    -Continue with additional volume removal with renal replacement therapy as recommended by Nephrology.  -No ARB due to renal dysfunction.  -Continue coreg.

## 2019-02-07 NOTE — PROGRESS NOTES
[facility] Medicine  Progress Note    Patient Name: Jones Red  MRN: [id number]  Patient Class: IP- Inpatient   Admission Date: 1/28/2019  Length of Stay: 10 days  Attending Physician: Jones Milner MD  Primary Care Provider: Primary Doctor No        Subjective:     Principal Problem:Septic shock    HPI:  Mr. Jones Red is a 53 y.o. male, with PMH of HTN, COPD, chronic respiratory failure (on home O2), Diastolic CHF, LYNDON, chronic venous insufficiency, and questionable seizures history (suspected to be 2/2 alcohol withdrawal), who preset tia to [facility] on 1/22/19 2/2 SOB. This was associated with cough productive of brown sputum. He was started in BiPap, but did ultimately degrade, and was intubated, and maintained on a ventilator. He was additionally altered upon arrival to the ED. History was reportedly obtained by a friend, and it was reported that he was found down at home, and was unable to get up. He as admitted to the ICU, started on IV fluids and pressors.     Hospital Course:  Patient 53-year-old gentleman with history of hypertension, diastolic heart failure, chronic obstructive pulmonary disease with chronic hypoxic respiratory failure on home oxygen, prior alcohol abuse, significant ongoing tobacco use (3 packs per day), and morbid obesity with septic shock secondary to right lower extremity cellulitis complicating chronic venous stasis.  Patient intubated at outside hospital after failing a trial of BiPAP.  Patient also with evidence of worsening renal failure with rhabdomyolysis.       Patient transferred from [facility] in [place], [place] to [facility] on 1/28/2019.  Patient started on renal replacement therapy.  Patient remains intubated and sedated but responds to stimuli when sedation held but otherwise confused.  Ultrasound of the right lower extremity did not reveal evidence of abscess, subcutaneous gas, or evidence  "of necrotizing fascitits.  General surgery and wound care consulted.  No surgical intervention recommended at this time.  Patient on broad spectrum antibiotics.    Patient clinically improved in terms his volume status and was successfully extubated on 2/2/2019.    Interval History:  Stepped down to the floor on 2/5.  New tunneled dialysis catheter placed 2/6.  Getting dialysis today.  Remains alert but completely disoriented.  States that his name is "Jeff Valles".    Review of Systems   Constitutional: Negative for chills and fever.   HENT: Negative for congestion and dental problem.    Eyes: Negative for discharge and itching.   Respiratory: Negative for shortness of breath.    Cardiovascular: Negative for chest pain.   Gastrointestinal: Negative for abdominal pain, constipation, diarrhea, nausea and vomiting.   Endocrine: Negative for cold intolerance and heat intolerance.   Musculoskeletal: Negative for arthralgias and back pain.   Neurological: Negative for dizziness and facial asymmetry.   Hematological: Negative for adenopathy.   Psychiatric/Behavioral: Negative for agitation and behavioral problems.     Objective:     Vital Signs (Most Recent):  Temp: 97.9 °F (36.6 °C) (02/07/19 0841)  Pulse: 94 (02/07/19 0841)  Resp: 20 (02/07/19 0841)  BP: 131/68 (02/07/19 0841)  SpO2: (!) 91 % (02/07/19 0841) Vital Signs (24h Range):  Temp:  [96.1 °F (35.6 °C)-98.3 °F (36.8 °C)] 97.9 °F (36.6 °C)  Pulse:  [] 94  Resp:  [18-22] 20  SpO2:  [80 %-95 %] 91 %  BP: (121-170)/() 131/68     Weight: (!) 169 kg (372 lb 9.2 oz)  Body mass index is 46.57 kg/m².    Intake/Output Summary (Last 24 hours) at 2/7/2019 0959  Last data filed at 2/7/2019 0500  Gross per 24 hour   Intake 1770 ml   Output 5400 ml   Net -3630 ml      Physical Exam   Constitutional: Vital signs are normal. He appears well-developed.  Non-toxic appearance. He does not have a sickly appearance. He does not appear ill. No distress.   Morbid obesity. "   HENT:   Head: Normocephalic and atraumatic.   Right Ear: External ear normal.   Left Ear: External ear normal.   Eyes: Conjunctivae and EOM are normal. Pupils are equal, round, and reactive to light. No scleral icterus.   Neck: Normal range of motion. Neck supple. No JVD present. No tracheal deviation present.   Cardiovascular: Normal rate, regular rhythm, normal heart sounds and intact distal pulses. Exam reveals no gallop and no friction rub.   No murmur heard.  Pulmonary/Chest: Effort normal and breath sounds normal. No stridor. No respiratory distress. He has no wheezes. He has no rales.   Distant lung sounds.  Moving air well.  Some crackles.   Abdominal: Soft. Bowel sounds are normal. He exhibits no distension and no mass. There is no tenderness. There is no guarding.   Musculoskeletal: Normal range of motion. He exhibits no edema or deformity.   Neurological: He is alert. He exhibits normal muscle tone.   Alert but not oriented to self, year, city, hospital or president.  Follows simple commands.  Moves all extremities.   Skin: Skin is warm and dry. No rash noted. He is not diaphoretic. No erythema.   Right lower extremity is erythematous, with blistering and edema.  Area of erythema has not extended beyond pen marking. Less edematous following renal replacement therapy.   Psychiatric:   Much more appropriate today.  Oriented x2.    Nursing note and vitals reviewed.      Significant Labs: All pertinent labs within the past 24 hours have been reviewed.    Significant Imaging: I have reviewed all pertinent imaging results/findings within the past 24 hours.    Assessment/Plan:      * Septic shock    -Admitted to inpatient status in ICU upon transfer from [place] with septic shock.  Treated with pressors and IV antibiotics.  Ultimately weaned off of pressors and transferred to the floor on 2/5.  -Possible component of pneumonia but clear cause of his infection is the severe cellulitis involving the right  lower extremity.    -Ultrasound of right lower extremity did not reveal evidence of abscess or gas.    -General surgery was consulted and recommended serial exams for now and no surgical intervention at this time.    -Cellulitis continues to improve.  WBC improving.    -Bood cultures no growth to date.    -Continue to elevate right lower extremity as tolerated.    -Appreciate input from ID.  Per their recs will continue cefepime and vancomycin until 2/11 per ID(EOC 2/11).  -Continue pulse dosing of vancomycin (will give 500 mg after HD today).  -Continue with wound care.     Encephalopathy, metabolic    -Most likely secondary to sepsis and infection but could be component of hypoxia or other metabolic issue?  -Will check TSH, RPR, B12, Folate and ammonia.  -Re-orient as needed  -Delirium precautions.       Essential hypertension    -At home takes coreg, losartan, hctz, metolazone and bumex  -BP improved today and is normal.  -Continue coreg and PRN hydralazine     Tobacco abuse    -Patient counseled on the importance of smoking cessation.  -NRT offered     Acute on chronic respiratory failure with hypoxia and hypercapnia    -Successfully extubated 2/2/2018.  Patient did well on BiPAP following extubation.  He has needed intermittent BiPAP.    -Continue with bronchodilator treatments as I suspect patient has COPD given wheezing and significant tobacco smoking history.    -Continue volume removal with CRRT as able.  -continue supplemental O2 requirements for goal sat 88-92%  -Continue steroids x 2 more days.  Change from IV to oral prednisone today.  -Will need outpatient PFTs and probable ICS/LAMA/LABA. When patient is being discharged will start him on Spiriva.         Acute on chronic diastolic heart failure    -Marked improvement with dialysis which we need to continue.    -Continue with additional volume removal with renal replacement therapy as recommended by Nephrology.  -No ARB due to renal  dysfunction.  -Continue coreg.     Cellulitis of right lower extremity    -As above for septic shock     Acute renal failure    -Due to ATN from sepsis with shock and rhabdomyolysis.    -Continues to have elevated anion gap.  -Unfortunately he does need HD.  We were unable to complete this on 2/5 due to clotted trialysis catheter.  -Received new HD catheter on 2/6.  Getting dialysis today.  -Continue with dialysis per nephrology.  -Working on outpatient placement as he will need ongoing dialysis       VTE Risk Mitigation (From admission, onward)        Ordered     heparin (porcine) injection 2,000 Units  Once      02/06/19 1729     heparin (porcine) injection 5,000 Units  As needed (PRN)      01/31/19 1825     heparin (porcine) injection 2,000 Units  As needed (PRN)      01/30/19 1056     heparin (porcine) injection 5,000 Units  Every 8 hours      01/29/19 2044     heparin (porcine) injection 5,000 Units  As needed (PRN)      01/29/19 1335     IP VTE HIGH RISK PATIENT  Once      01/28/19 1947              Jones Milner MD  Department of Hospital Medicine   [facility]

## 2019-02-07 NOTE — PLAN OF CARE
Problem: Adult Inpatient Plan of Care  Goal: Plan of Care Review  Outcome: Ongoing (interventions implemented as appropriate)  Received patient on 3 lpm nasal cannula, throughout the night patient would pull nasal cannula off SpO2 low 80s. Patient refused to wear BiPAP, pulls BiPAP mask off. Mitzy Chapa PA-C notified and aware. RN aware. When patient does have nasal cannula on SpO2 89-91% increased flow to 4 lpm on nasal cannula. Aerosol treatments given q4 with cpt, patient does not like it and tries to take off mask before aerosol tx is done. BBS diminished with exp wheezes. Will continue to monitor.

## 2019-02-07 NOTE — PT/OT/SLP PROGRESS
Occupational Therapy      Patient Name:  Jones Red   MRN:  [id number]    Patient not seen today secondary to Dialysis. Will follow-up per OT POC.    FIONA Sanchez  2/7/2019

## 2019-02-07 NOTE — PROGRESS NOTES
Pt received on 4LNC;SPO2 adequate. Weaned pt to 3L;SPO2 remains WNL. Treatment regimen was given and tolerated well. No changes made. Will continue to monitor.

## 2019-02-07 NOTE — ASSESSMENT & PLAN NOTE
-Due to ATN from sepsis with shock and rhabdomyolysis.    -Continues to have elevated anion gap.  -Unfortunately he does need HD.  We were unable to complete this on 2/5 due to clotted trialysis catheter.  -Received new HD catheter on 2/6.  Getting dialysis today.  -Continue with dialysis per nephrology.  -Working on outpatient placement as he will need ongoing dialysis

## 2019-02-07 NOTE — ASSESSMENT & PLAN NOTE
-Most likely secondary to sepsis and infection but could be component of hypoxia or other metabolic issue?  -Will check TSH, RPR, B12, Folate and ammonia.  -Re-orient as needed  -Delirium precautions.

## 2019-02-07 NOTE — ASSESSMENT & PLAN NOTE
-At home takes coreg, losartan, hctz, metolazone and bumex  -BP improved today and is normal.  -Continue coreg and PRN hydralazine

## 2019-02-07 NOTE — PLAN OF CARE
Tiffani [facility] auth number [id number].     Patients  is going to sign paperwork at 8am. Discharge tomorrow 2-8-2019 02/07/19 1616   Post-Acute Status   Post-Acute Authorization Placement   Post-Acute Placement Status Authorization Obtained

## 2019-02-07 NOTE — SUBJECTIVE & OBJECTIVE
"Interval History:  Stepped down to the floor on 2/5.  New tunneled dialysis catheter placed 2/6.  Getting dialysis today.  Remains alert but completely disoriented.  States that his name is "Jeff Valles".    Review of Systems   Constitutional: Negative for chills and fever.   HENT: Negative for congestion and dental problem.    Eyes: Negative for discharge and itching.   Respiratory: Negative for shortness of breath.    Cardiovascular: Negative for chest pain.   Gastrointestinal: Negative for abdominal pain, constipation, diarrhea, nausea and vomiting.   Endocrine: Negative for cold intolerance and heat intolerance.   Musculoskeletal: Negative for arthralgias and back pain.   Neurological: Negative for dizziness and facial asymmetry.   Hematological: Negative for adenopathy.   Psychiatric/Behavioral: Negative for agitation and behavioral problems.     Objective:     Vital Signs (Most Recent):  Temp: 97.9 °F (36.6 °C) (02/07/19 0841)  Pulse: 94 (02/07/19 0841)  Resp: 20 (02/07/19 0841)  BP: 131/68 (02/07/19 0841)  SpO2: (!) 91 % (02/07/19 0841) Vital Signs (24h Range):  Temp:  [96.1 °F (35.6 °C)-98.3 °F (36.8 °C)] 97.9 °F (36.6 °C)  Pulse:  [] 94  Resp:  [18-22] 20  SpO2:  [80 %-95 %] 91 %  BP: (121-170)/() 131/68     Weight: (!) 169 kg (372 lb 9.2 oz)  Body mass index is 46.57 kg/m².    Intake/Output Summary (Last 24 hours) at 2/7/2019 0959  Last data filed at 2/7/2019 0500  Gross per 24 hour   Intake 1770 ml   Output 5400 ml   Net -3630 ml      Physical Exam   Constitutional: Vital signs are normal. He appears well-developed.  Non-toxic appearance. He does not have a sickly appearance. He does not appear ill. No distress.   Morbid obesity.   HENT:   Head: Normocephalic and atraumatic.   Right Ear: External ear normal.   Left Ear: External ear normal.   Eyes: Conjunctivae and EOM are normal. Pupils are equal, round, and reactive to light. No scleral icterus.   Neck: Normal range of motion. Neck supple. " No JVD present. No tracheal deviation present.   Cardiovascular: Normal rate, regular rhythm, normal heart sounds and intact distal pulses. Exam reveals no gallop and no friction rub.   No murmur heard.  Pulmonary/Chest: Effort normal and breath sounds normal. No stridor. No respiratory distress. He has no wheezes. He has no rales.   Distant lung sounds.  Moving air well.  Some crackles.   Abdominal: Soft. Bowel sounds are normal. He exhibits no distension and no mass. There is no tenderness. There is no guarding.   Musculoskeletal: Normal range of motion. He exhibits no edema or deformity.   Neurological: He is alert. He exhibits normal muscle tone.   Alert but not oriented to self, year, city, hospital or president.  Follows simple commands.  Moves all extremities.   Skin: Skin is warm and dry. No rash noted. He is not diaphoretic. No erythema.   Right lower extremity is erythematous, with blistering and edema.  Area of erythema has not extended beyond pen marking. Less edematous following renal replacement therapy.   Psychiatric:   Much more appropriate today.  Oriented x2.    Nursing note and vitals reviewed.      Significant Labs: All pertinent labs within the past 24 hours have been reviewed.    Significant Imaging: I have reviewed all pertinent imaging results/findings within the past 24 hours.

## 2019-02-07 NOTE — PT/OT/SLP PROGRESS
Physical Therapy      Patient Name:  Jones Red   MRN:  [id number]    Patient not seen today secondary to Dialysis. Attempted AM session at 0945 and PM session at 1338 and 1500 and pt in dialysis. Will follow-up tomorrow, 2/8/19.    Wendy Santillan, PT, DPT

## 2019-02-07 NOTE — ASSESSMENT & PLAN NOTE
-Successfully extubated 2/2/2018.  Patient did well on BiPAP following extubation.  He has needed intermittent BiPAP.    -Continue with bronchodilator treatments as I suspect patient has COPD given wheezing and significant tobacco smoking history.    -Continue volume removal with CRRT as able.  -continue supplemental O2 requirements for goal sat 88-92%  -Continue steroids x 2 more days.  Change from IV to oral prednisone today.  -Will need outpatient PFTs and probable ICS/LAMA/LABA. When patient is being discharged will start him on Spiriva.

## 2019-02-07 NOTE — PLAN OF CARE
Problem: Adult Inpatient Plan of Care  Goal: Plan of Care Review  Outcome: Ongoing (interventions implemented as appropriate)  Plan of care reviewed with patient. Patient making inappropriate request and gestures throughout shift. Patient blowing kisses and asking for kisses. Informed patient that his behavior was inappropriate and would not be tolerated, patient verbalized understanding. All safety measures in place. Will continue to monitor.

## 2019-02-07 NOTE — PLAN OF CARE
LMSW hard faxed referral to [facility] fax number [phone].     LMSW right cared additional referrals.        02/07/19 8609   Post-Acute Status   Post-Acute Authorization Placement   Post-Acute Placement Status Referrals Sent

## 2019-02-07 NOTE — PLAN OF CARE
LMSW contacted the patients wife Aneta and talked to her about SNF/NH placement. Aneta is reluctantly agreeable. She would prefer if she could become his paid caregiver. LMSW explained to Aneta that she would have to contact the state, there is a long waiting list, and the patient still needs therapy. There is only one NH in [facility].     LMSW contacted david schaeffer, admissions person is at lunch.       02/07/19 8677   Post-Acute Status   Post-Acute Authorization Placement   Post-Acute Placement Status Patient List Provided

## 2019-02-07 NOTE — ASSESSMENT & PLAN NOTE
-Admitted to inpatient status in ICU upon transfer from [place] with septic shock.  Treated with pressors and IV antibiotics.  Ultimately weaned off of pressors and transferred to the floor on 2/5.  -Possible component of pneumonia but clear cause of his infection is the severe cellulitis involving the right lower extremity.    -Ultrasound of right lower extremity did not reveal evidence of abscess or gas.    -General surgery was consulted and recommended serial exams for now and no surgical intervention at this time.    -Cellulitis continues to improve.  WBC improving.    -Bood cultures no growth to date.    -Continue to elevate right lower extremity as tolerated.    -Appreciate input from ID.  Per their recs will continue cefepime and vancomycin until 2/11 per ID(EOC 2/11).  -Continue pulse dosing of vancomycin (will give 500 mg after HD today).  -Continue with wound care.

## 2019-02-07 NOTE — PROGRESS NOTES
"Nephrology  Progress Note    Admit Date: 1/28/2019   LOS: 10 days     SUBJECTIVE:     Follow-up For:  Septic shock/ATN    Interval History:    Seen on dialysis and resting peacefully.  Discussed with treatment team    Review of Systems:    Neg.     OBJECTIVE:     Vital Signs Range (Last 24H):  /83 (BP Location: Left arm)   Pulse 81   Temp 98 °F (36.7 °C) (Oral)   Resp 20   Ht 6' 3" (1.905 m)   Wt (!) 169 kg (372 lb 9.2 oz)   SpO2 (!) 91%   BMI 46.57 kg/m²     Temp:  [96.1 °F (35.6 °C)-98.3 °F (36.8 °C)]   Pulse:  [79-98]   Resp:  [18-22]   BP: (121-170)/()   SpO2:  [80 %-95 %]     I & O (Last 24H):    Intake/Output Summary (Last 24 hours) at 2/7/2019 1049  Last data filed at 2/7/2019 0500  Gross per 24 hour   Intake 1770 ml   Output 5400 ml   Net -3630 ml       Physical Exam:  General appearance:  Disheveled and morbidly obese.     Eyes:  Conjunctivae/corneas clear. PERRL.  Lungs: diminished.   Heart: Regular rate and rhythm, distant heart tones.  Abdomen: Soft, non-tender non-distended; bowel sounds normal; no masses,  no organomegaly, morbidly obese  Extremities:  Chronic Woody edema.   peeling/flaky LE's (R>L).    Skin:  Right lower extremity cellulitis improved.   Abdalla catheterization  Right IJ CHERIE       Laboratory Data:  Recent Labs   Lab 02/07/19  0750   WBC 12.63   RBC 3.22*   HGB 9.2*   HCT 28.6*   *   MCV 89   MCH 28.6   MCHC 32.2       BMP:   Recent Labs   Lab 02/07/19  0750   GLU 83      K 4.4   CL 99   CO2 25   BUN 85*   CREATININE 8.2*   CALCIUM 9.2   MG 2.6     Lab Results   Component Value Date    CALCIUM 9.2 02/07/2019    PHOS 9.8 (HH) 02/07/2019       Lab Results   Component Value Date    .4 (H) 01/30/2019    CALCIUM 9.2 02/07/2019    PHOS 9.8 (HH) 02/07/2019       No results found for: URICACID    BNP  No results for input(s): BNP, BNPTRIAGEBLO in the last 168 hours.    Medications:  Medication list was reviewed and changes noted under " Assessment/Plan.    Diagnostic Results:    X-Ray Chest AP Portable   Final Result      NG tube in place.         Electronically signed by: Viviane Persaud MD   Date:    02/02/2019   Time:    23:59      X-Ray Chest 1 View   Final Result      As above.         Electronically signed by: Keon Jonas MD   Date:    01/31/2019   Time:    19:14      X-Ray Chest AP Portable   Final Result      Lines and tubes are grossly unchanged.      Interval improvement in aeration of the lungs.         Electronically signed by: Connor Babcock MD   Date:    01/31/2019   Time:    16:42      X-Ray Chest 1 View   Final Result      Overall no significant interval change         Electronically signed by: Connor Babcock MD   Date:    01/30/2019   Time:    08:24      US Extremity Non Vascular Limited Right   Final Result      Nonspecific subcutaneous edema, cutaneous thickening, could represent cellulitis changes.  No definite necrotizing fasciitis, gas or abscess.         Electronically signed by: Tin Leone MD   Date:    01/29/2019   Time:    13:09      X-Ray Chest AP Portable   Final Result   Addendum 1 of 1      Enteric tube projects in unchanged radiographic position with tip coursing    below the diaphragm and appearing to extend beyond the field of view.         Electronically signed by: Trena Sena MD   Date:    01/29/2019   Time:    00:39      Final      As above.         Electronically signed by: Trena Sena MD   Date:    01/29/2019   Time:    00:04      US Lower Extremity Veins Right   Final Result      Limited examination.  No evidence of deep venous thrombosis in the right lower extremity.         Electronically signed by: Vanita Oviedo   Date:    01/28/2019   Time:    21:10      X-Ray Chest AP Portable   Final Result      Cardiomegaly with findings suggesting pulmonary edema, correlation advised.  Differential would include infection.         Electronically signed by: Kalyan Galvez MD   Date:    01/28/2019    Time:    20:19          ASSESSMENT/PLAN:     1. Persistent Multifactorial anuric (but signs of increased outpt over last 24 hours) acute renal failure secondary to ATN from septic shock, rhabdomyolysis, with hyponatremia, and hyperkalemia (N17.0, N17.9, E87.5, E87.1, M62.82, A41.9):  Transferred from outlying facility for CRRT and has transitioned to standard hemodialysis.   Some issues with clotting of dialysis catheter.  Continue daily HD/UF until signs of renal recovery.  Timeframe unknown at this time.  Tunneled line placed and thanks to [facility] Dr. Barcenas.  Consulted SW to start process of outpt placement for HD unit near home or place of rehab/SNF.  Renally dose meds, avoid nephrotoxins, and monitor I/O's closely.  2. Hyperphosphatemia.  Binders.  HD.   3. Secondary hyperparathyroidism due to acute kidney injury.  Calcium within normal limits.  No need to treat at this point.  4. Septic shock and resp failure:  improving daily. .    5. RLE Cellulitis/woody edema (L03.115): Defer to wound care and ID.  6. Anemia of illness:  checking iron/b12/folate.  Start ONDINA if trends continue.    7. Morbid obesity (E44):  Severe morbid obesity the root of most of this patients' medical issues.  Defer.       See above  Will continue to follow for renal needs.

## 2019-02-08 LAB
ANION GAP SERPL CALC-SCNC: 16 MMOL/L
BASOPHILS # BLD AUTO: 0.06 K/UL
BASOPHILS NFR BLD: 0.6 %
BUN SERPL-MCNC: 63 MG/DL
CALCIUM SERPL-MCNC: 9 MG/DL
CHLORIDE SERPL-SCNC: 97 MMOL/L
CO2 SERPL-SCNC: 25 MMOL/L
CREAT SERPL-MCNC: 6.3 MG/DL
DIFFERENTIAL METHOD: ABNORMAL
EOSINOPHIL # BLD AUTO: 0.1 K/UL
EOSINOPHIL NFR BLD: 1 %
ERYTHROCYTE [DISTWIDTH] IN BLOOD BY AUTOMATED COUNT: 14.7 %
EST. GFR  (AFRICAN AMERICAN): 11 ML/MIN/1.73 M^2
EST. GFR  (NON AFRICAN AMERICAN): 9 ML/MIN/1.73 M^2
GLUCOSE SERPL-MCNC: 81 MG/DL
HCT VFR BLD AUTO: 28.4 %
HGB BLD-MCNC: 9 G/DL
LYMPHOCYTES # BLD AUTO: 1.7 K/UL
LYMPHOCYTES NFR BLD: 16.4 %
MAGNESIUM SERPL-MCNC: 2.4 MG/DL
MCH RBC QN AUTO: 28.2 PG
MCHC RBC AUTO-ENTMCNC: 31.7 G/DL
MCV RBC AUTO: 89 FL
MONOCYTES # BLD AUTO: 0.7 K/UL
MONOCYTES NFR BLD: 7 %
NEUTROPHILS # BLD AUTO: 7.7 K/UL
NEUTROPHILS NFR BLD: 74.5 %
PHOSPHATE SERPL-MCNC: 8 MG/DL
PLATELET # BLD AUTO: 341 K/UL
PMV BLD AUTO: 9.9 FL
POTASSIUM SERPL-SCNC: 4.3 MMOL/L
RBC # BLD AUTO: 3.19 M/UL
RPR SER QL: NORMAL
SODIUM SERPL-SCNC: 138 MMOL/L
VANCOMYCIN SERPL-MCNC: 12.5 UG/ML
WBC # BLD AUTO: 10.33 K/UL

## 2019-02-08 PROCEDURE — 25000242 PHARM REV CODE 250 ALT 637 W/ HCPCS: Performed by: HOSPITALIST

## 2019-02-08 PROCEDURE — 94668 MNPJ CHEST WALL SBSQ: CPT

## 2019-02-08 PROCEDURE — 63600175 PHARM REV CODE 636 W HCPCS: Performed by: HOSPITALIST

## 2019-02-08 PROCEDURE — 80202 ASSAY OF VANCOMYCIN: CPT

## 2019-02-08 PROCEDURE — 80048 BASIC METABOLIC PNL TOTAL CA: CPT

## 2019-02-08 PROCEDURE — 99233 SBSQ HOSP IP/OBS HIGH 50: CPT | Mod: ,,, | Performed by: HOSPITALIST

## 2019-02-08 PROCEDURE — 97110 THERAPEUTIC EXERCISES: CPT

## 2019-02-08 PROCEDURE — 11000001 HC ACUTE MED/SURG PRIVATE ROOM

## 2019-02-08 PROCEDURE — 99900035 HC TECH TIME PER 15 MIN (STAT)

## 2019-02-08 PROCEDURE — 84100 ASSAY OF PHOSPHORUS: CPT

## 2019-02-08 PROCEDURE — 94640 AIRWAY INHALATION TREATMENT: CPT

## 2019-02-08 PROCEDURE — 80100016 HC MAINTENANCE HEMODIALYSIS

## 2019-02-08 PROCEDURE — 25000242 PHARM REV CODE 250 ALT 637 W/ HCPCS: Performed by: STUDENT IN AN ORGANIZED HEALTH CARE EDUCATION/TRAINING PROGRAM

## 2019-02-08 PROCEDURE — 63600175 PHARM REV CODE 636 W HCPCS: Performed by: NURSE PRACTITIONER

## 2019-02-08 PROCEDURE — 83735 ASSAY OF MAGNESIUM: CPT

## 2019-02-08 PROCEDURE — 36569 INSJ PICC 5 YR+ W/O IMAGING: CPT

## 2019-02-08 PROCEDURE — 63600175 PHARM REV CODE 636 W HCPCS: Performed by: INTERNAL MEDICINE

## 2019-02-08 PROCEDURE — 25000003 PHARM REV CODE 250: Performed by: HOSPITALIST

## 2019-02-08 PROCEDURE — C1751 CATH, INF, PER/CENT/MIDLINE: HCPCS

## 2019-02-08 PROCEDURE — 97127 HC THERAPEUTIC INTVTN, COGN FUNCTION - OT: CPT

## 2019-02-08 PROCEDURE — 85025 COMPLETE CBC W/AUTO DIFF WBC: CPT

## 2019-02-08 PROCEDURE — 94660 CPAP INITIATION&MGMT: CPT

## 2019-02-08 PROCEDURE — 63600175 PHARM REV CODE 636 W HCPCS: Performed by: CLINIC/CENTER

## 2019-02-08 PROCEDURE — 27000221 HC OXYGEN, UP TO 24 HOURS

## 2019-02-08 PROCEDURE — 94761 N-INVAS EAR/PLS OXIMETRY MLT: CPT

## 2019-02-08 PROCEDURE — 99233 PR SUBSEQUENT HOSPITAL CARE,LEVL III: ICD-10-PCS | Mod: ,,, | Performed by: HOSPITALIST

## 2019-02-08 PROCEDURE — 63600175 PHARM REV CODE 636 W HCPCS: Performed by: STUDENT IN AN ORGANIZED HEALTH CARE EDUCATION/TRAINING PROGRAM

## 2019-02-08 PROCEDURE — 97535 SELF CARE MNGMENT TRAINING: CPT

## 2019-02-08 RX ORDER — DIPHENHYDRAMINE HCL 25 MG
25 CAPSULE ORAL ONCE
Status: DISCONTINUED | OUTPATIENT
Start: 2019-02-08 | End: 2019-02-18

## 2019-02-08 RX ADMIN — CARVEDILOL 25 MG: 12.5 TABLET, FILM COATED ORAL at 05:02

## 2019-02-08 RX ADMIN — ERYTHROPOIETIN 6000 UNITS: 4000 INJECTION, SOLUTION INTRAVENOUS; SUBCUTANEOUS at 09:02

## 2019-02-08 RX ADMIN — BACITRACIN: 500 OINTMENT TOPICAL at 10:02

## 2019-02-08 RX ADMIN — SODIUM CHLORIDE SOLN NEBU 3% 4 ML: 3 NEBU SOLN at 07:02

## 2019-02-08 RX ADMIN — IPRATROPIUM BROMIDE AND ALBUTEROL SULFATE 3 ML: .5; 3 SOLUTION RESPIRATORY (INHALATION) at 03:02

## 2019-02-08 RX ADMIN — HYDRALAZINE HYDROCHLORIDE 10 MG: 20 INJECTION INTRAMUSCULAR; INTRAVENOUS at 01:02

## 2019-02-08 RX ADMIN — IPRATROPIUM BROMIDE AND ALBUTEROL SULFATE 3 ML: .5; 3 SOLUTION RESPIRATORY (INHALATION) at 07:02

## 2019-02-08 RX ADMIN — HEPARIN SODIUM 2000 UNITS: 1000 INJECTION, SOLUTION INTRAVENOUS; SUBCUTANEOUS at 09:02

## 2019-02-08 RX ADMIN — BACITRACIN: 500 OINTMENT TOPICAL at 05:02

## 2019-02-08 RX ADMIN — HEPARIN SODIUM 5000 UNITS: 5000 INJECTION, SOLUTION INTRAVENOUS; SUBCUTANEOUS at 10:02

## 2019-02-08 RX ADMIN — VANCOMYCIN HYDROCHLORIDE 750 MG: 750 INJECTION, POWDER, LYOPHILIZED, FOR SOLUTION INTRAVENOUS at 03:02

## 2019-02-08 RX ADMIN — CEFEPIME HYDROCHLORIDE 1 G: 1 INJECTION, SOLUTION INTRAVENOUS at 03:02

## 2019-02-08 RX ADMIN — HEPARIN SODIUM 5000 UNITS: 5000 INJECTION, SOLUTION INTRAVENOUS; SUBCUTANEOUS at 07:02

## 2019-02-08 NOTE — ASSESSMENT & PLAN NOTE
-Successfully extubated 2/2/2018.  Patient did well on BiPAP following extubation.  He has needed intermittent BiPAP.    -Continue with bronchodilator treatments as I suspect patient has COPD given wheezing and significant tobacco smoking history.    -Continue volume removal with CRRT as able.  -continue supplemental O2 requirements for goal sat 88-92%  -Continue steroids x 1 more days.    -Will need outpatient PFTs and probable ICS/LAMA/LABA. When patient is being discharged will start him on Spiriva.

## 2019-02-08 NOTE — ASSESSMENT & PLAN NOTE
-Due to ATN from sepsis with shock and rhabdomyolysis.    -Unfortunately it looks as if he has progressed to ESRD  -We were unable HD on 2/5 due to clotted trialysis catheter.  Received new HD catheter on 2/6.  Completed dialysis yesterday.  -Continue with dialysis per nephrology.  -Working on outpatient placement as he will need ongoing dialysis

## 2019-02-08 NOTE — ASSESSMENT & PLAN NOTE
-Most likely secondary to sepsis and infection.  Much improved today.  -TSH, B12, Folate are not deficient.  Ammonia is normal.  Await RPR.  -Re-orient as needed  -Delirium precautions.

## 2019-02-08 NOTE — PROGRESS NOTES
"Nephrology  Progress Note    Admit Date: 1/28/2019   LOS: 11 days     SUBJECTIVE:     Follow-up For:  Septic shock/ATN    Interval History:    Seen on dialysis.  Events noted.  Labile mood and mental awareness.  Pulled out PICC line this morning.  Discussed with treatment team.      Review of Systems:    Neg.     OBJECTIVE:     Vital Signs Range (Last 24H):  /83 (BP Location: Left arm, Patient Position: Lying)   Pulse 102   Temp 97.2 °F (36.2 °C) (Axillary)   Resp 20   Ht 6' 3" (1.905 m)   Wt (!) 166 kg (365 lb 15.4 oz)   SpO2 (!) 93%   BMI 45.74 kg/m²     Temp:  [97.1 °F (36.2 °C)-98.8 °F (37.1 °C)]   Pulse:  []   Resp:  [18-26]   BP: (102-165)/(47-89)   SpO2:  [87 %-93 %]     I & O (Last 24H):    Intake/Output Summary (Last 24 hours) at 2/8/2019 0855  Last data filed at 2/7/2019 1400  Gross per 24 hour   Intake --   Output 4000 ml   Net -4000 ml       Physical Exam:  General appearance:  Disheveled and morbidly obese.  Labile mood   Eyes:  Conjunctivae/corneas clear. PERRL.  Lungs: diminished.   Heart: Regular rate and rhythm, distant heart tones.  Abdomen: Soft, non-tender non-distended; bowel sounds normal; no masses,  no organomegaly, morbidly obese  Extremities:  Chronic Woody edema.   peeling/flaky LE's (R>L).    Skin:  Right lower extremity cellulitis improved.   Abdalla catheterization  Right IJ CHERIE       Laboratory Data:  Recent Labs   Lab 02/08/19 0439   WBC 10.33   RBC 3.19*   HGB 9.0*   HCT 28.4*      MCV 89   MCH 28.2   MCHC 31.7*       BMP:   Recent Labs   Lab 02/08/19 0439   GLU 81      K 4.3   CL 97   CO2 25   BUN 63*   CREATININE 6.3*   CALCIUM 9.0   MG 2.4     Lab Results   Component Value Date    CALCIUM 9.0 02/08/2019    PHOS 8.0 (H) 02/08/2019       Lab Results   Component Value Date    .4 (H) 01/30/2019    CALCIUM 9.0 02/08/2019    PHOS 8.0 (H) 02/08/2019       No results found for: URICACID    BNP  No results for input(s): BNP, BNPTRIAGEBLO in the last " 168 hours.    Medications:  Medication list was reviewed and changes noted under Assessment/Plan.    Diagnostic Results:    X-Ray Chest AP Portable   Final Result      NG tube in place.         Electronically signed by: Viviane Persaud MD   Date:    02/02/2019   Time:    23:59      X-Ray Chest 1 View   Final Result      As above.         Electronically signed by: Keon Jonas MD   Date:    01/31/2019   Time:    19:14      X-Ray Chest AP Portable   Final Result      Lines and tubes are grossly unchanged.      Interval improvement in aeration of the lungs.         Electronically signed by: Connor Babcock MD   Date:    01/31/2019   Time:    16:42      X-Ray Chest 1 View   Final Result      Overall no significant interval change         Electronically signed by: Connor Babcock MD   Date:    01/30/2019   Time:    08:24      US Extremity Non Vascular Limited Right   Final Result      Nonspecific subcutaneous edema, cutaneous thickening, could represent cellulitis changes.  No definite necrotizing fasciitis, gas or abscess.         Electronically signed by: Tin Leone MD   Date:    01/29/2019   Time:    13:09      X-Ray Chest AP Portable   Final Result   Addendum 1 of 1      Enteric tube projects in unchanged radiographic position with tip coursing    below the diaphragm and appearing to extend beyond the field of view.         Electronically signed by: Trena Sena MD   Date:    01/29/2019   Time:    00:39      Final      As above.         Electronically signed by: Trena Sena MD   Date:    01/29/2019   Time:    00:04      US Lower Extremity Veins Right   Final Result      Limited examination.  No evidence of deep venous thrombosis in the right lower extremity.         Electronically signed by: Vanita Oviedo   Date:    01/28/2019   Time:    21:10      X-Ray Chest AP Portable   Final Result      Cardiomegaly with findings suggesting pulmonary edema, correlation advised.  Differential would include infection.          Electronically signed by: Kalyan Galvez MD   Date:    01/28/2019   Time:    20:19      X-Ray Chest 1 View for PICC_Central line    (Results Pending)       ASSESSMENT/PLAN:     1. Persistent Multifactorial anuric (but signs of increased outpt over last 48 hours) acute renal failure secondary to ATN from septic shock, rhabdomyolysis, with hyponatremia, and hyperkalemia (N17.0, N17.9, E87.5, E87.1, M62.82, A41.9):  [facility] and has transitioned to standard hemodialysis.   Some issues with clotting of dialysis catheter.  Continue daily HD/UF until signs of renal recovery.  Timeframe unknown at this time.  Tunneled line placed and thanks to U Dr. Barcenas.  Consulted SW to start process of outpt placement for HD unit near home or place of rehab/SNF.  Renally dose meds, avoid nephrotoxins, and monitor I/O's closely.  2. Hyperphosphatemia.  Binders.  HD.   3. Secondary hyperparathyroidism due to acute kidney injury.  Calcium within normal limits.  No need to treat at this point.  4. Septic shock and resp failure:  improving daily. .    5. RLE Cellulitis/woody edema (L03.115): Defer to wound care and ID.  6. Anemia of illness:  Iron/b12/folate wnl.  Start ONDINA. Nephrocaps.     7. Morbid obesity (E44):  Severe morbid obesity the root of most of this patients' medical issues.  Defer.       See above  Will continue to follow for renal needs.

## 2019-02-08 NOTE — PLAN OF CARE
LMSW called in locet. Limited therapy information in chart for locet.   PT- last seen 2-5-2019  OT- last seen 2-3-2019  SL- last seen 2-5-2019    [facility] is requesting the patients wifes age, date of birth.  And ADL information. LMSW completed and does not have any peronal information on the patients wife.     LMSW added the patient to the long term care wavier program.        02/08/19 0921   Post-Acute Status   Post-Acute Authorization Placement   Post-Acute Placement Status Pending State Direction

## 2019-02-08 NOTE — SUBJECTIVE & OBJECTIVE
Interval History:  Stepped down to the floor on 2/5.  New tunneled dialysis catheter placed 2/6.  Had dialysis yesterday and is having again today.  Pulled his PICC out today.  No acute events noted overnight.    Review of Systems   Constitutional: Negative for chills and fever.   HENT: Negative for congestion and dental problem.    Eyes: Negative for discharge and itching.   Respiratory: Negative for shortness of breath.    Cardiovascular: Negative for chest pain.   Gastrointestinal: Negative for abdominal pain, constipation, diarrhea, nausea and vomiting.   Endocrine: Negative for cold intolerance and heat intolerance.   Musculoskeletal: Negative for arthralgias and back pain.   Neurological: Negative for dizziness and facial asymmetry.   Hematological: Negative for adenopathy.   Psychiatric/Behavioral: Negative for agitation and behavioral problems.     Objective:     Vital Signs (Most Recent):  Temp: 98 °F (36.7 °C) (02/08/19 0423)  Pulse: 101 (02/08/19 0710)  Resp: (!) 22 (02/08/19 0710)  BP: (!) 165/85 (02/08/19 0423)  SpO2: (!) 92 % (02/08/19 0710) Vital Signs (24h Range):  Temp:  [97.1 °F (36.2 °C)-98.8 °F (37.1 °C)] 98 °F (36.7 °C)  Pulse:  [] 101  Resp:  [18-26] 22  SpO2:  [87 %-93 %] 92 %  BP: (102-165)/(47-89) 165/85     Weight: (!) 166 kg (365 lb 15.4 oz)  Body mass index is 45.74 kg/m².    Intake/Output Summary (Last 24 hours) at 2/8/2019 0807  Last data filed at 2/7/2019 1400  Gross per 24 hour   Intake --   Output 4000 ml   Net -4000 ml      Physical Exam   Constitutional: He is oriented to person, place, and time. Vital signs are normal. He appears well-developed.  Non-toxic appearance. He does not have a sickly appearance. He does not appear ill. No distress.   Morbid obesity.   HENT:   Head: Normocephalic and atraumatic.   Right Ear: External ear normal.   Left Ear: External ear normal.   Eyes: Conjunctivae and EOM are normal. Pupils are equal, round, and reactive to light. No scleral  icterus.   Neck: Normal range of motion. Neck supple. No JVD present. No tracheal deviation present.   Cardiovascular: Normal rate, regular rhythm, normal heart sounds and intact distal pulses. Exam reveals no gallop and no friction rub.   No murmur heard.  Pulmonary/Chest: Effort normal and breath sounds normal. No stridor. No respiratory distress. He has no wheezes. He has no rales.   Distant lung sounds.  Moving air well.  Some crackles.   Abdominal: Soft. Bowel sounds are normal. He exhibits no distension and no mass. There is no tenderness. There is no guarding.   Musculoskeletal: Normal range of motion. He exhibits no edema or deformity.   Neurological: He is alert and oriented to person, place, and time. He exhibits normal muscle tone.   Skin: Skin is warm and dry. No rash noted. He is not diaphoretic. No erythema.   Right lower extremity with improved erythema and edema.  Area of erythema has not extended beyond pen marking. Less edematous following renal replacement therapy.   Nursing note and vitals reviewed.    Significant Labs: All pertinent labs within the past 24 hours have been reviewed.    Significant Imaging: I have reviewed all pertinent imaging results/findings within the past 24 hours.

## 2019-02-08 NOTE — PLAN OF CARE
Problem: Adult Inpatient Plan of Care  Goal: Plan of Care Review  Outcome: Ongoing (interventions implemented as appropriate)  Plan of care reviewed with patient. Patient free from falls or injury throughout shift. All safety measures in place. Purposeful rounding completed, no needs at this time. Will continue to monitor.

## 2019-02-08 NOTE — PROGRESS NOTES
"[facility]  Adult Nutrition  Progress Note    SUMMARY       Recommendations    Recommendation/Intervention: 1. Advance diet as tolerated to renal with texture per SLP 2. Consider restarting enteral nutrition if pt unable to pass swallow evaluation in 48h   Goals: 1. Meet >75% EEN and EPN within 48h 2. Promote nutrition related labs wnl  Nutrition Goal Status: new  Communication of RD Recs: discussed on rounds    Reason for Assessment    Reason For Assessment: RD follow-up  Diagnosis: infection/sepsis(Septic shock/ATN)  Relevant Medical History: HTN, COPD, Chronic respiratory failure (on home O2), Diastolic CHF, LYNDON, chronic venous insufficiency, and questionable seizures history (suspected to be 2/2 alcohol withdrawal)  Interdisciplinary Rounds: attended  General Information Comments: Pt in dialysis and with other providers x multiple visit attempts, remote assessment completed. Pt remains on clear liquid diet. Will perform NFPE and attempt to obtain additional information at follow-up.  Nutrition Discharge Planning: pending medical course    Nutrition Risk Screen    Nutrition Risk Screen: no indicators present    Nutrition/Diet History    Spiritual, Cultural Beliefs, Lutheran Practices, Values that Affect Care: no  Factors Affecting Nutritional Intake: impaired cognitive status/motor control, clear liquid diet    Anthropometrics    Temp: 97.8 °F (36.6 °C)  Height Method: Estimated  Height: 6' 3" (190.5 cm)  Height (inches): 75 in  Weight Method: Bed Scale  Weight: (!) 166 kg (365 lb 15.4 oz)  Weight (lb): (!) 365.97 lb  Ideal Body Weight (IBW), Male: 196 lb  % Ideal Body Weight, Male (lb): 186.72 lb  BMI (Calculated): 45.8  BMI Grade: greater than 40 - morbid obesity       Lab/Procedures/Meds    Pertinent Labs: Reviewed  Lab Results   Component Value Date    BUN 63 (H) 02/08/2019    CREATININE 6.3 (H) 02/08/2019    PHOS 8.0 (H) 02/08/2019    ESTGFRAFRICA 11 (A) 02/08/2019    EGFRNONAA 9 (A) " 02/08/2019       Pertinent Meds: Reviewed  Scheduled Meds:   cefepime in dextrose 5 %  1 g Intravenous Q24H    predniSONE  50 mg Oral Daily    sevelamer carbonate  2.4 g Oral BID    sodium chloride 3%  4 mL Nebulization BID    vancomycin (VANCOCIN) IVPB  500 mg Intravenous Once    vancomycin (VANCOCIN) IVPB  750 mg Intravenous Once    vitamin renal formula (B-complex-vitamin c-folic acid)  1 capsule Oral Daily     Estimated/Assessed Needs    Weight Used For Calorie Calculations: (!) 178 kg (392 lb 6.7 oz)  Energy Calorie Requirements (kcal): 2720  Energy Need Method: Clay-St Jeor  Protein Requirements: 143-178 gm/d (0.8-1 gm/kg)  Weight Used For Protein Calculations: (!) 178 kg (392 lb 6.7 oz)  Fluid Requirements (mL): 2720(or per team)  Estimated Fluid Requirement Method: RDA Method  RDA Method (mL): 2720    Nutrition Prescription Ordered    Current Diet Order: clear liquid    Evaluation of Received Nutrient/Fluid Intake    % Intake of Estimated Energy Needs: 0 - 25 %  % Meal Intake: 0 - 25 %    Nutrition Risk    Level of Risk/Frequency of Follow-up: high     Assessment and Plan    Nutrition Problem  Inadequate oral intake  Related to (etiology):   Altered cognitive function  Signs and Symptoms (as evidenced by):   Pt declining trials of purees and solids at his time; on clear liquid diet   Interventions (treatment strategy):  Collaboration with other providers  Nutrition Diagnosis Status:   Continues    Monitor and Evaluation    Food and Nutrient Intake: energy intake, food and beverage intake, enteral nutrition intake  Food and Nutrient Adminstration: diet order, enteral and parenteral nutrition administration  Physical Activity and Function: nutrition-related ADLs and IADLs  Anthropometric Measurements: weight, weight change  Biochemical Data, Medical Tests and Procedures: electrolyte and renal panel, gastrointestinal profile, glucose/endocrine profile, inflammatory profile, lipid  profile  Nutrition-Focused Physical Findings: overall appearance, extremities, muscles and bones, skin     Nutrition Follow-Up    RD Follow-up?: Yes    Patience High, MS, RD, LDN   Dietitian, [facility]  [phone]

## 2019-02-08 NOTE — NURSING
Dr. Milner notified of patient pulled out PICC line to rt upper arm, Instructed to call the nephro NP to inform him

## 2019-02-08 NOTE — ASSESSMENT & PLAN NOTE
-Admitted to inpatient status in ICU upon transfer from [facility] with septic shock.  Treated with pressors and IV antibiotics.  Ultimately weaned off of pressors and transferred to the floor on 2/5.  -Possible component of pneumonia but clear cause of his infection is the severe cellulitis involving the right lower extremity.    -Ultrasound of right lower extremity did not reveal evidence of abscess or gas.    -General surgery was consulted and recommended serial exams for now and no surgical intervention at this time.    -Cellulitis continues to improve.  WBC has normalized  -Bood cultures no growth to date.    -Continue to elevate right lower extremity as tolerated.    -Appreciate input from ID.  Per their recs will continue cefepime and vancomycin until 2/11 per ID(EOC 2/11).  -Continue pulse dosing of vancomycin.  Gave 500mg after HD yesterday.  Random vanc this morning is 12.5.  Will give 750mg after next HD.  -PICC line pulled by patient today.  Will discuss with nephrology if ok to replace.  Do not think nurses will be able to get a peripheral IV.  -Continue with wound care.

## 2019-02-08 NOTE — PROGRESS NOTES
[facility] Medicine  Progress Note    Patient Name: Jones Red  MRN: [id number]  Patient Class: IP- Inpatient   Admission Date: 1/28/2019  Length of Stay: 11 days  Attending Physician: Jones Milner MD  Primary Care Provider: Primary Doctor No        Subjective:     Principal Problem:Septic shock    HPI:  Mr. Jones Red is a 53 y.o. male, with PMH of HTN, COPD, chronic respiratory failure (on home O2), Diastolic CHF, LYNDON, chronic venous insufficiency, and questionable seizures history (suspected to be 2/2 alcohol withdrawal), who preset tia to [facility] on 1/22/19 2/2 SOB. This was associated with cough productive of brown sputum. He was started in BiPap, but did ultimately degrade, and was intubated, and maintained on a ventilator. He was additionally altered upon arrival to the ED. History was reportedly obtained by a friend, and it was reported that he was found down at home, and was unable to get up. He as admitted to the ICU, started on IV fluids and pressors.     Hospital Course:  Patient 53-year-old gentleman with history of hypertension, diastolic heart failure, chronic obstructive pulmonary disease with chronic hypoxic respiratory failure on home oxygen, prior alcohol abuse, significant ongoing tobacco use (3 packs per day), and morbid obesity with septic shock secondary to right lower extremity cellulitis complicating chronic venous stasis.  Patient intubated at outside hospital after failing a trial of BiPAP.  Patient also with evidence of worsening renal failure with rhabdomyolysis.       Patient transferred from [facility] in [place], [place] to [facility] on 1/28/2019.  Patient started on renal replacement therapy.  Patient remains intubated and sedated but responds to stimuli when sedation held but otherwise confused.  Ultrasound of the right lower extremity did not reveal evidence of abscess, subcutaneous gas, or evidence  of necrotizing fascitits.  General surgery and wound care consulted.  No surgical intervention recommended at this time.  Patient on broad spectrum antibiotics.    Patient clinically improved in terms his volume status and was successfully extubated on 2/2/2019.    Interval History:  Stepped down to the floor on 2/5.  New tunneled dialysis catheter placed 2/6.  Had dialysis yesterday and is having again today.  Pulled his PICC out today.  No acute events noted overnight.    Review of Systems   Constitutional: Negative for chills and fever.   HENT: Negative for congestion and dental problem.    Eyes: Negative for discharge and itching.   Respiratory: Negative for shortness of breath.    Cardiovascular: Negative for chest pain.   Gastrointestinal: Negative for abdominal pain, constipation, diarrhea, nausea and vomiting.   Endocrine: Negative for cold intolerance and heat intolerance.   Musculoskeletal: Negative for arthralgias and back pain.   Neurological: Negative for dizziness and facial asymmetry.   Hematological: Negative for adenopathy.   Psychiatric/Behavioral: Negative for agitation and behavioral problems.     Objective:     Vital Signs (Most Recent):  Temp: 98 °F (36.7 °C) (02/08/19 0423)  Pulse: 101 (02/08/19 0710)  Resp: (!) 22 (02/08/19 0710)  BP: (!) 165/85 (02/08/19 0423)  SpO2: (!) 92 % (02/08/19 0710) Vital Signs (24h Range):  Temp:  [97.1 °F (36.2 °C)-98.8 °F (37.1 °C)] 98 °F (36.7 °C)  Pulse:  [] 101  Resp:  [18-26] 22  SpO2:  [87 %-93 %] 92 %  BP: (102-165)/(47-89) 165/85     Weight: (!) 166 kg (365 lb 15.4 oz)  Body mass index is 45.74 kg/m².    Intake/Output Summary (Last 24 hours) at 2/8/2019 0807  Last data filed at 2/7/2019 1400  Gross per 24 hour   Intake --   Output 4000 ml   Net -4000 ml      Physical Exam   Constitutional: He is oriented to person, place, and time. Vital signs are normal. He appears well-developed.  Non-toxic appearance. He does not have a sickly appearance. He does  not appear ill. No distress.   Morbid obesity.   HENT:   Head: Normocephalic and atraumatic.   Right Ear: External ear normal.   Left Ear: External ear normal.   Eyes: Conjunctivae and EOM are normal. Pupils are equal, round, and reactive to light. No scleral icterus.   Neck: Normal range of motion. Neck supple. No JVD present. No tracheal deviation present.   Cardiovascular: Normal rate, regular rhythm, normal heart sounds and intact distal pulses. Exam reveals no gallop and no friction rub.   No murmur heard.  Pulmonary/Chest: Effort normal and breath sounds normal. No stridor. No respiratory distress. He has no wheezes. He has no rales.   Distant lung sounds.  Moving air well.  Some crackles.   Abdominal: Soft. Bowel sounds are normal. He exhibits no distension and no mass. There is no tenderness. There is no guarding.   Musculoskeletal: Normal range of motion. He exhibits no edema or deformity.   Neurological: He is alert and oriented to person, place, and time. He exhibits normal muscle tone.   Skin: Skin is warm and dry. No rash noted. He is not diaphoretic. No erythema.   Right lower extremity with improved erythema and edema.  Area of erythema has not extended beyond pen marking. Less edematous following renal replacement therapy.   Nursing note and vitals reviewed.    Significant Labs: All pertinent labs within the past 24 hours have been reviewed.    Significant Imaging: I have reviewed all pertinent imaging results/findings within the past 24 hours.    Assessment/Plan:      * Septic shock    -Admitted to inpatient status in ICU upon transfer from [facility] with septic shock.  Treated with pressors and IV antibiotics.  Ultimately weaned off of pressors and transferred to the floor on 2/5.  -Possible component of pneumonia but clear cause of his infection is the severe cellulitis involving the right lower extremity.    -Ultrasound of right lower extremity did not reveal evidence of abscess or gas.     -General surgery was consulted and recommended serial exams for now and no surgical intervention at this time.    -Cellulitis continues to improve.  WBC has normalized  -Bood cultures no growth to date.    -Continue to elevate right lower extremity as tolerated.    -Appreciate input from ID.  Per their recs will continue cefepime and vancomycin until 2/11 per ID(EOC 2/11).  -Continue pulse dosing of vancomycin.  Gave 500mg after HD yesterday.  Random vanc this morning is 12.5.  Will give 750mg after next HD.  -PICC line pulled by patient today.  Will discuss with nephrology if ok to replace.  Do not think nurses will be able to get a peripheral IV.  -Continue with wound care.     Encephalopathy, metabolic    -Most likely secondary to sepsis and infection.  Much improved today.  -TSH, B12, Folate are not deficient.  Ammonia is normal.  Await RPR.  -Re-orient as needed  -Delirium precautions.       Essential hypertension    -At home takes coreg, losartan, hctz, metolazone and bumex  -BP mildly elevatd at times.  -Continue coreg and PRN hydralazine     Tobacco abuse    -Patient counseled on the importance of smoking cessation.  -NRT offered     Acute on chronic respiratory failure with hypoxia and hypercapnia    -Successfully extubated 2/2/2018.  Patient did well on BiPAP following extubation.  He has needed intermittent BiPAP.    -Continue with bronchodilator treatments as I suspect patient has COPD given wheezing and significant tobacco smoking history.    -Continue volume removal with CRRT as able.  -continue supplemental O2 requirements for goal sat 88-92%  -Continue steroids x 1 more days.    -Will need outpatient PFTs and probable ICS/LAMA/LABA. When patient is being discharged will start him on Spiriva.         Acute on chronic diastolic heart failure    -Marked improvement with dialysis which we need to continue.    -Continue with additional volume removal with renal replacement therapy as recommended by  Nephrology.  -No ARB due to renal dysfunction.  -Continue coreg.     Cellulitis of right lower extremity    -As above for septic shock     Acute renal failure    -Due to ATN from sepsis with shock and rhabdomyolysis.    -Unfortunately it looks as if he has progressed to ESRD  -We were unable HD on 2/5 due to clotted trialysis catheter.  Received new HD catheter on 2/6.  Completed dialysis yesterday.  -Continue with dialysis per nephrology.  -Working on outpatient placement as he will need ongoing dialysis       VTE Risk Mitigation (From admission, onward)        Ordered     heparin (porcine) injection 2,000 Units  Once      02/06/19 1729     heparin (porcine) injection 5,000 Units  As needed (PRN)      01/31/19 1825     heparin (porcine) injection 2,000 Units  As needed (PRN)      01/30/19 1056     heparin (porcine) injection 5,000 Units  Every 8 hours      01/29/19 2044     heparin (porcine) injection 5,000 Units  As needed (PRN)      01/29/19 1335     IP VTE HIGH RISK PATIENT  Once      01/28/19 1947              Jones Milner MD  Department of Hospital Medicine   [facility]

## 2019-02-08 NOTE — PROCEDURES
"Jones Red is a 53 y.o. male patient.    Temp: 97.8 °F (36.6 °C) (02/08/19 1230)  Pulse: 98 (02/08/19 1230)  Resp: 20 (02/08/19 1230)  BP: 131/73 (02/08/19 1230)  SpO2: (!) 93 % (02/08/19 0806)  Weight: (!) 166 kg (365 lb 15.4 oz) (02/08/19 1400)  Height: 6' 3" (190.5 cm) (02/08/19 1400)    PICC  Date/Time: 2/8/2019 2:00 PM  Location procedure was performed: [facility] PICC LINE PLACEMENT  Performed by: Mary Smith RN  Consent Done: Yes  Time out: Immediately prior to procedure a time out was called to verify the correct patient, procedure, equipment, support staff and site/side marked as required  Indications: med administration and vascular access  Anesthesia: local infiltration  Local anesthetic: lidocaine 1% without epinephrine  Anesthetic Total (mL): 1  Preparation: skin prepped with ChloraPrep  Skin prep agent dried: skin prep agent completely dried prior to procedure  Sterile barriers: all five maximum sterile barriers used - cap, mask, sterile gown, sterile gloves, and large sterile sheet  Hand hygiene: hand hygiene performed prior to central venous catheter insertion  Location details: right cephalic  Catheter type: double lumen  Catheter size: 5 Fr  Catheter Length: 47cm    Ultrasound guidance: yes  Vessel Caliber: small and patent, compressibility normal  Vascular Doppler: not done  Needle advanced into vessel with real time Ultrasound guidance.  Guidewire confirmed in vessel.  Sterile sheath used.  no esophageal manometryNumber of attempts: 1  Post-procedure: blood return through all ports, chlorhexidine patch and sterile dressing applied  Technical procedures used: microintroducer with ultrasound  Estimated blood loss (mL): 0  Specimens: No  Implants: No  Assessment: placement verified by x-ray, no pneumothorax on x-ray, tip termination and successful placement  Tip Termination Explanation: svc  Complications: none          Mary Smith  2/8/2019  "

## 2019-02-08 NOTE — PLAN OF CARE
Problem: Obstructive Sleep Apnea Risk or Actual  Goal: Unobstructed Breathing During Sleep  Outcome: Ongoing (interventions implemented as appropriate)  Patient initially refused Bipap, finally got it on him around 4AM. Tolerates aerosol and CPT well, productive cough of white sputum noted.

## 2019-02-08 NOTE — PT/OT/SLP PROGRESS
Occupational Therapy   Treatment    Name: Jones Red  MRN: [id number]  Admitting Diagnosis:  Septic shock  2 Days Post-Op    Recommendations:     Discharge Recommendations: (TBD)  Discharge Equipment Recommendations:  (TBD)  Barriers to discharge:  Inaccessible home environment    Assessment:     Jones Red is a 53 y.o. male with a medical diagnosis of Septic shock.  He presents with loss of functional mobility and self-care skill complicated by severe cognitive processing and memory loss.. Performance deficits affecting function are weakness, impaired endurance, impaired self care skills, impaired functional mobilty, decreased safety awareness, decreased upper extremity function, decreased lower extremity function, impaired balance, impaired skin, impaired cognition.  He was Min A roll to Right, Max A Roll to Left, with UBD (don/doff hospital gown) Max A and G/H (wash face and hands) Min A bed level.  UE exercises were performed with counting repetitions assisting the pt to stay on task.  Continuation of OT treatment is needed to maximize patient function while in the hospital.    Rehab Prognosis:  Fair; patient would benefit from acute skilled OT services to address these deficits and reach maximum level of function.       Plan:     Patient to be seen 5 x/week to address the above listed problems via self-care/home management, neuromuscular re-education, cognitive retraining, sensory integration, therapeutic activities, therapeutic exercises  · Plan of Care Expires: 02/03/19  · Plan of Care Reviewed with: patient    Subjective     Pain/Comfort:  · Pain Rating 1: 0/10  · Pain Rating Post-Intervention 1: 0/10    Objective:     Communicated with: patient and his nurse prior to session.  Patient found supine with bed alarm, restraints, telemetry, PICC line, mosley catheter upon OT entry to room.  The patient was agreeable to OT treatment, were as his nurse was concerned about him being seen because he had recently  pulled out his PICC Line.     General Precautions: Standard, aspiration, fall   Orthopedic Precautions:N/A   Braces:       Occupational Performance:     Bed Mobility:    · Min A roll to Right  · Max A Roll to Left    Typically lost memory of the tasks he was performing and needed help reinitiating.  Occasionally he fed into task memory when performing and continued repositioning himself without difficulty     Functional Mobility/Transfers:        None    Activities of Daily Living:        Min A G/H (wash face and hands) bed level-needs help sequencing the tasks but performed well,                                       perseverating on cleaning his ears         Max A UED (don/doff hospital gown) bed level    AM PAC 6 Click ADL: 9    Treatment & Education:  Cognitive orientation and memory training through out treatment session  UE Exercise: AAROM (all planes - 10 reps BUE with pt counting reps)/counting help keep him on task    Patient left supine with all lines intact, call button in reach, bed alarm on and nurse notifiedEducation:      GOALS:   Multidisciplinary Problems     Occupational Therapy Goals        Problem: Occupational Therapy Goal    Goal Priority Disciplines Outcome Interventions   Occupational Therapy Goal     OT, PT/OT Ongoing (interventions implemented as appropriate)    Description:  Goals to be met by 3/3/19  1. Max A self-feeding  2. Mod A G/H (wash face and hands) supported sitting (MET 2/8/19)     REVISED: Mod A G/H (wash face and hands and mouth wash) sitting EOB  3. Min A hospital gown change bed level  3. 50% assist with UE ROM exercises MET 2/8/19       REVISED: UE AROM exercises  HHA with Min A VC for continuation of task  5.  Assess bed mobility                     Time Tracking:     OT Date of Treatment: 02/08/19  OT Start Time: 1532  OT Stop Time: 1620  OT Total Time (min): 48 min    Billable Minutes:Self Care/Home Management 14  Therapeutic Exercise 12  Cognitive Retraining 22 Janet  FIONA Garner  2/8/2019

## 2019-02-08 NOTE — PLAN OF CARE
Problem: Occupational Therapy Goal  Goal: Occupational Therapy Goal  Goals to be met by 3/3/19  1. Max A self-feeding  2. Mod A G/H (wash face and hands) supported sitting (MET 2/8/19)     REVISED: Mod A G/H (wash face and hands and mouth wash) sitting EOB  3. Min A hospital gown change bed level  3. 50% assist with UE ROM exercises MET 2/8/19       REVISED: UE AROM exercises  HHA with Min A VC for continuation of task  5.  Assess bed mobility   Outcome: Ongoing (interventions implemented as appropriate)  The patient remains oriented x2, with very poor memory mating retention to task poor.  He was Min A roll to Right, Max A Roll to Left, with UBD (don/doff hospital gown) Max A and G/H (wash face and hands) Min A bed level.  UE exercises were performed with counting repetitions assisting the pt to stay on task.  FIONA Can 2/8/2019

## 2019-02-09 LAB
ANION GAP SERPL CALC-SCNC: 12 MMOL/L
BASOPHILS # BLD AUTO: 0.07 K/UL
BASOPHILS NFR BLD: 0.8 %
BUN SERPL-MCNC: 58 MG/DL
CALCIUM SERPL-MCNC: 9.3 MG/DL
CHLORIDE SERPL-SCNC: 97 MMOL/L
CO2 SERPL-SCNC: 28 MMOL/L
CREAT SERPL-MCNC: 6.2 MG/DL
DIFFERENTIAL METHOD: ABNORMAL
EOSINOPHIL # BLD AUTO: 0.2 K/UL
EOSINOPHIL NFR BLD: 2.1 %
ERYTHROCYTE [DISTWIDTH] IN BLOOD BY AUTOMATED COUNT: 15 %
EST. GFR  (AFRICAN AMERICAN): 11 ML/MIN/1.73 M^2
EST. GFR  (NON AFRICAN AMERICAN): 10 ML/MIN/1.73 M^2
GLUCOSE SERPL-MCNC: 77 MG/DL
HCT VFR BLD AUTO: 29.4 %
HGB BLD-MCNC: 9.3 G/DL
LYMPHOCYTES # BLD AUTO: 1.7 K/UL
LYMPHOCYTES NFR BLD: 18.4 %
MAGNESIUM SERPL-MCNC: 2.5 MG/DL
MCH RBC QN AUTO: 28.4 PG
MCHC RBC AUTO-ENTMCNC: 31.6 G/DL
MCV RBC AUTO: 90 FL
MONOCYTES # BLD AUTO: 0.7 K/UL
MONOCYTES NFR BLD: 7 %
NEUTROPHILS # BLD AUTO: 6.6 K/UL
NEUTROPHILS NFR BLD: 70.9 %
PHOSPHATE SERPL-MCNC: 8.4 MG/DL
PLATELET # BLD AUTO: 319 K/UL
PMV BLD AUTO: 10 FL
POTASSIUM SERPL-SCNC: 4.6 MMOL/L
RBC # BLD AUTO: 3.27 M/UL
SODIUM SERPL-SCNC: 137 MMOL/L
VANCOMYCIN SERPL-MCNC: 18.3 UG/ML
WBC # BLD AUTO: 9.25 K/UL

## 2019-02-09 PROCEDURE — 25000003 PHARM REV CODE 250: Performed by: NURSE PRACTITIONER

## 2019-02-09 PROCEDURE — 25000242 PHARM REV CODE 250 ALT 637 W/ HCPCS: Performed by: HOSPITALIST

## 2019-02-09 PROCEDURE — 90935 HEMODIALYSIS ONE EVALUATION: CPT

## 2019-02-09 PROCEDURE — 99900035 HC TECH TIME PER 15 MIN (STAT)

## 2019-02-09 PROCEDURE — 27000221 HC OXYGEN, UP TO 24 HOURS

## 2019-02-09 PROCEDURE — 63600175 PHARM REV CODE 636 W HCPCS: Performed by: NURSE PRACTITIONER

## 2019-02-09 PROCEDURE — 94668 MNPJ CHEST WALL SBSQ: CPT

## 2019-02-09 PROCEDURE — 84100 ASSAY OF PHOSPHORUS: CPT

## 2019-02-09 PROCEDURE — 25000003 PHARM REV CODE 250: Performed by: HOSPITALIST

## 2019-02-09 PROCEDURE — 80202 ASSAY OF VANCOMYCIN: CPT

## 2019-02-09 PROCEDURE — 99223 PR INITIAL HOSPITAL CARE,LEVL III: ICD-10-PCS | Mod: ,,, | Performed by: HOSPITALIST

## 2019-02-09 PROCEDURE — 63600175 PHARM REV CODE 636 W HCPCS: Performed by: STUDENT IN AN ORGANIZED HEALTH CARE EDUCATION/TRAINING PROGRAM

## 2019-02-09 PROCEDURE — 63600175 PHARM REV CODE 636 W HCPCS: Performed by: HOSPITALIST

## 2019-02-09 PROCEDURE — 94660 CPAP INITIATION&MGMT: CPT

## 2019-02-09 PROCEDURE — 11000001 HC ACUTE MED/SURG PRIVATE ROOM

## 2019-02-09 PROCEDURE — 92526 ORAL FUNCTION THERAPY: CPT

## 2019-02-09 PROCEDURE — 85025 COMPLETE CBC W/AUTO DIFF WBC: CPT

## 2019-02-09 PROCEDURE — 94761 N-INVAS EAR/PLS OXIMETRY MLT: CPT

## 2019-02-09 PROCEDURE — 99223 1ST HOSP IP/OBS HIGH 75: CPT | Mod: ,,, | Performed by: HOSPITALIST

## 2019-02-09 PROCEDURE — 83735 ASSAY OF MAGNESIUM: CPT

## 2019-02-09 PROCEDURE — 80048 BASIC METABOLIC PNL TOTAL CA: CPT

## 2019-02-09 PROCEDURE — 63600175 PHARM REV CODE 636 W HCPCS: Performed by: INTERNAL MEDICINE

## 2019-02-09 PROCEDURE — 94640 AIRWAY INHALATION TREATMENT: CPT

## 2019-02-09 PROCEDURE — 25000242 PHARM REV CODE 250 ALT 637 W/ HCPCS: Performed by: STUDENT IN AN ORGANIZED HEALTH CARE EDUCATION/TRAINING PROGRAM

## 2019-02-09 RX ADMIN — HEPARIN SODIUM 5000 UNITS: 5000 INJECTION, SOLUTION INTRAVENOUS; SUBCUTANEOUS at 01:02

## 2019-02-09 RX ADMIN — IPRATROPIUM BROMIDE AND ALBUTEROL SULFATE 3 ML: .5; 3 SOLUTION RESPIRATORY (INHALATION) at 07:02

## 2019-02-09 RX ADMIN — IPRATROPIUM BROMIDE AND ALBUTEROL SULFATE 3 ML: .5; 3 SOLUTION RESPIRATORY (INHALATION) at 04:02

## 2019-02-09 RX ADMIN — VANCOMYCIN HYDROCHLORIDE 500 MG: 500 INJECTION, POWDER, LYOPHILIZED, FOR SOLUTION INTRAVENOUS at 02:02

## 2019-02-09 RX ADMIN — SODIUM CHLORIDE SOLN NEBU 3% 4 ML: 3 NEBU SOLN at 07:02

## 2019-02-09 RX ADMIN — HEPARIN SODIUM 5000 UNITS: 5000 INJECTION, SOLUTION INTRAVENOUS; SUBCUTANEOUS at 09:02

## 2019-02-09 RX ADMIN — HEPARIN SODIUM 1000 UNITS: 1000 INJECTION, SOLUTION INTRAVENOUS; SUBCUTANEOUS at 11:02

## 2019-02-09 RX ADMIN — IPRATROPIUM BROMIDE AND ALBUTEROL SULFATE 3 ML: .5; 3 SOLUTION RESPIRATORY (INHALATION) at 11:02

## 2019-02-09 RX ADMIN — BACITRACIN: 500 OINTMENT TOPICAL at 02:02

## 2019-02-09 RX ADMIN — HEPARIN SODIUM 1000 UNITS: 1000 INJECTION, SOLUTION INTRAVENOUS; SUBCUTANEOUS at 10:02

## 2019-02-09 RX ADMIN — HEPARIN SODIUM 5000 UNITS: 5000 INJECTION, SOLUTION INTRAVENOUS; SUBCUTANEOUS at 05:02

## 2019-02-09 RX ADMIN — IPRATROPIUM BROMIDE AND ALBUTEROL SULFATE 3 ML: .5; 3 SOLUTION RESPIRATORY (INHALATION) at 12:02

## 2019-02-09 RX ADMIN — HEPARIN SODIUM 2000 UNITS: 1000 INJECTION, SOLUTION INTRAVENOUS; SUBCUTANEOUS at 09:02

## 2019-02-09 RX ADMIN — BACITRACIN: 500 OINTMENT TOPICAL at 09:02

## 2019-02-09 RX ADMIN — SEVELAMER CARBONATE 2.4 G: 2400 POWDER, FOR SUSPENSION ORAL at 09:02

## 2019-02-09 RX ADMIN — HEPARIN SODIUM 5000 UNITS: 5000 INJECTION, SOLUTION INTRAVENOUS; SUBCUTANEOUS at 02:02

## 2019-02-09 RX ADMIN — CEFEPIME HYDROCHLORIDE 1 G: 1 INJECTION, SOLUTION INTRAVENOUS at 12:02

## 2019-02-09 RX ADMIN — CARVEDILOL 25 MG: 12.5 TABLET, FILM COATED ORAL at 05:02

## 2019-02-09 NOTE — ASSESSMENT & PLAN NOTE
-At home takes coreg, losartan, hctz, metolazone and bumex  -BP mildly elevatd at times.  -Continue coreg and PRN hydralazine

## 2019-02-09 NOTE — PLAN OF CARE
Problem: SLP Goal  Goal: SLP Goal  1. Pt will be able to follow simple 1 step commands 75% of time with moderate verbal and visual cues.  2. Increase speech intelligibility to 75% at the simple sentence level with moderate verbal cues to slow rate, raise volume.  3. Pt will be able to consume thin liquids in small single sips via cup or straw, with no signs of airway threat or aspiration given max assistance.  4. Pt will be able to advance to purees and solid consistencies with no signs of airway threat or aspiration given max assistance  5. Increase orientation to month, date, day of week, place and reason for hospitalization with 75% acc given max verbal and written cues.  6. Increase ability to express basic wants and needs 75% of time given min assistance   Outcome: Ongoing (interventions implemented as appropriate)  Pt seen for ongoing assessment of swallowing this date and ability to advance from clear liquid diet    Comments: Speech therapy to follow 3-5x/week for remediation of dysphagia and cognitive communication deficits

## 2019-02-09 NOTE — PT/OT/SLP PROGRESS
"Speech Language Pathology Treatment    Patient Name:  Jones Red   MRN:  [id number]  Admitting Diagnosis: Septic shock    Recommendations:                 General Recommendations:     1. Speech Pathology 3-5x/week for ongoing assessment of diet tolerance and remediation of cognitive communication deficits  Diet recommendations:  Regular, Liquid Diet Level: Thin   Aspiration Precautions: 1 bite/sip at a time, Assistance with meals, Avoid talking while eating, Eliminate distractions, Feed only when awake/alert, HOB to 90 degrees, Meds whole 1 at a time, Remain upright 30 minutes post meal, Small bites/sips and Standard aspiration precautions   General Precautions: Standard, aspiration  Communication strategies:    1. Provide verbal and written orientation information prior to sessions    Subjective   Pt is dialysis. Dialysis nurse agreeable to allowing SLP to assess patient while undergoing dialysis. Pt with increased attention and ability to actively participate this session.    Pain/Comfort:  · Pain Rating 1: 0/10    Objective:     Has the patient been evaluated by SLP for swallowing?   Yes  Keep patient NPO? No   Current Respiratory Status: nasal cannula      COGNITIVE COMMUNICATION: Pt is alert. Increased ability to focus and sustain attention to orientation and oral feeding tasks. Dcr is restlessness and confusional state this session. Able to sustain attention for 5-10 min intervals before redirection or re-orientation required. Increased ability to attend to and follow through on simple 1-2 step commands, approx 75% of time. Verbal expression is fluent and now with marked improvement in intelligibility. 75% intelligible at the simple sentence and basic conversational level. Increase self monitoring of rate and volume of speech. Increased orientation to person, general place, and able to state date as the 9th. Correct day, however, stating month as "January? February."    SWALLOWING: Pt was able to consume thin " liquids and regular solids with no signs of airway threat or aspiration. Able to consume 8 oz of thin liquids and 1 package of ember crackers  With no coughing, throat clearing, change in vocal quality. Mild signs of pharyngeal residuals with dry solids with need for secondary swallows.    Assessment:     Jones Red is a 53 y.o. male with an SLP diagnosis of Dysphagia and Cognitive-communication Impairment.  He presents with improved mental status, dcr in confusional state and is able to safely upgrade diet.  Goals:   Multidisciplinary Problems     SLP Goals        Problem: SLP Goal    Goal Priority Disciplines Outcome   SLP Goal     SLP Ongoing (interventions implemented as appropriate)   Description:  1. Pt will be able to follow simple 1 step commands 75% of time with moderate verbal and visual cues.  2. Increase speech intelligibility to 75% at the simple sentence level with moderate verbal cues to slow rate, raise volume.  3. Pt will be able to consume thin liquids in small single sips via cup or straw, with no signs of airway threat or aspiration given max assistance.  4. Pt will be able to advance to purees and solid consistencies with no signs of airway threat or aspiration given max assistance  5. Increase orientation to month, date, day of week, place and reason for hospitalization with 75% acc given max verbal and written cues.  6. Increase ability to express basic wants and needs 75% of time given min assistance                    Plan:     · Patient to be seen:  3 x/week, 5 x/week   · Plan of Care expires:  02/28/19  · Plan of Care reviewed with:  patient   · SLP Follow-Up:  Yes       Discharge recommendations:    TBD      Time Tracking:     SLP Treatment Date:   02/09/19  Speech Start Time:  0945  Speech Stop Time:  1014     Speech Total Time (min):  29 min    Billable Minutes: Treatment Swallowing Dysfunction 29 min    Stephanie Camarena CCC-SLP  02/09/2019

## 2019-02-09 NOTE — PLAN OF CARE
Problem: Adult Inpatient Plan of Care  Goal: Patient-Specific Goal (Individualization)  Outcome: Ongoing (interventions implemented as appropriate)  Bed in low and locked position and able to reposition with verbal assist.  Remains in restraints due to confusion that is intermittent.  Remains free of injury during shift.

## 2019-02-09 NOTE — ASSESSMENT & PLAN NOTE
-Successfully extubated 2/2/2018.  Patient did well on BiPAP following extubation.  He has needed intermittent BiPAP.    -Continue with bronchodilator treatments as I suspect patient has COPD given wheezing and significant tobacco smoking history.    -Continue volume removal with CRRT as able.  -continue supplemental O2 requirements for goal sat 88-92%  -Completed course of steroids   -Will need outpatient PFTs and probable ICS/LAMA/LABA. When patient is being discharged will start him on Spiriva.

## 2019-02-09 NOTE — ASSESSMENT & PLAN NOTE
-Most likely secondary to sepsis and infection.  Much improved on 2/8.  -TSH, B12, Folate are not deficient.  Ammonia is normal.  Await RPR.  -Re-orient as needed  -Delirium precautions.

## 2019-02-09 NOTE — PROGRESS NOTES
[facility] Medicine  Progress Note    Patient Name: Jones Red  MRN: [id number]  Patient Class: IP- Inpatient   Admission Date: 1/28/2019  Length of Stay: 12 days  Attending Physician: Jones Milner MD  Primary Care Provider: Primary Doctor No        Subjective:     Principal Problem:Septic shock    HPI:  Mr. Jones Red is a 53 y.o. male, with PMH of HTN, COPD, chronic respiratory failure (on home O2), Diastolic CHF, LYNDON, chronic venous insufficiency, and questionable seizures history (suspected to be 2/2 alcohol withdrawal), who preset tia to [facility] on 1/22/19 2/2 SOB. This was associated with cough productive of brown sputum. He was started in BiPap, but did ultimately degrade, and was intubated, and maintained on a ventilator. He was additionally altered upon arrival to the ED. History was reportedly obtained by a friend, and it was reported that he was found down at home, and was unable to get up. He as admitted to the ICU, started on IV fluids and pressors.     Hospital Course:  Patient 53-year-old gentleman with history of hypertension, diastolic heart failure, chronic obstructive pulmonary disease with chronic hypoxic respiratory failure on home oxygen, prior alcohol abuse, significant ongoing tobacco use (3 packs per day), and morbid obesity with septic shock secondary to right lower extremity cellulitis complicating chronic venous stasis.  Patient intubated at outside hospital after failing a trial of BiPAP.  Patient also with evidence of worsening renal failure with rhabdomyolysis.       Patient transferred from [facility] in [place], [place] to [facility] on 1/28/2019.  Patient started on renal replacement therapy.  Patient remains intubated and sedated but responds to stimuli when sedation held but otherwise confused.  Ultrasound of the right lower extremity did not reveal evidence of abscess, subcutaneous gas, or evidence  of necrotizing fascitits.  General surgery and wound care consulted.  No surgical intervention recommended at this time.  Patient on broad spectrum antibiotics.    Patient clinically improved in terms his volume status and was successfully extubated on 2/2/2019.    Interval History:  Stepped down to the floor on 2/5.  New tunneled dialysis catheter placed 2/6.  Pulled picc line yesterday morning and required placement of another. Still requiring supplemental O2.  Mentally clear and states he is very hungry today.    Review of Systems   Constitutional: Negative for chills and fever.   HENT: Negative for congestion and dental problem.    Eyes: Negative for discharge and itching.   Respiratory: Negative for shortness of breath.    Cardiovascular: Negative for chest pain.   Gastrointestinal: Negative for abdominal pain, constipation, diarrhea, nausea and vomiting.   Endocrine: Negative for cold intolerance and heat intolerance.   Musculoskeletal: Negative for arthralgias and back pain.   Neurological: Negative for dizziness and facial asymmetry.   Hematological: Negative for adenopathy.   Psychiatric/Behavioral: Negative for agitation and behavioral problems.     Objective:     Vital Signs (Most Recent):  Temp: 98.7 °F (37.1 °C) (02/09/19 0806)  Pulse: 97 (02/09/19 0806)  Resp: 18 (02/09/19 0806)  BP: 137/79 (02/09/19 0806)  SpO2: 98 % (02/09/19 0806) Vital Signs (24h Range):  Temp:  [97.5 °F (36.4 °C)-98.7 °F (37.1 °C)] 98.7 °F (37.1 °C)  Pulse:  [] 97  Resp:  [16-22] 18  SpO2:  [85 %-98 %] 98 %  BP: (122-170)/(63-98) 137/79     Weight: (!) 166 kg (365 lb 15.4 oz)  Body mass index is 45.74 kg/m².    Intake/Output Summary (Last 24 hours) at 2/9/2019 0916  Last data filed at 2/9/2019 0500  Gross per 24 hour   Intake 315 ml   Output 4080 ml   Net -3765 ml      Physical Exam   Constitutional: He is oriented to person, place, and time. Vital signs are normal. He appears well-developed.  Non-toxic appearance. He does  not have a sickly appearance. He does not appear ill. No distress.   Morbid obesity.   HENT:   Head: Normocephalic and atraumatic.   Right Ear: External ear normal.   Left Ear: External ear normal.   Eyes: Conjunctivae and EOM are normal. Pupils are equal, round, and reactive to light. No scleral icterus.   Neck: Normal range of motion. Neck supple. No JVD present. No tracheal deviation present.   Cardiovascular: Normal rate, regular rhythm, normal heart sounds and intact distal pulses. Exam reveals no gallop and no friction rub.   No murmur heard.  Pulmonary/Chest: Effort normal and breath sounds normal. No stridor. No respiratory distress. He has no wheezes. He has no rales.   Distant lung sounds.  Moving air well.  Some crackles.   Abdominal: Soft. Bowel sounds are normal. He exhibits no distension and no mass. There is no tenderness. There is no guarding.   Musculoskeletal: Normal range of motion. He exhibits no edema or deformity.   Neurological: He is alert and oriented to person, place, and time. He exhibits normal muscle tone.   Skin: Skin is warm and dry. No rash noted. He is not diaphoretic. No erythema.   Right lower extremity with improved erythema and edema.  Area of erythema has not extended beyond pen marking. Less edematous following renal replacement therapy.   Nursing note and vitals reviewed.    Significant Labs: All pertinent labs within the past 24 hours have been reviewed.    Significant Imaging: I have reviewed all pertinent imaging results/findings within the past 24 hours.    Assessment/Plan:      * Septic shock    -Admitted to inpatient status in ICU upon transfer from [place] with septic shock.  Treated with pressors and IV antibiotics.  Ultimately weaned off of pressors and transferred to the floor on 2/5.  -Possible component of pneumonia but clear cause of his infection is the severe cellulitis involving the right lower extremity.    -Ultrasound of right lower extremity did not  reveal evidence of abscess or gas.    -General surgery was consulted and recommended serial exams for now and no surgical intervention at this time.    -Cellulitis continues to improve.  WBC has normalized  -Bood cultures no growth to date.    -Continue to elevate right lower extremity as tolerated.    -Appreciate input from ID.  Per their recs will continue cefepime and vancomycin until 2/11 per ID(EOC 2/11).  -Continue pulse dosing of vancomycin.  Give 500mg after HD today.  -Continue with wound care.     Encephalopathy, metabolic    -Most likely secondary to sepsis and infection.  Much improved on 2/8.  -TSH, B12, Folate are not deficient.  Ammonia is normal.  Await RPR.  -Re-orient as needed  -Delirium precautions.       Essential hypertension    -At home takes coreg, losartan, hctz, metolazone and bumex  -BP mildly elevatd at times.  -Continue coreg and PRN hydralazine     Tobacco abuse    -Patient counseled on the importance of smoking cessation.  -NRT offered     Acute on chronic respiratory failure with hypoxia and hypercapnia    -Successfully extubated 2/2/2018.  Patient did well on BiPAP following extubation.  He has needed intermittent BiPAP.    -Continue with bronchodilator treatments as I suspect patient has COPD given wheezing and significant tobacco smoking history.    -Continue volume removal with CRRT as able.  -continue supplemental O2 requirements for goal sat 88-92%  -Completed course of steroids   -Will need outpatient PFTs and probable ICS/LAMA/LABA. When patient is being discharged will start him on Spiriva.         Acute on chronic diastolic heart failure    -Marked improvement with dialysis which we need to continue.    -Continue with additional volume removal with renal replacement therapy as recommended by Nephrology.  -No ARB due to renal dysfunction.  -Continue coreg.     Cellulitis of right lower extremity    -As above for septic shock     Acute renal failure    -Due to ATN from sepsis  with shock and rhabdomyolysis.    -Unfortunately it looks as if he has progressed to ESRD  -We were unable HD on 2/5 due to clotted trialysis catheter.  Received new HD catheter on 2/6.  Completed dialysis again yesterday.  -Continue with dialysis per nephrology.  -Working on outpatient placement as he will need ongoing dialysis       VTE Risk Mitigation (From admission, onward)        Ordered     heparin (porcine) injection 2,000 Units  Once      02/06/19 1729     heparin (porcine) injection 5,000 Units  As needed (PRN)      01/31/19 1825     heparin (porcine) injection 2,000 Units  As needed (PRN)      01/30/19 1056     heparin (porcine) injection 5,000 Units  Every 8 hours      01/29/19 2044     heparin (porcine) injection 5,000 Units  As needed (PRN)      01/29/19 1335     IP VTE HIGH RISK PATIENT  Once      01/28/19 1947              Jones Milner MD  Department of Hospital Medicine   [facility]

## 2019-02-09 NOTE — ASSESSMENT & PLAN NOTE
-Admitted to inpatient status in ICU upon transfer from [place] with septic shock.  Treated with pressors and IV antibiotics.  Ultimately weaned off of pressors and transferred to the floor on 2/5.  -Possible component of pneumonia but clear cause of his infection is the severe cellulitis involving the right lower extremity.    -Ultrasound of right lower extremity did not reveal evidence of abscess or gas.    -General surgery was consulted and recommended serial exams for now and no surgical intervention at this time.    -Cellulitis continues to improve.  WBC has normalized  -Bood cultures no growth to date.    -Continue to elevate right lower extremity as tolerated.    -Appreciate input from ID.  Per their recs will continue cefepime and vancomycin until 2/11 per ID(EOC 2/11).  -Continue pulse dosing of vancomycin.  Give 500mg after HD today.  -Continue with wound care.

## 2019-02-09 NOTE — PLAN OF CARE
Problem: Adult Inpatient Plan of Care  Goal: Plan of Care Review  Outcome: Ongoing (interventions implemented as appropriate)  Patient taken off bipap and placed on 4 liter nasal cannula.

## 2019-02-09 NOTE — PROGRESS NOTES
"Nephrology  Progress Note    Admit Date: 1/28/2019   LOS: 12 days     SUBJECTIVE:     Follow-up For:  Septic shock/ATN    Interval History:    UOP 100mL.  Labile mood and mental awareness.  Seen on HD earlier and tolerated without problem.    Review of Systems:    Neg.     OBJECTIVE:     Vital Signs Range (Last 24H):  /88   Pulse 98   Temp 97.7 °F (36.5 °C) (Oral)   Resp (!) 24   Ht 6' 3" (1.905 m)   Wt (!) 166 kg (365 lb 15.4 oz)   SpO2 98%   BMI 45.74 kg/m²     Temp:  [97.5 °F (36.4 °C)-98.7 °F (37.1 °C)]   Pulse:  [86-98]   Resp:  [16-24]   BP: (129-163)/()   SpO2:  [87 %-98 %]     I & O (Last 24H):    Intake/Output Summary (Last 24 hours) at 2/9/2019 1534  Last data filed at 2/9/2019 0500  Gross per 24 hour   Intake 315 ml   Output 100 ml   Net 215 ml       Physical Exam:  General appearance:  Disheveled and morbidly obese.  Labile mood   Eyes:  Conjunctivae/corneas clear. PERRL.  Lungs: diminished.   Heart: Regular rate and rhythm, distant heart tones.  Abdomen: Soft, non-tender non-distended; bowel sounds normal; no masses,  no organomegaly, morbidly obese  Extremities:  Chronic Woody edema.   peeling/flaky LE's (R>L).    Skin:  Right lower extremity cellulitis improved.   Abdalla catheterization  Right IJ CHERIE       Laboratory Data:  Recent Labs   Lab 02/09/19  0455   WBC 9.25   RBC 3.27*   HGB 9.3*   HCT 29.4*      MCV 90   MCH 28.4   MCHC 31.6*       BMP:   Recent Labs   Lab 02/09/19  0455   GLU 77      K 4.6   CL 97   CO2 28   BUN 58*   CREATININE 6.2*   CALCIUM 9.3   MG 2.5     Lab Results   Component Value Date    CALCIUM 9.3 02/09/2019    PHOS 8.4 (H) 02/09/2019       Lab Results   Component Value Date    .4 (H) 01/30/2019    CALCIUM 9.3 02/09/2019    PHOS 8.4 (H) 02/09/2019       No results found for: URICACID    BNP  No results for input(s): BNP, BNPTRIAGEBLO in the last 168 hours.    Medications:  Medication list was reviewed and changes noted under " Assessment/Plan.    Diagnostic Results:    X-Ray Chest 1 View for PICC_Central line   Final Result      As above         Electronically signed by: Kalyan Galvez MD   Date:    02/08/2019   Time:    14:46      X-Ray Chest AP Portable   Final Result      NG tube in place.         Electronically signed by: Viviane Persaud MD   Date:    02/02/2019   Time:    23:59      X-Ray Chest 1 View   Final Result      As above.         Electronically signed by: Keon Jonas MD   Date:    01/31/2019   Time:    19:14      X-Ray Chest AP Portable   Final Result      Lines and tubes are grossly unchanged.      Interval improvement in aeration of the lungs.         Electronically signed by: Connor Babcock MD   Date:    01/31/2019   Time:    16:42      X-Ray Chest 1 View   Final Result      Overall no significant interval change         Electronically signed by: Connor Babcock MD   Date:    01/30/2019   Time:    08:24      US Extremity Non Vascular Limited Right   Final Result      Nonspecific subcutaneous edema, cutaneous thickening, could represent cellulitis changes.  No definite necrotizing fasciitis, gas or abscess.         Electronically signed by: Tin Leone MD   Date:    01/29/2019   Time:    13:09      X-Ray Chest AP Portable   Final Result   Addendum 1 of 1      Enteric tube projects in unchanged radiographic position with tip coursing    below the diaphragm and appearing to extend beyond the field of view.         Electronically signed by: Trena Sena MD   Date:    01/29/2019   Time:    00:39      Final      As above.         Electronically signed by: Trena Sena MD   Date:    01/29/2019   Time:    00:04      US Lower Extremity Veins Right   Final Result      Limited examination.  No evidence of deep venous thrombosis in the right lower extremity.         Electronically signed by: Vanita Oviedo   Date:    01/28/2019   Time:    21:10      X-Ray Chest AP Portable   Final Result      Cardiomegaly with findings  suggesting pulmonary edema, correlation advised.  Differential would include infection.         Electronically signed by: Kalyan Galvez MD   Date:    01/28/2019   Time:    20:19          ASSESSMENT/PLAN:     1. Persistent Multifactorial anuric (but signs of increased outpt over last 48 hours) acute renal failure secondary to ATN from septic shock, rhabdomyolysis, with hyponatremia, and hyperkalemia (N17.0, N17.9, E87.5, E87.1, M62.82, A41.9):  [facility] and has transitioned to standard hemodialysis.   Some issues with clotting of dialysis catheter.  Continue daily HD/UF until signs of renal recovery.  Timeframe unknown at this time.  Tunneled line placed and thanks to U Dr. Barcenas.  Consulted SW to start process of outpt placement for HD unit near home or place of rehab/SNF.  Renally dose meds, avoid nephrotoxins, and monitor I/O's closely. Next HD Monday  2. Hyperphosphatemia.  Binders.  HD.   3. Secondary hyperparathyroidism due to acute kidney injury.  Calcium within normal limits.  No need to treat at this point.  4. Septic shock and resp failure:  improving daily. .    5. RLE Cellulitis/woody edema (L03.115): Defer to wound care and ID.  6. Anemia of illness:  Iron/b12/folate wnl.  Start ONDINA. Nephrocaps.     7. HTN: Overall well controlled  8. Morbid obesity (E44):  Severe morbid obesity the root of most of this patients' medical issues.  Defer.       See above  Will continue to follow for renal needs.

## 2019-02-09 NOTE — PLAN OF CARE
Problem: Adult Inpatient Plan of Care  Goal: Plan of Care Review  Outcome: Ongoing (interventions implemented as appropriate)  Pt awakens to voice and name.  Confused to time , place , & situation.  '[place]'. Soft wrist restraints in use for personal safety to prevent pulling lines out.  Pt re oriented hourly.  Picc line patent to Rt cephalic vein and lab drawn and sent to lab.  Picc line hubs changed and curos applied flushed dual port.  No personal injury noted from soft wrist restraints.  RLE elevated on pillow.  Neurovascular unchanged.  Pt assisted in turning self with 2 person assist to right side but poor tolerance to turn to left side.  Bipap worn this pm shift with soft wrist restraints in place.  Mosley patent and mosley care given.  Output 50mls.  No resp distress noted.  o2 was increased to 4l with first resp treatment around 1935pm.  Safety maintained.

## 2019-02-09 NOTE — ASSESSMENT & PLAN NOTE
-Due to ATN from sepsis with shock and rhabdomyolysis.    -Unfortunately it looks as if he has progressed to ESRD  -We were unable HD on 2/5 due to clotted trialysis catheter.  Received new HD catheter on 2/6.  Completed dialysis again yesterday.  -Continue with dialysis per nephrology.  -Working on outpatient placement as he will need ongoing dialysis

## 2019-02-09 NOTE — SUBJECTIVE & OBJECTIVE
Interval History:  Stepped down to the floor on 2/5.  New tunneled dialysis catheter placed 2/6.  Pulled picc line yesterday morning and required placement of another. Still requiring supplemental O2.  Mentally clear and states he is very hungry today.    Review of Systems   Constitutional: Negative for chills and fever.   HENT: Negative for congestion and dental problem.    Eyes: Negative for discharge and itching.   Respiratory: Negative for shortness of breath.    Cardiovascular: Negative for chest pain.   Gastrointestinal: Negative for abdominal pain, constipation, diarrhea, nausea and vomiting.   Endocrine: Negative for cold intolerance and heat intolerance.   Musculoskeletal: Negative for arthralgias and back pain.   Neurological: Negative for dizziness and facial asymmetry.   Hematological: Negative for adenopathy.   Psychiatric/Behavioral: Negative for agitation and behavioral problems.     Objective:     Vital Signs (Most Recent):  Temp: 98.7 °F (37.1 °C) (02/09/19 0806)  Pulse: 97 (02/09/19 0806)  Resp: 18 (02/09/19 0806)  BP: 137/79 (02/09/19 0806)  SpO2: 98 % (02/09/19 0806) Vital Signs (24h Range):  Temp:  [97.5 °F (36.4 °C)-98.7 °F (37.1 °C)] 98.7 °F (37.1 °C)  Pulse:  [] 97  Resp:  [16-22] 18  SpO2:  [85 %-98 %] 98 %  BP: (122-170)/(63-98) 137/79     Weight: (!) 166 kg (365 lb 15.4 oz)  Body mass index is 45.74 kg/m².    Intake/Output Summary (Last 24 hours) at 2/9/2019 0916  Last data filed at 2/9/2019 0500  Gross per 24 hour   Intake 315 ml   Output 4080 ml   Net -3765 ml      Physical Exam   Constitutional: He is oriented to person, place, and time. Vital signs are normal. He appears well-developed.  Non-toxic appearance. He does not have a sickly appearance. He does not appear ill. No distress.   Morbid obesity.   HENT:   Head: Normocephalic and atraumatic.   Right Ear: External ear normal.   Left Ear: External ear normal.   Eyes: Conjunctivae and EOM are normal. Pupils are equal, round, and  reactive to light. No scleral icterus.   Neck: Normal range of motion. Neck supple. No JVD present. No tracheal deviation present.   Cardiovascular: Normal rate, regular rhythm, normal heart sounds and intact distal pulses. Exam reveals no gallop and no friction rub.   No murmur heard.  Pulmonary/Chest: Effort normal and breath sounds normal. No stridor. No respiratory distress. He has no wheezes. He has no rales.   Distant lung sounds.  Moving air well.  Some crackles.   Abdominal: Soft. Bowel sounds are normal. He exhibits no distension and no mass. There is no tenderness. There is no guarding.   Musculoskeletal: Normal range of motion. He exhibits no edema or deformity.   Neurological: He is alert and oriented to person, place, and time. He exhibits normal muscle tone.   Skin: Skin is warm and dry. No rash noted. He is not diaphoretic. No erythema.   Right lower extremity with improved erythema and edema.  Area of erythema has not extended beyond pen marking. Less edematous following renal replacement therapy.   Nursing note and vitals reviewed.    Significant Labs: All pertinent labs within the past 24 hours have been reviewed.    Significant Imaging: I have reviewed all pertinent imaging results/findings within the past 24 hours.

## 2019-02-09 NOTE — PT/OT/SLP PROGRESS
Speech Language Pathology      Jones Red  MRN: [id number]    Failed attempt x3 this date. Patient not seen today secondary to dialysis, surgery. Pt pulled out port. In dialysis for large part of the day, then undergoing replacement of port. Will re-attempt 2/9/19.    Stephanie Camarena, JOSSELIN-SLP

## 2019-02-10 LAB
ANION GAP SERPL CALC-SCNC: 12 MMOL/L
BASOPHILS # BLD AUTO: 0.07 K/UL
BASOPHILS NFR BLD: 0.8 %
BUN SERPL-MCNC: 45 MG/DL
CALCIUM SERPL-MCNC: 9.1 MG/DL
CHLORIDE SERPL-SCNC: 99 MMOL/L
CO2 SERPL-SCNC: 24 MMOL/L
CREAT SERPL-MCNC: 5.7 MG/DL
DIFFERENTIAL METHOD: ABNORMAL
EOSINOPHIL # BLD AUTO: 0.2 K/UL
EOSINOPHIL NFR BLD: 2.1 %
ERYTHROCYTE [DISTWIDTH] IN BLOOD BY AUTOMATED COUNT: 15 %
EST. GFR  (AFRICAN AMERICAN): 12 ML/MIN/1.73 M^2
EST. GFR  (NON AFRICAN AMERICAN): 11 ML/MIN/1.73 M^2
GLUCOSE SERPL-MCNC: 89 MG/DL
HCT VFR BLD AUTO: 28.1 %
HGB BLD-MCNC: 9 G/DL
LYMPHOCYTES # BLD AUTO: 1.4 K/UL
LYMPHOCYTES NFR BLD: 15.6 %
MAGNESIUM SERPL-MCNC: 2.1 MG/DL
MCH RBC QN AUTO: 28.8 PG
MCHC RBC AUTO-ENTMCNC: 32 G/DL
MCV RBC AUTO: 90 FL
MONOCYTES # BLD AUTO: 0.9 K/UL
MONOCYTES NFR BLD: 10.1 %
NEUTROPHILS # BLD AUTO: 6.2 K/UL
NEUTROPHILS NFR BLD: 70.4 %
PHOSPHATE SERPL-MCNC: 6.6 MG/DL
PLATELET # BLD AUTO: 292 K/UL
PMV BLD AUTO: 9.5 FL
POTASSIUM SERPL-SCNC: 4.4 MMOL/L
RBC # BLD AUTO: 3.13 M/UL
SODIUM SERPL-SCNC: 135 MMOL/L
VANCOMYCIN SERPL-MCNC: 19.7 UG/ML
WBC # BLD AUTO: 8.73 K/UL

## 2019-02-10 PROCEDURE — 94640 AIRWAY INHALATION TREATMENT: CPT

## 2019-02-10 PROCEDURE — 97530 THERAPEUTIC ACTIVITIES: CPT

## 2019-02-10 PROCEDURE — 25000242 PHARM REV CODE 250 ALT 637 W/ HCPCS: Performed by: HOSPITALIST

## 2019-02-10 PROCEDURE — 25000242 PHARM REV CODE 250 ALT 637 W/ HCPCS: Performed by: STUDENT IN AN ORGANIZED HEALTH CARE EDUCATION/TRAINING PROGRAM

## 2019-02-10 PROCEDURE — 99900035 HC TECH TIME PER 15 MIN (STAT)

## 2019-02-10 PROCEDURE — 99233 PR SUBSEQUENT HOSPITAL CARE,LEVL III: ICD-10-PCS | Mod: ,,, | Performed by: HOSPITALIST

## 2019-02-10 PROCEDURE — 85025 COMPLETE CBC W/AUTO DIFF WBC: CPT

## 2019-02-10 PROCEDURE — 92507 TX SP LANG VOICE COMM INDIV: CPT

## 2019-02-10 PROCEDURE — 83735 ASSAY OF MAGNESIUM: CPT

## 2019-02-10 PROCEDURE — 94660 CPAP INITIATION&MGMT: CPT

## 2019-02-10 PROCEDURE — 84100 ASSAY OF PHOSPHORUS: CPT

## 2019-02-10 PROCEDURE — 94668 MNPJ CHEST WALL SBSQ: CPT

## 2019-02-10 PROCEDURE — 99233 SBSQ HOSP IP/OBS HIGH 50: CPT | Mod: ,,, | Performed by: HOSPITALIST

## 2019-02-10 PROCEDURE — 80048 BASIC METABOLIC PNL TOTAL CA: CPT

## 2019-02-10 PROCEDURE — 25000003 PHARM REV CODE 250: Performed by: NURSE PRACTITIONER

## 2019-02-10 PROCEDURE — 80202 ASSAY OF VANCOMYCIN: CPT

## 2019-02-10 PROCEDURE — 25000003 PHARM REV CODE 250: Performed by: HOSPITALIST

## 2019-02-10 PROCEDURE — 94761 N-INVAS EAR/PLS OXIMETRY MLT: CPT

## 2019-02-10 PROCEDURE — 63600175 PHARM REV CODE 636 W HCPCS: Performed by: STUDENT IN AN ORGANIZED HEALTH CARE EDUCATION/TRAINING PROGRAM

## 2019-02-10 PROCEDURE — 27000221 HC OXYGEN, UP TO 24 HOURS

## 2019-02-10 PROCEDURE — 11000001 HC ACUTE MED/SURG PRIVATE ROOM

## 2019-02-10 PROCEDURE — 63600175 PHARM REV CODE 636 W HCPCS: Performed by: NURSE PRACTITIONER

## 2019-02-10 RX ADMIN — IPRATROPIUM BROMIDE AND ALBUTEROL SULFATE 3 ML: .5; 3 SOLUTION RESPIRATORY (INHALATION) at 03:02

## 2019-02-10 RX ADMIN — BACITRACIN: 500 OINTMENT TOPICAL at 02:02

## 2019-02-10 RX ADMIN — HEPARIN SODIUM 5000 UNITS: 5000 INJECTION, SOLUTION INTRAVENOUS; SUBCUTANEOUS at 05:02

## 2019-02-10 RX ADMIN — HEPARIN SODIUM 5000 UNITS: 5000 INJECTION, SOLUTION INTRAVENOUS; SUBCUTANEOUS at 01:02

## 2019-02-10 RX ADMIN — CEFEPIME HYDROCHLORIDE 1 G: 1 INJECTION, SOLUTION INTRAVENOUS at 12:02

## 2019-02-10 RX ADMIN — BACITRACIN: 500 OINTMENT TOPICAL at 09:02

## 2019-02-10 RX ADMIN — SEVELAMER CARBONATE 2.4 G: 2400 POWDER, FOR SUSPENSION ORAL at 09:02

## 2019-02-10 RX ADMIN — IPRATROPIUM BROMIDE AND ALBUTEROL SULFATE 3 ML: .5; 3 SOLUTION RESPIRATORY (INHALATION) at 08:02

## 2019-02-10 RX ADMIN — IPRATROPIUM BROMIDE AND ALBUTEROL SULFATE 3 ML: .5; 3 SOLUTION RESPIRATORY (INHALATION) at 07:02

## 2019-02-10 RX ADMIN — CARVEDILOL 25 MG: 12.5 TABLET, FILM COATED ORAL at 05:02

## 2019-02-10 RX ADMIN — IPRATROPIUM BROMIDE AND ALBUTEROL SULFATE 3 ML: .5; 3 SOLUTION RESPIRATORY (INHALATION) at 11:02

## 2019-02-10 RX ADMIN — CARVEDILOL 25 MG: 12.5 TABLET, FILM COATED ORAL at 09:02

## 2019-02-10 RX ADMIN — SODIUM CHLORIDE SOLN NEBU 3% 4 ML: 3 NEBU SOLN at 08:02

## 2019-02-10 RX ADMIN — Medication 1 CAPSULE: at 09:02

## 2019-02-10 RX ADMIN — HEPARIN SODIUM 5000 UNITS: 5000 INJECTION, SOLUTION INTRAVENOUS; SUBCUTANEOUS at 09:02

## 2019-02-10 RX ADMIN — SODIUM CHLORIDE SOLN NEBU 3% 4 ML: 3 NEBU SOLN at 07:02

## 2019-02-10 NOTE — ASSESSMENT & PLAN NOTE
-Most likely secondary to sepsis and infection.  Much improved on 2/8 and 2/9 but still somewhat labile.  -TSH is normal.  B12, Folate are not deficient.  Ammonia is normal.  RPR is non-reactive.  -Re-orient as needed  -Delirium precautions.

## 2019-02-10 NOTE — ASSESSMENT & PLAN NOTE
-Admitted to inpatient status in ICU upon transfer from [place] with septic shock.  Treated with pressors and IV antibiotics.  Ultimately weaned off of pressors and transferred to the floor on 2/5.  -Possible component of pneumonia but clear cause of his infection is the severe cellulitis involving the right lower extremity.    -Ultrasound of right lower extremity did not reveal evidence of abscess or gas.    -General surgery was consulted and recommended serial exams for now and no surgical intervention at this time.    -Cellulitis continues to improve.  WBC has normalized  -Bood cultures no growth to date.    -Continue to elevate right lower extremity as tolerated.    -Appreciate input from ID.  Per ID recs will continue cefepime and vancomycin until 2/11 per ID(EOC 2/11).  -Continue pulse dosing of vancomycin.  If he has dialysis will give additional 500mg after HD today (but do not think he is having dialysis today).  -Continue with wound care.

## 2019-02-10 NOTE — PLAN OF CARE
Problem: Adult Inpatient Plan of Care  Goal: Patient-Specific Goal (Individualization)  Outcome: Ongoing (interventions implemented as appropriate)  Bed in low and locked position and able to reposition per self.  Remains free of injury during shift.  Bed alarm in use.  Restraints remain in use for safety and pt continued confusion.  Pt thinks rats on coming out of ceiling.  Attempts made to reassure and reorient pt.

## 2019-02-10 NOTE — PLAN OF CARE
Problem: SLP Goal  Goal: SLP Goal  1. Pt will be able to follow simple 1 step commands 75% of time with moderate verbal and visual cues.  2. Increase speech intelligibility to 75% at the simple sentence level with moderate verbal cues to slow rate, raise volume.  3. Pt will be able to consume thin liquids in small single sips via cup or straw, with no signs of airway threat or aspiration given max assistance.  4. Pt will be able to advance to purees and solid consistencies with no signs of airway threat or aspiration given max assistance  5. Increase orientation to month, date, day of week, place and reason for hospitalization with 75% acc given max verbal and written cues.  6. Increase ability to express basic wants and needs 75% of time given min assistance   Outcome: Ongoing (interventions implemented as appropriate)  Pt seen this date for continued evaluation of cognition and to monitor diet tolerance.     Comments: SLP to continue to follow

## 2019-02-10 NOTE — PROGRESS NOTES
[facility] Medicine  Progress Note    Patient Name: Jones Red  MRN: [id number]  Patient Class: IP- Inpatient   Admission Date: 1/28/2019  Length of Stay: 13 days  Attending Physician: Jones Milner MD  Primary Care Provider: Primary Doctor No        Subjective:     Principal Problem:Septic shock    HPI:  Mr. Jones Red is a 53 y.o. male, with PMH of HTN, COPD, chronic respiratory failure (on home O2), Diastolic CHF, LYNDON, chronic venous insufficiency, and questionable seizures history (suspected to be 2/2 alcohol withdrawal), who preset tia to [facility] on 1/22/19 2/2 SOB. This was associated with cough productive of brown sputum. He was started in BiPap, but did ultimately degrade, and was intubated, and maintained on a ventilator. He was additionally altered upon arrival to the ED. History was reportedly obtained by a friend, and it was reported that he was found down at home, and was unable to get up. He as admitted to the ICU, started on IV fluids and pressors.     Hospital Course:  Patient 53-year-old gentleman with history of hypertension, diastolic heart failure, chronic obstructive pulmonary disease with chronic hypoxic respiratory failure on home oxygen, prior alcohol abuse, significant ongoing tobacco use (3 packs per day), and morbid obesity with septic shock secondary to right lower extremity cellulitis complicating chronic venous stasis.  Patient intubated at outside hospital after failing a trial of BiPAP.  Patient also with evidence of worsening renal failure with rhabdomyolysis.       Patient transferred from [facility] in [place], [place] to [facility] on 1/28/2019.  Patient started on renal replacement therapy.  Patient remains intubated and sedated but responds to stimuli when sedation held but otherwise confused.  Ultrasound of the right lower extremity did not reveal evidence of abscess, subcutaneous gas, or evidence  of necrotizing fascitits.  General surgery and wound care consulted.  No surgical intervention recommended at this time.  Patient on broad spectrum antibiotics.    Patient clinically improved in terms his volume status and was successfully extubated on 2/2/2019.    Interval History:  Stepped down to the floor on 2/5.  New tunneled dialysis catheter placed 2/6.  Tolerating HD/UR daily.  Still requiring supplemental O2 (had desat to 88% on RA last night).  Mental status improving generally but still labile and confused at times requiring restraints to prevent self harm and pulling lines.  Tolerating diet.    Review of Systems   Constitutional: Negative for chills and fever.   HENT: Negative for congestion and dental problem.    Eyes: Negative for discharge and itching.   Respiratory: Negative for shortness of breath.    Cardiovascular: Negative for chest pain.   Gastrointestinal: Negative for abdominal pain, constipation, diarrhea, nausea and vomiting.   Endocrine: Negative for cold intolerance and heat intolerance.   Musculoskeletal: Negative for arthralgias and back pain.   Neurological: Negative for dizziness and facial asymmetry.   Hematological: Negative for adenopathy.   Psychiatric/Behavioral: Negative for agitation and behavioral problems.     Objective:     Vital Signs (Most Recent):  Temp: 97.7 °F (36.5 °C) (02/10/19 0733)  Pulse: 98 (02/10/19 0743)  Resp: 18 (02/10/19 0743)  BP: 134/66 (02/10/19 0733)  SpO2: (!) 90 % (02/10/19 0740) Vital Signs (24h Range):  Temp:  [96.9 °F (36.1 °C)-99 °F (37.2 °C)] 97.7 °F (36.5 °C)  Pulse:  [] 98  Resp:  [16-25] 18  SpO2:  [88 %-98 %] 90 %  BP: (119-163)/() 134/66     Weight: (!) 166 kg (365 lb 15.4 oz)  Body mass index is 45.74 kg/m².    Intake/Output Summary (Last 24 hours) at 2/10/2019 0754  Last data filed at 2/10/2019 0400  Gross per 24 hour   Intake 860 ml   Output 2550 ml   Net -1690 ml      Physical Exam   Constitutional: He is oriented to person,  place, and time. Vital signs are normal. He appears well-developed.  Non-toxic appearance. He does not have a sickly appearance. He does not appear ill. No distress.   Morbid obesity.   HENT:   Head: Normocephalic and atraumatic.   Right Ear: External ear normal.   Left Ear: External ear normal.   Eyes: Conjunctivae and EOM are normal. Pupils are equal, round, and reactive to light. No scleral icterus.   Neck: Normal range of motion. Neck supple. No JVD present. No tracheal deviation present.   Cardiovascular: Normal rate, regular rhythm, normal heart sounds and intact distal pulses. Exam reveals no gallop and no friction rub.   No murmur heard.  Pulmonary/Chest: Effort normal and breath sounds normal. No stridor. No respiratory distress. He has no wheezes. He has no rales.   Distant lung sounds.  Moving air well.  Some crackles.   Abdominal: Soft. Bowel sounds are normal. He exhibits no distension and no mass. There is no tenderness. There is no guarding.   Musculoskeletal: Normal range of motion. He exhibits no edema or deformity.   Neurological: He is alert and oriented to person, place, and time. He exhibits normal muscle tone.   Skin: Skin is warm and dry. No rash noted. He is not diaphoretic. No erythema.   Right lower extremity with improved erythema and edema.  Area of erythema has not extended beyond pen marking. Less edematous following renal replacement therapy.   Nursing note and vitals reviewed.    Significant Labs: All pertinent labs within the past 24 hours have been reviewed.    Significant Imaging: I have reviewed all pertinent imaging results/findings within the past 24 hours.    Assessment/Plan:      * Septic shock    -Admitted to inpatient status in ICU upon transfer from [place] with septic shock.  Treated with pressors and IV antibiotics.  Ultimately weaned off of pressors and transferred to the floor on 2/5.  -Possible component of pneumonia but clear cause of his infection is the severe  cellulitis involving the right lower extremity.    -Ultrasound of right lower extremity did not reveal evidence of abscess or gas.    -General surgery was consulted and recommended serial exams for now and no surgical intervention at this time.    -Cellulitis continues to improve.  WBC has normalized  -Bood cultures no growth to date.    -Continue to elevate right lower extremity as tolerated.    -Appreciate input from ID.  Per ID recs will continue cefepime and vancomycin until 2/11 per ID(EOC 2/11).  -Continue pulse dosing of vancomycin.  If he has dialysis will give additional 500mg after HD today (but do not think he is having dialysis today).  -Continue with wound care.     Encephalopathy, metabolic    -Most likely secondary to sepsis and infection.  Much improved on 2/8 and 2/9 but still somewhat labile.  -TSH is normal.  B12, Folate are not deficient.  Ammonia is normal.  RPR is non-reactive.  -Re-orient as needed  -Delirium precautions.       Essential hypertension    -At home takes coreg, losartan, hctz, metolazone and bumex  -BP mildly elevatd at times.  -Continue coreg and PRN hydralazine     Tobacco abuse    -Patient counseled on the importance of smoking cessation.  -NRT offered     Acute on chronic respiratory failure with hypoxia and hypercapnia    -Successfully extubated 2/2/2018.  Patient did well on BiPAP following extubation.  He has needed intermittent BiPAP.    -Continue with bronchodilator treatments as I suspect patient has COPD given wheezing and significant tobacco smoking history.    -Continue volume removal with CRRT as able.  -continue supplemental O2 requirements for goal sat 88-92%  -Completed course of steroids   -Will need outpatient PFTs and probable ICS/LAMA/LABA. When patient is being discharged will start him on Spiriva.         Acute on chronic diastolic heart failure    -Marked improvement with dialysis which we need to continue.    -Continue with additional volume removal with  renal replacement therapy as recommended by Nephrology.  -No ARB due to renal dysfunction.  -Continue coreg.     Cellulitis of right lower extremity    -As above for septic shock     Acute renal failure    -Due to ATN from sepsis with shock and rhabdomyolysis.    -Unfortunately it looks as if he has progressed to ESRD  -We were unable HD on 2/5 due to clotted trialysis catheter.  Received new HD catheter on 2/6.  Completed dialysis again yesterday.  -Continue with dialysis per nephrology.  -Working on outpatient placement as he will need ongoing dialysis       VTE Risk Mitigation (From admission, onward)        Ordered     heparin (porcine) injection 2,000 Units  Once      02/06/19 1729     heparin (porcine) injection 5,000 Units  As needed (PRN)      01/31/19 1825     heparin (porcine) injection 2,000 Units  As needed (PRN)      01/30/19 1056     heparin (porcine) injection 5,000 Units  Every 8 hours      01/29/19 2044     heparin (porcine) injection 5,000 Units  As needed (PRN)      01/29/19 1335     IP VTE HIGH RISK PATIENT  Once      01/28/19 1947              Jones Milner MD  Department of Hospital Medicine   [facility]

## 2019-02-10 NOTE — SUBJECTIVE & OBJECTIVE
Interval History:  Stepped down to the floor on 2/5.  New tunneled dialysis catheter placed 2/6.  Tolerating HD/UR daily.  Still requiring supplemental O2 (had desat to 88% on RA last night).  Mental status improving generally but still labile and confused at times requiring restraints to prevent self harm and pulling lines.  Tolerating diet.    Review of Systems   Constitutional: Negative for chills and fever.   HENT: Negative for congestion and dental problem.    Eyes: Negative for discharge and itching.   Respiratory: Negative for shortness of breath.    Cardiovascular: Negative for chest pain.   Gastrointestinal: Negative for abdominal pain, constipation, diarrhea, nausea and vomiting.   Endocrine: Negative for cold intolerance and heat intolerance.   Musculoskeletal: Negative for arthralgias and back pain.   Neurological: Negative for dizziness and facial asymmetry.   Hematological: Negative for adenopathy.   Psychiatric/Behavioral: Negative for agitation and behavioral problems.     Objective:     Vital Signs (Most Recent):  Temp: 97.7 °F (36.5 °C) (02/10/19 0733)  Pulse: 98 (02/10/19 0743)  Resp: 18 (02/10/19 0743)  BP: 134/66 (02/10/19 0733)  SpO2: (!) 90 % (02/10/19 0740) Vital Signs (24h Range):  Temp:  [96.9 °F (36.1 °C)-99 °F (37.2 °C)] 97.7 °F (36.5 °C)  Pulse:  [] 98  Resp:  [16-25] 18  SpO2:  [88 %-98 %] 90 %  BP: (119-163)/() 134/66     Weight: (!) 166 kg (365 lb 15.4 oz)  Body mass index is 45.74 kg/m².    Intake/Output Summary (Last 24 hours) at 2/10/2019 0754  Last data filed at 2/10/2019 0400  Gross per 24 hour   Intake 860 ml   Output 2550 ml   Net -1690 ml      Physical Exam   Constitutional: He is oriented to person, place, and time. Vital signs are normal. He appears well-developed.  Non-toxic appearance. He does not have a sickly appearance. He does not appear ill. No distress.   Morbid obesity.   HENT:   Head: Normocephalic and atraumatic.   Right Ear: External ear normal.    Left Ear: External ear normal.   Eyes: Conjunctivae and EOM are normal. Pupils are equal, round, and reactive to light. No scleral icterus.   Neck: Normal range of motion. Neck supple. No JVD present. No tracheal deviation present.   Cardiovascular: Normal rate, regular rhythm, normal heart sounds and intact distal pulses. Exam reveals no gallop and no friction rub.   No murmur heard.  Pulmonary/Chest: Effort normal and breath sounds normal. No stridor. No respiratory distress. He has no wheezes. He has no rales.   Distant lung sounds.  Moving air well.  Some crackles.   Abdominal: Soft. Bowel sounds are normal. He exhibits no distension and no mass. There is no tenderness. There is no guarding.   Musculoskeletal: Normal range of motion. He exhibits no edema or deformity.   Neurological: He is alert and oriented to person, place, and time. He exhibits normal muscle tone.   Skin: Skin is warm and dry. No rash noted. He is not diaphoretic. No erythema.   Right lower extremity with improved erythema and edema.  Area of erythema has not extended beyond pen marking. Less edematous following renal replacement therapy.   Nursing note and vitals reviewed.    Significant Labs: All pertinent labs within the past 24 hours have been reviewed.    Significant Imaging: I have reviewed all pertinent imaging results/findings within the past 24 hours.

## 2019-02-10 NOTE — PLAN OF CARE
Problem: Adult Inpatient Plan of Care  Goal: Plan of Care Review  Outcome: Ongoing (interventions implemented as appropriate)  Pt received on 2.5LNC with adequate saturation. No distress noted. BIpap on s/b. Duoneb and 3% saline given, tolerated well. Cpt with pneumatic percussor completed.

## 2019-02-10 NOTE — PT/OT/SLP PROGRESS
Physical Therapy Treatment    Patient Name:  Jones Red   MRN:  [id number]    Recommendations:     Discharge Recommendations:  (TBD pending progress)   Discharge Equipment Recommendations: (defer to facility)   Barriers to discharge: Increased caregiver burden; high fall risk; impaired safety awareness; inaccessible home environment based on current level of function    Assessment:     Jones Red is a 53 y.o. male admitted with a medical diagnosis of Septic shock.  He presents with the following impairments/functional limitations:  impaired self care skills, impaired functional mobilty, impaired skin, edema, decreased lower extremity function, weakness, impaired endurance, impaired cognition, decreased safety awareness affecting his ability to participate in transfers, standing activities, ADLs, household ambulation.    Pt progressing very well with PT. His mobility AMPA score has remarkably improved from 7/24 last session to 15/24 this session, which is more indicative of SNF placement following acute setting. Two people were present for mobility due to pt's size and h/o impaired cognition this admission (found in wrist restraints) for patient and caregiver safety.      Patient has had significant improvement in functional mobility today. He has met 5/5 goals set on initial PT evaluation, and goals have been revised to reflect pt's improvement. His cognition and ability to follow commands has improved, as well as his cooperation and participation. Pt was able to take steps at bedside with rolling walker with min A. Due to size and inconsistent mental status, pt will likely need walker and 2 person assist for ambulation away from bed with third person to follow with chair for safety. Will plan to do this next session.      Today, pt presents as an excellent SNF candidate. Prior to admission pt was modified independent with mobility (household ambulation with walker) and intermittent assist for self-care. There is  "expectation of returning to prior level of function to maintain independence avoiding readmission. Pt is at high risk of unplanned readmission due to fall risk and lack of 24 hour caregiver in prior setting. He was pleasant and motivated to participate in OOB mobility this date.       PT will continue to follow and progress as tolerated. Please see progress note for detailed plan of care and recommendations.    Rehab Prognosis: Good; patient would benefit from acute skilled PT services to address these deficits and reach maximum level of function.    Recent Surgery: Procedure(s) (LRB):  Insertion,catheter,tunneled (Right) 4 Days Post-Op    Plan:     During this hospitalization, patient to be seen 5 x/week to address the identified rehab impairments via gait training, therapeutic activities, therapeutic exercises, neuromuscular re-education and progress toward the following goals:    · Plan of Care Expires:  03/03/19    Subjective     Pt has deep voice with thick cajun accent affecting communication, also sounding garbled.   Chief Complaint: No complaints during session. "You can just pull that off" re: dry skin at R foot. Pt educated re: allowing R foot to heal, avoid pulling at skin.   Patient/Family Comments/goals: "Let me walk over there" re: pointing across the room.   Pain/Comfort:  · Pain Rating 1: 0/10  · Pain Rating Post-Intervention 1: 0/10      Objective:     Communicated with bedside nurse Jennifer prior to session.  Patient found supine in bed with HOB elevated with PICC line(tunneled catheter R chest) upon PT entry to room.     General Precautions: Standard, (fall and aspiration risk; delirium precautions)   Orthopedic Precautions:N/A   Braces: N/A     Functional Mobility:  Bed Mobility:     Supine to Sit: min A (on bariatric bed, deflated mattress) second person there for safety  Sit to Supine: min A with bed flat. Re-inflated mattress once pt returned to supine.  Bridging: Supervision for bridging to " HOB with bed in Trendelenberg, using head board to pull himself to top of bed.   Rolling: Min A rolling L and R with use of bed rail.    Transfers:     Sit to Stand:  X 3 trials with 2 person min A and no AD for first two trials, rolling walker for third trial. Verbal cues for technique.   Gait: x 4 lateral steps on level tile with 2 person min A and rolling walker. Verbal cues for proper hand placement on walker. Pt with tendency to lean R forearm on walker handle during standing prior to gait.      AM-PAC 6 CLICK MOBILITY  Turning over in bed (including adjusting bedclothes, sheets and blankets)?: 3  Sitting down on and standing up from a chair with arms (e.g., wheelchair, bedside commode, etc.): 3  Moving from lying on back to sitting on the side of the bed?: 3  Moving to and from a bed to a chair (including a wheelchair)?: 3  Need to walk in hospital room?: 2  Climbing 3-5 steps with a railing?: 1  Basic Mobility Total Score: 15       Therapeutic Activities and Exercises:  -Total assist donning socks, special care for R foot due to skin integrity   -CGA for sitting activities at edge of bed approximately 15 minutes.   -Three standing trials. X 10 seconds static no AD, x 20 seconds static no AD, x 40 seconds including lateral steps as described above. All trials with tendency for trunk, hip and knee flexion and leaning on walker with R forearm. Verbal cues for safety and correction of posture and use of walker. Min A at trunk, second person prepared to block knees, no knee buckling throughout all standing trials.   -Discussion of PT plan of care.       Patient left supine with HOB elevated and R LE elevated with all lines intact, call button in reach and bedside nurse, charge nurse Nasra and PCT Maribel notified..    GOALS:   Multidisciplinary Problems     Physical Therapy Goals        Problem: Physical Therapy Goal    Goal Priority Disciplines Outcome Goal Variances Interventions   Physical Therapy  Goal     PT, PT/OT Ongoing (interventions implemented as appropriate)     Description:  Goals to be met by: 3/3/19    Patient will increase functional independence with mobility by performin. Supine to sit with min A. -- MET 2/10/19  REVISED: Supine>sit modified independently.   2. Sit to supine with min A. -- MET 2/10/19  REVISED: Sit>supine modified independently.   3. Rolling R and L with min A. -- MET 2/10/19  4. Sitting at edge of bed >5 minutes with min A. -- MET 2/10/19  5. PT will assess sit<>stand. -- MET 2/10/19  REVISED: Sit<>stand with supervision and standard or rolling walker.   6. Gait > 50 ft with standard or rolling walker with min A.                         Time Tracking:     PT Received On: 02/10/19  PT Start Time: 1102     PT Stop Time: 1134  PT Total Time (min): 32 min     Billable Minutes: Therapeutic Activity 32   PTA present to assist with session due to 2 skilled person assist required for safety     Treatment Type: Treatment  PT/PTA: PT     PTA Visit Number: 0     Rebecca Burch, PT  02/10/2019

## 2019-02-10 NOTE — PROGRESS NOTES
"Nephrology  Progress Note    Admit Date: 1/28/2019   LOS: 13 days     SUBJECTIVE:     Follow-up For:  Septic shock/ATN    Interval History:    UOP 50mL.  Labile mood and mental awareness.      Review of Systems:    Neg.     OBJECTIVE:     Vital Signs Range (Last 24H):  /77 (BP Location: Left arm, Patient Position: Lying)   Pulse 90   Temp 98.9 °F (37.2 °C) (Oral)   Resp 16   Ht 6' 3" (1.905 m)   Wt (!) 166 kg (365 lb 15.4 oz)   SpO2 (!) 90%   BMI 45.74 kg/m²     Temp:  [97.7 °F (36.5 °C)-99 °F (37.2 °C)]   Pulse:  []   Resp:  [16-25]   BP: (119-139)/(56-77)   SpO2:  [88 %-94 %]     I & O (Last 24H):    Intake/Output Summary (Last 24 hours) at 2/10/2019 1647  Last data filed at 2/10/2019 0400  Gross per 24 hour   Intake 360 ml   Output --   Net 360 ml       Physical Exam:  General appearance:  Disheveled and morbidly obese.  Labile mood   Eyes:  Conjunctivae/corneas clear. PERRL.  Lungs: diminished.   Heart: Regular rate and rhythm, distant heart tones.  Abdomen: Soft, non-tender non-distended; bowel sounds normal; no masses,  no organomegaly, morbidly obese  Extremities:  Chronic Woody edema.   peeling/flaky LE's (R>L).    Skin:  Right lower extremity cellulitis improved.   Abdalla catheterization  Right IJ CHERIE       Laboratory Data:  Recent Labs   Lab 02/10/19  0415   WBC 8.73   RBC 3.13*   HGB 9.0*   HCT 28.1*      MCV 90   MCH 28.8   MCHC 32.0       BMP:   Recent Labs   Lab 02/10/19  0415   GLU 89   *   K 4.4   CL 99   CO2 24   BUN 45*   CREATININE 5.7*   CALCIUM 9.1   MG 2.1     Lab Results   Component Value Date    CALCIUM 9.1 02/10/2019    PHOS 6.6 (H) 02/10/2019       Lab Results   Component Value Date    .4 (H) 01/30/2019    CALCIUM 9.1 02/10/2019    PHOS 6.6 (H) 02/10/2019       No results found for: URICACID    BNP  No results for input(s): BNP, BNPTRIAGEBLO in the last 168 hours.    Medications:  Medication list was reviewed and changes noted under " Mom states that he has white spots on the inside of his lips. Also has spots on the roof of his mouth. Mom states he has a cold and raspy voice. Assessment/Plan.    Diagnostic Results:    X-Ray Chest 1 View for PICC_Central line   Final Result      As above         Electronically signed by: Kalyan Galvez MD   Date:    02/08/2019   Time:    14:46      X-Ray Chest AP Portable   Final Result      NG tube in place.         Electronically signed by: Viviane Persaud MD   Date:    02/02/2019   Time:    23:59      X-Ray Chest 1 View   Final Result      As above.         Electronically signed by: Keon Jonas MD   Date:    01/31/2019   Time:    19:14      X-Ray Chest AP Portable   Final Result      Lines and tubes are grossly unchanged.      Interval improvement in aeration of the lungs.         Electronically signed by: Connor Babcock MD   Date:    01/31/2019   Time:    16:42      X-Ray Chest 1 View   Final Result      Overall no significant interval change         Electronically signed by: Connor Babcock MD   Date:    01/30/2019   Time:    08:24      US Extremity Non Vascular Limited Right   Final Result      Nonspecific subcutaneous edema, cutaneous thickening, could represent cellulitis changes.  No definite necrotizing fasciitis, gas or abscess.         Electronically signed by: Tin Leone MD   Date:    01/29/2019   Time:    13:09      X-Ray Chest AP Portable   Final Result   Addendum 1 of 1      Enteric tube projects in unchanged radiographic position with tip coursing    below the diaphragm and appearing to extend beyond the field of view.         Electronically signed by: Trena Sena MD   Date:    01/29/2019   Time:    00:39      Final      As above.         Electronically signed by: Trena Sena MD   Date:    01/29/2019   Time:    00:04      US Lower Extremity Veins Right   Final Result      Limited examination.  No evidence of deep venous thrombosis in the right lower extremity.         Electronically signed by: Vanita Oviedo   Date:    01/28/2019   Time:    21:10      X-Ray Chest AP Portable   Final Result      Cardiomegaly with findings  suggesting pulmonary edema, correlation advised.  Differential would include infection.         Electronically signed by: Kalyan Galvez MD   Date:    01/28/2019   Time:    20:19          ASSESSMENT/PLAN:     1. Persistent Multifactorial anuric (but signs of increased outpt over last 48 hours) acute renal failure secondary to ATN from septic shock, rhabdomyolysis, with hyponatremia, and hyperkalemia (N17.0, N17.9, E87.5, E87.1, M62.82, A41.9):  [facility] has transitioned to standard hemodialysis.   Some issues with clotting of dialysis catheter.  Continue daily HD/UF until signs of renal recovery.  Timeframe unknown at this time.  Tunneled line placed and thanks to U Dr. Barcenas.  Consulted SW to start process of outpt placement for HD unit near home or place of rehab/SNF.  Renally dose meds, avoid nephrotoxins, and monitor I/O's closely. Next HD Monday  2. Hyperphosphatemia.  Binders.  HD.   3. Secondary hyperparathyroidism due to acute kidney injury.  Calcium within normal limits.  No need to treat at this point.  4. Septic shock and resp failure:  improving daily. .    5. RLE Cellulitis/woody edema (L03.115): Defer to wound care and ID. Vanco level okay for today to hold dose.No HD today.  6. Anemia of illness:  Iron/b12/folate wnl.  Start ONDINA. Nephrocaps.     7. HTN: Overall well controlled  8. Morbid obesity (E44):  Severe morbid obesity the root of most of this patients' medical issues.  Defer.       See above  Will continue to follow for renal needs.

## 2019-02-10 NOTE — PT/OT/SLP PROGRESS
"Speech Language Pathology Treatment    Patient Name:  Jones Red   MRN:  [id number]  Admitting Diagnosis: Septic shock    Recommendations:                 General Recommendations:     1. Speech Pathology 3-5x/week for ongoing assessment of diet tolerance and remediation of cognitive communication deficits  Diet recommendations:  Regular, Liquid Diet Level: Thin   Aspiration Precautions: 1 bite/sip at a time, Assistance with meals, Avoid talking while eating, Eliminate distractions, Feed only when awake/alert, HOB to 90 degrees, Meds whole 1 at a time, Remain upright 30 minutes post meal, Small bites/sips and Standard aspiration precautions   General Precautions: Standard, aspiration  Communication strategies:    1. Provide verbal and written orientation information prior to sessions    Subjective   Pt awake, RN in room reporting pt had just finished lunch and it went well. Pt asking for coffee. Agreeable to work with SLP.  Pain/Comfort:  Pain Rating 1: 0/10    Objective:     Has the patient been evaluated by SLP for swallowing?   Yes  Keep patient NPO? No   Current Respiratory Status: nasal cannula      COGNITIVE COMMUNICATION: Pt awake, alert, cooperative. Able to independently reference external aid to provide month, day, and year with 100% accuracy. Not oriented to birth date (stating March) or age (stating 54). When reviewed age with pt, pt stating "I will be 54 in March", however, pt's birthday is in [birth year]. Pt unable to recall exact location. Required review of this information. Continuing to demonstrate confusion and difficulty keeping track of time. Pt stating he was admitted this AM. Provided pt with orientation information (exact location, length of hospitalization, time of year, ect). Pt required moderate cues for redirection this date during simple conversation. Able to attend to SLP for 2-3 mins at a time before redirection required. Pt tangential this date about burn on leg and continued to repeat " information despite SLP reminder that pt has already discussed that. Required moderate cues to maintain eye contact this date and to repair communication breakdowns. Pt often changing topic of conversation at inappropriate times.    MOTOR SPEECH: Pt ~60% intelligible to a familiar listener this date during conversation about familiar topic (dcr to 50% when topic of conversation is unknown). Pt required max cues to slow rate of speech and increase volume this date. Unable to repair communication breakdown independently when SLP asked for repetition. Reviewed strategies with pt and discussed implementing them on his own.     SWALLOWING: Able to consume 8oz of coffee this date with no overt s/s of airway threat or aspiration: no coughing, throat clearing, change in vocal quality. RN stating pt able to consume regular tray with assistance this date.     Assessment:     Jones Red is a 53 y.o. male with an SLP diagnosis of Dysphagia and Cognitive-communication Impairment. Pt and RN stating pt able to tolerate regular meal at breakfast and lunch with no difficulties given assistance. Able to consume thin liquids this session with no overt s/s of airway threat or aspiration. Continues to present with dcr ability to store orientation information and dcr attention during conversation.     Goals:   Multidisciplinary Problems     SLP Goals        Problem: SLP Goal    Goal Priority Disciplines Outcome   SLP Goal     SLP Ongoing (interventions implemented as appropriate)   Description:  1. Pt will be able to follow simple 1 step commands 75% of time with moderate verbal and visual cues.  2. Increase speech intelligibility to 75% at the simple sentence level with moderate verbal cues to slow rate, raise volume.  3. Pt will be able to consume thin liquids in small single sips via cup or straw, with no signs of airway threat or aspiration given max assistance.  4. Pt will be able to advance to purees and solid consistencies with  no signs of airway threat or aspiration given max assistance  5. Increase orientation to month, date, day of week, place and reason for hospitalization with 75% acc given max verbal and written cues.  6. Increase ability to express basic wants and needs 75% of time given min assistance                    Plan:     · Patient to be seen:  3 x/week, 5 x/week   · Plan of Care expires:  02/28/19  · Plan of Care reviewed with:  patient   · SLP Follow-Up:  Yes       Discharge recommendations:    TBD      Time Tracking:     SLP Treatment Date:   02/10/19  Speech Start Time:  1330  Speech Stop Time:  1358     Speech Total Time (min):  28 min    Billable Minutes: Speech Language Therapy individual 28 mins    LINH Oquendo-SLP  02/10/2019

## 2019-02-10 NOTE — PLAN OF CARE
Problem: Adult Inpatient Plan of Care  Goal: Plan of Care Review  Outcome: Ongoing (interventions implemented as appropriate)  Tolerates rx well, no acute distress noted. Seems more oriented today.

## 2019-02-10 NOTE — PLAN OF CARE
Problem: Adult Inpatient Plan of Care  Goal: Plan of Care Review  Outcome: Ongoing (interventions implemented as appropriate)  Pt had 1 bm. Wrist restraints maintained without any self injury. Released and exercised extremities. Pt with confusion intermittently.  Pt threw legs off bed and bed alarm went off. Assisted legs back in bed .  Bed weight not obtained due to bed requires zeroing again. Charge nurse ragini kaur inst to report to day nurse to obtain wt.  picc line patent. Tunnel cath patent. Pt pulled condom cath off x2.

## 2019-02-10 NOTE — PLAN OF CARE
Problem: Physical Therapy Goal  Goal: Physical Therapy Goal  Goals to be met by: 3/3/19    Patient will increase functional independence with mobility by performin. Supine to sit with min A. -- MET 2/10/19  REVISED: Supine>sit modified independently.   2. Sit to supine with min A. -- MET 2/10/19  REVISED: Sit>supine modified independently.   3. Rolling R and L with min A. -- MET 2/10/19  4. Sitting at edge of bed >5 minutes with min A. -- MET 2/10/19  5. PT will assess sit<>stand. -- MET 2/10/19  REVISED: Sit<>stand with supervision and standard or rolling walker.   6. Gait > 50 ft with standard or rolling walker with min A.       Outcome: Ongoing (interventions implemented as appropriate)  Pt progressing very well with PT. His mobility AMPA score has remarkably improved from  last session to 15/24 this session, which is more indicative of SNF placement following acute setting. Two people were present for mobility due to pt's size and h/o impaired cognition this admission (found in wrist restraints) for patient and caregiver safety.     Patient has had significant improvement in functional mobility today. He has met 5/5 goals set on initial PT evaluation, and goals have been revised to reflect pt's improvement. His cognition and ability to follow commands has improved, as well as his cooperation and participation. Pt was able to take steps at bedside with rolling walker with min A. Due to size and inconsistent mental status, pt will likely need walker and 2 person assist for ambulation away from bed with third person to follow with chair for safety. Will plan to do this next session.     Today, pt presents as an excellent SNF candidate. Prior to admission pt was modified independent with mobility (household ambulation with walker) and intermittent assist for self-care. There is expectation of returning to prior level of function to maintain independence avoiding readmission. Pt is at high risk of  unplanned readmission due to fall risk and lack of 24 hour caregiver in prior setting. He was pleasant and motivated to participate in OOB mobility this date.      PT will continue to follow and progress as tolerated. Please see progress note for detailed plan of care and recommendations.

## 2019-02-11 LAB
ANION GAP SERPL CALC-SCNC: 14 MMOL/L
BASOPHILS # BLD AUTO: 0.07 K/UL
BASOPHILS NFR BLD: 0.9 %
BUN SERPL-MCNC: 59 MG/DL
CALCIUM SERPL-MCNC: 9.7 MG/DL
CHLORIDE SERPL-SCNC: 99 MMOL/L
CO2 SERPL-SCNC: 23 MMOL/L
CREAT SERPL-MCNC: 7.7 MG/DL
DIFFERENTIAL METHOD: ABNORMAL
EOSINOPHIL # BLD AUTO: 0.3 K/UL
EOSINOPHIL NFR BLD: 3.5 %
ERYTHROCYTE [DISTWIDTH] IN BLOOD BY AUTOMATED COUNT: 15.5 %
EST. GFR  (AFRICAN AMERICAN): 9 ML/MIN/1.73 M^2
EST. GFR  (NON AFRICAN AMERICAN): 7 ML/MIN/1.73 M^2
GLUCOSE SERPL-MCNC: 93 MG/DL
HCT VFR BLD AUTO: 28.1 %
HGB BLD-MCNC: 8.7 G/DL
LYMPHOCYTES # BLD AUTO: 1.4 K/UL
LYMPHOCYTES NFR BLD: 17.4 %
MAGNESIUM SERPL-MCNC: 2.4 MG/DL
MCH RBC QN AUTO: 27.9 PG
MCHC RBC AUTO-ENTMCNC: 31 G/DL
MCV RBC AUTO: 90 FL
MONOCYTES # BLD AUTO: 0.8 K/UL
MONOCYTES NFR BLD: 9.5 %
NEUTROPHILS # BLD AUTO: 5.5 K/UL
NEUTROPHILS NFR BLD: 68.2 %
PHOSPHATE SERPL-MCNC: 9 MG/DL
PLATELET # BLD AUTO: 318 K/UL
PMV BLD AUTO: 9.6 FL
POTASSIUM SERPL-SCNC: 4.5 MMOL/L
RBC # BLD AUTO: 3.12 M/UL
SODIUM SERPL-SCNC: 136 MMOL/L
VANCOMYCIN SERPL-MCNC: 18.6 UG/ML
WBC # BLD AUTO: 8.01 K/UL

## 2019-02-11 PROCEDURE — 94761 N-INVAS EAR/PLS OXIMETRY MLT: CPT

## 2019-02-11 PROCEDURE — 80202 ASSAY OF VANCOMYCIN: CPT

## 2019-02-11 PROCEDURE — 63600175 PHARM REV CODE 636 W HCPCS: Performed by: STUDENT IN AN ORGANIZED HEALTH CARE EDUCATION/TRAINING PROGRAM

## 2019-02-11 PROCEDURE — 94668 MNPJ CHEST WALL SBSQ: CPT

## 2019-02-11 PROCEDURE — 84100 ASSAY OF PHOSPHORUS: CPT

## 2019-02-11 PROCEDURE — 11000001 HC ACUTE MED/SURG PRIVATE ROOM

## 2019-02-11 PROCEDURE — 25000003 PHARM REV CODE 250: Performed by: NURSE PRACTITIONER

## 2019-02-11 PROCEDURE — 25000242 PHARM REV CODE 250 ALT 637 W/ HCPCS: Performed by: HOSPITALIST

## 2019-02-11 PROCEDURE — 97535 SELF CARE MNGMENT TRAINING: CPT

## 2019-02-11 PROCEDURE — 63600175 PHARM REV CODE 636 W HCPCS: Performed by: INTERNAL MEDICINE

## 2019-02-11 PROCEDURE — 63600175 PHARM REV CODE 636 W HCPCS: Performed by: NURSE PRACTITIONER

## 2019-02-11 PROCEDURE — 99233 PR SUBSEQUENT HOSPITAL CARE,LEVL III: ICD-10-PCS | Mod: ,,, | Performed by: HOSPITALIST

## 2019-02-11 PROCEDURE — 63600175 PHARM REV CODE 636 W HCPCS: Performed by: HOSPITALIST

## 2019-02-11 PROCEDURE — 94640 AIRWAY INHALATION TREATMENT: CPT

## 2019-02-11 PROCEDURE — 25000242 PHARM REV CODE 250 ALT 637 W/ HCPCS: Performed by: STUDENT IN AN ORGANIZED HEALTH CARE EDUCATION/TRAINING PROGRAM

## 2019-02-11 PROCEDURE — 99233 SBSQ HOSP IP/OBS HIGH 50: CPT | Mod: ,,, | Performed by: INTERNAL MEDICINE

## 2019-02-11 PROCEDURE — 80100016 HC MAINTENANCE HEMODIALYSIS

## 2019-02-11 PROCEDURE — 83735 ASSAY OF MAGNESIUM: CPT

## 2019-02-11 PROCEDURE — 97530 THERAPEUTIC ACTIVITIES: CPT

## 2019-02-11 PROCEDURE — 99900035 HC TECH TIME PER 15 MIN (STAT)

## 2019-02-11 PROCEDURE — 85025 COMPLETE CBC W/AUTO DIFF WBC: CPT

## 2019-02-11 PROCEDURE — 92507 TX SP LANG VOICE COMM INDIV: CPT

## 2019-02-11 PROCEDURE — 27000221 HC OXYGEN, UP TO 24 HOURS

## 2019-02-11 PROCEDURE — 94660 CPAP INITIATION&MGMT: CPT

## 2019-02-11 PROCEDURE — 25000003 PHARM REV CODE 250: Performed by: HOSPITALIST

## 2019-02-11 PROCEDURE — 99233 SBSQ HOSP IP/OBS HIGH 50: CPT | Mod: ,,, | Performed by: HOSPITALIST

## 2019-02-11 PROCEDURE — 99233 PR SUBSEQUENT HOSPITAL CARE,LEVL III: ICD-10-PCS | Mod: ,,, | Performed by: INTERNAL MEDICINE

## 2019-02-11 PROCEDURE — 80048 BASIC METABOLIC PNL TOTAL CA: CPT

## 2019-02-11 RX ORDER — SEVELAMER CARBONATE FOR ORAL SUSPENSION 2400 MG/1
2.4 POWDER, FOR SUSPENSION ORAL 3 TIMES DAILY
Status: DISCONTINUED | OUTPATIENT
Start: 2019-02-11 | End: 2019-02-14

## 2019-02-11 RX ORDER — ALUMINUM HYDROXIDE 320 MG/5ML
10 LIQUID ORAL 3 TIMES DAILY
Status: COMPLETED | OUTPATIENT
Start: 2019-02-11 | End: 2019-02-12

## 2019-02-11 RX ADMIN — BACITRACIN: 500 OINTMENT TOPICAL at 01:02

## 2019-02-11 RX ADMIN — HEPARIN SODIUM 5000 UNITS: 5000 INJECTION, SOLUTION INTRAVENOUS; SUBCUTANEOUS at 05:02

## 2019-02-11 RX ADMIN — HEPARIN SODIUM 2000 UNITS: 1000 INJECTION, SOLUTION INTRAVENOUS; SUBCUTANEOUS at 11:02

## 2019-02-11 RX ADMIN — Medication 1 CAPSULE: at 01:02

## 2019-02-11 RX ADMIN — IPRATROPIUM BROMIDE AND ALBUTEROL SULFATE 3 ML: .5; 3 SOLUTION RESPIRATORY (INHALATION) at 07:02

## 2019-02-11 RX ADMIN — VANCOMYCIN HYDROCHLORIDE 500 MG: 500 INJECTION, POWDER, LYOPHILIZED, FOR SOLUTION INTRAVENOUS at 01:02

## 2019-02-11 RX ADMIN — ALUMINUM HYDROXIDE 10 ML: 320 LIQUID ORAL at 08:02

## 2019-02-11 RX ADMIN — IPRATROPIUM BROMIDE AND ALBUTEROL SULFATE 3 ML: .5; 3 SOLUTION RESPIRATORY (INHALATION) at 08:02

## 2019-02-11 RX ADMIN — CEFEPIME HYDROCHLORIDE 1 G: 1 INJECTION, SOLUTION INTRAVENOUS at 01:02

## 2019-02-11 RX ADMIN — IPRATROPIUM BROMIDE AND ALBUTEROL SULFATE 3 ML: .5; 3 SOLUTION RESPIRATORY (INHALATION) at 03:02

## 2019-02-11 RX ADMIN — ERYTHROPOIETIN 20000 UNITS: 20000 INJECTION, SOLUTION INTRAVENOUS; SUBCUTANEOUS at 09:02

## 2019-02-11 RX ADMIN — ALUMINUM HYDROXIDE 10 ML: 320 LIQUID ORAL at 03:02

## 2019-02-11 RX ADMIN — CARVEDILOL 25 MG: 12.5 TABLET, FILM COATED ORAL at 01:02

## 2019-02-11 RX ADMIN — HEPARIN SODIUM 2000 UNITS: 1000 INJECTION, SOLUTION INTRAVENOUS; SUBCUTANEOUS at 08:02

## 2019-02-11 RX ADMIN — SODIUM CHLORIDE SOLN NEBU 3% 4 ML: 3 NEBU SOLN at 08:02

## 2019-02-11 RX ADMIN — SEVELAMER CARBONATE 2.4 G: 2.4 POWDER, FOR SUSPENSION ORAL at 08:02

## 2019-02-11 RX ADMIN — BACITRACIN: 500 OINTMENT TOPICAL at 08:02

## 2019-02-11 RX ADMIN — SEVELAMER CARBONATE 2.4 G: 2.4 POWDER, FOR SUSPENSION ORAL at 03:02

## 2019-02-11 RX ADMIN — HEPARIN SODIUM 5000 UNITS: 5000 INJECTION, SOLUTION INTRAVENOUS; SUBCUTANEOUS at 12:02

## 2019-02-11 RX ADMIN — IPRATROPIUM BROMIDE AND ALBUTEROL SULFATE 3 ML: .5; 3 SOLUTION RESPIRATORY (INHALATION) at 11:02

## 2019-02-11 RX ADMIN — HEPARIN SODIUM 5000 UNITS: 5000 INJECTION, SOLUTION INTRAVENOUS; SUBCUTANEOUS at 01:02

## 2019-02-11 RX ADMIN — HEPARIN SODIUM 5000 UNITS: 5000 INJECTION, SOLUTION INTRAVENOUS; SUBCUTANEOUS at 09:02

## 2019-02-11 RX ADMIN — BACITRACIN: 500 OINTMENT TOPICAL at 03:02

## 2019-02-11 RX ADMIN — CARVEDILOL 25 MG: 12.5 TABLET, FILM COATED ORAL at 04:02

## 2019-02-11 RX ADMIN — SODIUM CHLORIDE SOLN NEBU 3% 4 ML: 3 NEBU SOLN at 07:02

## 2019-02-11 NOTE — PROGRESS NOTES
"[facility]  Adult Nutrition  Progress Note    SUMMARY       Recommendations    Recommendation:   1. Continue renal diet   2. Assess diet education needs if cognitive status improves    Goals:   1. Meet >75% EEN and EPN   2. Promote nutrition related labs wnl    Nutrition Goal Status: progressing towards goal  Communication of RD Recs: discussed on rounds    Reason for Assessment    Reason For Assessment: RD follow-up  Diagnosis: infection/sepsis(Septic shock/ATN)  Relevant Medical History: HTN, COPD, Chronic respiratory failure (on home O2), Diastolic CHF, LYNDON, chronic venous insufficiency, and questionable seizures history (suspected to be 2/2 alcohol withdrawal)  Interdisciplinary Rounds: attended    General Information Comments: Pt eating lunch during time of visit, would answer questions but seemed confused. States he is finishing most of all meals. SLP is now recommending regular texture diet; diet advanced to renal on 2/9. NFPE performed, no physical s/s of malnutrition at this time.     Nutrition Discharge Planning: d/c on renal diet    Nutrition Risk Screen    Nutrition Risk Screen: no indicators present    Nutrition/Diet History    Spiritual, Cultural Beliefs, Orthodox Practices, Values that Affect Care: no  Factors Affecting Nutritional Intake: impaired cognitive status/motor control    Anthropometrics    Temp: 97.9 °F (36.6 °C)  Height Method: Estimated  Height: 6' 3" (190.5 cm)  Height (inches): 75 in  Weight Method: Bed Scale  Weight: (!) 166 kg (365 lb 15.4 oz)  Weight (lb): (!) 365.97 lb  Ideal Body Weight (IBW), Male: 196 lb  % Ideal Body Weight, Male (lb): 186.72 lb  BMI (Calculated): 45.8  BMI Grade: greater than 40 - morbid obesity     Lab/Procedures/Meds    Pertinent Labs: Reviewed  Lab Results   Component Value Date     02/11/2019    K 4.5 02/11/2019    CL 99 02/11/2019    CO2 23 02/11/2019    BUN 59 (H) 02/11/2019    CREATININE 7.7 (H) 02/11/2019    CALCIUM 9.7 " 02/11/2019    PHOS 9.0 (HH) 02/11/2019    ESTGFRAFRICA 9 (A) 02/11/2019    EGFRNONAA 7 (A) 02/11/2019    ALBUMIN 1.8 (L) 01/30/2019     Pertinent Meds: Reviewed  Scheduled Meds:   epoetin edin (PROCRIT) injection  20,000 Units Intravenous Q7 Days    sevelamer carbonate  2.4 g Oral TID    sodium chloride 3%  4 mL Nebulization BID    vancomycin (VANCOCIN) IVPB  500 mg Intravenous Once    vancomycin (VANCOCIN) IVPB  500 mg Intravenous Once    vitamin renal formula (B-complex-vitamin c-folic acid)  1 capsule Oral Daily     Estimated/Assessed Needs    Weight Used For Calorie Calculations: (!) 178 kg (392 lb 6.7 oz)  Energy Calorie Requirements (kcal): 2720  Energy Need Method: [place]  Protein Requirements: 143-178 gm/d (0.8-1 gm/kg)  Weight Used For Protein Calculations: (!) 178 kg (392 lb 6.7 oz)  Fluid Requirements (mL): 2720(or per team)  Estimated Fluid Requirement Method: RDA Method  RDA Method (mL): 2720     Nutrition Prescription Ordered    Current Diet Order: renal    Evaluation of Received Nutrient/Fluid Intake    % Intake of Estimated Energy Needs: 50 - 75 %  % Meal Intake: 50 - 75 %    Nutrition Risk    Level of Risk/Frequency of Follow-up: low     Assessment and Plan    Nutrition Problem  Inadequate oral intake  Related to (etiology):   Altered cognitive function  Signs and Symptoms (as evidenced by):   Diet just advanced, finishing <75% of meals  Interventions (treatment strategy):  Collaboration with other providers  Nutrition Diagnosis Status:   Continues    Monitor and Evaluation    Food and Nutrient Intake: energy intake, food and beverage intake  Food and Nutrient Adminstration: diet order  Physical Activity and Function: nutrition-related ADLs and IADLs  Anthropometric Measurements: weight, weight change  Biochemical Data, Medical Tests and Procedures: electrolyte and renal panel, gastrointestinal profile, glucose/endocrine profile, inflammatory profile, lipid profile  Nutrition-Focused  Physical Findings: overall appearance, extremities, muscles and bones, skin     Malnutrition Assessment       Orbital Region (Subcutaneous Fat Loss): well nourished  Upper Arm Region (Subcutaneous Fat Loss): well nourished   [place] Region (Muscle Loss): well nourished  Clavicle Bone Region (Muscle Loss): well nourished  Clavicle and Acromion Bone Region (Muscle Loss): well nourished  Dorsal Hand (Muscle Loss): well nourished  Patellar Region (Muscle Loss): well nourished  Anterior Thigh Region (Muscle Loss): well nourished  Posterior Calf Region (Muscle Loss): well nourished     Nutrition Follow-Up    RD Follow-up?: Yes    Patience High, MS, RD, LDN   Dietitian, [facility]  [phone]

## 2019-02-11 NOTE — PLAN OF CARE
Problem: Adult Inpatient Plan of Care  Goal: Patient-Specific Goal (Individualization)  Outcome: Ongoing (interventions implemented as appropriate)  Pt free of falls/trauma/injuries this shift.VSS. Medication administered as perscribed. PT currently on Bipap. Pt has bilateral wrist restraints.Fall precautions in place, bed alarm on. Patient tolerating POC well. Pt verbalizes understanding of care. Pt denies any chest pain, SOB, or any other discomforts at this time. Will continue to monitor.

## 2019-02-11 NOTE — PLAN OF CARE
Problem: Adult Inpatient Plan of Care  Goal: Plan of Care Review  Recommendation:   1. Continue renal diet   2. Assess diet education needs if cognitive status improves     Goals:   1. Meet >75% EEN and EPN   2. Promote nutrition related labs wnl

## 2019-02-11 NOTE — PLAN OF CARE
Problem: Adult Inpatient Plan of Care  Goal: Plan of Care Review  Outcome: Ongoing (interventions implemented as appropriate)  Pt received on 2LNC with saturation 90%. BBS coarse and diminished. Duoneb and 3% hypertonic saline given via nebulizer, tolerated well. CPT completed per percussion. Will continue to monitor.

## 2019-02-11 NOTE — ASSESSMENT & PLAN NOTE
-Admitted to inpatient status in ICU upon transfer from [place] with septic shock.  Treated with pressors and IV antibiotics.  Ultimately weaned off of pressors and transferred to the floor on 2/5.  -Possible component of pneumonia but clear cause of his infection is the severe cellulitis involving the right lower extremity.    -Ultrasound of right lower extremity did not reveal evidence of abscess or gas.    -General surgery was consulted and recommended serial exams for now and no surgical intervention at this time.    -Cellulitis continues to improve but still with notable erythema.  WBC has normalized and bood cultures no growth to date.    -Continue to elevate right lower extremity as tolerated.    -Appreciate input from ID.  Per ID recs have continued cefepime and vancomycin until today.  Will d/c antibiotics after doses today.    -Continue pulse dosing of vancomycin.  If he has dialysis will give additional 500mg after HD today   -Have asked Dr. Wood to take a look to determine if he may require a longer course of treatment.  -Continue with wound care.

## 2019-02-11 NOTE — PLAN OF CARE
Problem: Adult Inpatient Plan of Care  Goal: Plan of Care Review  Outcome: Ongoing (interventions implemented as appropriate)  Plan of care reviewed with patient.  Patient calm and cooperative with out restraints.  Disoriented to situation.  IV antibiotics administered per orders.  Purposeful rounding completed, call bell within reach.  [employer]vas[employer] camera in use.  Will continue to monitor.

## 2019-02-11 NOTE — PLAN OF CARE
Problem: Adult Inpatient Plan of Care  Goal: Plan of Care Review  Pt currently on BIPAP  . Pt in no distress at this time. Sats 92  %. Will continue to monitor.

## 2019-02-11 NOTE — PROGRESS NOTES
"Nephrology  Progress Note    Admit Date: 1/28/2019   LOS: 14 days     SUBJECTIVE:     Follow-up For:  Septic shock/ATN    Interval History:    Seen on HD.  No c/o.  Mild confusion at times.       Review of Systems:    Neg.     OBJECTIVE:     Vital Signs Range (Last 24H):  /84   Pulse 89   Temp 97.9 °F (36.6 °C) (Oral)   Resp 18   Ht 6' 3" (1.905 m)   Wt (!) 166 kg (365 lb 15.4 oz)   SpO2 (!) 92%   BMI 45.74 kg/m²     Temp:  [97.7 °F (36.5 °C)-98.9 °F (37.2 °C)]   Pulse:  []   Resp:  [16-25]   BP: (128-147)/(68-87)   SpO2:  [90 %-94 %]     I & O (Last 24H):  No intake or output data in the 24 hours ending 02/11/19 0908    Physical Exam:  General appearance:  Disheveled and morbidly obese.  Labile mood/mentation   Eyes:  Conjunctivae/corneas clear. PERRL.  Lungs: diminished.   Heart: Regular rate and rhythm, distant heart tones.  Abdomen: Soft, non-tender non-distended; bowel sounds normal; no masses,  no organomegaly, morbidly obese  Extremities:  Chronic Woody edema.   peeling/flaky LE's (R>L).    Skin:  Right lower extremity cellulitis improved.   Right IJ CHERIE       Laboratory Data:  Recent Labs   Lab 02/11/19  0540   WBC 8.01   RBC 3.12*   HGB 8.7*   HCT 28.1*      MCV 90   MCH 27.9   MCHC 31.0*       BMP:   Recent Labs   Lab 02/11/19  0540   GLU 93      K 4.5   CL 99   CO2 23   BUN 59*   CREATININE 7.7*   CALCIUM 9.7   MG 2.4     Lab Results   Component Value Date    CALCIUM 9.7 02/11/2019    PHOS 9.0 (HH) 02/11/2019       Lab Results   Component Value Date    .4 (H) 01/30/2019    CALCIUM 9.7 02/11/2019    PHOS 9.0 (HH) 02/11/2019       No results found for: URICACID    BNP  No results for input(s): BNP, BNPTRIAGEBLO in the last 168 hours.    Medications:  Medication list was reviewed and changes noted under Assessment/Plan.    Diagnostic Results:    X-Ray Chest 1 View for PICC_Central line   Final Result      As above         Electronically signed by: Kalyan Galvez MD "   Date:    02/08/2019   Time:    14:46      X-Ray Chest AP Portable   Final Result      NG tube in place.         Electronically signed by: Viviane Persaud MD   Date:    02/02/2019   Time:    23:59      X-Ray Chest 1 View   Final Result      As above.         Electronically signed by: Keon Jonas MD   Date:    01/31/2019   Time:    19:14      X-Ray Chest AP Portable   Final Result      Lines and tubes are grossly unchanged.      Interval improvement in aeration of the lungs.         Electronically signed by: Connor Babcock MD   Date:    01/31/2019   Time:    16:42      X-Ray Chest 1 View   Final Result      Overall no significant interval change         Electronically signed by: Connor Babcock MD   Date:    01/30/2019   Time:    08:24      US Extremity Non Vascular Limited Right   Final Result      Nonspecific subcutaneous edema, cutaneous thickening, could represent cellulitis changes.  No definite necrotizing fasciitis, gas or abscess.         Electronically signed by: Tin Leone MD   Date:    01/29/2019   Time:    13:09      X-Ray Chest AP Portable   Final Result   Addendum 1 of 1      Enteric tube projects in unchanged radiographic position with tip coursing    below the diaphragm and appearing to extend beyond the field of view.         Electronically signed by: Trena Sena MD   Date:    01/29/2019   Time:    00:39      Final      As above.         Electronically signed by: Trena Sena MD   Date:    01/29/2019   Time:    00:04      US Lower Extremity Veins Right   Final Result      Limited examination.  No evidence of deep venous thrombosis in the right lower extremity.         Electronically signed by: Vanita Oviedo   Date:    01/28/2019   Time:    21:10      X-Ray Chest AP Portable   Final Result      Cardiomegaly with findings suggesting pulmonary edema, correlation advised.  Differential would include infection.         Electronically signed by: Kalyan Galvez MD   Date:    01/28/2019    Time:    20:19          ASSESSMENT/PLAN:     1. Persistent Multifactorial essentially anuric acute renal failure secondary to ATN from septic shock, rhabdomyolysis, with hyponatremia, and hyperkalemia (N17.0, N17.9, E87.5, E87.1, M62.82, A41.9):  Transferred from outlying facility for CRRT and has transitioned to standard hemodialysis.   Some issues with clotting of dialysis catheter.  Continue daily HD/UF until signs of renal recovery.  Timeframe unknown at this time.  Tunneled line placed and thanks to U Dr. Barcenas.  Will place on MWF schedule for now.  Consulted SW to start process of outpt placement for HD unit near home or place of rehab/SNF.  Renally dose meds, avoid nephrotoxins, and monitor I/O's closely.   2. Hyperphosphatemia.  Binders.  HD.   Aluminum hydroxide X 3 since remains persistently elevated.    3. Secondary hyperparathyroidism due to acute kidney injury.  Calcium within normal limits.  No need to treat at this point.  4. RLE Cellulitis/woody edema (L03.115): Defer to wound care and ID. Vanco per rx.   5. Anemia of illness:  Iron/b12/folate wnl.  Started ONDINA. Nephrocaps.     6. HTN: Overall well controlled  7. Morbid obesity (E44):  Severe morbid obesity the root of most of this patients' medical issues.  Defer.       See above  Will continue to follow for renal needs.   Ok to transfer from [facility] once outpt/SNF HD arranged.

## 2019-02-11 NOTE — PT/OT/SLP PROGRESS
"Physical Therapy Treatment    Patient Name:  Jones Red   MRN:  [id number]    Recommendations:     Discharge Recommendations:  (TBD )   Discharge Equipment Recommendations: (defer to SNF )   Barriers to discharge: increase caregiver burden     Assessment:     Jones Red is a 53 y.o. male admitted with a medical diagnosis of Septic shock.  He presents with the following impairments/functional limitations:  weakness, impaired endurance, impaired self care skills, impaired functional mobilty, gait instability, impaired balance, impaired cognition, decreased coordination, decreased lower extremity function, impaired cardiopulmonary response to activity, impaired skin, edema patient tolerated treatment session fairly well and improved with functional mobility. Patient required Min A of 2 people for transfers on this day. Patient was able to take 5 steps with RW with Min A for safety X 3 trials at bedside. Patient with decrease confusion and motivated to participate.     Rehab Prognosis: Good; patient would benefit from acute skilled PT services to address these deficits and reach maximum level of function.    Recent Surgery: Procedure(s) (LRB):  Insertion,catheter,tunneled (Right) 5 Days Post-Op    Plan:     During this hospitalization, patient to be seen 5 x/week to address the identified rehab impairments via gait training, therapeutic activities, therapeutic exercises, neuromuscular re-education and progress toward the following goals:    · Plan of Care Expires:  03/03/19    Subjective     Chief Complaint: Patient stated " I stated " I need to use the bathroom"   Patient/Family Comments/goals: none stated   Pain/Comfort:  · Pain Rating 1: 0/10  · Pain Rating Post-Intervention 1: 0/10      Objective:     Communicated with nurse prior to session.  Patient found supine PICC line  upon PT entry to room.     General Precautions: Standard, (fall, aspiration and delirium )   Orthopedic Precautions:N/A   Braces: N/A "     Functional Mobility:  · Supine to sit with Supervision   · Scoot to edge of bed with CGA  · Sit to stand from bed with Rolling walker with Min A second person for safety   · SPT from bed to bedside commode with rolling walker with Min A for safety  · Sit to stand from bedside commode with Min A second person for safety   · Patient took 5 steps at bedside with RW with Min A second person for safety X 3 trials   · SPT from bedside commode to bedside chair with Rolling walker     AM-PAC 6 CLICK MOBILITY  Turning over in bed (including adjusting bedclothes, sheets and blankets)?: 3  Sitting down on and standing up from a chair with arms (e.g., wheelchair, bedside commode, etc.): 3  Moving from lying on back to sitting on the side of the bed?: 3  Moving to and from a bed to a chair (including a wheelchair)?: 3  Need to walk in hospital room?: 3  Climbing 3-5 steps with a railing?: 1  Basic Mobility Total Score: 16     Therapeutic Activities and Exercises:  Patient static stood at bedside commode with RW with CGA while patient required total A for catherine care.     Patient left up in chair with all lines intact, call button in reach, nurse notified and patient escort  present..    GOALS:   Multidisciplinary Problems     Physical Therapy Goals        Problem: Physical Therapy Goal    Goal Priority Disciplines Outcome Goal Variances Interventions   Physical Therapy Goal     PT, PT/OT Ongoing (interventions implemented as appropriate)     Description:  Goals to be met by: 3/3/19    Patient will increase functional independence with mobility by performin. Supine to sit with min A. -- MET 2/10/19  REVISED: Supine>sit modified independently.   2. Sit to supine with min A. -- MET 2/10/19  REVISED: Sit>supine modified independently.   3. Rolling R and L with min A. -- MET 2/10/19  4. Sitting at edge of bed >5 minutes with min A. -- MET 2/10/19  5. PT will assess sit<>stand. -- MET 2/10/19  REVISED: Sit<>stand with  supervision and standard or rolling walker.   6. Gait > 50 ft with standard or rolling walker with min A.                         Time Tracking:     PT Received On: 02/11/19  PT Start Time: 0743     PT Stop Time: 0822  PT Total Time (min): 39 min     Billable Minutes: Therapeutic Activity 39    Treatment Type: Treatment  PT/PTA: PTA     PTA Visit Number: 1     Anabell Brown, PTA  02/11/2019

## 2019-02-11 NOTE — PROGRESS NOTES
[facility]  Infectious Disease  Progress Note    Patient Name: Jones Red  MRN: [id number]  Admission Date: 1/28/2019  Length of Stay: 14 days  Attending Physician: Jones Milner MD  Primary Care Provider: Primary Doctor No    Isolation Status: No active isolations  Assessment/Plan:      Cellulitis of right lower extremity    - with associated septic shock  - resolved  - blood cultures sterile  - ok to stop abx in am and observe          Thank you for your consult. I will sign off. Please contact us if you have any additional questions.    Abraham Romero MD  Infectious Disease  [facility]    Subjective:     Principal Problem:Septic shock    HPI: 53M with h/o CHF, COPD on home o2, morbid obesity admitted as transfer from [facility] ([facility] in [place], [place]) for septic shock. Patient currently intubated and sedated on propofol and history obtained by chart review. Pt with h/o chronic venous stasis and has had acute worsening of leg wounds with redness and blistering. Currently being supported by pressor support and renal replacement. Nurse reports minimal secretions. Inpatient team has tried imaging lower extremities, but CT could not be done 2/2 obesity. Ultrasound negative for abscess. Currently on cefepime, metronidazole and vancomycin. ID consulted for further abx recs.   Interval History: Doing well. Out of ICU. Denies fever or chills.     Review of Systems   Constitutional: Negative for chills and fever.   All other systems reviewed and are negative.    Objective:     Vital Signs (Most Recent):  Temp: 97.9 °F (36.6 °C) (02/11/19 1245)  Pulse: 102 (02/11/19 1245)  Resp: 18 (02/11/19 1245)  BP: 122/78 (02/11/19 1245)  SpO2: (!) 90 % (02/11/19 1358) Vital Signs (24h Range):  Temp:  [97.7 °F (36.5 °C)-98.8 °F (37.1 °C)] 97.9 °F (36.6 °C)  Pulse:  [] 102  Resp:  [16-25] 18  SpO2:  [90 %-94 %] 90 %  BP: (122-148)/(68-91) 122/78     Weight: (!)  166 kg (365 lb 15.4 oz)  Body mass index is 45.74 kg/m².    Estimated Creatinine Clearance: 18.8 mL/min (A) (based on SCr of 7.7 mg/dL (H)).    Physical Exam   Constitutional: He appears well-developed and well-nourished.   HENT:   Head: Normocephalic.   Right Ear: External ear normal.   Left Ear: External ear normal.   Eyes: Conjunctivae and EOM are normal. Pupils are equal, round, and reactive to light.   Pulmonary/Chest: Effort normal and breath sounds normal. No stridor. No respiratory distress.   Musculoskeletal: He exhibits edema.   Skin: Skin is warm. No erythema.   Psychiatric: He has a normal mood and affect. His behavior is normal.   Nursing note and vitals reviewed.      Significant Labs:   Blood Culture:   Recent Labs   Lab 01/28/19  2020 01/28/19 2023 02/01/19  0956 02/01/19  2000   LABBLOO No growth after 5 days. No growth after 5 days. No growth after 5 days. No growth after 5 days.     CBC:   Recent Labs   Lab 02/10/19  0415 02/11/19  0540   WBC 8.73 8.01   HGB 9.0* 8.7*   HCT 28.1* 28.1*    318     Wound Culture: No results for input(s): LABAERO in the last 4320 hours.    Significant Imaging: I have reviewed all pertinent imaging results/findings within the past 24 hours.

## 2019-02-11 NOTE — PT/OT/SLP PROGRESS
"Speech Language Pathology Treatment    Patient Name:  Jones Red   MRN:  [id number]  Admitting Diagnosis: Septic shock    Recommendations:                 General Recommendations:     1. Speech Pathology 3-5x/week for ongoing assessment of diet tolerance and remediation of cognitive communication deficits    Diet recommendations:  Regular, Liquid Diet Level: Thin     Aspiration Precautions: 1 bite/sip at a time, Assistance with meals, Avoid talking while eating, Eliminate distractions, Feed only when awake/alert, HOB to 90 degrees, Meds whole 1 at a time, Remain upright 30 minutes post meal, Small bites/sips and Standard aspiration precautions     General Precautions: Standard, aspiration, fall    Communication strategies:    1. Provide verbal and written orientation information prior to sessions    Subjective   Pt awake in dialysis. Dialysis RN agreeable for pt to be seen for speech services. MD present for part of session. RN reports that pt has been able to tolerate diet with assistance.     Pain/Comfort:  Pain Rating 1: 0/10    Objective:     Has the patient been evaluated by SLP for swallowing?   Yes  Keep patient NPO? No   Current Respiratory Status: nasal cannula      COGNITIVE COMMUNICATION: Pt awake, alert, cooperative. Oriented to name, , place, hospital, month with 100% acc. Not oriented to year, reason for hospitalization, date, age. SLP provided temporal orientation information. Pt unable to reference wall clock and identify correct time. Pt with overt confusional state. Dcr monitoring of the passage of time noted. Pt reports he has been hospitalized "5 or 6 days." Dcr focused and sustained attention. Pt continues to require moderate cues for redirection during simple conversation. Able to attend to SLP for 2-3 mins at a time before redirection required. Able to answer simple to complex yes/no questions with 100% acc; however, significantly increased processing and response noted. Able to complete " "simple automatic speech tasks with 100% acc (months of the year). Able to participate in 2-3 turns of a simple conversation; however, dcr ability to participate in higher level conversation. Verbal expression is fluent; however, c/b confused and tangential language with poor topic maintenance. Dcr eye contact and flat affect noted throughout conversation. Required moderate cues to maintain eye contact this date and to repair communication breakdowns. Pt demonstrates overt recent memory deficits c/b dcr ability to recall familiar and redundant information (hospital staff, MD) and dcr episodic memory. Unable to recall simple information reviewed in session after 3 minutes. Pt demonstrates significantly dcr insight to deficits. Keeps stating that he is "ready to go, but he just needs a ride." Provided thorough explanation of current deficits and safety concerns, pt continues to demonstrate poor safety awareness. Pt is able to express basic wants and needs given max conversational structuring.     MOTOR SPEECH: Speech c/b heavy regional dialect. Pt ~60% intelligible to an unfamiliar listener this date during conversation about familiar topic. With increased utterance length intelligibility dcr to 40-50%. Pt required mod cues to slow rate of speech and increase volume this date. Unable to repair communication breakdowns independently. Required requests for repetition with increased volume to clarify breakdowns.     Assessment:     Jones Red is a 53 y.o. male with an SLP diagnosis of Dysphagia and Cognitive-communication Impairment. Pt continues to present with overt confusional state; however, pt noted with increased orientation and attention this date.     Goals:   Multidisciplinary Problems     SLP Goals        Problem: SLP Goal    Goal Priority Disciplines Outcome   SLP Goal     SLP Ongoing (interventions implemented as appropriate)   Description:  1. Pt will be able to follow simple 1 step commands 75% of time " with moderate verbal and visual cues.  2. Increase speech intelligibility to 75% at the simple sentence level with moderate verbal cues to slow rate, raise volume.  3. Pt will be able to consume thin liquids in small single sips via cup or straw, with no signs of airway threat or aspiration given max assistance.  4. Pt will be able to advance to purees and solid consistencies with no signs of airway threat or aspiration given max assistance  5. Increase orientation to month, date, day of week, place and reason for hospitalization with 75% acc given max verbal and written cues.  6. Increase ability to express basic wants and needs 75% of time given min assistance                    Plan:     · Patient to be seen:  3 x/week, 5 x/week   · Plan of Care expires:  02/23/19  · Plan of Care reviewed with:  patient, other (see comments)(RN)   · SLP Follow-Up:  Yes       Discharge recommendations:  other (see comments)(TBD) TBD      Time Tracking:     SLP Treatment Date:   02/11/19  Speech Start Time:  1030  Speech Stop Time:  1050     Speech Total Time (min):  20 min    Billable Minutes: Speech Language Therapy individual 20 mins    Nolan Aguirre CCC-SLP  02/11/2019

## 2019-02-11 NOTE — NURSING
Discussed importance of not removing IV lines.  Patient remains confused at time, but is calm and cooperative.  Restraints removed, [employer] camera placed in room.  Will continue to monitor.

## 2019-02-11 NOTE — PROGRESS NOTES
[facility] Medicine  Progress Note    Patient Name: Jones Red  MRN: [id number]  Patient Class: IP- Inpatient   Admission Date: 1/28/2019  Length of Stay: 14 days  Attending Physician: Jones Milner MD  Primary Care Provider: Primary Doctor No        Subjective:     Principal Problem:Septic shock    HPI:  Mr. Jones Red is a 53 y.o. male, with PMH of HTN, COPD, chronic respiratory failure (on home O2), Diastolic CHF, LYNDON, chronic venous insufficiency, and questionable seizures history (suspected to be 2/2 alcohol withdrawal), who preset tia to [facility] on 1/22/19 2/2 SOB. This was associated with cough productive of brown sputum. He was started in BiPap, but did ultimately degrade, and was intubated, and maintained on a ventilator. He was additionally altered upon arrival to the ED. History was reportedly obtained by a friend, and it was reported that he was found down at home, and was unable to get up. He as admitted to the ICU, started on IV fluids and pressors.     Hospital Course:  Patient 53-year-old gentleman with history of hypertension, diastolic heart failure, chronic obstructive pulmonary disease with chronic hypoxic respiratory failure on home oxygen, prior alcohol abuse, significant ongoing tobacco use (3 packs per day), and morbid obesity with septic shock secondary to right lower extremity cellulitis complicating chronic venous stasis.  Patient intubated at outside hospital after failing a trial of BiPAP.  Patient also with evidence of worsening renal failure with rhabdomyolysis.       Patient transferred from [facility] in [place], [place] to [facility] on 1/28/2019.  Patient started on renal replacement therapy.  Patient remains intubated and sedated but responds to stimuli when sedation held but otherwise confused.  Ultrasound of the right lower extremity did not reveal evidence of abscess, subcutaneous gas, or evidence  of necrotizing fascitits.  General surgery and wound care consulted.  No surgical intervention recommended at this time.  Patient on broad spectrum antibiotics.    Patient clinically improved in terms his volume status and was successfully extubated on 2/2/2019.    Interval History:  Stepped down to the floor on 2/5.  New tunneled dialysis catheter placed 2/6.  Tolerating HD/UR.  Still requiring supplemental O2.  Mental status improving generally but still labile and confused at times requiring restraints to prevent self harm and pulling lines.  Tolerating diet.  Seen during dialysis today.  Mildly confused    Review of Systems   Constitutional: Negative for chills and fever.   HENT: Negative for congestion and dental problem.    Eyes: Negative for discharge and itching.   Respiratory: Negative for shortness of breath.    Cardiovascular: Negative for chest pain.   Gastrointestinal: Negative for abdominal pain, constipation, diarrhea, nausea and vomiting.   Endocrine: Negative for cold intolerance and heat intolerance.   Musculoskeletal: Negative for arthralgias and back pain.   Neurological: Negative for dizziness and facial asymmetry.   Hematological: Negative for adenopathy.   Psychiatric/Behavioral: Negative for agitation and behavioral problems.     Objective:     Vital Signs (Most Recent):  Temp: 98.3 °F (36.8 °C) (02/11/19 0744)  Pulse: 96 (02/11/19 0744)  Resp: 18 (02/11/19 0744)  BP: (!) 140/74 (02/11/19 0744)  SpO2: (!) 92 % (02/11/19 0744) Vital Signs (24h Range):  Temp:  [97.7 °F (36.5 °C)-98.9 °F (37.2 °C)] 98.3 °F (36.8 °C)  Pulse:  [] 96  Resp:  [16-25] 18  SpO2:  [90 %-94 %] 92 %  BP: (128-147)/(68-80) 140/74     Weight: (!) 166 kg (365 lb 15.4 oz)  Body mass index is 45.74 kg/m².  No intake or output data in the 24 hours ending 02/11/19 0800   Physical Exam   Constitutional: He is oriented to person, place, and time. Vital signs are normal. He appears well-developed.  Non-toxic appearance. He  does not have a sickly appearance. He does not appear ill. No distress.   Morbid obesity.   HENT:   Head: Normocephalic and atraumatic.   Right Ear: External ear normal.   Left Ear: External ear normal.   Eyes: Conjunctivae and EOM are normal. Pupils are equal, round, and reactive to light. No scleral icterus.   Neck: Normal range of motion. Neck supple. No JVD present. No tracheal deviation present.   Cardiovascular: Normal rate, regular rhythm, normal heart sounds and intact distal pulses. Exam reveals no gallop and no friction rub.   No murmur heard.  Pulmonary/Chest: Effort normal and breath sounds normal. No stridor. No respiratory distress. He has no wheezes. He has no rales.   Distant lung sounds.  Moving air well.  Some crackles.   Abdominal: Soft. Bowel sounds are normal. He exhibits no distension and no mass. There is no tenderness. There is no guarding.   Musculoskeletal: Normal range of motion. He exhibits no edema or deformity.   Neurological: He is alert and oriented to person, place, and time. He exhibits normal muscle tone.   Oriented x3 by confused and poor insight.  I explained what the dialysis machine next him was doing in simple terms.  He was unable to explain this back to me.   Skin: Skin is warm and dry. No rash noted. He is not diaphoretic. No erythema.   Right lower extremity with improved erythema and edema.  Area of erythema has not extended beyond pen marking. Less edematous following renal replacement therapy.   Nursing note and vitals reviewed.    Significant Labs: All pertinent labs within the past 24 hours have been reviewed.    Significant Imaging: I have reviewed all pertinent imaging results/findings within the past 24 hours.    Assessment/Plan:      * Septic shock    -Admitted to inpatient status in ICU upon transfer from [facility] with septic shock.  Treated with pressors and IV antibiotics.  Ultimately weaned off of pressors and transferred to the floor on 2/5.  -Possible  component of pneumonia but clear cause of his infection is the severe cellulitis involving the right lower extremity.    -Ultrasound of right lower extremity did not reveal evidence of abscess or gas.    -General surgery was consulted and recommended serial exams for now and no surgical intervention at this time.    -Cellulitis continues to improve but still with notable erythema.  WBC has normalized and bood cultures no growth to date.    -Continue to elevate right lower extremity as tolerated.    -Appreciate input from ID.  Per ID recs have continued cefepime and vancomycin until today.  Will d/c antibiotics after doses today.    -Continue pulse dosing of vancomycin.  If he has dialysis will give additional 500mg after HD today   -Have asked Dr. Wood to take a look to determine if he may require a longer course of treatment.  -Continue with wound care.     Encephalopathy, metabolic    -Most likely secondary to sepsis and infection.  Much improved on 2/8 and 2/9 but still somewhat labile.  -TSH is normal.  B12, Folate are not deficient.  Ammonia is normal.  RPR is non-reactive.  -Re-orient as needed  -Delirium precautions.  -Attempt to remove restraints today.       Essential hypertension    -At home takes coreg, losartan, hctz, metolazone and bumex  -BP mildly elevatd at times.  -Continue coreg and PRN hydralazine     Tobacco abuse    -Patient counseled on the importance of smoking cessation.  -NRT offered     Acute on chronic respiratory failure with hypoxia and hypercapnia    -Successfully extubated 2/2/2018.  Patient did well on BiPAP following extubation.  He has needed intermittent BiPAP.    -Continue with bronchodilator treatments as I suspect patient has COPD given wheezing and significant tobacco smoking history.    -Continue volume removal with CRRT as able.  -continue supplemental O2 requirements for goal sat 88-92%  -Completed course of steroids   -Will need outpatient PFTs and probable ICS/LAMA/LABA.  When patient is being discharged will start him on Spiriva.         Acute on chronic diastolic heart failure    -Marked improvement with dialysis which we need to continue.    -Continue with additional volume removal with renal replacement therapy as recommended by Nephrology.  -No ARB due to renal dysfunction.  -Continue coreg.     Cellulitis of right lower extremity    -As above for septic shock     Acute renal failure    -Due to ATN from sepsis with shock and rhabdomyolysis.    -Unfortunately it looks as if he has progressed to ESRD  -We were unable HD on 2/5 due to clotted trialysis catheter.  Received new HD catheter on 2/6.  Completed dialysis again yesterday.  -Continue with dialysis per nephrology.  -Working on outpatient placement as he will need ongoing dialysis       VTE Risk Mitigation (From admission, onward)        Ordered     heparin (porcine) injection 2,000 Units  Once      02/06/19 1729     heparin (porcine) injection 5,000 Units  As needed (PRN)      01/31/19 1825     heparin (porcine) injection 2,000 Units  As needed (PRN)      01/30/19 1056     heparin (porcine) injection 5,000 Units  Every 8 hours      01/29/19 2044     heparin (porcine) injection 5,000 Units  As needed (PRN)      01/29/19 1335     IP VTE HIGH RISK PATIENT  Once      01/28/19 1947              Jones Milner MD  Department of Hospital Medicine   [facility]

## 2019-02-11 NOTE — PLAN OF CARE
Problem: Adult Inpatient Plan of Care  Goal: Plan of Care Review  Outcome: Ongoing (interventions implemented as appropriate)  No changes in respiratory status.

## 2019-02-11 NOTE — PLAN OF CARE
Problem: SLP Goal  Goal: SLP Goal  1. Pt will be able to follow simple 1 step commands 75% of time with moderate verbal and visual cues.  2. Increase speech intelligibility to 75% at the simple sentence level with moderate verbal cues to slow rate, raise volume.  3. Pt will be able to consume thin liquids in small single sips via cup or straw, with no signs of airway threat or aspiration given max assistance.  4. Pt will be able to advance to purees and solid consistencies with no signs of airway threat or aspiration given max assistance  5. Increase orientation to month, date, day of week, place and reason for hospitalization with 75% acc given max verbal and written cues.  6. Increase ability to express basic wants and needs 75% of time given min assistance   Outcome: Ongoing (interventions implemented as appropriate)  Pt seen in dialysis for ongoing assessment and remediation of cognitive communication deficits. Increased temporal orientation noted. Pt continues to present with dcr attention and confusional state.    Comments: Speech therapy to follow up 3-5x per week for ongoing assessment and remediation of cognitive communication deficits and to monitor diet tolerance.

## 2019-02-11 NOTE — ASSESSMENT & PLAN NOTE
-Most likely secondary to sepsis and infection.  Much improved on 2/8 and 2/9 but still somewhat labile.  -TSH is normal.  B12, Folate are not deficient.  Ammonia is normal.  RPR is non-reactive.  -Re-orient as needed  -Delirium precautions.  -Attempt to remove restraints today.

## 2019-02-11 NOTE — SUBJECTIVE & OBJECTIVE
Interval History:  Stepped down to the floor on 2/5.  New tunneled dialysis catheter placed 2/6.  Tolerating HD/UR.  Still requiring supplemental O2.  Mental status improving generally but still labile and confused at times requiring restraints to prevent self harm and pulling lines.  Tolerating diet.  Seen during dialysis today.  Mildly confused    Review of Systems   Constitutional: Negative for chills and fever.   HENT: Negative for congestion and dental problem.    Eyes: Negative for discharge and itching.   Respiratory: Negative for shortness of breath.    Cardiovascular: Negative for chest pain.   Gastrointestinal: Negative for abdominal pain, constipation, diarrhea, nausea and vomiting.   Endocrine: Negative for cold intolerance and heat intolerance.   Musculoskeletal: Negative for arthralgias and back pain.   Neurological: Negative for dizziness and facial asymmetry.   Hematological: Negative for adenopathy.   Psychiatric/Behavioral: Negative for agitation and behavioral problems.     Objective:     Vital Signs (Most Recent):  Temp: 98.3 °F (36.8 °C) (02/11/19 0744)  Pulse: 96 (02/11/19 0744)  Resp: 18 (02/11/19 0744)  BP: (!) 140/74 (02/11/19 0744)  SpO2: (!) 92 % (02/11/19 0744) Vital Signs (24h Range):  Temp:  [97.7 °F (36.5 °C)-98.9 °F (37.2 °C)] 98.3 °F (36.8 °C)  Pulse:  [] 96  Resp:  [16-25] 18  SpO2:  [90 %-94 %] 92 %  BP: (128-147)/(68-80) 140/74     Weight: (!) 166 kg (365 lb 15.4 oz)  Body mass index is 45.74 kg/m².  No intake or output data in the 24 hours ending 02/11/19 0800   Physical Exam   Constitutional: He is oriented to person, place, and time. Vital signs are normal. He appears well-developed.  Non-toxic appearance. He does not have a sickly appearance. He does not appear ill. No distress.   Morbid obesity.   HENT:   Head: Normocephalic and atraumatic.   Right Ear: External ear normal.   Left Ear: External ear normal.   Eyes: Conjunctivae and EOM are normal. Pupils are equal,  round, and reactive to light. No scleral icterus.   Neck: Normal range of motion. Neck supple. No JVD present. No tracheal deviation present.   Cardiovascular: Normal rate, regular rhythm, normal heart sounds and intact distal pulses. Exam reveals no gallop and no friction rub.   No murmur heard.  Pulmonary/Chest: Effort normal and breath sounds normal. No stridor. No respiratory distress. He has no wheezes. He has no rales.   Distant lung sounds.  Moving air well.  Some crackles.   Abdominal: Soft. Bowel sounds are normal. He exhibits no distension and no mass. There is no tenderness. There is no guarding.   Musculoskeletal: Normal range of motion. He exhibits no edema or deformity.   Neurological: He is alert and oriented to person, place, and time. He exhibits normal muscle tone.   Oriented x3 by confused and poor insight.  I explained what the dialysis machine next him was doing in simple terms.  He was unable to explain this back to me.   Skin: Skin is warm and dry. No rash noted. He is not diaphoretic. No erythema.   Right lower extremity with improved erythema and edema.  Area of erythema has not extended beyond pen marking. Less edematous following renal replacement therapy.   Nursing note and vitals reviewed.    Significant Labs: All pertinent labs within the past 24 hours have been reviewed.    Significant Imaging: I have reviewed all pertinent imaging results/findings within the past 24 hours.

## 2019-02-11 NOTE — ASSESSMENT & PLAN NOTE
- with associated septic shock  - resolved  - blood cultures sterile  - ok to stop abx in am and observe

## 2019-02-11 NOTE — PLAN OF CARE
Problem: Physical Therapy Goal  Goal: Physical Therapy Goal  Goals to be met by: 3/3/19    Patient will increase functional independence with mobility by performin. Supine to sit with min A. -- MET 2/10/19  REVISED: Supine>sit modified independently.   2. Sit to supine with min A. -- MET 2/10/19  REVISED: Sit>supine modified independently.   3. Rolling R and L with min A. -- MET 2/10/19  4. Sitting at edge of bed >5 minutes with min A. -- MET 2/10/19  5. PT will assess sit<>stand. -- MET 2/10/19  REVISED: Sit<>stand with supervision and standard or rolling walker.   6. Gait > 50 ft with standard or rolling walker with min A.        Outcome: Ongoing (interventions implemented as appropriate)    Patient required CGA/Min A for functional mobility with SPO2 (3L), second person for safety

## 2019-02-11 NOTE — SUBJECTIVE & OBJECTIVE
Interval History: Doing well. Out of ICU. Denies fever or chills.     Review of Systems   Constitutional: Negative for chills and fever.   All other systems reviewed and are negative.    Objective:     Vital Signs (Most Recent):  Temp: 97.9 °F (36.6 °C) (02/11/19 1245)  Pulse: 102 (02/11/19 1245)  Resp: 18 (02/11/19 1245)  BP: 122/78 (02/11/19 1245)  SpO2: (!) 90 % (02/11/19 1358) Vital Signs (24h Range):  Temp:  [97.7 °F (36.5 °C)-98.8 °F (37.1 °C)] 97.9 °F (36.6 °C)  Pulse:  [] 102  Resp:  [16-25] 18  SpO2:  [90 %-94 %] 90 %  BP: (122-148)/(68-91) 122/78     Weight: (!) 166 kg (365 lb 15.4 oz)  Body mass index is 45.74 kg/m².    Estimated Creatinine Clearance: 18.8 mL/min (A) (based on SCr of 7.7 mg/dL (H)).    Physical Exam   Constitutional: He appears well-developed and well-nourished.   HENT:   Head: Normocephalic.   Right Ear: External ear normal.   Left Ear: External ear normal.   Eyes: Conjunctivae and EOM are normal. Pupils are equal, round, and reactive to light.   Pulmonary/Chest: Effort normal and breath sounds normal. No stridor. No respiratory distress.   Musculoskeletal: He exhibits edema.   Skin: Skin is warm. No erythema.   Psychiatric: He has a normal mood and affect. His behavior is normal.   Nursing note and vitals reviewed.      Significant Labs:   Blood Culture:   Recent Labs   Lab 01/28/19 2020 01/28/19 2023 02/01/19  0956 02/01/19 2000   LABBLOO No growth after 5 days. No growth after 5 days. No growth after 5 days. No growth after 5 days.     CBC:   Recent Labs   Lab 02/10/19  0415 02/11/19  0540   WBC 8.73 8.01   HGB 9.0* 8.7*   HCT 28.1* 28.1*    318     Wound Culture: No results for input(s): LABAERO in the last 4320 hours.    Significant Imaging: I have reviewed all pertinent imaging results/findings within the past 24 hours.

## 2019-02-11 NOTE — PT/OT/SLP PROGRESS
Speech Language Pathology      Jones Red  MRN: [id number]    Speech services attempted this AM. Pt in dialysis and unavailable. Will follow up later this date pending pt availability.     Nolan Aguirre, JOSSELIN-SLP

## 2019-02-12 PROBLEM — L03.115 CELLULITIS OF RIGHT LOWER EXTREMITY: Status: RESOLVED | Noted: 2019-01-28 | Resolved: 2019-02-12

## 2019-02-12 LAB
ANION GAP SERPL CALC-SCNC: 14 MMOL/L
BASOPHILS # BLD AUTO: 0.09 K/UL
BASOPHILS NFR BLD: 1 %
BUN SERPL-MCNC: 40 MG/DL
CALCIUM SERPL-MCNC: 9.8 MG/DL
CHLORIDE SERPL-SCNC: 97 MMOL/L
CO2 SERPL-SCNC: 24 MMOL/L
CREAT SERPL-MCNC: 6.5 MG/DL
DIFFERENTIAL METHOD: ABNORMAL
EOSINOPHIL # BLD AUTO: 0.4 K/UL
EOSINOPHIL NFR BLD: 4.5 %
ERYTHROCYTE [DISTWIDTH] IN BLOOD BY AUTOMATED COUNT: 15.7 %
EST. GFR  (AFRICAN AMERICAN): 10 ML/MIN/1.73 M^2
EST. GFR  (NON AFRICAN AMERICAN): 9 ML/MIN/1.73 M^2
GLUCOSE SERPL-MCNC: 94 MG/DL
HCT VFR BLD AUTO: 29.2 %
HGB BLD-MCNC: 9.2 G/DL
LYMPHOCYTES # BLD AUTO: 1.6 K/UL
LYMPHOCYTES NFR BLD: 18.2 %
MAGNESIUM SERPL-MCNC: 2.1 MG/DL
MCH RBC QN AUTO: 28.4 PG
MCHC RBC AUTO-ENTMCNC: 31.5 G/DL
MCV RBC AUTO: 90 FL
MONOCYTES # BLD AUTO: 0.8 K/UL
MONOCYTES NFR BLD: 8.7 %
NEUTROPHILS # BLD AUTO: 5.8 K/UL
NEUTROPHILS NFR BLD: 66.2 %
PHOSPHATE SERPL-MCNC: 5.6 MG/DL
PLATELET # BLD AUTO: 264 K/UL
PMV BLD AUTO: 9.7 FL
POTASSIUM SERPL-SCNC: 4 MMOL/L
RBC # BLD AUTO: 3.24 M/UL
SODIUM SERPL-SCNC: 135 MMOL/L
WBC # BLD AUTO: 8.83 K/UL

## 2019-02-12 PROCEDURE — 94761 N-INVAS EAR/PLS OXIMETRY MLT: CPT

## 2019-02-12 PROCEDURE — 25000003 PHARM REV CODE 250: Performed by: HOSPITALIST

## 2019-02-12 PROCEDURE — 80048 BASIC METABOLIC PNL TOTAL CA: CPT

## 2019-02-12 PROCEDURE — 94640 AIRWAY INHALATION TREATMENT: CPT

## 2019-02-12 PROCEDURE — 99900035 HC TECH TIME PER 15 MIN (STAT)

## 2019-02-12 PROCEDURE — 27000221 HC OXYGEN, UP TO 24 HOURS

## 2019-02-12 PROCEDURE — 97116 GAIT TRAINING THERAPY: CPT

## 2019-02-12 PROCEDURE — 25000003 PHARM REV CODE 250: Performed by: NURSE PRACTITIONER

## 2019-02-12 PROCEDURE — 99233 PR SUBSEQUENT HOSPITAL CARE,LEVL III: ICD-10-PCS | Mod: ,,, | Performed by: HOSPITALIST

## 2019-02-12 PROCEDURE — 92507 TX SP LANG VOICE COMM INDIV: CPT

## 2019-02-12 PROCEDURE — 11000001 HC ACUTE MED/SURG PRIVATE ROOM

## 2019-02-12 PROCEDURE — 94668 MNPJ CHEST WALL SBSQ: CPT

## 2019-02-12 PROCEDURE — 85025 COMPLETE CBC W/AUTO DIFF WBC: CPT

## 2019-02-12 PROCEDURE — 99233 SBSQ HOSP IP/OBS HIGH 50: CPT | Mod: ,,, | Performed by: HOSPITALIST

## 2019-02-12 PROCEDURE — 83735 ASSAY OF MAGNESIUM: CPT

## 2019-02-12 PROCEDURE — 25000242 PHARM REV CODE 250 ALT 637 W/ HCPCS: Performed by: HOSPITALIST

## 2019-02-12 PROCEDURE — 25000242 PHARM REV CODE 250 ALT 637 W/ HCPCS: Performed by: STUDENT IN AN ORGANIZED HEALTH CARE EDUCATION/TRAINING PROGRAM

## 2019-02-12 PROCEDURE — 84100 ASSAY OF PHOSPHORUS: CPT

## 2019-02-12 RX ADMIN — SODIUM CHLORIDE SOLN NEBU 3% 4 ML: 3 NEBU SOLN at 08:02

## 2019-02-12 RX ADMIN — Medication 1 CAPSULE: at 09:02

## 2019-02-12 RX ADMIN — SEVELAMER CARBONATE 2.4 G: 2.4 POWDER, FOR SUSPENSION ORAL at 09:02

## 2019-02-12 RX ADMIN — IPRATROPIUM BROMIDE AND ALBUTEROL SULFATE 3 ML: .5; 3 SOLUTION RESPIRATORY (INHALATION) at 08:02

## 2019-02-12 RX ADMIN — IPRATROPIUM BROMIDE AND ALBUTEROL SULFATE 3 ML: .5; 3 SOLUTION RESPIRATORY (INHALATION) at 11:02

## 2019-02-12 RX ADMIN — CARVEDILOL 25 MG: 12.5 TABLET, FILM COATED ORAL at 09:02

## 2019-02-12 RX ADMIN — SEVELAMER CARBONATE 2.4 G: 2.4 POWDER, FOR SUSPENSION ORAL at 04:02

## 2019-02-12 RX ADMIN — CARVEDILOL 25 MG: 12.5 TABLET, FILM COATED ORAL at 04:02

## 2019-02-12 RX ADMIN — IPRATROPIUM BROMIDE AND ALBUTEROL SULFATE 3 ML: .5; 3 SOLUTION RESPIRATORY (INHALATION) at 04:02

## 2019-02-12 RX ADMIN — SODIUM CHLORIDE SOLN NEBU 3% 4 ML: 3 NEBU SOLN at 07:02

## 2019-02-12 RX ADMIN — BACITRACIN: 500 OINTMENT TOPICAL at 04:02

## 2019-02-12 RX ADMIN — ALUMINUM HYDROXIDE 10 ML: 320 LIQUID ORAL at 09:02

## 2019-02-12 RX ADMIN — IPRATROPIUM BROMIDE AND ALBUTEROL SULFATE 3 ML: .5; 3 SOLUTION RESPIRATORY (INHALATION) at 07:02

## 2019-02-12 RX ADMIN — IPRATROPIUM BROMIDE AND ALBUTEROL SULFATE 3 ML: .5; 3 SOLUTION RESPIRATORY (INHALATION) at 03:02

## 2019-02-12 RX ADMIN — BACITRACIN: 500 OINTMENT TOPICAL at 09:02

## 2019-02-12 NOTE — PLAN OF CARE
Problem: Physical Therapy Goal  Goal: Physical Therapy Goal  Goals to be met by: 3/3/19    Patient will increase functional independence with mobility by performin. Supine to sit with min A. -- MET 2/10/19  REVISED: Supine>sit modified independently.   2. Sit to supine with min A. -- MET 2/10/19  REVISED: Sit>supine modified independently.   3. Rolling R and L with min A. -- MET 2/10/19  4. Sitting at edge of bed >5 minutes with min A. -- MET 2/10/19  5. PT will assess sit<>stand. -- MET 2/10/19  REVISED: Sit<>stand with supervision and standard or rolling walker.   6. Gait > 50 ft with standard or rolling walker with min A.        Outcome: Ongoing (interventions implemented as appropriate)    Progressing well towards goals

## 2019-02-12 NOTE — NURSING
While PCT was cleaning patient she said hpatient scratched his buttock and a fissure of raw skin was noted between his butt cheeks, barrier cream applied.  Also noted was an egg sized slightly raised hard area to left buttocks, patient stated it was a sweat gland.

## 2019-02-12 NOTE — PLAN OF CARE
Problem: Adult Inpatient Plan of Care  Goal: Plan of Care Review  Outcome: Ongoing (interventions implemented as appropriate)  Patient follows command, transferred himself to the BC with walker, able to turn to sides independently. Medications given. Pad changed. VSS. Labs drawn sent to lab. PICC and trialysis in place. Nose bleed noted 2x. Cold compress applied. Bipap On at HS and O2 via NC when awake. Patient conversant and oriented except to situation. Reoriented from time to time. AVASYS camera ON. No complaints at this time.

## 2019-02-12 NOTE — PT/OT/SLP PROGRESS
"Speech Language Pathology Treatment    Patient Name:  Jones Red   MRN:  [id number]  Admitting Diagnosis: Septic shock    Recommendations:                 General Recommendations:     1. Speech Pathology 3-5x/week for ongoing assessment of diet tolerance and remediation of cognitive communication deficits    Diet recommendations:  Regular, Liquid Diet Level: Thin     Aspiration Precautions: 1 bite/sip at a time, Assistance with meals, Avoid talking while eating, Eliminate distractions, Feed only when awake/alert, HOB to 90 degrees, Meds whole 1 at a time, Remain upright 30 minutes post meal, Small bites/sips and Standard aspiration precautions     General Precautions: Standard, aspiration    Communication strategies:    1. Provide verbal and written orientation information prior to sessions    Subjective    Pt awake, upright in chair. RN present for medication administration. Pt expressing goal to "go home" frequently throughout the session. 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   Yes  Keep patient NPO? No   Current Respiratory Status: nasal cannula      COGNITIVE COMMUNICATION: Pt awake, alert, upright in chair, pleasant. Oriented to name, , hospital, month, year, day of week with 100% acc. Not oriented to reason for date, hospitalization, or length of hospitalization. SLP provided temporal orientation and cues to reference external orientation board. Pt continues to present with confusional state. Pt is highly distractible and benefits from dcr environmental distractions. Able to focus and sustain attention to simple conversation for 5-6 minutes prior to requiring redirection. Able to complete simple auditory vigilance task with 100% acc given min cues. Pt presents with dcr storage and retrieval of novel information. Pt unable to immediately recall " short paragraph, given mod review and cueing. Unable to recall temporal orientation information following review after 3 minutes in 3/3 trials. Verbal expression is fluent, and pt is able to express simple wants and needs. Pt is tangential and demonstrates poor topic maintenance. Instances of confused language noted. Pt continues adamantly identifying hospital staff as people who he previously attended middle school. Improved eye contact during conversation this date. Pt presents with significantly dcr safety awareness and insight to deficits. Pt unable to provide appropriate answers to hypothetical safety questions in 3/3 trials. Provided thorough discussion and explanation, insight to deficits did not improve. Would benefit from ongoing speech services while in acute care and services at the next level of care.     MOTOR SPEECH: Speech c/b heavy regional dialect. Pt is approx 75% intelligible at the simple conversational level to an unfamiliar listener. Intelligibility improves to 80-90% given cues to increase volume and dcr rate of speech. Continues to be unable to independently repair breakdowns in communication. Required requests for repetition with increased volume to clarify breakdowns.     Assessment:     Jones Red is a 53 y.o. male with an SLP diagnosis of Dysphagia and Cognitive-communication Impairment. Overt confusional state, dcr attention, and dcr insight to deficits noted; however, pt presenting with improved orientation.     Goals:   Multidisciplinary Problems     SLP Goals        Problem: SLP Goal    Goal Priority Disciplines Outcome   SLP Goal     SLP Ongoing (interventions implemented as appropriate)   Description:  1. Pt will be able to follow simple 1 step commands 75% of time with moderate verbal and visual cues.  2. Increase speech intelligibility to 75% at the simple sentence level with moderate verbal cues to slow rate, raise volume.  3. Pt will be able to consume thin liquids in small  single sips via cup or straw, with no signs of airway threat or aspiration given max assistance.  4. Pt will be able to advance to purees and solid consistencies with no signs of airway threat or aspiration given max assistance  5. Increase orientation to month, date, day of week, place and reason for hospitalization with 75% acc given max verbal and written cues.  6. Increase ability to express basic wants and needs 75% of time given min assistance                    Plan:     · Patient to be seen:  3 x/week, 5 x/week   · Plan of Care expires:  02/23/19  · Plan of Care reviewed with:  patient, other (see comments)(RN)   · SLP Follow-Up:  Yes       Discharge recommendations:  nursing facility, skilled TBD      Time Tracking:     SLP Treatment Date:   02/12/19  Speech Start Time:  0937  Speech Stop Time:  1000     Speech Total Time (min):  23 min    Billable Minutes: Speech Language Therapy individual 23 mins    Nolan Aguirre CCC-SLP  02/12/2019

## 2019-02-12 NOTE — PLAN OF CARE
Problem: Adult Inpatient Plan of Care  Goal: Plan of Care Review  Outcome: Ongoing (interventions implemented as appropriate)  Plan of care reviewed with patient. Patient up in chair with assistance. Epistaxis x1 this shift. Heparin dose held. Wound care provided. Telemetry monitor on with sounds audible. Purposeful rounding done. Bed kept locked and in lowest position. Call light in reach. Will continue to monitor.

## 2019-02-12 NOTE — PLAN OF CARE
CM faxing referrals to the following facilities provided by pt's Medicaid, [facility] Plan:  Lanie Melchor  [facility] Galvez  [facility] Camp CM to follow for plans and arrangemetns.

## 2019-02-12 NOTE — PLAN OF CARE
CM received call from Lore, , discharge planner at pt's [facility], stating she can assist with discharge planning.    Lore sending a list of facilities within a 100 mile radius of pt's home in [place], [place]. This CM to fax packets to all in network providers.    CM to contact [facility] once a facility has accepted.    CM to follow for plans and arrangements.     02/12/19 0975   Discharge Assessment   Assessment Type Discharge Planning Reassessment   Confirmed/corrected address and phone number on facesheet? Yes   Assessment information obtained from? Medical Record;Patient   Prior to hospitilization cognitive status: Not Oriented to Time   Prior to hospitalization functional status: Completely Dependent   Current cognitive status: Not Oriented to Time   Current Functional Status: Completely Dependent   Lives With spouse   Able to Return to Prior Arrangements no   Is patient able to care for self after discharge? No   Patient's perception of discharge disposition skilled nursing facility   Readmission Within the Last 30 Days no previous admission in last 30 days   Patient currently being followed by outpatient case management? No   Patient currently receives any other outside agency services? No   Do you have any problems affording any of your prescribed medications? TBD   Discharge Plan A Skilled Nursing Facility   Discharge Plan B Skilled Nursing Facility   DME Needed Upon Discharge  other (see comments)  (to be decided)

## 2019-02-12 NOTE — PLAN OF CARE
1430  CM received call from Lore, , discharge planner at pt's insurance, [facility], stating she can assist with discharge planning.     Lore sending a list of facilities within a 100 mile radius of pt's home in [place], [place]. This CM to fax packets to all in network providers.    1550  CM received call from Abigail at [facility] in [place], [place]. Abigail states she will send referral to her medical director for review. If pt accepted medically,she will assist with long-term medicaid financials.

## 2019-02-12 NOTE — PT/OT/SLP PROGRESS
Physical Therapy Treatment    Patient Name:  Jones Red   MRN:  [id number]    Recommendations:     Discharge Recommendations:  nursing facility, skilled   Discharge Equipment Recommendations: (defer to SNF )   Barriers to discharge: increase burden on caregiver     Assessment:     Jones Red is a 53 y.o. male admitted with a medical diagnosis of Septic shock.  He presents with the following impairments/functional limitations:  weakness, impaired endurance, decreased safety awareness, impaired cardiopulmonary response to activity, impaired functional mobilty, gait instability, impaired self care skills patient required CGA for transfer and CGA/Min for ambulating 24 feet with RW. Patient with poor safety awareness and required verbal cues for safety.     Rehab Prognosis: Good; patient would benefit from acute skilled PT services to address these deficits and reach maximum level of function.    Recent Surgery: Procedure(s) (LRB):  Insertion,catheter,tunneled (Right) 6 Days Post-Op    Plan:     During this hospitalization, patient to be seen 5 x/week to address the identified rehab impairments via gait training, therapeutic activities, therapeutic exercises, neuromuscular re-education and progress toward the following goals:    · Plan of Care Expires:  03/03/19    Subjective     Chief Complaint: patient was agreeable to walk to the door   Patient/Family Comments/goals: get out of here   Pain/Comfort:  · Pain Rating 1: 0/10  · Pain Rating Post-Intervention 1: 0/10      Objective:     Communicated with nurse prior to session.  Patient found seated telemetry, PICC line  upon PT entry to room. 2L SPO2     General Precautions: Standard, aspiration   Orthopedic Precautions:N/A   Braces: N/A     Functional Mobility:  · Sit to stand from bedside chair with Rolling walker with CGA for safety required verbal cues for hand placement   · Stand to sit onto bedside chair with Rolling walker with Min A   · Patient gait trained 24  feet with Rolling walker with Min A patient was leaning on walker, verbal cues for upright posture, close proximity to walker and demo decrease step length and foot clearance. (CLOSE chair follow) SPO2 2L     AM-PAC 6 CLICK MOBILITY  Turning over in bed (including adjusting bedclothes, sheets and blankets)?: 3  Sitting down on and standing up from a chair with arms (e.g., wheelchair, bedside commode, etc.): 3  Moving from lying on back to sitting on the side of the bed?: 3  Moving to and from a bed to a chair (including a wheelchair)?: 3  Need to walk in hospital room?: 3  Climbing 3-5 steps with a railing?: 1  Basic Mobility Total Score: 16     Patient left up in chair with all lines intact, call button in reach, nurse notified, Resp therapist  present and [facility] ..    GOALS:   Multidisciplinary Problems     Physical Therapy Goals        Problem: Physical Therapy Goal    Goal Priority Disciplines Outcome Goal Variances Interventions   Physical Therapy Goal     PT, PT/OT Ongoing (interventions implemented as appropriate)     Description:  Goals to be met by: 3/3/19    Patient will increase functional independence with mobility by performin. Supine to sit with min A. -- MET 2/10/19  REVISED: Supine>sit modified independently.   2. Sit to supine with min A. -- MET 2/10/19  REVISED: Sit>supine modified independently.   3. Rolling R and L with min A. -- MET 2/10/19  4. Sitting at edge of bed >5 minutes with min A. -- MET 2/10/19  5. PT will assess sit<>stand. -- MET 2/10/19  REVISED: Sit<>stand with supervision and standard or rolling walker.   6. Gait > 50 ft with standard or rolling walker with min A.                         Time Tracking:     PT Received On: 19  PT Start Time: 1110     PT Stop Time: 1128  PT Total Time (min): 18 min     Billable Minutes: Gait Training 18    Treatment Type: Treatment  PT/PTA: PTA     PTA Visit Number: 2     Anabell Brown, MIKE  2019

## 2019-02-12 NOTE — ASSESSMENT & PLAN NOTE
-Admitted to inpatient status in ICU upon transfer from [place] with septic shock.  Treated with pressors and IV antibiotics.  Ultimately weaned off of pressors and transferred to the floor on 2/5.  -Possible component of pneumonia but clear cause of his infection is the severe cellulitis involving the right lower extremity.    -Ultrasound of right lower extremity did not reveal evidence of abscess or gas.    -General surgery was consulted and recommended serial exams for now and no surgical intervention at this time.    -Cellulitis continues to improve but still with notable erythema.  WBC has normalized and bood cultures no growth to date.    -Continue to elevate right lower extremity as tolerated.    -Appreciate input from ID.  Per ID recs have continued cefepime and vancomycin until today.  Will d/c antibiotics after doses today.    -Continue pulse dosing of vancomycin.  If he has dialysis will give additional 500mg after HD today   -Patient completed a course of antibiotics on 2/11/2019.  Continue with wound care.

## 2019-02-12 NOTE — PROGRESS NOTES
[facility] Hospital Medicine  Progress Note    Patient Name: Jones Red  MRN: [id number]  Patient Class: IP- Inpatient   Admission Date: 1/28/2019  Length of Stay: 15 days  Attending Physician: Selwyn So MD  Primary Care Provider: Primary Doctor No        Subjective:     Principal Problem:Septic shock    HPI:  Mr. Jones Red is a 53 y.o. male, with PMH of HTN, COPD, chronic respiratory failure (on home O2), Diastolic CHF, LYNDON, chronic venous insufficiency, and questionable seizures history (suspected to be 2/2 alcohol withdrawal), who preset tia to [facility] on 1/22/19 2/2 SOB. This was associated with cough productive of brown sputum. He was started in BiPap, but did ultimately degrade, and was intubated, and maintained on a ventilator. He was additionally altered upon arrival to the ED. History was reportedly obtained by a friend, and it was reported that he was found down at home, and was unable to get up. He as admitted to the ICU, started on IV fluids and pressors.     Hospital Course:  Mr. Red is a 53-year-old man with history of hypertension, diastolic heart failure, chronic obstructive pulmonary disease with chronic hypoxic respiratory failure on home oxygen, prior alcohol abuse, significant ongoing tobacco use (3 packs per day), and morbid obesity with septic shock secondary to right lower extremity cellulitis complicating chronic venous stasis.  Patient intubated at outside hospital after failing a trial of BiPAP.  Patient also with evidence of worsening renal failure with rhabdomyolysis.  Patient transferred from [facility] in [place], [place] to [facility] on 1/28/2019.  Patient started on renal replacement therapy.  He was extubated on 2/2/19 to bipap and transferred to the floor on 2/5/19.  Ultrasound of the right lower extremity did not reveal evidence of abscess, subcutaneous gas, or evidence of necrotizing fascitits.   General surgery and wound care consulted.  No surgical intervention recommended at this time.  He was seen by ID who recommended continuing vancomycin and cefepime until 2/11/19.  Mental status improved. He is tolerating his diet and continues on dialysis.  We are presently working with PT and OT daily in attempts to gain strength.  We are working on placement in SNF closer to his home which is complicated due to his high medical demands.    Interval History:  No acute events.    Review of Systems   Constitutional: Negative for chills and fever.   HENT: Negative for congestion and dental problem.    Eyes: Negative for discharge and itching.   Respiratory: Negative for shortness of breath.    Cardiovascular: Negative for chest pain.   Gastrointestinal: Negative for abdominal pain, constipation, diarrhea, nausea and vomiting.   Endocrine: Negative for cold intolerance and heat intolerance.   Musculoskeletal: Negative for arthralgias and back pain.   Neurological: Negative for dizziness and facial asymmetry.   Hematological: Negative for adenopathy.   Psychiatric/Behavioral: Negative for agitation and behavioral problems.     Objective:     Vital Signs (Most Recent):  Temp: 98.7 °F (37.1 °C) (02/12/19 1632)  Pulse: 97 (02/12/19 1617)  Resp: 18 (02/12/19 1632)  BP: (!) 165/84 (02/12/19 1632)  SpO2: 96 % (02/12/19 1632) Vital Signs (24h Range):  Temp:  [97.3 °F (36.3 °C)-99.6 °F (37.6 °C)] 98.7 °F (37.1 °C)  Pulse:  [] 97  Resp:  [18-22] 18  SpO2:  [88 %-98 %] 96 %  BP: (116-165)/(60-84) 165/84     Weight: (!) 164 kg (361 lb 8.9 oz)  Body mass index is 45.19 kg/m².    Intake/Output Summary (Last 24 hours) at 2/12/2019 1751  Last data filed at 2/11/2019 1800  Gross per 24 hour   Intake 480 ml   Output --   Net 480 ml      Physical Exam   Constitutional: He is oriented to person, place, and time. Vital signs are normal. He appears well-developed.  Non-toxic appearance. He does not have a sickly appearance. He does  not appear ill. No distress.   Morbid obesity.   HENT:   Head: Normocephalic and atraumatic.   Right Ear: External ear normal.   Left Ear: External ear normal.   Eyes: Conjunctivae and EOM are normal. Pupils are equal, round, and reactive to light. No scleral icterus.   Neck: Normal range of motion. Neck supple. No JVD present. No tracheal deviation present.   Cardiovascular: Normal rate, regular rhythm, normal heart sounds and intact distal pulses. Exam reveals no gallop and no friction rub.   No murmur heard.  Pulmonary/Chest: Effort normal and breath sounds normal. No stridor. No respiratory distress. He has no wheezes. He has no rales.   Distant lung sounds.  Moving air well.  Some crackles.   Abdominal: Soft. Bowel sounds are normal. He exhibits no distension and no mass. There is no tenderness. There is no guarding.   Musculoskeletal: Normal range of motion. He exhibits no edema or deformity.   Neurological: He is alert and oriented to person, place, and time. He exhibits normal muscle tone.   Oriented x 3.  Overall mental status much improved.  Limited insight into his health care issues.   Skin: Skin is warm and dry. No rash noted. He is not diaphoretic. No erythema.   Erythema resolved.  Some skin breakdown in right skin but does not appear infected at this time.   Nursing note and vitals reviewed.    Significant Labs: All pertinent labs within the past 24 hours have been reviewed.    Significant Imaging: I have reviewed all pertinent imaging results/findings within the past 24 hours.    Assessment/Plan:      * Septic shock    -Admitted to inpatient status in ICU upon transfer from [place] with septic shock.  Treated with pressors and IV antibiotics.  Ultimately weaned off of pressors and transferred to the floor on 2/5.  -Possible component of pneumonia but clear cause of his infection is the severe cellulitis involving the right lower extremity.    -Ultrasound of right lower extremity did not reveal  evidence of abscess or gas.    -General surgery was consulted and recommended serial exams for now and no surgical intervention at this time.    -Cellulitis continues to improve but still with notable erythema.  WBC has normalized and bood cultures no growth to date.    -Continue to elevate right lower extremity as tolerated.    -Appreciate input from ID.  Per ID recs have continued cefepime and vancomycin until today.  Will d/c antibiotics after doses today.    -Continue pulse dosing of vancomycin.  If he has dialysis will give additional 500mg after HD today   -Patient completed a course of antibiotics on 2/11/2019.  Continue with wound care.     Acute on chronic diastolic heart failure    -Marked improvement with dialysis which we need to continue.    -Continue with additional volume removal with renal replacement therapy as recommended by Nephrology.  -No ARB due to renal dysfunction.  -Continue coreg.     Acute on chronic respiratory failure with hypoxia and hypercapnia    -Successfully extubated 2/2/2018.  Patient did well on BiPAP following extubation.  He has needed intermittent BiPAP.    -Continue with bronchodilator treatments as I suspect patient has COPD given wheezing and significant tobacco smoking history.    -Continue volume removal with CRRT as able.  -continue supplemental O2 requirements for goal sat 88-92%  -Completed course of steroids   -Will need outpatient PFTs and probable ICS/LAMA/LABA. When patient is being discharged will start him on Spiriva.         Acute renal failure    -Due to ATN from sepsis with shock and rhabdomyolysis.    -Unfortunately it looks as if he has progressed to ESRD  -We were unable HD on 2/5 due to clotted trialysis catheter.  Received new HD catheter on 2/6.  Completed dialysis again yesterday.  -Continue with dialysis per nephrology.  -Working on outpatient placement as he will need ongoing dialysis     Tobacco abuse    -Patient counseled on the importance of  smoking cessation.  -NRT offered     Encephalopathy, metabolic    Much improved.  Off restraints today.  Slowly improving.       Essential hypertension    -At home takes coreg, losartan, hctz, metolazone and bumex  -BP mildly elevatd at times.  -Continue coreg and PRN hydralazine       VTE Risk Mitigation (From admission, onward)        Ordered     heparin (porcine) injection 2,000 Units  Once      02/06/19 1729     heparin (porcine) injection 5,000 Units  As needed (PRN)      01/31/19 1825     heparin (porcine) injection 2,000 Units  As needed (PRN)      01/30/19 1056     heparin (porcine) injection 5,000 Units  Every 8 hours      01/29/19 2044     heparin (porcine) injection 5,000 Units  As needed (PRN)      01/29/19 1335     IP VTE HIGH RISK PATIENT  Once      01/28/19 1947              Selwyn So MD  Department of Hospital Medicine   [facility]

## 2019-02-12 NOTE — PLAN OF CARE
Problem: Adult Inpatient Plan of Care  Goal: Plan of Care Review  Outcome: Ongoing (interventions implemented as appropriate)  Pt in no distress on 2L NC, txs given and CPT done. Pt only tolerated bipap for 2 hours. Will continue to monitor.

## 2019-02-12 NOTE — PLAN OF CARE
Problem: Occupational Therapy Goal  Goal: Occupational Therapy Goal  Goals to be met by 3/3/19  1. Max A self-feeding  2. Mod A G/H (wash face and hands) supported sitting (MET 2/8/19)     REVISED: Mod A G/H (wash face and hands and mouth wash) sitting EOB. GOAL MET 2/11/19.  3. Min A hospital gown change bed level  3. 50% assist with UE ROM exercises MET 2/8/19       REVISED: UE AROM exercises  HHA with Min A VC for continuation of task  5.  Assess bed mobility.  GOAL MET 2/11/19.  6. Step transfer to [facility] with MIN A.    7. MAX A with toileting.   8. Grooming/hygiene in stance at sink with seated rest breaks as needed.        Outcome: Ongoing (interventions implemented as appropriate)  Remained on O2 throughout session.  MIN A for sit<>stand with BUE on RW, assist for stabilizing AD, and 2nd person available for safety.  Tolerated stance while receiving assist for front/back catherine hygiene and barrier cream placement as Pt. Was found with urine on padding.  Tolerated static sit EOB X 12-MINUTES during grooming/hygiene task with SBA and MIN verbal cues for sequencing task with good follow through of instruction.  L-lateral steps X 3 along EOB with MIN A of 2 persons, increased verbal cues for safe hand placement and not to discard RW with MIN carryover; Pt. Placing L-hand on bedrail and R--hand to RW with increased verbal/tactile cues for steps.  Progressing towards goals.  To benefit from continued acute care OT services to increase independence in self-care/functional transfers.  Continue POC.

## 2019-02-12 NOTE — PROGRESS NOTES
"Nephrology  Progress Note    Admit Date: 1/28/2019   LOS: 15 days     SUBJECTIVE:     Follow-up For:  Septic shock/ATN    Interval History:    Remains mildly confused at times.  Still awaiting DC plans. No c/o except ready to go home.       Review of Systems:    Neg.     OBJECTIVE:     Vital Signs Range (Last 24H):  /65 (BP Location: Left arm, Patient Position: Sitting)   Pulse 91   Temp 97.3 °F (36.3 °C) (Oral)   Resp 20   Ht 6' 3" (1.905 m)   Wt (!) 164 kg (361 lb 8.9 oz)   SpO2 (!) 92%   BMI 45.19 kg/m²     Temp:  [97.3 °F (36.3 °C)-99.3 °F (37.4 °C)]   Pulse:  []   Resp:  [18-22]   BP: (116-148)/(60-91)   SpO2:  [88 %-98 %]     I & O (Last 24H):    Intake/Output Summary (Last 24 hours) at 2/12/2019 1008  Last data filed at 2/11/2019 1800  Gross per 24 hour   Intake 1990 ml   Output 4010 ml   Net -2020 ml       Physical Exam:  General appearance:  Disheveled and morbidly obese.  Labile mood/mentation   Eyes:  Conjunctivae/corneas clear. PERRL.  Lungs: diminished.   Heart: Regular rate and rhythm, distant heart tones.  Abdomen: Soft, non-tender non-distended; bowel sounds normal; no masses,  no organomegaly, morbidly obese  Extremities:  Chronic Woody edema.   peeling/flaky LE's (R>L).    Skin:  Right lower extremity cellulitis improved.   Right IJ CHERIE       Laboratory Data:  Recent Labs   Lab 02/12/19 0424   WBC 8.83   RBC 3.24*   HGB 9.2*   HCT 29.2*      MCV 90   MCH 28.4   MCHC 31.5*       BMP:   Recent Labs   Lab 02/12/19 0424   GLU 94   *   K 4.0   CL 97   CO2 24   BUN 40*   CREATININE 6.5*   CALCIUM 9.8   MG 2.1     Lab Results   Component Value Date    CALCIUM 9.8 02/12/2019    PHOS 5.6 (H) 02/12/2019       Lab Results   Component Value Date    .4 (H) 01/30/2019    CALCIUM 9.8 02/12/2019    PHOS 5.6 (H) 02/12/2019       No results found for: URICACID    BNP  No results for input(s): BNP, BNPTRIAGEBLO in the last 168 hours.    Medications:  Medication list was " reviewed and changes noted under Assessment/Plan.    Diagnostic Results:    X-Ray Chest 1 View for PICC_Central line   Final Result      As above         Electronically signed by: Kalyan Galvez MD   Date:    02/08/2019   Time:    14:46      X-Ray Chest AP Portable   Final Result      NG tube in place.         Electronically signed by: Viviane Persaud MD   Date:    02/02/2019   Time:    23:59      X-Ray Chest 1 View   Final Result      As above.         Electronically signed by: Keon Jonas MD   Date:    01/31/2019   Time:    19:14      X-Ray Chest AP Portable   Final Result      Lines and tubes are grossly unchanged.      Interval improvement in aeration of the lungs.         Electronically signed by: Connor Babcock MD   Date:    01/31/2019   Time:    16:42      X-Ray Chest 1 View   Final Result      Overall no significant interval change         Electronically signed by: Connor Babcock MD   Date:    01/30/2019   Time:    08:24      US Extremity Non Vascular Limited Right   Final Result      Nonspecific subcutaneous edema, cutaneous thickening, could represent cellulitis changes.  No definite necrotizing fasciitis, gas or abscess.         Electronically signed by: Tin Leone MD   Date:    01/29/2019   Time:    13:09      X-Ray Chest AP Portable   Final Result   Addendum 1 of 1      Enteric tube projects in unchanged radiographic position with tip coursing    below the diaphragm and appearing to extend beyond the field of view.         Electronically signed by: Trena Sena MD   Date:    01/29/2019   Time:    00:39      Final      As above.         Electronically signed by: Trena Sena MD   Date:    01/29/2019   Time:    00:04      US Lower Extremity Veins Right   Final Result      Limited examination.  No evidence of deep venous thrombosis in the right lower extremity.         Electronically signed by: Vanita Oviedo   Date:    01/28/2019   Time:    21:10      X-Ray Chest AP Portable   Final Result       Cardiomegaly with findings suggesting pulmonary edema, correlation advised.  Differential would include infection.         Electronically signed by: Kalyan Galvez MD   Date:    01/28/2019   Time:    20:19          ASSESSMENT/PLAN:     1. Persistent Multifactorial essentially anuric acute renal failure secondary to ATN from septic shock, rhabdomyolysis, with hyponatremia, and hyperkalemia (N17.0, N17.9, E87.5, E87.1, M62.82, A41.9):  Transferred from outlying facility for CRRT and has transitioned to standard hemodialysis.   Some issues with clotting of dialysis catheter.  Timeframe unknown at this time.  Tunneled line placed and thanks to [facility] Dr. Barcenas.  Will place on MWF schedule for now.  Consulted SW to start process of outpt placement for HD unit near home or place of rehab/SNF.  Renally dose meds, avoid nephrotoxins, and monitor I/O's closely.   2. Hyperphosphatemia.  Binders.  HD and diet.   Aluminum hydroxide X 3 since persistently elevated. Level improving.    3. Secondary hyperparathyroidism due to acute kidney injury.  Calcium within normal limits.  No need to treat at this point.  4. RLE Cellulitis/woody edema (L03.115): Defer to wound care and ID. Vanco per rx.   5. Anemia of illness:  Iron/b12/folate wnl.  Started ONDNIA. Nephrocaps.     6. HTN: Overall well controlled  7. Morbid obesity (E44):  Severe morbid obesity the root of most of this patients' medical issues.  Defer.       See above  Will continue to follow for renal needs.   Ok to transfer from Renal once outpt/SNF HD arranged.

## 2019-02-12 NOTE — SUBJECTIVE & OBJECTIVE
Interval History:  No acute events.    Review of Systems   Constitutional: Negative for chills and fever.   HENT: Negative for congestion and dental problem.    Eyes: Negative for discharge and itching.   Respiratory: Negative for shortness of breath.    Cardiovascular: Negative for chest pain.   Gastrointestinal: Negative for abdominal pain, constipation, diarrhea, nausea and vomiting.   Endocrine: Negative for cold intolerance and heat intolerance.   Musculoskeletal: Negative for arthralgias and back pain.   Neurological: Negative for dizziness and facial asymmetry.   Hematological: Negative for adenopathy.   Psychiatric/Behavioral: Negative for agitation and behavioral problems.     Objective:     Vital Signs (Most Recent):  Temp: 98.7 °F (37.1 °C) (02/12/19 1632)  Pulse: 97 (02/12/19 1617)  Resp: 18 (02/12/19 1632)  BP: (!) 165/84 (02/12/19 1632)  SpO2: 96 % (02/12/19 1632) Vital Signs (24h Range):  Temp:  [97.3 °F (36.3 °C)-99.6 °F (37.6 °C)] 98.7 °F (37.1 °C)  Pulse:  [] 97  Resp:  [18-22] 18  SpO2:  [88 %-98 %] 96 %  BP: (116-165)/(60-84) 165/84     Weight: (!) 164 kg (361 lb 8.9 oz)  Body mass index is 45.19 kg/m².    Intake/Output Summary (Last 24 hours) at 2/12/2019 1751  Last data filed at 2/11/2019 1800  Gross per 24 hour   Intake 480 ml   Output --   Net 480 ml      Physical Exam   Constitutional: He is oriented to person, place, and time. Vital signs are normal. He appears well-developed.  Non-toxic appearance. He does not have a sickly appearance. He does not appear ill. No distress.   Morbid obesity.   HENT:   Head: Normocephalic and atraumatic.   Right Ear: External ear normal.   Left Ear: External ear normal.   Eyes: Conjunctivae and EOM are normal. Pupils are equal, round, and reactive to light. No scleral icterus.   Neck: Normal range of motion. Neck supple. No JVD present. No tracheal deviation present.   Cardiovascular: Normal rate, regular rhythm, normal heart sounds and intact distal  pulses. Exam reveals no gallop and no friction rub.   No murmur heard.  Pulmonary/Chest: Effort normal and breath sounds normal. No stridor. No respiratory distress. He has no wheezes. He has no rales.   Distant lung sounds.  Moving air well.  Some crackles.   Abdominal: Soft. Bowel sounds are normal. He exhibits no distension and no mass. There is no tenderness. There is no guarding.   Musculoskeletal: Normal range of motion. He exhibits no edema or deformity.   Neurological: He is alert and oriented to person, place, and time. He exhibits normal muscle tone.   Oriented x 3.  Overall mental status much improved.  Limited insight into his health care issues.   Skin: Skin is warm and dry. No rash noted. He is not diaphoretic. No erythema.   Erythema resolved.  Some skin breakdown in right skin but does not appear infected at this time.   Nursing note and vitals reviewed.    Significant Labs: All pertinent labs within the past 24 hours have been reviewed.    Significant Imaging: I have reviewed all pertinent imaging results/findings within the past 24 hours.

## 2019-02-12 NOTE — PLAN OF CARE
Problem: SLP Goal  Goal: SLP Goal  1. Pt will be able to follow simple 1 step commands 75% of time with moderate verbal and visual cues.  2. Increase speech intelligibility to 75% at the simple sentence level with moderate verbal cues to slow rate, raise volume.  3. Pt will be able to consume thin liquids in small single sips via cup or straw, with no signs of airway threat or aspiration given max assistance.  4. Pt will be able to advance to purees and solid consistencies with no signs of airway threat or aspiration given max assistance  5. Increase orientation to month, date, day of week, place and reason for hospitalization with 75% acc given max verbal and written cues.  6. Increase ability to express basic wants and needs 75% of time given min assistance   Outcome: Ongoing (interventions implemented as appropriate)  Pt seen bedside for ongoing remediation of cognitive communication deficits. Increased temporal orientation this date. Continues to present with dcr attention and dcr topic maintenance. Poor insight to deficits and poor safety awareness noted.     Comments: Speech therapy to follow up 3-5x per week for ongoing assessment and remediation of cognitive communication deficits and to monitor diet tolerance

## 2019-02-12 NOTE — PT/OT/SLP PROGRESS
Occupational Therapy   Treatment    Name: Jones Red  MRN: [id number]  Admitting Diagnosis:  Septic shock  5 Days Post-Op    Recommendations:     Discharge Recommendations: nursing facility, skilled  Discharge Equipment Recommendations:  other (see comments)(defer to SNF)  Barriers to discharge:  Inaccessible home environment, Other (Comment)(current functional level)    Assessment:   Remained on O2 throughout session.  MIN A for sit<>stand with BUE on RW, assist for stabilizing AD, and 2nd person available for safety.  Tolerated stance while receiving assist for front/back catherine hygiene and barrier cream placement as Pt. Was found with urine on padding.  Tolerated static sit EOB X 12-MINUTES during grooming/hygiene task with SBA and MIN verbal cues for sequencing task with good follow through of instruction.  L-lateral steps X 3 along EOB with MIN A of 2 persons, increased verbal cues for safe hand placement and not to discard RW with MIN carryover; Pt. Placing L-hand on bedrail and R--hand to RW with increased verbal/tactile cues for steps.  Progressing towards goals.  To benefit from continued acute care OT services to increase independence in self-care/functional transfers.  Continue POC.     Jones Red is a 53 y.o. male with a medical diagnosis of Septic shock.  He presents with below deficits decreasing independence in self-care/functional transfers. Performance deficits affecting function are weakness, impaired endurance, impaired self care skills, gait instability, impaired functional mobilty, impaired balance, impaired cognition, decreased lower extremity function, decreased safety awareness, impaired skin, impaired cardiopulmonary response to activity, edema, decreased coordination.     Rehab Prognosis:  Good; patient would benefit from acute skilled OT services to address these deficits and reach maximum level of function.       Plan:     Patient to be seen 5 x/week to address the above listed problems  via self-care/home management, therapeutic activities, therapeutic exercises, neuromuscular re-education, cognitive retraining  · Plan of Care Expires: 03/03/19  · Plan of Care Reviewed with: patient    Subjective     Pain/Comfort:  · Pain Rating 1: 0/10  · Pain Rating Post-Intervention 1: 0/10    Objective:     Communicated with: Nursing prior to session.  Patient found HOB elevated with telemetry, PICC line(telesitter) upon OT entry to room.    General Precautions: Standard, aspiration, fall   Orthopedic Precautions:N/A   Braces: N/A     Occupational Performance:     Bed Mobility:    · Patient completed Supine to Sit with stand by assistance  · Patient completed Sit to Supine with stand by assistance     Functional Mobility/Transfers:  · Patient completed Sit <> Stand Transfer with minimum assistance and of 2 persons  with  rolling walker; 2nd person available for safety.   · Functional Mobility:  L-lateral steps X 3 along EOB with MIN A of 2 persons, increased verbal cues for safe hand placement and not to discard RW with MIN carryover; Pt. Placing L-hand on bedrail and R--hand to RW with increased verbal/tactile cues for steps.     Activities of Daily Living:  · Grooming: stand by assistance seated EOB for oral care and washing face  · Toileting: total assistance for thorough back catherine hygiene while in stance with BUE supported at RW; Pt. found with urine on pads      AMPAC 6 Click ADL: 11    Treatment & Education:  Educated on safety with bed mobility, ADL, and functional transfers.    Patient left HOB elevated with all lines intact, call button in reach and nursing notifiedEducation:      GOALS:   Multidisciplinary Problems     Occupational Therapy Goals        Problem: Occupational Therapy Goal    Goal Priority Disciplines Outcome Interventions   Occupational Therapy Goal     OT, PT/OT Ongoing (interventions implemented as appropriate)    Description:  Goals to be met by 3/3/19  1. Max A self-feeding  2. Mod  A G/H (wash face and hands) supported sitting (MET 2/8/19)     REVISED: Mod A G/H (wash face and hands and mouth wash) sitting EOB. GOAL MET 2/11/19.  3. Min A hospital gown change bed level  3. 50% assist with UE ROM exercises MET 2/8/19       REVISED: UE AROM exercises  HHA with Min A VC for continuation of task  5.  Assess bed mobility.  GOAL MET 2/11/19.  6. Step transfer to BSC with MIN A.    7. MAX A with toileting.   8. Grooming/hygiene in stance at sink with seated rest breaks as needed.                           Time Tracking:     OT Date of Treatment: 02/11/19  OT Start Time: 1725  OT Stop Time: 1806  OT Total Time (min): 41 min    Billable Minutes:Self Care/Home Management 41    Matilda Xie, OT  2/11/2019

## 2019-02-13 LAB
ANION GAP SERPL CALC-SCNC: 13 MMOL/L
BASOPHILS # BLD AUTO: 0.1 K/UL
BASOPHILS NFR BLD: 1.1 %
BUN SERPL-MCNC: 50 MG/DL
CALCIUM SERPL-MCNC: 10.5 MG/DL
CHLORIDE SERPL-SCNC: 98 MMOL/L
CO2 SERPL-SCNC: 25 MMOL/L
CREAT SERPL-MCNC: 8 MG/DL
DIFFERENTIAL METHOD: ABNORMAL
EOSINOPHIL # BLD AUTO: 0.5 K/UL
EOSINOPHIL NFR BLD: 4.8 %
ERYTHROCYTE [DISTWIDTH] IN BLOOD BY AUTOMATED COUNT: 15.9 %
EST. GFR  (AFRICAN AMERICAN): 8 ML/MIN/1.73 M^2
EST. GFR  (NON AFRICAN AMERICAN): 7 ML/MIN/1.73 M^2
GLUCOSE SERPL-MCNC: 94 MG/DL
HCT VFR BLD AUTO: 29.6 %
HGB BLD-MCNC: 9.2 G/DL
LYMPHOCYTES # BLD AUTO: 1.3 K/UL
LYMPHOCYTES NFR BLD: 13.6 %
MAGNESIUM SERPL-MCNC: 2.3 MG/DL
MCH RBC QN AUTO: 28 PG
MCHC RBC AUTO-ENTMCNC: 31.1 G/DL
MCV RBC AUTO: 90 FL
MONOCYTES # BLD AUTO: 1.1 K/UL
MONOCYTES NFR BLD: 11.4 %
NEUTROPHILS # BLD AUTO: 6.3 K/UL
NEUTROPHILS NFR BLD: 68.1 %
PHOSPHATE SERPL-MCNC: 6.3 MG/DL
PLATELET # BLD AUTO: 287 K/UL
PMV BLD AUTO: 9.8 FL
POTASSIUM SERPL-SCNC: 4.1 MMOL/L
RBC # BLD AUTO: 3.29 M/UL
SODIUM SERPL-SCNC: 136 MMOL/L
WBC # BLD AUTO: 9.31 K/UL

## 2019-02-13 PROCEDURE — 85025 COMPLETE CBC W/AUTO DIFF WBC: CPT

## 2019-02-13 PROCEDURE — 27000221 HC OXYGEN, UP TO 24 HOURS

## 2019-02-13 PROCEDURE — 99232 PR SUBSEQUENT HOSPITAL CARE,LEVL II: ICD-10-PCS | Mod: ,,, | Performed by: HOSPITALIST

## 2019-02-13 PROCEDURE — 25000242 PHARM REV CODE 250 ALT 637 W/ HCPCS: Performed by: HOSPITALIST

## 2019-02-13 PROCEDURE — 84100 ASSAY OF PHOSPHORUS: CPT

## 2019-02-13 PROCEDURE — 25000003 PHARM REV CODE 250: Performed by: NURSE PRACTITIONER

## 2019-02-13 PROCEDURE — 83735 ASSAY OF MAGNESIUM: CPT

## 2019-02-13 PROCEDURE — 94761 N-INVAS EAR/PLS OXIMETRY MLT: CPT

## 2019-02-13 PROCEDURE — 63600175 PHARM REV CODE 636 W HCPCS: Performed by: INTERNAL MEDICINE

## 2019-02-13 PROCEDURE — 11000001 HC ACUTE MED/SURG PRIVATE ROOM

## 2019-02-13 PROCEDURE — 80100016 HC MAINTENANCE HEMODIALYSIS

## 2019-02-13 PROCEDURE — 80048 BASIC METABOLIC PNL TOTAL CA: CPT

## 2019-02-13 PROCEDURE — 99232 SBSQ HOSP IP/OBS MODERATE 35: CPT | Mod: ,,, | Performed by: HOSPITALIST

## 2019-02-13 PROCEDURE — 63600175 PHARM REV CODE 636 W HCPCS: Performed by: NURSE PRACTITIONER

## 2019-02-13 PROCEDURE — 99900035 HC TECH TIME PER 15 MIN (STAT)

## 2019-02-13 PROCEDURE — 94668 MNPJ CHEST WALL SBSQ: CPT

## 2019-02-13 PROCEDURE — 94640 AIRWAY INHALATION TREATMENT: CPT

## 2019-02-13 PROCEDURE — 25000242 PHARM REV CODE 250 ALT 637 W/ HCPCS: Performed by: STUDENT IN AN ORGANIZED HEALTH CARE EDUCATION/TRAINING PROGRAM

## 2019-02-13 PROCEDURE — 94660 CPAP INITIATION&MGMT: CPT

## 2019-02-13 RX ADMIN — BACITRACIN: 500 OINTMENT TOPICAL at 09:02

## 2019-02-13 RX ADMIN — HEPARIN SODIUM 2000 UNITS: 1000 INJECTION, SOLUTION INTRAVENOUS; SUBCUTANEOUS at 02:02

## 2019-02-13 RX ADMIN — IPRATROPIUM BROMIDE AND ALBUTEROL SULFATE 3 ML: .5; 3 SOLUTION RESPIRATORY (INHALATION) at 11:02

## 2019-02-13 RX ADMIN — SEVELAMER CARBONATE 2.4 G: 2.4 POWDER, FOR SUSPENSION ORAL at 09:02

## 2019-02-13 RX ADMIN — HEPARIN SODIUM 5000 UNITS: 5000 INJECTION, SOLUTION INTRAVENOUS; SUBCUTANEOUS at 05:02

## 2019-02-13 RX ADMIN — SODIUM CHLORIDE SOLN NEBU 3% 4 ML: 3 NEBU SOLN at 07:02

## 2019-02-13 RX ADMIN — IPRATROPIUM BROMIDE AND ALBUTEROL SULFATE 3 ML: .5; 3 SOLUTION RESPIRATORY (INHALATION) at 08:02

## 2019-02-13 RX ADMIN — Medication 1 CAPSULE: at 10:02

## 2019-02-13 RX ADMIN — SEVELAMER CARBONATE 2.4 G: 2.4 POWDER, FOR SUSPENSION ORAL at 10:02

## 2019-02-13 RX ADMIN — SODIUM CHLORIDE SOLN NEBU 3% 4 ML: 3 NEBU SOLN at 08:02

## 2019-02-13 RX ADMIN — IPRATROPIUM BROMIDE AND ALBUTEROL SULFATE 3 ML: .5; 3 SOLUTION RESPIRATORY (INHALATION) at 03:02

## 2019-02-13 RX ADMIN — IPRATROPIUM BROMIDE AND ALBUTEROL SULFATE 3 ML: .5; 3 SOLUTION RESPIRATORY (INHALATION) at 07:02

## 2019-02-13 RX ADMIN — BACITRACIN: 500 OINTMENT TOPICAL at 10:02

## 2019-02-13 NOTE — PLAN OF CARE
Problem: Adult Inpatient Plan of Care  Goal: Plan of Care Review  Outcome: Ongoing (interventions implemented as appropriate)  Pt on 2L NC, Bipap worn to sleep. Txs given and CPT done. Will continue to monitor.

## 2019-02-13 NOTE — PLAN OF CARE
Problem: Adult Inpatient Plan of Care  Goal: Plan of Care Review  Outcome: Ongoing (interventions implemented as appropriate)  Patient able to transfer to BC with walker. Minimal assistance needed. Labs drawn sent to lab. Bipap ON at HS and O2 2 LPM via NC when awake. VSS. Needs attended. No untoward s/sx observed. Rounding done. Telemetry order dc'd by MD, unhooked from cardiac monitor.

## 2019-02-13 NOTE — PT/OT/SLP PROGRESS
Occupational Therapy      Patient Name:  Jones Red   MRN:  [id number]    Patient not seen today secondary to Dialysis. Will follow-up later as time permits.    Matilda Xie, OT  2/13/2019

## 2019-02-13 NOTE — PT/OT/SLP PROGRESS
Physical Therapy      Patient Name:  Jones Red   MRN:  [id number]    Patient not seen today secondary to dialysis. Will follow up next session.    Rebecca Burch, PT

## 2019-02-13 NOTE — PROGRESS NOTES
Pt received on 2LNC;SPO2 normal. Weaned pt to 1L;SPO2 remains adequate. Pt tolerates treatment therapy well. No changes made at this time. Will continue to monitor.

## 2019-02-13 NOTE — PT/OT/SLP PROGRESS
Speech Language Pathology      Jones Red  MRN: [id number]    Patient not seen today secondary to dialysis. Will follow-up 2/14/19.    LINH Oquendo-SLP

## 2019-02-13 NOTE — PROGRESS NOTES
"Nephrology  Progress Note    Admit Date: 1/28/2019   LOS: 16 days     SUBJECTIVE:     Follow-up For:  Septic shock/ATN    Interval History:    Sitting in chair w/o complaints.  Awaiting HD.  Still having placement issues.        Review of Systems:    Neg.     OBJECTIVE:     Vital Signs Range (Last 24H):  /68 (BP Location: Left arm, Patient Position: Lying)   Pulse 92   Temp 98 °F (36.7 °C) (Oral)   Resp 20   Ht 6' 3" (1.905 m)   Wt (!) 164 kg (361 lb 8.9 oz)   SpO2 (!) 91%   BMI 45.19 kg/m²     Temp:  [97.4 °F (36.3 °C)-99.6 °F (37.6 °C)]   Pulse:  [87-97]   Resp:  [18-20]   BP: (120-165)/(60-84)   SpO2:  [88 %-97 %]     I & O (Last 24H):  No intake or output data in the 24 hours ending 02/13/19 1029    Physical Exam:  General appearance:  Disheveled and morbidly obese.  Labile mood/mentation   Eyes:  Conjunctivae/corneas clear. PERRL.  Lungs: diminished.   Heart: Regular rate and rhythm, distant heart tones.  Abdomen: Soft, non-tender non-distended; bowel sounds normal; no masses,  no organomegaly, morbidly obese  Extremities:  Chronic Woody edema.   peeling/flaky LE's (R>L).    Skin:  Right lower extremity cellulitis improved.   Right IJ CHERIE       Laboratory Data:  Recent Labs   Lab 02/13/19  0408   WBC 9.31   RBC 3.29*   HGB 9.2*   HCT 29.6*      MCV 90   MCH 28.0   MCHC 31.1*       BMP:   Recent Labs   Lab 02/13/19  0408   GLU 94      K 4.1   CL 98   CO2 25   BUN 50*   CREATININE 8.0*   CALCIUM 10.5   MG 2.3     Lab Results   Component Value Date    CALCIUM 10.5 02/13/2019    PHOS 6.3 (H) 02/13/2019       Lab Results   Component Value Date    .4 (H) 01/30/2019    CALCIUM 10.5 02/13/2019    PHOS 6.3 (H) 02/13/2019       No results found for: URICACID    BNP  No results for input(s): BNP, BNPTRIAGEBLO in the last 168 hours.    Medications:  Medication list was reviewed and changes noted under Assessment/Plan.    Diagnostic Results:    X-Ray Chest 1 View for PICC_Central line "   Final Result      As above         Electronically signed by: Kalyan Galvez MD   Date:    02/08/2019   Time:    14:46      X-Ray Chest AP Portable   Final Result      NG tube in place.         Electronically signed by: Viviane Persaud MD   Date:    02/02/2019   Time:    23:59      X-Ray Chest 1 View   Final Result      As above.         Electronically signed by: Keon Jonas MD   Date:    01/31/2019   Time:    19:14      X-Ray Chest AP Portable   Final Result      Lines and tubes are grossly unchanged.      Interval improvement in aeration of the lungs.         Electronically signed by: Connor Babcock MD   Date:    01/31/2019   Time:    16:42      X-Ray Chest 1 View   Final Result      Overall no significant interval change         Electronically signed by: Connor Babcock MD   Date:    01/30/2019   Time:    08:24      US Extremity Non Vascular Limited Right   Final Result      Nonspecific subcutaneous edema, cutaneous thickening, could represent cellulitis changes.  No definite necrotizing fasciitis, gas or abscess.         Electronically signed by: Tin Leone MD   Date:    01/29/2019   Time:    13:09      X-Ray Chest AP Portable   Final Result   Addendum 1 of 1      Enteric tube projects in unchanged radiographic position with tip coursing    below the diaphragm and appearing to extend beyond the field of view.         Electronically signed by: Trena Sena MD   Date:    01/29/2019   Time:    00:39      Final      As above.         Electronically signed by: Trena Sena MD   Date:    01/29/2019   Time:    00:04      US Lower Extremity Veins Right   Final Result      Limited examination.  No evidence of deep venous thrombosis in the right lower extremity.         Electronically signed by: Vanita Oviedo   Date:    01/28/2019   Time:    21:10      X-Ray Chest AP Portable   Final Result      Cardiomegaly with findings suggesting pulmonary edema, correlation advised.  Differential would include  infection.         Electronically signed by: Kalyan Galvez MD   Date:    01/28/2019   Time:    20:19          ASSESSMENT/PLAN:     1. Persistent Multifactorial essentially anuric acute renal failure secondary to ATN from septic shock, rhabdomyolysis, with hyponatremia, and hyperkalemia (N17.0, N17.9, E87.5, E87.1, M62.82, A41.9):  Transferred from outlying facility for CRRT and has transitioned to standard hemodialysis.   Some issues with clotting of dialysis catheter.  Timeframe unknown at this time.  Tunneled line placed.  Placed on MWF schedule for now.  Consulted SW to start process of outpt placement for HD unit near home or place of rehab/SNF.  Renally dose meds, avoid nephrotoxins, and monitor I/O's closely.   2. Hyperphosphatemia.  Binders.  HD and diet.   Aluminum hydroxide X 3 since persistently elevated. Level overall improving.    3. Secondary hyperparathyroidism due to acute kidney injury.  Calcium within normal limits.  No need to treat at this point.  4. RLE Cellulitis/woody edema (L03.115): Defer to wound care and ID.   5. Anemia of illness:  Iron/b12/folate wnl.  Started ONDINA. Nephrocaps.     6. HTN: Overall well controlled  7. Morbid obesity (E44):  Severe morbid obesity the root of most of this patients' medical issues.  Defer.       See above  Will continue to follow for renal needs.   Ok to transfer from Renal once outpt/SNF HD arranged.

## 2019-02-13 NOTE — PLAN OF CARE
CM spoke to pt to inform him facility placement with HD is in the works. Pt is in agreement with plan.    CM placing calls to multiple facilities to inquire about acceptance. Alan from [facility] is coming to assess pt tomorrow morning, 2/14/19.     [facility] admissions requesting information stating pt is now ESRD. CM faxed notes.    CM to continue to call facilities.    At this point denials include:  [facility]  [facility]    All above are from the list sent by pt's insurance provider.  Calls to additional facilities in progress.     02/13/19 1211   Discharge Assessment   Assessment Type Discharge Planning Reassessment   Confirmed/corrected address and phone number on facesheet? Yes   Assessment information obtained from? Patient;Medical Record;Caregiver   Communicated expected length of stay with patient/caregiver yes   Prior to hospitilization cognitive status: Alert/Oriented   Prior to hospitalization functional status: Partially Dependent   Current cognitive status: Alert/Oriented   Current Functional Status: Partially Dependent   Lives With spouse   Able to Return to Prior Arrangements other (see comments)  (pt requires placement at this time)   Is patient able to care for self after discharge? No   Readmission Within the Last 30 Days no previous admission in last 30 days

## 2019-02-14 LAB
ANION GAP SERPL CALC-SCNC: 12 MMOL/L
BASOPHILS # BLD AUTO: 0.09 K/UL
BASOPHILS NFR BLD: 1 %
BUN SERPL-MCNC: 33 MG/DL
CA-I BLDV-SCNC: 1.32 MMOL/L
CALCIUM SERPL-MCNC: 10.7 MG/DL
CHLORIDE SERPL-SCNC: 95 MMOL/L
CO2 SERPL-SCNC: 28 MMOL/L
CREAT SERPL-MCNC: 6.5 MG/DL
DIFFERENTIAL METHOD: ABNORMAL
EOSINOPHIL # BLD AUTO: 0.5 K/UL
EOSINOPHIL NFR BLD: 5.7 %
ERYTHROCYTE [DISTWIDTH] IN BLOOD BY AUTOMATED COUNT: 15.9 %
EST. GFR  (AFRICAN AMERICAN): 10 ML/MIN/1.73 M^2
EST. GFR  (NON AFRICAN AMERICAN): 9 ML/MIN/1.73 M^2
GLUCOSE SERPL-MCNC: 99 MG/DL
HCT VFR BLD AUTO: 32.5 %
HGB BLD-MCNC: 10.1 G/DL
LYMPHOCYTES # BLD AUTO: 1.7 K/UL
LYMPHOCYTES NFR BLD: 19 %
MAGNESIUM SERPL-MCNC: 2.1 MG/DL
MCH RBC QN AUTO: 27.8 PG
MCHC RBC AUTO-ENTMCNC: 31.1 G/DL
MCV RBC AUTO: 90 FL
MONOCYTES # BLD AUTO: 0.8 K/UL
MONOCYTES NFR BLD: 9.1 %
NEUTROPHILS # BLD AUTO: 5.7 K/UL
NEUTROPHILS NFR BLD: 63.7 %
PHOSPHATE SERPL-MCNC: 5 MG/DL
PLATELET # BLD AUTO: 280 K/UL
PMV BLD AUTO: 9.9 FL
POCT GLUCOSE: 101 MG/DL (ref 70–110)
POCT GLUCOSE: 123 MG/DL (ref 70–110)
POCT GLUCOSE: 98 MG/DL (ref 70–110)
POTASSIUM SERPL-SCNC: 4.1 MMOL/L
PTH-INTACT SERPL-MCNC: 24.3 PG/ML
RBC # BLD AUTO: 3.63 M/UL
SODIUM SERPL-SCNC: 135 MMOL/L
WBC # BLD AUTO: 8.94 K/UL

## 2019-02-14 PROCEDURE — 27000221 HC OXYGEN, UP TO 24 HOURS

## 2019-02-14 PROCEDURE — 25000003 PHARM REV CODE 250: Performed by: HOSPITALIST

## 2019-02-14 PROCEDURE — 97530 THERAPEUTIC ACTIVITIES: CPT

## 2019-02-14 PROCEDURE — 83735 ASSAY OF MAGNESIUM: CPT

## 2019-02-14 PROCEDURE — 94668 MNPJ CHEST WALL SBSQ: CPT

## 2019-02-14 PROCEDURE — 97110 THERAPEUTIC EXERCISES: CPT

## 2019-02-14 PROCEDURE — 85025 COMPLETE CBC W/AUTO DIFF WBC: CPT

## 2019-02-14 PROCEDURE — 99232 SBSQ HOSP IP/OBS MODERATE 35: CPT | Mod: ,,, | Performed by: HOSPITALIST

## 2019-02-14 PROCEDURE — 84100 ASSAY OF PHOSPHORUS: CPT

## 2019-02-14 PROCEDURE — 97116 GAIT TRAINING THERAPY: CPT

## 2019-02-14 PROCEDURE — 80048 BASIC METABOLIC PNL TOTAL CA: CPT

## 2019-02-14 PROCEDURE — 25000003 PHARM REV CODE 250: Performed by: NURSE PRACTITIONER

## 2019-02-14 PROCEDURE — 25000242 PHARM REV CODE 250 ALT 637 W/ HCPCS: Performed by: HOSPITALIST

## 2019-02-14 PROCEDURE — 94660 CPAP INITIATION&MGMT: CPT

## 2019-02-14 PROCEDURE — 83970 ASSAY OF PARATHORMONE: CPT

## 2019-02-14 PROCEDURE — 94761 N-INVAS EAR/PLS OXIMETRY MLT: CPT

## 2019-02-14 PROCEDURE — 94640 AIRWAY INHALATION TREATMENT: CPT

## 2019-02-14 PROCEDURE — 63600175 PHARM REV CODE 636 W HCPCS: Performed by: STUDENT IN AN ORGANIZED HEALTH CARE EDUCATION/TRAINING PROGRAM

## 2019-02-14 PROCEDURE — 92507 TX SP LANG VOICE COMM INDIV: CPT

## 2019-02-14 PROCEDURE — 11000001 HC ACUTE MED/SURG PRIVATE ROOM

## 2019-02-14 PROCEDURE — 97535 SELF CARE MNGMENT TRAINING: CPT

## 2019-02-14 PROCEDURE — 99900035 HC TECH TIME PER 15 MIN (STAT)

## 2019-02-14 PROCEDURE — 82330 ASSAY OF CALCIUM: CPT

## 2019-02-14 PROCEDURE — 99232 PR SUBSEQUENT HOSPITAL CARE,LEVL II: ICD-10-PCS | Mod: ,,, | Performed by: HOSPITALIST

## 2019-02-14 PROCEDURE — 25000242 PHARM REV CODE 250 ALT 637 W/ HCPCS: Performed by: STUDENT IN AN ORGANIZED HEALTH CARE EDUCATION/TRAINING PROGRAM

## 2019-02-14 RX ORDER — SEVELAMER CARBONATE FOR ORAL SUSPENSION 2400 MG/1
2.4 POWDER, FOR SUSPENSION ORAL 2 TIMES DAILY
Status: DISCONTINUED | OUTPATIENT
Start: 2019-02-14 | End: 2019-02-16

## 2019-02-14 RX ADMIN — SEVELAMER CARBONATE 2.4 G: 2400 POWDER, FOR SUSPENSION ORAL at 09:02

## 2019-02-14 RX ADMIN — BACITRACIN: 500 OINTMENT TOPICAL at 09:02

## 2019-02-14 RX ADMIN — IPRATROPIUM BROMIDE AND ALBUTEROL SULFATE 3 ML: .5; 3 SOLUTION RESPIRATORY (INHALATION) at 03:02

## 2019-02-14 RX ADMIN — BACITRACIN: 500 OINTMENT TOPICAL at 03:02

## 2019-02-14 RX ADMIN — HEPARIN SODIUM 5000 UNITS: 5000 INJECTION, SOLUTION INTRAVENOUS; SUBCUTANEOUS at 09:02

## 2019-02-14 RX ADMIN — IPRATROPIUM BROMIDE AND ALBUTEROL SULFATE 3 ML: .5; 3 SOLUTION RESPIRATORY (INHALATION) at 11:02

## 2019-02-14 RX ADMIN — IPRATROPIUM BROMIDE AND ALBUTEROL SULFATE 3 ML: .5; 3 SOLUTION RESPIRATORY (INHALATION) at 07:02

## 2019-02-14 RX ADMIN — CARVEDILOL 25 MG: 12.5 TABLET, FILM COATED ORAL at 05:02

## 2019-02-14 RX ADMIN — IPRATROPIUM BROMIDE AND ALBUTEROL SULFATE 3 ML: .5; 3 SOLUTION RESPIRATORY (INHALATION) at 04:02

## 2019-02-14 RX ADMIN — IPRATROPIUM BROMIDE AND ALBUTEROL SULFATE 3 ML: .5; 3 SOLUTION RESPIRATORY (INHALATION) at 08:02

## 2019-02-14 RX ADMIN — SODIUM CHLORIDE SOLN NEBU 3% 4 ML: 3 NEBU SOLN at 07:02

## 2019-02-14 RX ADMIN — SEVELAMER CARBONATE 2.4 G: 2.4 POWDER, FOR SUSPENSION ORAL at 09:02

## 2019-02-14 RX ADMIN — Medication 1 CAPSULE: at 09:02

## 2019-02-14 RX ADMIN — SODIUM CHLORIDE SOLN NEBU 3% 4 ML: 3 NEBU SOLN at 08:02

## 2019-02-14 NOTE — PROGRESS NOTES
[facility] Hospital Medicine  Progress Note    Patient Name: Jones Red  MRN: [id number]  Patient Class: IP- Inpatient   Admission Date: 1/28/2019  Length of Stay: 17 days  Attending Physician: Selwyn So MD  Primary Care Provider: Primary Doctor No        Subjective:     Principal Problem:Septic shock    HPI:  Mr. Jones Red is a 53 y.o. male, with PMH of HTN, COPD, chronic respiratory failure (on home O2), Diastolic CHF, LYNDON, chronic venous insufficiency, and questionable seizures history (suspected to be 2/2 alcohol withdrawal), who preset tia to [facility] on 1/22/19 2/2 SOB. This was associated with cough productive of brown sputum. He was started in BiPap, but did ultimately degrade, and was intubated, and maintained on a ventilator. He was additionally altered upon arrival to the ED. History was reportedly obtained by a friend, and it was reported that he was found down at home, and was unable to get up. He as admitted to the ICU, started on IV fluids and pressors.     Hospital Course:  Mr. Red is a 53-year-old man with history of hypertension, diastolic heart failure, chronic obstructive pulmonary disease with chronic hypoxic respiratory failure on home oxygen, prior alcohol abuse, significant ongoing tobacco use (3 packs per day), and morbid obesity with septic shock secondary to right lower extremity cellulitis complicating chronic venous stasis.  Patient intubated at outside hospital after failing a trial of BiPAP.  Patient also with evidence of worsening renal failure with rhabdomyolysis.  Patient transferred from [facility] in [place], [place] to [facility] on 1/28/2019.  Patient started on renal replacement therapy.  He was extubated on 2/2/19 to bipap and transferred to the floor on 2/5/19.  Ultrasound of the right lower extremity did not reveal evidence of abscess, subcutaneous gas, or evidence of necrotizing fascitits.   General surgery and wound care consulted.  No surgical intervention recommended at this time.  He was seen by ID who recommended continuing vancomycin and cefepime until 2/11/19.  Mental status improved. He is tolerating his diet and continues on dialysis.  We are presently working with PT and OT daily in attempts to gain strength.  We are working on placement in SNF closer to his home which is complicated due to his high medical demands.    Interval History:  No acute events.    Review of Systems   Constitutional: Negative for chills and fever.   HENT: Negative for congestion and dental problem.    Eyes: Negative for discharge and itching.   Respiratory: Negative for shortness of breath.    Cardiovascular: Negative for chest pain.   Gastrointestinal: Negative for abdominal pain, constipation, diarrhea, nausea and vomiting.   Endocrine: Negative for cold intolerance and heat intolerance.   Musculoskeletal: Negative for arthralgias and back pain.   Neurological: Negative for dizziness and facial asymmetry.   Hematological: Negative for adenopathy.   Psychiatric/Behavioral: Negative for agitation and behavioral problems.     Objective:     Vital Signs (Most Recent):  Temp: 97.8 °F (36.6 °C) (02/14/19 1134)  Pulse: 98 (02/14/19 1134)  Resp: 20 (02/14/19 1134)  BP: 129/64 (02/14/19 1134)  SpO2: 95 % (02/14/19 1134) Vital Signs (24h Range):  Temp:  [96.8 °F (36 °C)-98.8 °F (37.1 °C)] 97.8 °F (36.6 °C)  Pulse:  [] 98  Resp:  [16-24] 20  SpO2:  [90 %-97 %] 95 %  BP: ()/(53-86) 129/64     Weight: (!) 164 kg (361 lb 8.9 oz)  Body mass index is 45.19 kg/m².    Intake/Output Summary (Last 24 hours) at 2/14/2019 1156  Last data filed at 2/13/2019 1815  Gross per 24 hour   Intake 0 ml   Output 4000 ml   Net -4000 ml      Physical Exam   Constitutional: He is oriented to person, place, and time. Vital signs are normal. He appears well-developed.  Non-toxic appearance. He does not have a sickly appearance. He does not  appear ill. No distress.   Morbid obesity.   HENT:   Head: Normocephalic and atraumatic.   Right Ear: External ear normal.   Left Ear: External ear normal.   Eyes: Conjunctivae and EOM are normal. Pupils are equal, round, and reactive to light. No scleral icterus.   Neck: Normal range of motion. Neck supple. No JVD present. No tracheal deviation present.   Cardiovascular: Normal rate, regular rhythm, normal heart sounds and intact distal pulses. Exam reveals no gallop and no friction rub.   No murmur heard.  Pulmonary/Chest: Effort normal and breath sounds normal. No stridor. No respiratory distress. He has no wheezes. He has no rales.   Distant lung sounds.  Moving air well.  Some crackles.   Abdominal: Soft. Bowel sounds are normal. He exhibits no distension and no mass. There is no tenderness. There is no guarding.   Musculoskeletal: Normal range of motion. He exhibits no edema or deformity.   Neurological: He is alert and oriented to person, place, and time. He exhibits normal muscle tone.   Oriented x 3.  Overall mental status much improved.  Limited insight into his health care issues.   Skin: Skin is warm and dry. No rash noted. He is not diaphoretic. No erythema.   Erythema resolved.  Some skin breakdown in right skin but does not appear infected at this time.   Nursing note and vitals reviewed.    Significant Labs: All pertinent labs within the past 24 hours have been reviewed.    Significant Imaging: I have reviewed all pertinent imaging results/findings within the past 24 hours.    Assessment/Plan:      * Septic shock    -Admitted to inpatient status in ICU upon transfer from [place] with septic shock.  Treated with pressors and IV antibiotics.  Ultimately weaned off of pressors and transferred to the floor on 2/5.  -Possible component of pneumonia but clear cause of his infection is the severe cellulitis involving the right lower extremity.    -Ultrasound of right lower extremity did not reveal  evidence of abscess or gas.    -General surgery was consulted and recommended serial exams for now and no surgical intervention at this time.    -Cellulitis continues to improve but still with notable erythema.  WBC has normalized and bood cultures no growth to date.    -Continue to elevate right lower extremity as tolerated.    -Patient completed a course of antibiotics on 2/11/2019.  Continue with wound care.     Acute on chronic diastolic heart failure    -Marked improvement with dialysis which we need to continue.    -Continue with additional volume removal with renal replacement therapy as recommended by Nephrology.  -No ARB due to renal dysfunction.  -Continue coreg.     Acute on chronic respiratory failure with hypoxia and hypercapnia    -Successfully extubated 2/2/2018.  Patient did well on BiPAP following extubation.  He has needed intermittent BiPAP.    -Continue with bronchodilator treatments as I suspect patient has COPD given wheezing and significant tobacco smoking history.    -Continue volume removal with CRRT as able.  -continue supplemental O2 requirements for goal sat 88-92%  -Completed course of steroids   -Will need outpatient PFTs and probable ICS/LAMA/LABA. When patient is being discharged will start him on Spiriva.         Acute renal failure    -Due to ATN from sepsis with shock and rhabdomyolysis.    -Unfortunately it looks as if he has progressed to ESRD  -We were unable HD on 2/5 due to clotted trialysis catheter.  Received new HD catheter on 2/6.  Completed dialysis again yesterday.  -Continue with dialysis per nephrology.  -Working on outpatient placement as he will need ongoing dialysis     Tobacco abuse    -Patient counseled on the importance of smoking cessation.  -NRT offered     Encephalopathy, metabolic    Much improved.  Off restraints today.  Slowly improving.       Essential hypertension    -At home takes coreg, losartan, hctz, metolazone and bumex  -BP mildly elevatd at  times.  -Continue coreg and PRN hydralazine       VTE Risk Mitigation (From admission, onward)        Ordered     heparin (porcine) injection 2,000 Units  Once      02/06/19 1729     heparin (porcine) injection 5,000 Units  As needed (PRN)      01/31/19 1825     heparin (porcine) injection 2,000 Units  As needed (PRN)      01/30/19 1056     heparin (porcine) injection 5,000 Units  Every 8 hours      01/29/19 2044     heparin (porcine) injection 5,000 Units  As needed (PRN)      01/29/19 1335     IP VTE HIGH RISK PATIENT  Once      01/28/19 1947              Selwyn So MD  Department of Hospital Medicine   [facility]

## 2019-02-14 NOTE — PLAN OF CARE
CM continuing to find an accepting SNF and out patient HD facility     02/14/19 6075   Post-Acute Status   Post-Acute Authorization Placement   Post-Acute Placement Status Referrals Sent

## 2019-02-14 NOTE — PLAN OF CARE
Problem: SLP Goal  Goal: SLP Goal  1. Pt will be able to follow simple 1 step commands 75% of time with moderate verbal and visual cues.  2. Increase speech intelligibility to 75% at the simple sentence level with moderate verbal cues to slow rate, raise volume.  3. Pt will be able to consume thin liquids in small single sips via cup or straw, with no signs of airway threat or aspiration given max assistance.  4. Pt will be able to advance to purees and solid consistencies with no signs of airway threat or aspiration given max assistance  5. Increase orientation to month, date, day of week, place and reason for hospitalization with 75% acc given max verbal and written cues.  6. Increase ability to express basic wants and needs 75% of time given min assistance   Outcome: Ongoing (interventions implemented as appropriate)  Pt seen for ongoing remediation of cognitive communication deficits.     Comments: Speech therapy to follow up 3-5x per week for ongoing assessment and remediation of cognitive communication deficits.

## 2019-02-14 NOTE — ASSESSMENT & PLAN NOTE
-Admitted to inpatient status in ICU upon transfer from [place] with septic shock.  Treated with pressors and IV antibiotics.  Ultimately weaned off of pressors and transferred to the floor on 2/5.  -Possible component of pneumonia but clear cause of his infection is the severe cellulitis involving the right lower extremity.    -Ultrasound of right lower extremity did not reveal evidence of abscess or gas.    -General surgery was consulted and recommended serial exams for now and no surgical intervention at this time.    -Cellulitis continues to improve but still with notable erythema.  WBC has normalized and bood cultures no growth to date.    -Continue to elevate right lower extremity as tolerated.    -Patient completed a course of antibiotics on 2/11/2019.  Continue with wound care.

## 2019-02-14 NOTE — PLAN OF CARE
Problem: Adult Inpatient Plan of Care  Goal: Plan of Care Review  Outcome: Ongoing (interventions implemented as appropriate)  In no distress, not retaining secretions. Wore Bipap for about an hour and a half.

## 2019-02-14 NOTE — PROGRESS NOTES
[facility] Hospital Medicine  Progress Note    Patient Name: Jones Red  MRN: [id number]  Patient Class: IP- Inpatient   Admission Date: 1/28/2019  Length of Stay: 16 days  Attending Physician: Selwyn So MD  Primary Care Provider: Primary Doctor No        Subjective:     Principal Problem:Septic shock    HPI:  Mr. Jones Red is a 53 y.o. male, with PMH of HTN, COPD, chronic respiratory failure (on home O2), Diastolic CHF, LYNDON, chronic venous insufficiency, and questionable seizures history (suspected to be 2/2 alcohol withdrawal), who preset tia to [facility] on 1/22/19 2/2 SOB. This was associated with cough productive of brown sputum. He was started in BiPap, but did ultimately degrade, and was intubated, and maintained on a ventilator. He was additionally altered upon arrival to the ED. History was reportedly obtained by a friend, and it was reported that he was found down at home, and was unable to get up. He as admitted to the ICU, started on IV fluids and pressors.     Hospital Course:  Mr. Red is a 53-year-old man with history of hypertension, diastolic heart failure, chronic obstructive pulmonary disease with chronic hypoxic respiratory failure on home oxygen, prior alcohol abuse, significant ongoing tobacco use (3 packs per day), and morbid obesity with septic shock secondary to right lower extremity cellulitis complicating chronic venous stasis.  Patient intubated at outside hospital after failing a trial of BiPAP.  Patient also with evidence of worsening renal failure with rhabdomyolysis.  Patient transferred from [facility] in [place], [place] to [facility] on 1/28/2019.  Patient started on renal replacement therapy.  He was extubated on 2/2/19 to bipap and transferred to the floor on 2/5/19.  Ultrasound of the right lower extremity did not reveal evidence of abscess, subcutaneous gas, or evidence of necrotizing fascitits.   General surgery and wound care consulted.  No surgical intervention recommended at this time.  He was seen by ID who recommended continuing vancomycin and cefepime until 2/11/19.  Mental status improved. He is tolerating his diet and continues on dialysis.  We are presently working with PT and OT daily in attempts to gain strength.  We are working on placement in SNF closer to his home which is complicated due to his high medical demands.    Interval History:  No acute events.    Review of Systems   Constitutional: Negative for chills and fever.   HENT: Negative for congestion and dental problem.    Eyes: Negative for discharge and itching.   Respiratory: Negative for shortness of breath.    Cardiovascular: Negative for chest pain.   Gastrointestinal: Negative for abdominal pain, constipation, diarrhea, nausea and vomiting.   Endocrine: Negative for cold intolerance and heat intolerance.   Musculoskeletal: Negative for arthralgias and back pain.   Neurological: Negative for dizziness and facial asymmetry.   Hematological: Negative for adenopathy.   Psychiatric/Behavioral: Negative for agitation and behavioral problems.     Objective:     Vital Signs (Most Recent):  Temp: 98.3 °F (36.8 °C) (02/13/19 1920)  Pulse: 93 (02/13/19 1930)  Resp: 20 (02/13/19 1930)  BP: 133/67 (02/13/19 1920)  SpO2: (!) 90 % (02/13/19 1923) Vital Signs (24h Range):  Temp:  [96.8 °F (36 °C)-98.3 °F (36.8 °C)] 98.3 °F (36.8 °C)  Pulse:  [] 93  Resp:  [18-20] 20  SpO2:  [85 %-97 %] 90 %  BP: (108-159)/(58-86) 133/67     Weight: (!) 164 kg (361 lb 8.9 oz)  Body mass index is 45.19 kg/m².    Intake/Output Summary (Last 24 hours) at 2/13/2019 2044  Last data filed at 2/13/2019 1815  Gross per 24 hour   Intake 0 ml   Output 4000 ml   Net -4000 ml      Physical Exam   Constitutional: He is oriented to person, place, and time. Vital signs are normal. He appears well-developed.  Non-toxic appearance. He does not have a sickly appearance. He does  not appear ill. No distress.   Morbid obesity.   HENT:   Head: Normocephalic and atraumatic.   Right Ear: External ear normal.   Left Ear: External ear normal.   Eyes: Conjunctivae and EOM are normal. Pupils are equal, round, and reactive to light. No scleral icterus.   Neck: Normal range of motion. Neck supple. No JVD present. No tracheal deviation present.   Cardiovascular: Normal rate, regular rhythm, normal heart sounds and intact distal pulses. Exam reveals no gallop and no friction rub.   No murmur heard.  Pulmonary/Chest: Effort normal and breath sounds normal. No stridor. No respiratory distress. He has no wheezes. He has no rales.   Distant lung sounds.  Moving air well.  Some crackles.   Abdominal: Soft. Bowel sounds are normal. He exhibits no distension and no mass. There is no tenderness. There is no guarding.   Musculoskeletal: Normal range of motion. He exhibits no edema or deformity.   Neurological: He is alert and oriented to person, place, and time. He exhibits normal muscle tone.   Oriented x 3.  Overall mental status much improved.  Limited insight into his health care issues.   Skin: Skin is warm and dry. No rash noted. He is not diaphoretic. No erythema.   Erythema resolved.  Some skin breakdown in right skin but does not appear infected at this time.   Nursing note and vitals reviewed.    Significant Labs: All pertinent labs within the past 24 hours have been reviewed.    Significant Imaging: I have reviewed all pertinent imaging results/findings within the past 24 hours.    Assessment/Plan:      * Septic shock    -Admitted to inpatient status in ICU upon transfer from [place] with septic shock.  Treated with pressors and IV antibiotics.  Ultimately weaned off of pressors and transferred to the floor on 2/5.  -Possible component of pneumonia but clear cause of his infection is the severe cellulitis involving the right lower extremity.    -Ultrasound of right lower extremity did not reveal  evidence of abscess or gas.    -General surgery was consulted and recommended serial exams for now and no surgical intervention at this time.    -Cellulitis continues to improve but still with notable erythema.  WBC has normalized and bood cultures no growth to date.    -Continue to elevate right lower extremity as tolerated.    -Patient completed a course of antibiotics on 2/11/2019.  Continue with wound care.     Acute on chronic diastolic heart failure    -Marked improvement with dialysis which we need to continue.    -Continue with additional volume removal with renal replacement therapy as recommended by Nephrology.  -No ARB due to renal dysfunction.  -Continue coreg.     Acute on chronic respiratory failure with hypoxia and hypercapnia    -Successfully extubated 2/2/2018.  Patient did well on BiPAP following extubation.  He has needed intermittent BiPAP.    -Continue with bronchodilator treatments as I suspect patient has COPD given wheezing and significant tobacco smoking history.    -Continue volume removal with CRRT as able.  -continue supplemental O2 requirements for goal sat 88-92%  -Completed course of steroids   -Will need outpatient PFTs and probable ICS/LAMA/LABA. When patient is being discharged will start him on Spiriva.         Acute renal failure    -Due to ATN from sepsis with shock and rhabdomyolysis.    -Unfortunately it looks as if he has progressed to ESRD  -We were unable HD on 2/5 due to clotted trialysis catheter.  Received new HD catheter on 2/6.  Completed dialysis again yesterday.  -Continue with dialysis per nephrology.  -Working on outpatient placement as he will need ongoing dialysis     Tobacco abuse    -Patient counseled on the importance of smoking cessation.  -NRT offered     Encephalopathy, metabolic    Much improved.  Off restraints today.  Slowly improving.       Essential hypertension    -At home takes coreg, losartan, hctz, metolazone and bumex  -BP mildly elevatd at  times.  -Continue coreg and PRN hydralazine       VTE Risk Mitigation (From admission, onward)        Ordered     heparin (porcine) injection 2,000 Units  Once      02/06/19 1729     heparin (porcine) injection 5,000 Units  As needed (PRN)      01/31/19 1825     heparin (porcine) injection 2,000 Units  As needed (PRN)      01/30/19 1056     heparin (porcine) injection 5,000 Units  Every 8 hours      01/29/19 2044     heparin (porcine) injection 5,000 Units  As needed (PRN)      01/29/19 1335     IP VTE HIGH RISK PATIENT  Once      01/28/19 1947              Selwyn So MD  Department of Hospital Medicine   [facility]

## 2019-02-14 NOTE — PROGRESS NOTES
"Nephrology  Progress Note    Admit Date: 1/28/2019   LOS: 17 days     SUBJECTIVE:     Follow-up For:  Septic shock/ATN    Interval History:    No c/o this am.  Denies CP/SOB.  Awaiting SNF placement.        Review of Systems:    Neg.     OBJECTIVE:     Vital Signs Range (Last 24H):  BP (!) 90/58   Pulse 108   Temp 98.8 °F (37.1 °C)   Resp 17   Ht 6' 3" (1.905 m)   Wt (!) 164 kg (361 lb 8.9 oz)   SpO2 97%   BMI 45.19 kg/m²     Temp:  [96.8 °F (36 °C)-98.8 °F (37.1 °C)]   Pulse:  []   Resp:  [16-24]   BP: ()/(53-86)   SpO2:  [90 %-97 %]     I & O (Last 24H):    Intake/Output Summary (Last 24 hours) at 2/14/2019 1124  Last data filed at 2/13/2019 1815  Gross per 24 hour   Intake 0 ml   Output 4000 ml   Net -4000 ml       Physical Exam:  General appearance:  Disheveled and morbidly obese.  Labile mood/mentation   Eyes:  Conjunctivae/corneas clear. PERRL.  Lungs: diminished.   Heart: Regular rate and rhythm, distant heart tones.  Abdomen: Soft, non-tender non-distended; bowel sounds normal; no masses,  no organomegaly, morbidly obese  Extremities:  Chronic Woody edema.   peeling/flaky LE's (R>L).  Overall much improved  Skin:  Right lower extremity cellulitis improved.   Right IJ CHERIE       Laboratory Data:  Recent Labs   Lab 02/14/19 0338   WBC 8.94   RBC 3.63*   HGB 10.1*   HCT 32.5*      MCV 90   MCH 27.8   MCHC 31.1*       BMP:   Recent Labs   Lab 02/14/19 0338   GLU 99   *   K 4.1   CL 95   CO2 28   BUN 33*   CREATININE 6.5*   CALCIUM 10.7*   MG 2.1     Lab Results   Component Value Date    CALCIUM 10.7 (H) 02/14/2019    PHOS 5.0 (H) 02/14/2019       Lab Results   Component Value Date    .4 (H) 01/30/2019    CALCIUM 10.7 (H) 02/14/2019    PHOS 5.0 (H) 02/14/2019       No results found for: URICACID    BNP  No results for input(s): BNP, BNPTRIAGEBLO in the last 168 hours.    Medications:  Medication list was reviewed and changes noted under Assessment/Plan.    Diagnostic " Results:    X-Ray Chest 1 View for PICC_Central line   Final Result      As above         Electronically signed by: Kalyan Galvez MD   Date:    02/08/2019   Time:    14:46      X-Ray Chest AP Portable   Final Result      NG tube in place.         Electronically signed by: Viviane Persaud MD   Date:    02/02/2019   Time:    23:59      X-Ray Chest 1 View   Final Result      As above.         Electronically signed by: Keon Jonas MD   Date:    01/31/2019   Time:    19:14      X-Ray Chest AP Portable   Final Result      Lines and tubes are grossly unchanged.      Interval improvement in aeration of the lungs.         Electronically signed by: Connor Babcock MD   Date:    01/31/2019   Time:    16:42      X-Ray Chest 1 View   Final Result      Overall no significant interval change         Electronically signed by: Connor Babcock MD   Date:    01/30/2019   Time:    08:24      US Extremity Non Vascular Limited Right   Final Result      Nonspecific subcutaneous edema, cutaneous thickening, could represent cellulitis changes.  No definite necrotizing fasciitis, gas or abscess.         Electronically signed by: Tin Leone MD   Date:    01/29/2019   Time:    13:09      X-Ray Chest AP Portable   Final Result   Addendum 1 of 1      Enteric tube projects in unchanged radiographic position with tip coursing    below the diaphragm and appearing to extend beyond the field of view.         Electronically signed by: Trena Sena MD   Date:    01/29/2019   Time:    00:39      Final      As above.         Electronically signed by: Trena Sena MD   Date:    01/29/2019   Time:    00:04      US Lower Extremity Veins Right   Final Result      Limited examination.  No evidence of deep venous thrombosis in the right lower extremity.         Electronically signed by: Vanita Oviedo   Date:    01/28/2019   Time:    21:10      X-Ray Chest AP Portable   Final Result      Cardiomegaly with findings suggesting pulmonary edema,  correlation advised.  Differential would include infection.         Electronically signed by: Kalyan Galvez MD   Date:    01/28/2019   Time:    20:19          ASSESSMENT/PLAN:     1. Persistent Multifactorial essentially anuric acute renal failure secondary to ATN from septic shock, rhabdomyolysis, with hyponatremia, and hyperkalemia (N17.0, N17.9, E87.5, E87.1, M62.82, A41.9):  Transferred from outlying facility for CRRT and has transitioned to standard hemodialysis.   Some issues with clotting of dialysis catheter.  Timeframe unknown at this time.  Tunneled line placed.  Placed on MWF schedule for now.  Consulted SW to start process of outpt placement for HD unit near home or place of rehab/SNF.  Renally dose meds, avoid nephrotoxins, and monitor I/O's closely.   2. Hyperphosphatemia.  Binders.  HD and diet.   Aluminum hydroxide X 3 since persistently elevated. Level overall improving.    3. Hypercalcemia (E83.52):  Check ionized/PTH.  Low chapis bath on HD.  4. RLE Cellulitis/woody edema (L03.115): Defer to wound care and ID.   5. Anemia of illness:  Iron/b12/folate wnl.  Started ONDINA. Nephrocaps.     6. HTN: Overall well controlled  7. Morbid obesity (E44):  Severe morbid obesity the root of most of this patients' medical issues.  Defer.       See above  Will continue to follow for renal needs.   Ok to transfer from [facility] once outpt/SNF HD arranged.

## 2019-02-14 NOTE — SUBJECTIVE & OBJECTIVE
Interval History:  No acute events.    Review of Systems   Constitutional: Negative for chills and fever.   HENT: Negative for congestion and dental problem.    Eyes: Negative for discharge and itching.   Respiratory: Negative for shortness of breath.    Cardiovascular: Negative for chest pain.   Gastrointestinal: Negative for abdominal pain, constipation, diarrhea, nausea and vomiting.   Endocrine: Negative for cold intolerance and heat intolerance.   Musculoskeletal: Negative for arthralgias and back pain.   Neurological: Negative for dizziness and facial asymmetry.   Hematological: Negative for adenopathy.   Psychiatric/Behavioral: Negative for agitation and behavioral problems.     Objective:     Vital Signs (Most Recent):  Temp: 98.3 °F (36.8 °C) (02/13/19 1920)  Pulse: 93 (02/13/19 1930)  Resp: 20 (02/13/19 1930)  BP: 133/67 (02/13/19 1920)  SpO2: (!) 90 % (02/13/19 1923) Vital Signs (24h Range):  Temp:  [96.8 °F (36 °C)-98.3 °F (36.8 °C)] 98.3 °F (36.8 °C)  Pulse:  [] 93  Resp:  [18-20] 20  SpO2:  [85 %-97 %] 90 %  BP: (108-159)/(58-86) 133/67     Weight: (!) 164 kg (361 lb 8.9 oz)  Body mass index is 45.19 kg/m².    Intake/Output Summary (Last 24 hours) at 2/13/2019 2044  Last data filed at 2/13/2019 1815  Gross per 24 hour   Intake 0 ml   Output 4000 ml   Net -4000 ml      Physical Exam   Constitutional: He is oriented to person, place, and time. Vital signs are normal. He appears well-developed.  Non-toxic appearance. He does not have a sickly appearance. He does not appear ill. No distress.   Morbid obesity.   HENT:   Head: Normocephalic and atraumatic.   Right Ear: External ear normal.   Left Ear: External ear normal.   Eyes: Conjunctivae and EOM are normal. Pupils are equal, round, and reactive to light. No scleral icterus.   Neck: Normal range of motion. Neck supple. No JVD present. No tracheal deviation present.   Cardiovascular: Normal rate, regular rhythm, normal heart sounds and intact distal  pulses. Exam reveals no gallop and no friction rub.   No murmur heard.  Pulmonary/Chest: Effort normal and breath sounds normal. No stridor. No respiratory distress. He has no wheezes. He has no rales.   Distant lung sounds.  Moving air well.  Some crackles.   Abdominal: Soft. Bowel sounds are normal. He exhibits no distension and no mass. There is no tenderness. There is no guarding.   Musculoskeletal: Normal range of motion. He exhibits no edema or deformity.   Neurological: He is alert and oriented to person, place, and time. He exhibits normal muscle tone.   Oriented x 3.  Overall mental status much improved.  Limited insight into his health care issues.   Skin: Skin is warm and dry. No rash noted. He is not diaphoretic. No erythema.   Erythema resolved.  Some skin breakdown in right skin but does not appear infected at this time.   Nursing note and vitals reviewed.    Significant Labs: All pertinent labs within the past 24 hours have been reviewed.    Significant Imaging: I have reviewed all pertinent imaging results/findings within the past 24 hours.

## 2019-02-14 NOTE — PT/OT/SLP PROGRESS
Speech Language Pathology Treatment    Patient Name:  Jones Red   MRN:  [id number]  Admitting Diagnosis: Septic shock    Recommendations:                 General Recommendations:     1. Speech Pathology 3-5x/week for ongoing assessment of diet tolerance and remediation of cognitive communication deficits    Diet recommendations:  Regular, Liquid Diet Level: Thin     Aspiration Precautions: 1 bite/sip at a time, Assistance with meals, Avoid talking while eating, Eliminate distractions, Feed only when awake/alert, HOB to 90 degrees, Meds whole 1 at a time, Remain upright 30 minutes post meal, Small bites/sips and Standard aspiration precautions     General Precautions: Standard, aspiration, fall    Communication strategies:    1. Provide verbal and written orientation information prior to sessions    Subjective      Pt awake, alert, reclined in bed. Agreeable to session.    Pain/Comfort:  Pain Rating 1: 0/10    Objective:     Has the patient been evaluated by SLP for swallowing?   Yes  Keep patient NPO? No   Current Respiratory Status: room air      COGNITIVE COMMUNICATION: Pt awake, alert, agreeable to session. Oriented to name, , city, general place, hospital, and year with 100% acc. Not oriented to date. Given mod cues to reference external orientation board, pt was able to identify month and date. Pt continues to be highly distractible and benefited from reduced environmental stimuli. Able to focus and sustain attention to simple conversation for 5-6 prior to requiring redirection. Flat affect and dcr eye contact throughout conversation noted. Pt continues to present with dcr recall of course of hospitalization; however, minimal improvements noted with recent memory. Pt was able to complete simple 5 minute prospective memory task with 100% acc this date.  Able to alternate attention to timer, recall prompt, and recall target information without cues. Able to recall correct month, date, year after 15 minutes  without cues to reference external orientation board. With increased length and complexity of information, pt demonstrates reduced storage and retrieval of novel information. Able to immediately recall 3/8 details from simple paragraph. Pt also presents with dcr category fluency. Able to generate 13 items in a simple category in 60 seconds given mod cues. Verbal expression is fluent; however, c/b poor topic maintenance and tangential/confused language. Pt will require ongoing services at the next level of care. Pt continues to demonstrate poor insight to deficits and dcr safety-awareness.     MOTOR SPEECH: Speech c/b heavy regional dialect. Pt is able 70% intelligible this date at the simple conversational level to an unfamiliar listener. Intelligibility improves to 80-90% given min-mod cues to increase volume of speech. Pt continues to demonstrate dcr self-monitoring of rate and volume of speech. Pt continues to require mod-max cues to repair communication breakdowns.     Assessment:     Jones Red is a 53 y.o. male with an SLP diagnosis of Cognitive-communication Impairment. Overt confusional state, dcr attention, and dcr insight to deficits noted; however, pt presenting with improved orientation.     Goals:   Multidisciplinary Problems     SLP Goals        Problem: SLP Goal    Goal Priority Disciplines Outcome   SLP Goal     SLP Ongoing (interventions implemented as appropriate)   Description:  1. Pt will be able to follow simple 1 step commands 75% of time with moderate verbal and visual cues.  2. Increase speech intelligibility to 75% at the simple sentence level with moderate verbal cues to slow rate, raise volume.  3. Pt will be able to consume thin liquids in small single sips via cup or straw, with no signs of airway threat or aspiration given max assistance.  4. Pt will be able to advance to purees and solid consistencies with no signs of airway threat or aspiration given max assistance  5. Increase  orientation to month, date, day of week, place and reason for hospitalization with 75% acc given max verbal and written cues.  6. Increase ability to express basic wants and needs 75% of time given min assistance                    Plan:     · Patient to be seen:  3 x/week, 5 x/week   · Plan of Care expires:  02/23/19  · Plan of Care reviewed with:  patient, other (see comments)(RN)   · SLP Follow-Up:  Yes       Discharge recommendations:  nursing facility, skilled       Time Tracking:     SLP Treatment Date:   02/14/19  Speech Start Time:  1020  Speech Stop Time:  1046     Speech Total Time (min):  26 min    Billable Minutes: Speech Language Therapy individual 26 mins    Nolan Aguirre CCC-SLP  02/14/2019

## 2019-02-14 NOTE — PLAN OF CARE
Problem: Physical Therapy Goal  Goal: Physical Therapy Goal  Goals to be met by: 3/3/19    Patient will increase functional independence with mobility by performin. Supine to sit with min A. -- MET 2/10/19  REVISED: Supine>sit modified independently.   2. Sit to supine with min A. -- MET 2/10/19  REVISED: Sit>supine modified independently.   3. Rolling R and L with min A. -- MET 2/10/19  4. Sitting at edge of bed >5 minutes with min A. -- MET 2/10/19  5. PT will assess sit<>stand. -- MET 2/10/19  REVISED: Sit<>stand with supervision and standard or rolling walker.   6. Gait > 50 ft with standard or rolling walker with min A.        Pt progressing towards goals, sup to sit SBA with pt using bedrails to assist, sit to stand min.A with bariatric RW, amb'd 35' with BRW and CGA/Derek, O2@2L:96%, HR:118. Recommend cont PT in SNF.

## 2019-02-14 NOTE — PT/OT/SLP PROGRESS
"Physical Therapy Treatment    Patient Name:  Jones Red   MRN:  [id number]    Recommendations:     Discharge Recommendations:  nursing facility, skilled   Discharge Equipment Recommendations: (TBD in SNF, pt currently using a bariatric RW)   Barriers to discharge: Decreased caregiver support    Assessment:     Jones Red is a 53 y.o. male admitted with a medical diagnosis of Septic shock.  He presents with the following impairments/functional limitations:  weakness, impaired endurance, impaired self care skills, impaired functional mobilty, gait instability, impaired balance, decreased lower extremity function, decreased safety awareness, edema, impaired skin, impaired cardiopulmonary response to activity ;pt with slightly improved mobility today, though ambulates with chair following for safety 2/2 LE weakness.    Rehab Prognosis: Good; patient would benefit from acute skilled PT services to address these deficits and reach maximum level of function.    Recent Surgery: Procedure(s) (LRB):  Insertion,catheter,tunneled (Right) 8 Days Post-Op    Plan:     During this hospitalization, patient to be seen 5 x/week to address the identified rehab impairments via gait training, therapeutic activities, therapeutic exercises, neuromuscular re-education and progress toward the following goals:    · Plan of Care Expires:  03/03/19    Subjective     Chief Complaint: pt c/o LE weakness  Patient/Family Comments/goals: "My legs feel like jello".  Pain/Comfort:  · Pain Rating 1: 0/10  · Pain Rating Post-Intervention 1: 0/10      Objective:     Communicated with nurse prior to session.  Patient found supine in bed with PICC line, telemetry, oxygen(O2@2L)  upon PT entry to room.     General Precautions: Standard, aspiration, fall   Orthopedic Precautions:N/A   Braces: N/A     Functional Mobility:  · Bed Mobility:     · Supine to Sit: stand by assistance and pt using bedrails  · Transfers:     · Sit to Stand:  minimum assistance " "with rolling walker  · Gait: amb'd 35' with bariatric RW and CGA/min.A, O2@2L:96%, HR:118, chair following closely for safety      AM-PAC 6 CLICK MOBILITY  Turning over in bed (including adjusting bedclothes, sheets and blankets)?: 3  Sitting down on and standing up from a chair with arms (e.g., wheelchair, bedside commode, etc.): 3  Moving from lying on back to sitting on the side of the bed?: 3  Moving to and from a bed to a chair (including a wheelchair)?: 3  Need to walk in hospital room?: 3  Climbing 3-5 steps with a railing?: 1  Basic Mobility Total Score: 16       Therapeutic Activities and Exercises:   pt perf'd seated heel/toe raises, LAQ"s, hip flex x 10 ea.     Patient left up in chair with all lines intact, call button in reach, nurse notified and LE's elevated..    GOALS:   Multidisciplinary Problems     Physical Therapy Goals        Problem: Physical Therapy Goal    Goal Priority Disciplines Outcome Goal Variances Interventions   Physical Therapy Goal     PT, PT/OT Ongoing (interventions implemented as appropriate)     Description:  Goals to be met by: 3/3/19    Patient will increase functional independence with mobility by performin. Supine to sit with min A. -- MET 2/10/19  REVISED: Supine>sit modified independently.   2. Sit to supine with min A. -- MET 2/10/19  REVISED: Sit>supine modified independently.   3. Rolling R and L with min A. -- MET 2/10/19  4. Sitting at edge of bed >5 minutes with min A. -- MET 2/10/19  5. PT will assess sit<>stand. -- MET 2/10/19  REVISED: Sit<>stand with supervision and standard or rolling walker.   6. Gait > 50 ft with standard or rolling walker with min A.                         Time Tracking:     PT Received On: 19  PT Start Time: 1139     PT Stop Time: 1205  PT Total Time (min): 26 min     Billable Minutes: Gait Training 16 and Therapeutic Exercise 10    Treatment Type: Treatment  PT/PTA: PTA     PTA Visit Number: 3     Coretta Evans, " PTA  02/14/2019

## 2019-02-14 NOTE — PLAN OF CARE
CM spoke with Alan at [facility] who states she is still planning on coming to see pt for admission eval.

## 2019-02-14 NOTE — ASSESSMENT & PLAN NOTE
-Admitted to inpatient status in ICU upon transfer from [place] with septic shock.  Treated with pressors and IV antibiotics.  Ultimately weaned off of pressors and transferred to the floor on 2/5.  -Possible component of pneumonia but clear cause of his infection is the severe cellulitis involving the right lower extremity.    -Ultrasound of right lower extremity did not reveal evidence of abscess or gas.    -General surgery was consulted and recommended serial exams for now and no surgical intervention at this time.    -Cellulitis continues to improve but still with notable erythema.  WBC has normalized and bood cultures no growth to date.    -Continue to elevate right lower extremity as tolerated.    -Patient completed a course of antibiotics on 2/11/2019.  Continue with wound care.

## 2019-02-14 NOTE — NURSING
"Pt is being verbally abusive towards staff, calling her assigned nurse a "bitch" and calling other staff members "morons" if no one came into her room immediately. She constantly called the nurses station requesting her nurse. The charge RN went into her room to find out what she needed. She called the charge nurse an idiot and said she needs her nurse to check the external catheter. Patient became very irate when the charge nurse told her that her nurse will be a few minutes and started screaming, cursing, and repeatedly pressing the call light. Harry NP notified of patient's agitation. Advised to give PRN Benadryl. Pt refusing at this time, continuing to be verbally abusive towards staff members.  "

## 2019-02-15 PROBLEM — R53.81 DEBILITY: Status: ACTIVE | Noted: 2019-02-15

## 2019-02-15 LAB
ANION GAP SERPL CALC-SCNC: 14 MMOL/L
BASOPHILS # BLD AUTO: 0.15 K/UL
BASOPHILS NFR BLD: 1.8 %
BUN SERPL-MCNC: 42 MG/DL
CALCIUM SERPL-MCNC: 10.6 MG/DL
CHLORIDE SERPL-SCNC: 97 MMOL/L
CO2 SERPL-SCNC: 24 MMOL/L
CREAT SERPL-MCNC: 7.7 MG/DL
DIFFERENTIAL METHOD: ABNORMAL
EOSINOPHIL # BLD AUTO: 0.6 K/UL
EOSINOPHIL NFR BLD: 7.1 %
ERYTHROCYTE [DISTWIDTH] IN BLOOD BY AUTOMATED COUNT: 16 %
EST. GFR  (AFRICAN AMERICAN): 9 ML/MIN/1.73 M^2
EST. GFR  (NON AFRICAN AMERICAN): 7 ML/MIN/1.73 M^2
GLUCOSE SERPL-MCNC: 100 MG/DL
HCT VFR BLD AUTO: 29.7 %
HGB BLD-MCNC: 9.3 G/DL
LYMPHOCYTES # BLD AUTO: 1.9 K/UL
LYMPHOCYTES NFR BLD: 23.8 %
MAGNESIUM SERPL-MCNC: 2.1 MG/DL
MCH RBC QN AUTO: 28.2 PG
MCHC RBC AUTO-ENTMCNC: 31.3 G/DL
MCV RBC AUTO: 90 FL
MONOCYTES # BLD AUTO: 0.8 K/UL
MONOCYTES NFR BLD: 9.6 %
NEUTROPHILS # BLD AUTO: 4.6 K/UL
NEUTROPHILS NFR BLD: 56 %
PHOSPHATE SERPL-MCNC: 6.4 MG/DL
PLATELET # BLD AUTO: 308 K/UL
PMV BLD AUTO: 9.6 FL
POTASSIUM SERPL-SCNC: 3.9 MMOL/L
RBC # BLD AUTO: 3.3 M/UL
SODIUM SERPL-SCNC: 135 MMOL/L
WBC # BLD AUTO: 8.16 K/UL

## 2019-02-15 PROCEDURE — 94668 MNPJ CHEST WALL SBSQ: CPT

## 2019-02-15 PROCEDURE — 11000001 HC ACUTE MED/SURG PRIVATE ROOM

## 2019-02-15 PROCEDURE — 80100016 HC MAINTENANCE HEMODIALYSIS

## 2019-02-15 PROCEDURE — 94660 CPAP INITIATION&MGMT: CPT

## 2019-02-15 PROCEDURE — 99232 SBSQ HOSP IP/OBS MODERATE 35: CPT | Mod: ,,, | Performed by: HOSPITALIST

## 2019-02-15 PROCEDURE — 25000242 PHARM REV CODE 250 ALT 637 W/ HCPCS: Performed by: STUDENT IN AN ORGANIZED HEALTH CARE EDUCATION/TRAINING PROGRAM

## 2019-02-15 PROCEDURE — 99232 PR SUBSEQUENT HOSPITAL CARE,LEVL II: ICD-10-PCS | Mod: ,,, | Performed by: HOSPITALIST

## 2019-02-15 PROCEDURE — 94761 N-INVAS EAR/PLS OXIMETRY MLT: CPT

## 2019-02-15 PROCEDURE — 63600175 PHARM REV CODE 636 W HCPCS: Performed by: INTERNAL MEDICINE

## 2019-02-15 PROCEDURE — 25000003 PHARM REV CODE 250: Performed by: HOSPITALIST

## 2019-02-15 PROCEDURE — 80048 BASIC METABOLIC PNL TOTAL CA: CPT

## 2019-02-15 PROCEDURE — 99900035 HC TECH TIME PER 15 MIN (STAT)

## 2019-02-15 PROCEDURE — 94640 AIRWAY INHALATION TREATMENT: CPT

## 2019-02-15 PROCEDURE — 25000242 PHARM REV CODE 250 ALT 637 W/ HCPCS: Performed by: HOSPITALIST

## 2019-02-15 PROCEDURE — 85025 COMPLETE CBC W/AUTO DIFF WBC: CPT

## 2019-02-15 PROCEDURE — 25000003 PHARM REV CODE 250: Performed by: NURSE PRACTITIONER

## 2019-02-15 PROCEDURE — 27000221 HC OXYGEN, UP TO 24 HOURS

## 2019-02-15 PROCEDURE — 84100 ASSAY OF PHOSPHORUS: CPT

## 2019-02-15 PROCEDURE — 83735 ASSAY OF MAGNESIUM: CPT

## 2019-02-15 PROCEDURE — 92507 TX SP LANG VOICE COMM INDIV: CPT

## 2019-02-15 PROCEDURE — 63600175 PHARM REV CODE 636 W HCPCS: Performed by: STUDENT IN AN ORGANIZED HEALTH CARE EDUCATION/TRAINING PROGRAM

## 2019-02-15 RX ADMIN — HEPARIN SODIUM 2000 UNITS: 1000 INJECTION, SOLUTION INTRAVENOUS; SUBCUTANEOUS at 09:02

## 2019-02-15 RX ADMIN — ALTEPLASE 4 MG: 2.2 INJECTION, POWDER, LYOPHILIZED, FOR SOLUTION INTRAVENOUS at 11:02

## 2019-02-15 RX ADMIN — SEVELAMER CARBONATE 2.4 G: 2400 POWDER, FOR SUSPENSION ORAL at 09:02

## 2019-02-15 RX ADMIN — IPRATROPIUM BROMIDE AND ALBUTEROL SULFATE 3 ML: .5; 3 SOLUTION RESPIRATORY (INHALATION) at 12:02

## 2019-02-15 RX ADMIN — SODIUM CHLORIDE SOLN NEBU 3% 4 ML: 3 NEBU SOLN at 08:02

## 2019-02-15 RX ADMIN — Medication 1 CAPSULE: at 09:02

## 2019-02-15 RX ADMIN — BACITRACIN: 500 OINTMENT TOPICAL at 12:02

## 2019-02-15 RX ADMIN — BACITRACIN: 500 OINTMENT TOPICAL at 09:02

## 2019-02-15 RX ADMIN — HEPARIN SODIUM 5000 UNITS: 5000 INJECTION, SOLUTION INTRAVENOUS; SUBCUTANEOUS at 09:02

## 2019-02-15 RX ADMIN — IPRATROPIUM BROMIDE AND ALBUTEROL SULFATE 3 ML: .5; 3 SOLUTION RESPIRATORY (INHALATION) at 11:02

## 2019-02-15 RX ADMIN — CARVEDILOL 25 MG: 12.5 TABLET, FILM COATED ORAL at 12:02

## 2019-02-15 RX ADMIN — IPRATROPIUM BROMIDE AND ALBUTEROL SULFATE 3 ML: .5; 3 SOLUTION RESPIRATORY (INHALATION) at 03:02

## 2019-02-15 RX ADMIN — HEPARIN SODIUM 5000 UNITS: 5000 INJECTION, SOLUTION INTRAVENOUS; SUBCUTANEOUS at 05:02

## 2019-02-15 RX ADMIN — IPRATROPIUM BROMIDE AND ALBUTEROL SULFATE 3 ML: .5; 3 SOLUTION RESPIRATORY (INHALATION) at 08:02

## 2019-02-15 RX ADMIN — BACITRACIN: 500 OINTMENT TOPICAL at 02:02

## 2019-02-15 RX ADMIN — CARVEDILOL 25 MG: 12.5 TABLET, FILM COATED ORAL at 05:02

## 2019-02-15 RX ADMIN — HEPARIN SODIUM 5000 UNITS: 5000 INJECTION, SOLUTION INTRAVENOUS; SUBCUTANEOUS at 02:02

## 2019-02-15 NOTE — PLAN OF CARE
Problem: Noninvasive Ventilation Acute  Goal: Effective Unassisted Ventilation and Oxygenation  Outcome: Ongoing (interventions implemented as appropriate)  Patient received on 1L nc. Sats 94 to 95%. Txs given, cpt done. Bipap qhs. Pt. in no distress, will continue to monitor.

## 2019-02-15 NOTE — ASSESSMENT & PLAN NOTE
-Admitted to inpatient status in ICU upon transfer from [place] with septic shock.  Treated with pressors and IV antibiotics.  Ultimately weaned off of pressors and transferred to the floor on 2/5.  -Possible component of pneumonia but clear cause of his infection is the severe cellulitis involving the right lower extremity.    -Ultrasound of right lower extremity did not reveal evidence of abscess or gas.    -General surgery was consulted and recommended serial exams for now and no surgical intervention at this time.    -Cellulitis continues to improve but still with notable erythema.  WBC has normalized and bood cultures no growth to date.    -Continue to elevate right lower extremity as tolerated.    -Patient completed a course of antibiotics on 2/11/2019.  Continue with wound care.

## 2019-02-15 NOTE — PROGRESS NOTES
Patient not seen on this day secondary to being away at dialysis will attempt as scheduled Anabell Brown 2/15/2019

## 2019-02-15 NOTE — PROGRESS NOTES
"Nephrology  Progress Note    Admit Date: 1/28/2019   LOS: 18 days     SUBJECTIVE:     Follow-up For:  Septic shock/ATN    Interval History:    No c/o this am.  Seen on HD.  Mentation at baseline.  Discussed with team.       Review of Systems:    Neg.     OBJECTIVE:     Vital Signs Range (Last 24H):  /67 (BP Location: Left arm, Patient Position: Lying)   Pulse 90   Temp 98 °F (36.7 °C)   Resp 18   Ht 6' 3" (1.905 m)   Wt (!) 164 kg (361 lb 8.9 oz)   SpO2 95%   BMI 45.19 kg/m²     Temp:  [97.8 °F (36.6 °C)-98.5 °F (36.9 °C)]   Pulse:  []   Resp:  [18-22]   BP: (112-140)/(59-72)   SpO2:  [93 %-96 %]     I & O (Last 24H):  No intake or output data in the 24 hours ending 02/15/19 0945    Physical Exam:  General appearance:  Disheveled and morbidly obese.  Labile mood/mentation   Eyes:  Conjunctivae/corneas clear. PERRL.  Lungs: diminished.   Heart: Regular rate and rhythm, distant heart tones.  Abdomen: Soft, non-tender non-distended; bowel sounds normal; no masses,  no organomegaly, morbidly obese  Extremities:  Chronic Woody edema.   peeling/flaky LE's (R>L).  Overall much improved  Skin:  Right lower extremity cellulitis improved.   Right IJ CHERIE       Laboratory Data:  Recent Labs   Lab 02/15/19  0420   WBC 8.16   RBC 3.30*   HGB 9.3*   HCT 29.7*      MCV 90   MCH 28.2   MCHC 31.3*       BMP:   Recent Labs   Lab 02/15/19  0420      *   K 3.9   CL 97   CO2 24   BUN 42*   CREATININE 7.7*   CALCIUM 10.6*   MG 2.1     Lab Results   Component Value Date    CALCIUM 10.6 (H) 02/15/2019    PHOS 6.4 (H) 02/15/2019       Lab Results   Component Value Date    PTH 24.3 02/14/2019    CALCIUM 10.6 (H) 02/15/2019    CAION 1.32 02/14/2019    PHOS 6.4 (H) 02/15/2019       No results found for: URICACID    BNP  No results for input(s): BNP, BNPTRIAGEBLO in the last 168 hours.    Medications:  Medication list was reviewed and changes noted under Assessment/Plan.    Diagnostic Results:    X-Ray " Chest 1 View for PICC_Central line   Final Result      As above         Electronically signed by: Kalyan Galvez MD   Date:    02/08/2019   Time:    14:46      X-Ray Chest AP Portable   Final Result      NG tube in place.         Electronically signed by: Viviane Persaud MD   Date:    02/02/2019   Time:    23:59      X-Ray Chest 1 View   Final Result      As above.         Electronically signed by: Keon Jonas MD   Date:    01/31/2019   Time:    19:14      X-Ray Chest AP Portable   Final Result      Lines and tubes are grossly unchanged.      Interval improvement in aeration of the lungs.         Electronically signed by: Connor Babcock MD   Date:    01/31/2019   Time:    16:42      X-Ray Chest 1 View   Final Result      Overall no significant interval change         Electronically signed by: Connor Babcock MD   Date:    01/30/2019   Time:    08:24      US Extremity Non Vascular Limited Right   Final Result      Nonspecific subcutaneous edema, cutaneous thickening, could represent cellulitis changes.  No definite necrotizing fasciitis, gas or abscess.         Electronically signed by: Tin Leone MD   Date:    01/29/2019   Time:    13:09      X-Ray Chest AP Portable   Final Result   Addendum 1 of 1      Enteric tube projects in unchanged radiographic position with tip coursing    below the diaphragm and appearing to extend beyond the field of view.         Electronically signed by: Trena Sena MD   Date:    01/29/2019   Time:    00:39      Final      As above.         Electronically signed by: Trena Sena MD   Date:    01/29/2019   Time:    00:04      US Lower Extremity Veins Right   Final Result      Limited examination.  No evidence of deep venous thrombosis in the right lower extremity.         Electronically signed by: Vanita Oviedo   Date:    01/28/2019   Time:    21:10      X-Ray Chest AP Portable   Final Result      Cardiomegaly with findings suggesting pulmonary edema, correlation advised.   Differential would include infection.         Electronically signed by: Kalyan Galvez MD   Date:    01/28/2019   Time:    20:19          ASSESSMENT/PLAN:     1. Persistent Multifactorial essentially anuric acute renal failure secondary to ATN from septic shock, rhabdomyolysis, with hyponatremia, and hyperkalemia (N17.0, N17.9, E87.5, E87.1, M62.82, A41.9):  Transferred from outlying facility for CRRT and has transitioned to standard hemodialysis.   Timeframe unknown at this time.  Tunneled line placed.  Placed on MWF schedule for now.  Consulted SW to start process of outpt placement for HD unit near home or place of rehab/SNF.  Renally dose meds, avoid nephrotoxins, and monitor I/O's closely.   2. Hyperphosphatemia.  Binders.  HD and diet.   Aluminum hydroxide X 3 since persistently elevated. Level overall improving.    3. Hypercalcemia (E83.52):  Ionized ca/PTH wnl.  Low chapis bath on HD.  4. RLE Cellulitis/woody edema (L03.115): Defer to wound care and ID.   5. Anemia of illness:  Iron/b12/folate wnl.  Started ONDINA. Nephrocaps.     6. HTN: Overall well controlled  7. Morbid obesity (E44):  Severe morbid obesity the root of most of this patients' medical issues.  Defer.       See above  Will continue to follow for renal needs.   Labs MWF only.   Ok to transfer from Renal once outpt/SNF HD arranged.     Addendum:    -issues with HD cath at end of HD session.  TPA overnight and attempt manipulation this weekend.

## 2019-02-15 NOTE — PLAN OF CARE
Problem: Adult Inpatient Plan of Care  Goal: Plan of Care Review  Outcome: Ongoing (interventions implemented as appropriate)  Patient on 2L NC sat's 95% Q4 and Q12 aerosol treatments given tolerated well.CPT held pt receiving dialysis

## 2019-02-15 NOTE — PLAN OF CARE
Problem: Adult Inpatient Plan of Care  Goal: Plan of Care Review  Outcome: Ongoing (interventions implemented as appropriate)  Pt attempted to sat on side of bed per self activating bed alarm.  Pt inst to lay back down.  Safety maintained. picc line flushed x2 ports.  Unable to get weight on bed scale due to zero status.  Pt inst to call for assistance.

## 2019-02-15 NOTE — ASSESSMENT & PLAN NOTE
Continue with therapy.  Attempting skilled nursing or inpatient rehab placement for further therapy.

## 2019-02-15 NOTE — PT/OT/SLP PROGRESS
Occupational Therapy   Treatment    Name: Jones Red  MRN: [id number]  Admitting Diagnosis:  Septic shock  8 Days Post-Op    Recommendations:     Discharge Recommendations: nursing facility, skilled  Discharge Equipment Recommendations:  other (see comments)(defer to next level of care)  Barriers to discharge:  Other (Comment), Inaccessible home environment(current functional level)    Assessment:   Ambulated short household distance with RW, MIN A, verbal cues for RW management and upright trunk with MIN carryover and chair follow for safety.  Oral/hand hygiene in stance at sink with increased verbal cues for positioning at sink; remained with BUE-forearm suported at sink to perform entire task with seated rest break once complete.  Step transfer bedside chair>bed with MIN A with RW.  Progressing towards goals.  To benefit from continued acute care OT services to increase independence in self-care/functional transfers.  Continue POC.    Jones Red is a 53 y.o. male with a medical diagnosis of Septic shock.  He presents with below deficits decreasing independence in self-care/functional transfers. Performance deficits affecting function are weakness, impaired endurance, impaired self care skills, impaired functional mobilty, gait instability, impaired balance, decreased safety awareness, pain, impaired skin, impaired cardiopulmonary response to activity, decreased lower extremity function, decreased upper extremity function.     Rehab Prognosis:  Good; patient would benefit from acute skilled OT services to address these deficits and reach maximum level of function.       Plan:     Patient to be seen 5 x/week to address the above listed problems via self-care/home management, therapeutic activities, therapeutic exercises, cognitive retraining  · Plan of Care Expires: 03/03/19  · Plan of Care Reviewed with: patient    Subjective     Pain/Comfort:  · Pain Rating 1: other (see comments)(did not rate)  · Location -  "Side 1: Bilateral  · Location - Orientation 1: generalized  · Location 1: other (see comments)(ankle/knee pain)  · Pain Addressed 1: Distraction, Cessation of Activity, Nurse notified  · Pain Rating Post-Intervention 1: other (see comments)(at rest reports "it's still there." though did not numerically rate)    Objective:     Communicated with: Nursing prior to session.  Patient found HOB elevated with oxygen, telemetry, PICC line upon OT entry to room.    General Precautions: Standard, aspiration, fall   Orthopedic Precautions:N/A   Braces: N/A     Occupational Performance:     Bed Mobility:    · Patient completed Supine to Sit with stand by assistance  · Patient completed Sit to Supine with stand by assistance     Functional Mobility/Transfers:  · Patient completed Sit <> Stand Transfer with contact guard assistance and minimum assistance  with  rolling walker   · Functional Mobility: Ambulated short household distance with RW, MIN A, verbal cues for RW management and upright trunk with MIN carryover and chair follow for safety     Activities of Daily Living:  · Grooming: Oral/hand hygiene in stance at sink with increased verbal cues for positioning at sink; remained with BUE-forearm suported at sink to perform entire task with seated rest break once complete      AMPA 6 Click ADL: 14    Treatment & Education:  Educated on functional transfer/ADL safety.     Patient left HOB elevated with all lines intact, call button in reach and nursing notifiedEducation:      GOALS:   Multidisciplinary Problems     Occupational Therapy Goals        Problem: Occupational Therapy Goal    Goal Priority Disciplines Outcome Interventions   Occupational Therapy Goal     OT, PT/OT Ongoing (interventions implemented as appropriate)    Description:  Goals to be met by 3/3/19  1. Max A self-feeding  2. Mod A G/H (wash face and hands) supported sitting (MET 2/8/19)     REVISED: Mod A G/H (wash face and hands and mouth wash) sitting EOB. " GOAL MET 2/11/19.  3. Min A hospital gown change bed level  3. 50% assist with UE ROM exercises MET 2/8/19       REVISED: UE AROM exercises  HHA with Min A VC for continuation of task  5.  Assess bed mobility.  GOAL MET 2/11/19.  6. Step transfer to BSC with MIN A.    7. MAX A with toileting.   8. Grooming/hygiene in stance with CGA at sink with seated rest breaks as needed.  GOAL MET 2/14/19.                            Time Tracking:     OT Date of Treatment: 02/14/19  OT Start Time: 1901  OT Stop Time: 1933  OT Total Time (min): 32 min    Billable Minutes:Self Care/Home Management 16  Therapeutic Activity 16    Matilda Xie, LUIS  2/14/2019

## 2019-02-15 NOTE — PROGRESS NOTES
[facility] Hospital Medicine  Progress Note    Patient Name: Jones Red  MRN: [id number]  Patient Class: IP- Inpatient   Admission Date: 1/28/2019  Length of Stay: 18 days  Attending Physician: Selwyn So MD  Primary Care Provider: Primary Doctor No        Subjective:     Principal Problem:Septic shock    HPI:  Mr. Jones Red is a 53 y.o. male, with PMH of HTN, COPD, chronic respiratory failure (on home O2), Diastolic CHF, LYNDON, chronic venous insufficiency, and questionable seizures history (suspected to be 2/2 alcohol withdrawal), who preset tia to [facility] on 1/22/19 2/2 SOB. This was associated with cough productive of brown sputum. He was started in BiPap, but did ultimately degrade, and was intubated, and maintained on a ventilator. He was additionally altered upon arrival to the ED. History was reportedly obtained by a friend, and it was reported that he was found down at home, and was unable to get up. He as admitted to the ICU, started on IV fluids and pressors.     Hospital Course:  Mr. Red is a 53-year-old man with history of hypertension, diastolic heart failure, chronic obstructive pulmonary disease with chronic hypoxic respiratory failure on home oxygen, prior alcohol abuse, significant ongoing tobacco use (3 packs per day), and morbid obesity with septic shock secondary to right lower extremity cellulitis complicating chronic venous stasis.  Patient intubated at outside hospital after failing a trial of BiPAP.  Patient also with evidence of worsening renal failure with rhabdomyolysis.  Patient transferred from [facility] in [place], [place] to [facility] on 1/28/2019.  Patient started on renal replacement therapy.  He was extubated on 2/2/19 to bipap and transferred to the floor on 2/5/19.  Ultrasound of the right lower extremity did not reveal evidence of abscess, subcutaneous gas, or evidence of necrotizing fascitits.   General surgery and wound care consulted.  No surgical intervention recommended at this time.  He was seen by ID who recommended continuing vancomycin and cefepime until 2/11/19.  Mental status improved. He is tolerating his diet and continues on dialysis.  We are presently working with PT and OT daily in attempts to gain strength.  We are working on placement in SNF or inpatient rehab closer to his home which is complicated due to his high medical demands.    Interval History:  No acute events.    Review of Systems   Constitutional: Negative for chills and fever.   HENT: Negative for congestion and dental problem.    Eyes: Negative for discharge and itching.   Respiratory: Negative for shortness of breath.    Cardiovascular: Negative for chest pain.   Gastrointestinal: Negative for abdominal pain, constipation, diarrhea, nausea and vomiting.   Endocrine: Negative for cold intolerance and heat intolerance.   Musculoskeletal: Negative for arthralgias and back pain.   Neurological: Negative for dizziness and facial asymmetry.   Hematological: Negative for adenopathy.   Psychiatric/Behavioral: Negative for agitation and behavioral problems.     Objective:     Vital Signs (Most Recent):  Temp: 97.5 °F (36.4 °C) (02/15/19 1100)  Pulse: 89 (02/15/19 1218)  Resp: 18 (02/15/19 1218)  BP: 122/71 (02/15/19 1216)  SpO2: 95 % (02/15/19 1218) Vital Signs (24h Range):  Temp:  [97.5 °F (36.4 °C)-98.5 °F (36.9 °C)] 97.5 °F (36.4 °C)  Pulse:  [] 89  Resp:  [18-22] 18  SpO2:  [93 %-96 %] 95 %  BP: (109-150)/() 122/71     Weight: (!) 164 kg (361 lb 8.9 oz)  Body mass index is 45.19 kg/m².    Intake/Output Summary (Last 24 hours) at 2/15/2019 1355  Last data filed at 2/15/2019 1030  Gross per 24 hour   Intake --   Output 946 ml   Net -946 ml      Physical Exam   Constitutional: He is oriented to person, place, and time. Vital signs are normal. He appears well-developed.  Non-toxic appearance. He does not have a sickly  appearance. He does not appear ill. No distress.   Morbid obesity.   HENT:   Head: Normocephalic and atraumatic.   Right Ear: External ear normal.   Left Ear: External ear normal.   Eyes: Conjunctivae and EOM are normal. Pupils are equal, round, and reactive to light. No scleral icterus.   Neck: Normal range of motion. Neck supple. No JVD present. No tracheal deviation present.   Cardiovascular: Normal rate, regular rhythm, normal heart sounds and intact distal pulses. Exam reveals no gallop and no friction rub.   No murmur heard.  Pulmonary/Chest: Effort normal and breath sounds normal. No stridor. No respiratory distress. He has no wheezes. He has no rales.   Distant lung sounds.  Moving air well.  Some crackles.   Abdominal: Soft. Bowel sounds are normal. He exhibits no distension and no mass. There is no tenderness. There is no guarding.   Musculoskeletal: Normal range of motion. He exhibits no edema or deformity.   Neurological: He is alert and oriented to person, place, and time. He exhibits normal muscle tone.   Oriented x 3.  Overall mental status much improved.  Limited insight into his health care issues.   Skin: Skin is warm and dry. No rash noted. He is not diaphoretic. No erythema.   Erythema resolved.  Some skin breakdown in right skin but does not appear infected at this time.   Nursing note and vitals reviewed.    Significant Labs: All pertinent labs within the past 24 hours have been reviewed.    Significant Imaging: I have reviewed all pertinent imaging results/findings within the past 24 hours.    Assessment/Plan:      * Septic shock    -Admitted to inpatient status in ICU upon transfer from [facility] with septic shock.  Treated with pressors and IV antibiotics.  Ultimately weaned off of pressors and transferred to the floor on 2/5.  -Possible component of pneumonia but clear cause of his infection is the severe cellulitis involving the right lower extremity.    -Ultrasound of right lower  extremity did not reveal evidence of abscess or gas.    -General surgery was consulted and recommended serial exams for now and no surgical intervention at this time.    -Cellulitis continues to improve but still with notable erythema.  WBC has normalized and bood cultures no growth to date.    -Continue to elevate right lower extremity as tolerated.    -Patient completed a course of antibiotics on 2/11/2019.  Continue with wound care.     Acute on chronic diastolic heart failure    -Marked improvement with dialysis which we need to continue.    -Continue with additional volume removal with renal replacement therapy as recommended by Nephrology.  -No ARB due to renal dysfunction.  -Continue coreg.     Acute on chronic respiratory failure with hypoxia and hypercapnia    -Successfully extubated 2/2/2018.  Patient did well on BiPAP following extubation.  He has needed intermittent BiPAP.    -Continue with bronchodilator treatments as I suspect patient has COPD given wheezing and significant tobacco smoking history.    -Continue volume removal with CRRT as able.  -continue supplemental O2 requirements for goal sat 88-92%  -Completed course of steroids   -Will need outpatient PFTs and probable ICS/LAMA/LABA. When patient is being discharged will start him on Spiriva.         Acute renal failure    -Due to ATN from sepsis with shock and rhabdomyolysis.    -Unfortunately it looks as if he has progressed to ESRD  -We were unable HD on 2/5 due to clotted trialysis catheter.  Received new HD catheter on 2/6.  Completed dialysis again yesterday.  -Continue with dialysis per nephrology.  -Working on outpatient placement as he will need ongoing dialysis     Tobacco abuse    -Patient counseled on the importance of smoking cessation.  -NRT offered     Debility    Continue with therapy.  Attempting skilled nursing or inpatient rehab placement for further therapy.     Encephalopathy, metabolic    Much improved.  Off restraints  today.  Slowly improving.       Essential hypertension    Reasonably controlled with current regimen.  Will continue with current regimen and continue to monitor.       VTE Risk Mitigation (From admission, onward)        Ordered     heparin (porcine) injection 2,000 Units  Once      02/06/19 1729     heparin (porcine) injection 5,000 Units  As needed (PRN)      01/31/19 1825     heparin (porcine) injection 2,000 Units  As needed (PRN)      01/30/19 1056     heparin (porcine) injection 5,000 Units  Every 8 hours      01/29/19 2044     heparin (porcine) injection 5,000 Units  As needed (PRN)      01/29/19 1335     IP VTE HIGH RISK PATIENT  Once      01/28/19 1947              Selwyn So MD  Department of Hospital Medicine   [facility]

## 2019-02-15 NOTE — PLAN OF CARE
Problem: SLP Goal  Goal: SLP Goal  1. Pt will be able to follow simple 1 step commands 75% of time with moderate verbal and visual cues.  2. Increase speech intelligibility to 75% at the simple sentence level with moderate verbal cues to slow rate, raise volume.  3. Pt will be able to consume thin liquids in small single sips via cup or straw, with no signs of airway threat or aspiration given max assistance.  4. Pt will be able to advance to purees and solid consistencies with no signs of airway threat or aspiration given max assistance  5. Increase orientation to month, date, day of week, place and reason for hospitalization with 75% acc given max verbal and written cues.  6. Increase ability to express basic wants and needs 75% of time given min assistance   7. Pt will be able to sustain attention to task during simple functional tasks for 10 mins given min cues.   Outcome: Ongoing (interventions implemented as appropriate)  Pt seen this date for continued SLP treatment     Comments: SLP to continue to follow

## 2019-02-15 NOTE — PT/OT/SLP PROGRESS
Speech Language Pathology Treatment    Patient Name:  Jones Red   MRN:  [id number]  Admitting Diagnosis: Septic shock    Recommendations:                 General Recommendations:     1. Speech Pathology 3-5x/week for ongoing assessment of diet tolerance and remediation of cognitive communication deficits    Diet recommendations:  Regular, Liquid Diet Level: Thin     Aspiration Precautions: 1 bite/sip at a time, Assistance with meals, Avoid talking while eating, Eliminate distractions, Feed only when awake/alert, HOB to 90 degrees, Meds whole 1 at a time, Remain upright 30 minutes post meal, Small bites/sips and Standard aspiration precautions     General Precautions: Standard, aspiration, fall    Communication strategies:    1. Provide verbal and written orientation information prior to sessions    Subjective      Pt awake, alert, reclined in bed. Agreeable to session. Pt stating he is tired, but will work for a short session.     Pain/Comfort:   10/10 pain in leg, ankle, and head. Pt stating he had taken medication.     Objective:     Has the patient been evaluated by SLP for swallowing?   Yes  Keep patient NPO? No   Current Respiratory Status: room air      COGNITIVE COMMUNICATION: Pt awake, alert, agreeable to session. Oriented to name, , city, general place, hospital, exact date, and year with 100% acc. Not oriented to current president. Pt continues to be highly distractible and benefited from reduced environmental stimuli. Flat affect and dcr eye contact throughout conversation noted. Able to sustain attention this date for 1-2 mins at a time before redirection required. Dcr attention c/b off topic answers, need for repetition of simple directions, and closing eyes. Able to answer general knowledge questions this date with 70% accuracy. Errors c/b confusion of question and off topic answers leading to pt forgetting original question. Given a picture scene associated with general questions, pt able to  increase answers to questions to 80% accuracy, however, continues to demonstrate difficulty with abstract thinking.     MOTOR SPEECH: Speech c/b heavy regional dialect. Pt is able 70% intelligible this date at the simple conversational level to an unfamiliar listener. Intelligibility improves to 80-90% given min-mod cues to increase volume of speech. Pt continues to demonstrate dcr self-monitoring of rate and volume of speech. Pt continues to require mod-max cues to repair communication breakdowns.     Assessment:     Jones Red is a 53 y.o. male with an SLP diagnosis of Cognitive-communication Impairment. Continues to present with confusional state, dcr attention, and dcr insight to deficits noted; however, pt presenting with improved orientation.     Goals:   Multidisciplinary Problems     SLP Goals        Problem: SLP Goal    Goal Priority Disciplines Outcome   SLP Goal     SLP Ongoing (interventions implemented as appropriate)   Description:  1. Pt will be able to follow simple 1 step commands 75% of time with moderate verbal and visual cues.  2. Increase speech intelligibility to 75% at the simple sentence level with moderate verbal cues to slow rate, raise volume.  3. Pt will be able to consume thin liquids in small single sips via cup or straw, with no signs of airway threat or aspiration given max assistance.  4. Pt will be able to advance to purees and solid consistencies with no signs of airway threat or aspiration given max assistance  5. Increase orientation to month, date, day of week, place and reason for hospitalization with 75% acc given max verbal and written cues.  6. Increase ability to express basic wants and needs 75% of time given min assistance   7. Pt will be able to sustain attention to task during simple functional tasks for 10 mins given min cues.                     Plan:     · Patient to be seen:  3 x/week, 5 x/week   · Plan of Care expires:  02/23/19  · Plan of Care reviewed with:   patient   · SLP Follow-Up:  Yes       Discharge recommendations:  nursing facility, skilled       Time Tracking:     SLP Treatment Date:   02/15/19  Speech Start Time:  1525  Speech Stop Time:  1540    Speech Total Time (min):  15 min    Billable Minutes: Speech Language Therapy individual 15 mins    LINH Oquendo-SLP  02/15/2019

## 2019-02-15 NOTE — PT/OT/SLP PROGRESS
Occupational Therapy      Patient Name:  Jones Red   MRN:  [id number]    Patient not seen today secondary to Dialysis. Will attempt again this PM if time allows.    Mariela Venegas, OTR/L  2/15/2019

## 2019-02-15 NOTE — PLAN OF CARE
CM received a call fromTaylor at  [facility] in [place] declined pt stating they are more psyc and addictive behaviors and do not feel pt would be a good fit at their facility.

## 2019-02-15 NOTE — PLAN OF CARE
Problem: Occupational Therapy Goal  Goal: Occupational Therapy Goal  Goals to be met by 3/3/19  1. Max A self-feeding  2. Mod A G/H (wash face and hands) supported sitting (MET 2/8/19)     REVISED: Mod A G/H (wash face and hands and mouth wash) sitting EOB. GOAL MET 2/11/19.  3. Min A hospital gown change bed level  3. 50% assist with UE ROM exercises MET 2/8/19       REVISED: UE AROM exercises  HHA with Min A VC for continuation of task  5.  Assess bed mobility.  GOAL MET 2/11/19.  6. Step transfer to BSC with MIN A.    7. MAX A with toileting.   8. Grooming/hygiene in stance with CGA at sink with seated rest breaks as needed.  GOAL MET 2/14/19.          Outcome: Ongoing (interventions implemented as appropriate)  Ambulated short household distance with RW, MIN A, verbal cues for RW management and upright trunk with MIN carryover and chair follow for safety.  Oral/hand hygiene in stance at sink with increased verbal cues for positioning at sink; remained with BUE-forearm suported at sink to perform entire task with seated rest break once complete.  Step transfer bedside chair>bed with MIN A with RW.  Progressing towards goals.  To benefit from continued acute care OT services to increase independence in self-care/functional transfers.  Continue POC.

## 2019-02-15 NOTE — SUBJECTIVE & OBJECTIVE
Interval History:  No acute events.    Review of Systems   Constitutional: Negative for chills and fever.   HENT: Negative for congestion and dental problem.    Eyes: Negative for discharge and itching.   Respiratory: Negative for shortness of breath.    Cardiovascular: Negative for chest pain.   Gastrointestinal: Negative for abdominal pain, constipation, diarrhea, nausea and vomiting.   Endocrine: Negative for cold intolerance and heat intolerance.   Musculoskeletal: Negative for arthralgias and back pain.   Neurological: Negative for dizziness and facial asymmetry.   Hematological: Negative for adenopathy.   Psychiatric/Behavioral: Negative for agitation and behavioral problems.     Objective:     Vital Signs (Most Recent):  Temp: 97.5 °F (36.4 °C) (02/15/19 1100)  Pulse: 89 (02/15/19 1218)  Resp: 18 (02/15/19 1218)  BP: 122/71 (02/15/19 1216)  SpO2: 95 % (02/15/19 1218) Vital Signs (24h Range):  Temp:  [97.5 °F (36.4 °C)-98.5 °F (36.9 °C)] 97.5 °F (36.4 °C)  Pulse:  [] 89  Resp:  [18-22] 18  SpO2:  [93 %-96 %] 95 %  BP: (109-150)/() 122/71     Weight: (!) 164 kg (361 lb 8.9 oz)  Body mass index is 45.19 kg/m².    Intake/Output Summary (Last 24 hours) at 2/15/2019 1355  Last data filed at 2/15/2019 1030  Gross per 24 hour   Intake --   Output 946 ml   Net -946 ml      Physical Exam   Constitutional: He is oriented to person, place, and time. Vital signs are normal. He appears well-developed.  Non-toxic appearance. He does not have a sickly appearance. He does not appear ill. No distress.   Morbid obesity.   HENT:   Head: Normocephalic and atraumatic.   Right Ear: External ear normal.   Left Ear: External ear normal.   Eyes: Conjunctivae and EOM are normal. Pupils are equal, round, and reactive to light. No scleral icterus.   Neck: Normal range of motion. Neck supple. No JVD present. No tracheal deviation present.   Cardiovascular: Normal rate, regular rhythm, normal heart sounds and intact distal  pulses. Exam reveals no gallop and no friction rub.   No murmur heard.  Pulmonary/Chest: Effort normal and breath sounds normal. No stridor. No respiratory distress. He has no wheezes. He has no rales.   Distant lung sounds.  Moving air well.  Some crackles.   Abdominal: Soft. Bowel sounds are normal. He exhibits no distension and no mass. There is no tenderness. There is no guarding.   Musculoskeletal: Normal range of motion. He exhibits no edema or deformity.   Neurological: He is alert and oriented to person, place, and time. He exhibits normal muscle tone.   Oriented x 3.  Overall mental status much improved.  Limited insight into his health care issues.   Skin: Skin is warm and dry. No rash noted. He is not diaphoretic. No erythema.   Erythema resolved.  Some skin breakdown in right skin but does not appear infected at this time.   Nursing note and vitals reviewed.    Significant Labs: All pertinent labs within the past 24 hours have been reviewed.    Significant Imaging: I have reviewed all pertinent imaging results/findings within the past 24 hours.

## 2019-02-16 PROCEDURE — 94664 DEMO&/EVAL PT USE INHALER: CPT

## 2019-02-16 PROCEDURE — 99900035 HC TECH TIME PER 15 MIN (STAT)

## 2019-02-16 PROCEDURE — 92507 TX SP LANG VOICE COMM INDIV: CPT

## 2019-02-16 PROCEDURE — 25000003 PHARM REV CODE 250: Performed by: NURSE PRACTITIONER

## 2019-02-16 PROCEDURE — 25000242 PHARM REV CODE 250 ALT 637 W/ HCPCS: Performed by: HOSPITALIST

## 2019-02-16 PROCEDURE — 99233 SBSQ HOSP IP/OBS HIGH 50: CPT | Mod: ,,, | Performed by: HOSPITALIST

## 2019-02-16 PROCEDURE — 25000003 PHARM REV CODE 250: Performed by: INTERNAL MEDICINE

## 2019-02-16 PROCEDURE — 94640 AIRWAY INHALATION TREATMENT: CPT

## 2019-02-16 PROCEDURE — 25000003 PHARM REV CODE 250: Performed by: HOSPITALIST

## 2019-02-16 PROCEDURE — 27000221 HC OXYGEN, UP TO 24 HOURS

## 2019-02-16 PROCEDURE — 11000001 HC ACUTE MED/SURG PRIVATE ROOM

## 2019-02-16 PROCEDURE — 94660 CPAP INITIATION&MGMT: CPT

## 2019-02-16 PROCEDURE — 97116 GAIT TRAINING THERAPY: CPT

## 2019-02-16 PROCEDURE — 63600175 PHARM REV CODE 636 W HCPCS: Performed by: STUDENT IN AN ORGANIZED HEALTH CARE EDUCATION/TRAINING PROGRAM

## 2019-02-16 PROCEDURE — 99233 PR SUBSEQUENT HOSPITAL CARE,LEVL III: ICD-10-PCS | Mod: ,,, | Performed by: HOSPITALIST

## 2019-02-16 PROCEDURE — 25000242 PHARM REV CODE 250 ALT 637 W/ HCPCS: Performed by: STUDENT IN AN ORGANIZED HEALTH CARE EDUCATION/TRAINING PROGRAM

## 2019-02-16 PROCEDURE — 27000646 HC AEROBIKA DEVICE

## 2019-02-16 PROCEDURE — 97530 THERAPEUTIC ACTIVITIES: CPT

## 2019-02-16 PROCEDURE — 94761 N-INVAS EAR/PLS OXIMETRY MLT: CPT

## 2019-02-16 RX ORDER — SEVELAMER CARBONATE 800 MG/1
800 TABLET, FILM COATED ORAL
Status: DISCONTINUED | OUTPATIENT
Start: 2019-02-16 | End: 2019-02-16

## 2019-02-16 RX ORDER — SEVELAMER CARBONATE 800 MG/1
1600 TABLET, FILM COATED ORAL
Status: DISCONTINUED | OUTPATIENT
Start: 2019-02-16 | End: 2019-02-25

## 2019-02-16 RX ADMIN — HEPARIN SODIUM 5000 UNITS: 5000 INJECTION, SOLUTION INTRAVENOUS; SUBCUTANEOUS at 05:02

## 2019-02-16 RX ADMIN — MICONAZOLE NITRATE: 20 OINTMENT TOPICAL at 09:02

## 2019-02-16 RX ADMIN — IPRATROPIUM BROMIDE AND ALBUTEROL SULFATE 3 ML: .5; 3 SOLUTION RESPIRATORY (INHALATION) at 04:02

## 2019-02-16 RX ADMIN — IPRATROPIUM BROMIDE AND ALBUTEROL SULFATE 3 ML: .5; 3 SOLUTION RESPIRATORY (INHALATION) at 07:02

## 2019-02-16 RX ADMIN — IPRATROPIUM BROMIDE AND ALBUTEROL SULFATE 3 ML: .5; 3 SOLUTION RESPIRATORY (INHALATION) at 08:02

## 2019-02-16 RX ADMIN — SEVELAMER CARBONATE 1600 MG: 800 TABLET, FILM COATED ORAL at 12:02

## 2019-02-16 RX ADMIN — IPRATROPIUM BROMIDE AND ALBUTEROL SULFATE 3 ML: .5; 3 SOLUTION RESPIRATORY (INHALATION) at 12:02

## 2019-02-16 RX ADMIN — BACITRACIN: 500 OINTMENT TOPICAL at 09:02

## 2019-02-16 RX ADMIN — HEPARIN SODIUM 5000 UNITS: 5000 INJECTION, SOLUTION INTRAVENOUS; SUBCUTANEOUS at 09:02

## 2019-02-16 RX ADMIN — SEVELAMER CARBONATE 2.4 G: 2400 POWDER, FOR SUSPENSION ORAL at 09:02

## 2019-02-16 RX ADMIN — SODIUM CHLORIDE SOLN NEBU 3% 4 ML: 3 NEBU SOLN at 07:02

## 2019-02-16 RX ADMIN — HEPARIN SODIUM 5000 UNITS: 5000 INJECTION, SOLUTION INTRAVENOUS; SUBCUTANEOUS at 01:02

## 2019-02-16 RX ADMIN — BACITRACIN: 500 OINTMENT TOPICAL at 02:02

## 2019-02-16 RX ADMIN — SEVELAMER CARBONATE 1600 MG: 800 TABLET, FILM COATED ORAL at 05:02

## 2019-02-16 RX ADMIN — SODIUM CHLORIDE SOLN NEBU 3% 4 ML: 3 NEBU SOLN at 08:02

## 2019-02-16 RX ADMIN — Medication 1 CAPSULE: at 09:02

## 2019-02-16 NOTE — PT/OT/SLP PROGRESS
"Speech Language Pathology Treatment    Patient Name:  Jones Red   MRN:  [id number]  Admitting Diagnosis: Septic shock    Recommendations:                 General Recommendations:     1. Speech Pathology 3-5x/week for ongoing assessment of diet tolerance and remediation of cognitive communication deficits    Diet recommendations:  Regular, Liquid Diet Level: Thin     Aspiration Precautions: 1 bite/sip at a time, Assistance with meals, Avoid talking while eating, Eliminate distractions, Feed only when awake/alert, HOB to 90 degrees, Meds whole 1 at a time, Remain upright 30 minutes post meal, Small bites/sips and Standard aspiration precautions     General Precautions: Standard, aspiration, fall    Communication strategies:    1. Provide verbal and written orientation information prior to sessions    Subjective      Pt awake, alert, finishing PT session. Pt stating he "feels great".     Pain/Comfort:  Pain Rating 1: 0/10  Pain Rating Post-Intervention 1: 0/10  Objective:     Has the patient been evaluated by SLP for swallowing?   Yes  Keep patient NPO? No   Current Respiratory Status: room air      COGNITIVE COMMUNICATION: Pt awake, alert, agreeable to session. Oriented to name, , city, general place, hospital, exact date, and year with 100% acc.  Pt continues to be highly distractible and benefits from reduced environmental stimuli. Flat affect and dcr eye contact throughout conversation noted. Able to sustain attention this date for 30-60 seconds at a time before redirection required. Dcr attention c/b off topic answers, need for repetition of simple directions, and closing eyes. During simple, functional tasks this date (reading weather forecast, directions on on pill bottle, and TV guide) pt required max assistance to facilitate onward progression of task and answer questions with 50% accuracy. Pt stating he cannot read stimuli due to blurred vision, however, continues to have difficulty reading and locating " "the answer to simple questions when print is larger. Pt often stating "I don't know" before attempting to locate answer or ask SLP for assistance. Pt unable to attend to SLP this date with distractions present (PCT changing sheets, staff having side conversations). Pt continues to present with off topic, perseverative speech regarding deficits and plans post discharge. SLP discussed plan with pt, however, pt still confused about deficits and need for care post discharge. Left pt with a sheet of paper and discussed writing down events during day to keep pt oriented and to keep a schedule like he would at home. Pt agreeable and feels this will assist him in staying up to date on his care.     MOTOR SPEECH: Speech c/b heavy regional dialect. Pt is able 60-75% intelligible this date at the simple conversational level to an unfamiliar listener. Intelligibility improves to 85% given min-mod cues to increase volume of speech. Pt continues to demonstrate dcr self-monitoring of rate and volume of speech. Pt continues to require mod-max cues to repair communication breakdowns.     Assessment:     Jones Red is a 53 y.o. male with an SLP diagnosis of Cognitive-communication Impairment. Continues to present with confusional state, dcr attention, and dcr insight to deficits noted; however, pt presenting with improved orientation.      Goals:   Multidisciplinary Problems     SLP Goals        Problem: SLP Goal    Goal Priority Disciplines Outcome   SLP Goal     SLP Ongoing (interventions implemented as appropriate)   Description:  1. Pt will be able to follow simple 1 step commands 75% of time with moderate verbal and visual cues. (MET)  2. Increase speech intelligibility to 75% at the simple sentence level with moderate verbal cues to slow rate, raise volume.  3. Pt will be able to consume thin liquids in small single sips via cup or straw, with no signs of airway threat or aspiration given max assistance. (MET)   4. Pt will be " able to advance to purees and solid consistencies with no signs of airway threat or aspiration given max assistance. (MET)  5. Increase orientation to month, date, day of week, place and reason for hospitalization with 75% acc given max verbal and written cues.  6. Increase ability to express basic wants and needs 75% of time given min assistance   7. Pt will be able to sustain attention to task during simple functional tasks for 10 mins given min cues.                      Plan:     · Patient to be seen:  3 x/week, 5 x/week   · Plan of Care expires:  02/23/19  · Plan of Care reviewed with:  patient   · SLP Follow-Up:  Yes       Discharge recommendations:  nursing facility, skilled       Time Tracking:     SLP Treatment Date:   02/16/19  Speech Start Time:  1040  Speech Stop Time:  1101    Speech Total Time (min):  21 min    Billable Minutes: Speech Language Therapy individual 21 mins    LINH Oquendo-SLP  02/16/2019

## 2019-02-16 NOTE — PLAN OF CARE
Problem: Adult Inpatient Plan of Care  Goal: Plan of Care Review  Outcome: Ongoing (interventions implemented as appropriate)  Pt sat on side of bed x4 this shift.  Pt picc line dressing changed. No drainage noted.  Bacitracin applied to lower extremities.  Pt voided 100mls.  Dialysis catheter intact and no bleeding tegraderm intact

## 2019-02-16 NOTE — PROGRESS NOTES
"Nephrology  Progress Note    Admit Date: 1/28/2019   LOS: 19 days     SUBJECTIVE:     Follow-up For:  Septic shock/ATN    Interval History:    No c/o this am.  Stable BPs.  Asking when he can go home.    Review of Systems:   Constitutional: no fever or chills   Respiratory: no cough or shorness of breath   Cardiovascular: no chest pain or palpitations   Gastrointestinal: no nausea or vomiting, no abdominal pain or change in bowel habits   Genitourinary: no hematuria or dysuria   Musculoskeletal: no arthralgias or myalgias   Neurological: no seizures or tremors    OBJECTIVE:     Vital Signs Range (Last 24H):  /62 (Patient Position: Sitting)   Pulse 87   Temp 98.2 °F (36.8 °C) (Oral)   Resp 18   Ht 6' 3" (1.905 m)   Wt (!) 164 kg (361 lb 8.9 oz)   SpO2 97%   BMI 45.19 kg/m²     Temp:  [96.5 °F (35.8 °C)-98.5 °F (36.9 °C)]   Pulse:  []   Resp:  [16-20]   BP: (100-150)/()   SpO2:  [91 %-97 %]     I & O (Last 24H):    Intake/Output Summary (Last 24 hours) at 2/16/2019 0925  Last data filed at 2/16/2019 0600  Gross per 24 hour   Intake 240 ml   Output 971 ml   Net -731 ml       Physical Exam:  General appearance:  Disheveled and morbidly obese.  Labile mood/mentation   Eyes:  Conjunctivae/corneas clear. EOMI.  Lungs: diminished.   Heart: Regular rate and rhythm, distant heart tones.  Abdomen: Soft, non-tender non-distended; bowel sounds normal; no masses,  no organomegaly, morbidly obese  Extremities:  Chronic Woody edema.   peeling/flaky LE's (R>L).  Overall much improved  Skin:  Right lower extremity cellulitis improved.   Right IJ CHERIE       Laboratory Data:  No results for input(s): WBC, RBC, HGB, HCT, PLT, MCV, MCH, MCHC in the last 24 hours.    BMP:   Recent Labs   Lab 02/15/19  0420      *   K 3.9   CL 97   CO2 24   BUN 42*   CREATININE 7.7*   CALCIUM 10.6*   MG 2.1     Lab Results   Component Value Date    CALCIUM 10.6 (H) 02/15/2019    PHOS 6.4 (H) 02/15/2019       Lab Results "   Component Value Date    PTH 24.3 02/14/2019    CALCIUM 10.6 (H) 02/15/2019    CAION 1.32 02/14/2019    PHOS 6.4 (H) 02/15/2019       No results found for: URICACID    BNP  No results for input(s): BNP, BNPTRIAGEBLO in the last 168 hours.    Medications:  Medication list was reviewed and changes noted under Assessment/Plan.    Diagnostic Results:    X-Ray Chest 1 View for PICC_Central line   Final Result      As above         Electronically signed by: Kalyan Galvez MD   Date:    02/08/2019   Time:    14:46      X-Ray Chest AP Portable   Final Result      NG tube in place.         Electronically signed by: Viviane Persaud MD   Date:    02/02/2019   Time:    23:59      X-Ray Chest 1 View   Final Result      As above.         Electronically signed by: Keon Jonas MD   Date:    01/31/2019   Time:    19:14      X-Ray Chest AP Portable   Final Result      Lines and tubes are grossly unchanged.      Interval improvement in aeration of the lungs.         Electronically signed by: Connor Babcock MD   Date:    01/31/2019   Time:    16:42      X-Ray Chest 1 View   Final Result      Overall no significant interval change         Electronically signed by: Connor Babcock MD   Date:    01/30/2019   Time:    08:24      US Extremity Non Vascular Limited Right   Final Result      Nonspecific subcutaneous edema, cutaneous thickening, could represent cellulitis changes.  No definite necrotizing fasciitis, gas or abscess.         Electronically signed by: Tin Leone MD   Date:    01/29/2019   Time:    13:09      X-Ray Chest AP Portable   Final Result   Addendum 1 of 1      Enteric tube projects in unchanged radiographic position with tip coursing    below the diaphragm and appearing to extend beyond the field of view.         Electronically signed by: Trena Sena MD   Date:    01/29/2019   Time:    00:39      Final      As above.         Electronically signed by: Trena Sena MD   Date:    01/29/2019   Time:    00:04       US Lower Extremity Veins Right   Final Result      Limited examination.  No evidence of deep venous thrombosis in the right lower extremity.         Electronically signed by: Vanita Oviedo   Date:    01/28/2019   Time:    21:10      X-Ray Chest AP Portable   Final Result      Cardiomegaly with findings suggesting pulmonary edema, correlation advised.  Differential would include infection.         Electronically signed by: Kalyan Galvez MD   Date:    01/28/2019   Time:    20:19          ASSESSMENT/PLAN:     1. Multifactorial essentially anuric acute renal failure secondary to ATN from septic shock, rhabdomyolysis, with hyponatremia, and hyperkalemia (N17.0, N17.9, E87.5, E87.1, M62.82, A41.9):  Transferred from outlying facility for CRRT and has transitioned to standard hemodialysis.   Timeframe of renal recovery unclear at this time.  Tunneled line placed.  Placed on MWF schedule for now.  Consulted SW to start process of outpt placement for HD unit near home or place of rehab/SNF.  Renally dose meds, avoid nephrotoxins, and monitor I/O's closely.   2. Hyperphosphatemia: Change to Renvela tabs with meals.  3. Hypercalcemia (E83.52):  Ionized ca/PTH wnl.  Low chapis bath on HD.  4. RLE Cellulitis/woody edema (L03.115): Defer to wound care and ID.   5. Anemia of illness:  Iron/b12/folate wnl.  Started ONDINA. Nephrocaps.     6. HTN: Overall well controlled.  Added hold parameters for Coreg.  7. Morbid obesity (E44):  Severe morbid obesity the root of most of this patients' medical issues.  Defer.       See above  Will continue to follow for renal needs.   Labs MWF only.   Ok to transfer from Renal once outpt/SNF HD arranged.     Javed Vance MD  Nephrology

## 2019-02-16 NOTE — PLAN OF CARE
Problem: SLP Goal  Goal: SLP Goal  1. Pt will be able to follow simple 1 step commands 75% of time with moderate verbal and visual cues. (MET)  2. Increase speech intelligibility to 75% at the simple sentence level with moderate verbal cues to slow rate, raise volume.  3. Pt will be able to consume thin liquids in small single sips via cup or straw, with no signs of airway threat or aspiration given max assistance. (MET)   4. Pt will be able to advance to purees and solid consistencies with no signs of airway threat or aspiration given max assistance. (MET)  5. Increase orientation to month, date, day of week, place and reason for hospitalization with 75% acc given max verbal and written cues.  6. Increase ability to express basic wants and needs 75% of time given min assistance   7. Pt will be able to sustain attention to task during simple functional tasks for 10 mins given min cues.    Outcome: Ongoing (interventions implemented as appropriate)  Pt seen this date for continued SLP treatment     Comments: SLP to continue to follow for cognitive communication deficits.

## 2019-02-16 NOTE — SUBJECTIVE & OBJECTIVE
Interval History:  No acute events.    Review of Systems   Constitutional: Negative for chills and fever.   HENT: Negative for congestion and dental problem.    Eyes: Negative for discharge and itching.   Respiratory: Negative for shortness of breath.    Cardiovascular: Negative for chest pain.   Gastrointestinal: Negative for abdominal pain, constipation, diarrhea, nausea and vomiting.   Endocrine: Negative for cold intolerance and heat intolerance.   Musculoskeletal: Negative for arthralgias and back pain.   Neurological: Negative for dizziness and facial asymmetry.   Hematological: Negative for adenopathy.   Psychiatric/Behavioral: Negative for agitation and behavioral problems.     Objective:     Vital Signs (Most Recent):  Temp: 97.8 °F (36.6 °C) (02/16/19 1215)  Pulse: 97 (02/16/19 1217)  Resp: 18 (02/16/19 1217)  BP: (!) 101/58 (02/16/19 1215)  SpO2: (!) 93 % (02/16/19 1217) Vital Signs (24h Range):  Temp:  [96.5 °F (35.8 °C)-98.5 °F (36.9 °C)] 97.8 °F (36.6 °C)  Pulse:  [72-98] 97  Resp:  [16-20] 18  SpO2:  [91 %-97 %] 93 %  BP: (100-126)/(56-62) 101/58     Weight: (!) 164 kg (361 lb 8.9 oz)  Body mass index is 45.19 kg/m².    Intake/Output Summary (Last 24 hours) at 2/16/2019 1342  Last data filed at 2/16/2019 1100  Gross per 24 hour   Intake 240 ml   Output 75 ml   Net 165 ml      Physical Exam   Constitutional: He is oriented to person, place, and time. Vital signs are normal. He appears well-developed.  Non-toxic appearance. He does not have a sickly appearance. He does not appear ill. No distress.   Morbid obesity.   HENT:   Head: Normocephalic and atraumatic.   Right Ear: External ear normal.   Left Ear: External ear normal.   Eyes: Conjunctivae and EOM are normal. Pupils are equal, round, and reactive to light. No scleral icterus.   Neck: Normal range of motion. Neck supple. No JVD present. No tracheal deviation present.   Cardiovascular: Normal rate, regular rhythm, normal heart sounds and intact  distal pulses. Exam reveals no gallop and no friction rub.   No murmur heard.  Pulmonary/Chest: Effort normal and breath sounds normal. No stridor. No respiratory distress. He has no wheezes. He has no rales.   Distant lung sounds.  Moving air well.  Some crackles.   Abdominal: Soft. Bowel sounds are normal. He exhibits no distension and no mass. There is no tenderness. There is no guarding.   Musculoskeletal: Normal range of motion. He exhibits no edema or deformity.   Neurological: He is alert and oriented to person, place, and time. He exhibits normal muscle tone.   Oriented x 3.  Overall mental status much improved.  Limited insight into his health care issues.   Skin: Skin is warm and dry. No rash noted. He is not diaphoretic. No erythema.   Erythema resolved.  Some skin breakdown in right skin but does not appear infected at this time.   Nursing note and vitals reviewed.    Significant Labs: All pertinent labs within the past 24 hours have been reviewed.    Significant Imaging: I have reviewed all pertinent imaging results/findings within the past 24 hours.

## 2019-02-16 NOTE — PLAN OF CARE
Problem: Physical Therapy Goal  Goal: Physical Therapy Goal  Goals to be met by: 3/3/19    Patient will increase functional independence with mobility by performin. Supine to sit with min A. -- MET 2/10/19  REVISED: Supine>sit modified independently.   2. Sit to supine with min A. -- MET 2/10/19  REVISED: Sit>supine modified independently. MET 19  3. Rolling R and L with min A. -- MET 2/10/19  4. Sitting at edge of bed >5 minutes with min A. -- MET 2/10/19  5. PT will assess sit<>stand. -- MET 2/10/19  REVISED: Sit<>stand with supervision and standard or rolling walker. MET 19  6. Gait > 50 ft with standard or rolling walker with min A. MET 19         Patient met all above goals PT to see next treatment session to set new goals.

## 2019-02-16 NOTE — PROGRESS NOTES
[facility] Hospital Medicine  Progress Note    Patient Name: Jones Red  MRN: [id number]  Patient Class: IP- Inpatient   Admission Date: 1/28/2019  Length of Stay: 19 days  Attending Physician: Selwyn So MD  Primary Care Provider: Primary Doctor No        Subjective:     Principal Problem:Septic shock    HPI:  Mr. Jones Red is a 53 y.o. male, with PMH of HTN, COPD, chronic respiratory failure (on home O2), Diastolic CHF, LYNDON, chronic venous insufficiency, and questionable seizures history (suspected to be 2/2 alcohol withdrawal), who preset tia to [facility] on 1/22/19 2/2 SOB. This was associated with cough productive of brown sputum. He was started in BiPap, but did ultimately degrade, and was intubated, and maintained on a ventilator. He was additionally altered upon arrival to the ED. History was reportedly obtained by a friend, and it was reported that he was found down at home, and was unable to get up. He as admitted to the ICU, started on IV fluids and pressors.     Hospital Course:  Mr. Red is a 53-year-old man with history of hypertension, diastolic heart failure, chronic obstructive pulmonary disease with chronic hypoxic respiratory failure on home oxygen, prior alcohol abuse, significant ongoing tobacco use (3 packs per day), and morbid obesity with septic shock secondary to right lower extremity cellulitis complicating chronic venous stasis.  Patient intubated at outside hospital after failing a trial of BiPAP.  Patient also with evidence of worsening renal failure with rhabdomyolysis.  Patient transferred from [facility] in [place], [place] to [facility] on 1/28/2019.  Patient started on renal replacement therapy.  He was extubated on 2/2/19 to bipap and transferred to the floor on 2/5/19.  Ultrasound of the right lower extremity did not reveal evidence of abscess, subcutaneous gas, or evidence of necrotizing fascitits.   General surgery and wound care consulted.  No surgical intervention recommended at this time.  He was seen by ID who recommended continuing vancomycin and cefepime until 2/11/19.  Mental status improved. He is tolerating his diet and continues on dialysis.  We are presently working with PT and OT daily in attempts to gain strength.  We are working on placement in SNF or inpatient rehab closer to his home which is complicated due to his high medical demands.    Interval History:  No acute events.    Review of Systems   Constitutional: Negative for chills and fever.   HENT: Negative for congestion and dental problem.    Eyes: Negative for discharge and itching.   Respiratory: Negative for shortness of breath.    Cardiovascular: Negative for chest pain.   Gastrointestinal: Negative for abdominal pain, constipation, diarrhea, nausea and vomiting.   Endocrine: Negative for cold intolerance and heat intolerance.   Musculoskeletal: Negative for arthralgias and back pain.   Neurological: Negative for dizziness and facial asymmetry.   Hematological: Negative for adenopathy.   Psychiatric/Behavioral: Negative for agitation and behavioral problems.     Objective:     Vital Signs (Most Recent):  Temp: 97.8 °F (36.6 °C) (02/16/19 1215)  Pulse: 97 (02/16/19 1217)  Resp: 18 (02/16/19 1217)  BP: (!) 101/58 (02/16/19 1215)  SpO2: (!) 93 % (02/16/19 1217) Vital Signs (24h Range):  Temp:  [96.5 °F (35.8 °C)-98.5 °F (36.9 °C)] 97.8 °F (36.6 °C)  Pulse:  [72-98] 97  Resp:  [16-20] 18  SpO2:  [91 %-97 %] 93 %  BP: (100-126)/(56-62) 101/58     Weight: (!) 164 kg (361 lb 8.9 oz)  Body mass index is 45.19 kg/m².    Intake/Output Summary (Last 24 hours) at 2/16/2019 1342  Last data filed at 2/16/2019 1100  Gross per 24 hour   Intake 240 ml   Output 75 ml   Net 165 ml      Physical Exam   Constitutional: He is oriented to person, place, and time. Vital signs are normal. He appears well-developed.  Non-toxic appearance. He does not have a  sickly appearance. He does not appear ill. No distress.   Morbid obesity.   HENT:   Head: Normocephalic and atraumatic.   Right Ear: External ear normal.   Left Ear: External ear normal.   Eyes: Conjunctivae and EOM are normal. Pupils are equal, round, and reactive to light. No scleral icterus.   Neck: Normal range of motion. Neck supple. No JVD present. No tracheal deviation present.   Cardiovascular: Normal rate, regular rhythm, normal heart sounds and intact distal pulses. Exam reveals no gallop and no friction rub.   No murmur heard.  Pulmonary/Chest: Effort normal and breath sounds normal. No stridor. No respiratory distress. He has no wheezes. He has no rales.   Distant lung sounds.  Moving air well.  Some crackles.   Abdominal: Soft. Bowel sounds are normal. He exhibits no distension and no mass. There is no tenderness. There is no guarding.   Musculoskeletal: Normal range of motion. He exhibits no edema or deformity.   Neurological: He is alert and oriented to person, place, and time. He exhibits normal muscle tone.   Oriented x 3.  Overall mental status much improved.  Limited insight into his health care issues.   Skin: Skin is warm and dry. No rash noted. He is not diaphoretic. No erythema.   Erythema resolved.  Some skin breakdown in right skin but does not appear infected at this time.   Nursing note and vitals reviewed.    Significant Labs: All pertinent labs within the past 24 hours have been reviewed.    Significant Imaging: I have reviewed all pertinent imaging results/findings within the past 24 hours.    Assessment/Plan:      * Septic shock    -Admitted to inpatient status in ICU upon transfer from [place] with septic shock.  Treated with pressors and IV antibiotics.  Ultimately weaned off of pressors and transferred to the floor on 2/5.  -Possible component of pneumonia but clear cause of his infection is the severe cellulitis involving the right lower extremity.    -Ultrasound of right  lower extremity did not reveal evidence of abscess or gas.    -General surgery was consulted and recommended serial exams for now and no surgical intervention at this time.    -Cellulitis continues to improve but still with notable erythema.  WBC has normalized and bood cultures no growth to date.    -Continue to elevate right lower extremity as tolerated.    -Patient completed a course of antibiotics on 2/11/2019.  Continue with wound care.     Acute on chronic diastolic heart failure    -Marked improvement with dialysis which we need to continue.    -Continue with additional volume removal with renal replacement therapy as recommended by Nephrology.  -No ARB due to renal dysfunction.  -Continue coreg.     Acute on chronic respiratory failure with hypoxia and hypercapnia    -Successfully extubated 2/2/2018.  Patient did well on BiPAP following extubation.  He has needed intermittent BiPAP.    -Continue with bronchodilator treatments as I suspect patient has COPD given wheezing and significant tobacco smoking history.    -Continue volume removal with CRRT as able.  -continue supplemental O2 requirements for goal sat 88-92%  -Completed course of steroids   -Will need outpatient PFTs and probable ICS/LAMA/LABA. When patient is being discharged will start him on Spiriva.         Acute renal failure    -Due to ATN from sepsis with shock and rhabdomyolysis.    -Unfortunately it looks as if he has progressed to ESRD  -We were unable HD on 2/5 due to clotted trialysis catheter.  Received new HD catheter on 2/6.  Completed dialysis again yesterday.  -Continue with dialysis per nephrology.  -Working on outpatient placement as he will need ongoing dialysis     Tobacco abuse    -Patient counseled on the importance of smoking cessation.  -NRT offered     Debility    Continue with therapy.  Attempting skilled nursing or inpatient rehab placement for further therapy.     Encephalopathy, metabolic    Much improved.  Off restraints  today.  Slowly improving.       Essential hypertension    Reasonably controlled with current regimen.  Will continue with current regimen and continue to monitor.       VTE Risk Mitigation (From admission, onward)        Ordered     heparin (porcine) injection 2,000 Units  Once      02/06/19 1729     heparin (porcine) injection 5,000 Units  As needed (PRN)      01/31/19 1825     heparin (porcine) injection 2,000 Units  As needed (PRN)      01/30/19 1056     heparin (porcine) injection 5,000 Units  Every 8 hours      01/29/19 2044     heparin (porcine) injection 5,000 Units  As needed (PRN)      01/29/19 1335     IP VTE HIGH RISK PATIENT  Once      01/28/19 1947              Selwyn So MD  Department of Hospital Medicine   [facility]

## 2019-02-16 NOTE — PLAN OF CARE
Problem: Adult Inpatient Plan of Care  Goal: Plan of Care Review  Outcome: Ongoing (interventions implemented as appropriate)  Received patient on 1 lpm nasal cannula, SpO2 90-92%. Aerosol treatments given q4 with cpt done. BiPAP on standby, will continue to monitor.

## 2019-02-16 NOTE — PROGRESS NOTES
Follow up visit to assess right lower leg blistering. Blistering has resolved.  Skin shedding like a snakeskin.  Loosened areas lifted.  Discussed above with Dr. So, orders entered.  Recommend change from low air loss mattress to bariatric foam mattress.  House supervisor to contact vendor to change mattress.

## 2019-02-16 NOTE — PT/OT/SLP PROGRESS
"Physical Therapy Treatment    Patient Name:  Jones Red   MRN:  [id number]    Recommendations:     Discharge Recommendations:  nursing facility, skilled   Discharge Equipment Recommendations: (defer to next level of care )   Barriers to discharge: Decreased caregiver support    Assessment:     Jones Red is a 53 y.o. male admitted with a medical diagnosis of Septic shock.  He presents with the following impairments/functional limitations:  weakness, impaired endurance, impaired functional mobilty, gait instability, impaired balance, impaired cognition, decreased safety awareness, edema, impaired skin, impaired cardiopulmonary response to activity patient still presents with poor safety awareness at times but improved with mobility. Patient was able to ambulated 30 feet, then 60 feet with Rolling walker with CGA for safety with 2L SPO2.     Rehab Prognosis: Good; patient would benefit from acute skilled PT services to address these deficits and reach maximum level of function.    Recent Surgery: Procedure(s) (LRB):  Insertion,catheter,tunneled (Right) 10 Days Post-Op    Plan:     During this hospitalization, patient to be seen 5 x/week to address the identified rehab impairments via gait training, therapeutic activities, therapeutic exercises, neuromuscular re-education and progress toward the following goals:    · Plan of Care Expires:  03/03/19    Subjective     Chief Complaint: patient stated " I'm ready to go"   Patient/Family Comments/goals: I want to go [facility]    Pain/Comfort:  · Pain Rating 1: 0/10  · Pain Rating Post-Intervention 1: 0/10      Objective:     Communicated with nurse prior to session.  Patient found supine oxygen, telemetry, PICC line  upon PT entry to room.     General Precautions: Standard, aspiration, fall   Orthopedic Precautions:N/A   Braces: N/A     Functional Mobility:  · Supine to sit with HOB flat with Mod I   · Sit to stand from bed, bedside chair with RW with SBA for " safety vc's for hand placement   · Stand to sit onto bedside chair with RW with CGA for safety vc's to reach back for chair.   · Patient gait trained 30 feet, 60 feet with Rolling walker with CGA for safety patient required verbal cues to clear bilateral feet, upright posture. Patient required one seated rest break. Patient demo decrease foot clearance. 2L SPO2     AM-PAC 6 CLICK MOBILITY  Turning over in bed (including adjusting bedclothes, sheets and blankets)?: 4  Sitting down on and standing up from a chair with arms (e.g., wheelchair, bedside commode, etc.): 3  Moving from lying on back to sitting on the side of the bed?: 4  Moving to and from a bed to a chair (including a wheelchair)?: 3  Need to walk in hospital room?: 3  Climbing 3-5 steps with a railing?: 2  Basic Mobility Total Score: 19       Therapeutic Activities and Exercises:  Patient required total A to noe socks.     Patient left up in chair with all lines intact, call button in reach, nurse notified and SPT  present..    GOALS:   Multidisciplinary Problems     Physical Therapy Goals        Problem: Physical Therapy Goal    Goal Priority Disciplines Outcome Goal Variances Interventions   Physical Therapy Goal     PT, PT/OT Ongoing (interventions implemented as appropriate)     Description:  Goals to be met by: 3/3/19    Patient will increase functional independence with mobility by performin. Supine to sit with min A. -- MET 2/10/19  REVISED: Supine>sit modified independently.   2. Sit to supine with min A. -- MET 2/10/19  REVISED: Sit>supine modified independently. MET 19  3. Rolling R and L with min A. -- MET 2/10/19  4. Sitting at edge of bed >5 minutes with min A. -- MET 2/10/19  5. PT will assess sit<>stand. -- MET 2/10/19  REVISED: Sit<>stand with supervision and standard or rolling walker. MET 19  6. Gait > 50 ft with standard or rolling walker with min A. MET 19                         Time Tracking:     PT Received  On: 02/16/19  PT Start Time: 1005     PT Stop Time: 1038  PT Total Time (min): 33 min     Billable Minutes: Gait Training 18 and Therapeutic Activity 15    Treatment Type: Treatment  PT/PTA: PTA     PTA Visit Number: 4     Anabell Brown, PTA  02/16/2019

## 2019-02-16 NOTE — PLAN OF CARE
Problem: Adult Inpatient Plan of Care  Goal: Patient-Specific Goal (Individualization)  Outcome: Ongoing (interventions implemented as appropriate)  Bed in low and locked position and able to reposition per self and amb with walker.  Remains free of injury during shift.

## 2019-02-16 NOTE — PLAN OF CARE
Problem: Adult Inpatient Plan of Care  Goal: Plan of Care Review  Outcome: Ongoing (interventions implemented as appropriate)  Patient on 1L NC sat's 93% with diminished BS Q4 and Q12 aerosol treatment given tolerated well.

## 2019-02-17 PROCEDURE — 94761 N-INVAS EAR/PLS OXIMETRY MLT: CPT

## 2019-02-17 PROCEDURE — 25000003 PHARM REV CODE 250: Performed by: NURSE PRACTITIONER

## 2019-02-17 PROCEDURE — 27000221 HC OXYGEN, UP TO 24 HOURS

## 2019-02-17 PROCEDURE — 99232 PR SUBSEQUENT HOSPITAL CARE,LEVL II: ICD-10-PCS | Mod: ,,, | Performed by: HOSPITALIST

## 2019-02-17 PROCEDURE — 27000646 HC AEROBIKA DEVICE

## 2019-02-17 PROCEDURE — 11000001 HC ACUTE MED/SURG PRIVATE ROOM

## 2019-02-17 PROCEDURE — 25000242 PHARM REV CODE 250 ALT 637 W/ HCPCS: Performed by: STUDENT IN AN ORGANIZED HEALTH CARE EDUCATION/TRAINING PROGRAM

## 2019-02-17 PROCEDURE — 25000003 PHARM REV CODE 250: Performed by: INTERNAL MEDICINE

## 2019-02-17 PROCEDURE — 99900035 HC TECH TIME PER 15 MIN (STAT)

## 2019-02-17 PROCEDURE — 25000242 PHARM REV CODE 250 ALT 637 W/ HCPCS: Performed by: HOSPITALIST

## 2019-02-17 PROCEDURE — 94664 DEMO&/EVAL PT USE INHALER: CPT

## 2019-02-17 PROCEDURE — 99232 SBSQ HOSP IP/OBS MODERATE 35: CPT | Mod: ,,, | Performed by: HOSPITALIST

## 2019-02-17 PROCEDURE — 94640 AIRWAY INHALATION TREATMENT: CPT

## 2019-02-17 PROCEDURE — 94660 CPAP INITIATION&MGMT: CPT

## 2019-02-17 PROCEDURE — 63600175 PHARM REV CODE 636 W HCPCS: Performed by: STUDENT IN AN ORGANIZED HEALTH CARE EDUCATION/TRAINING PROGRAM

## 2019-02-17 RX ADMIN — IPRATROPIUM BROMIDE AND ALBUTEROL SULFATE 3 ML: .5; 3 SOLUTION RESPIRATORY (INHALATION) at 12:02

## 2019-02-17 RX ADMIN — HEPARIN SODIUM 5000 UNITS: 5000 INJECTION, SOLUTION INTRAVENOUS; SUBCUTANEOUS at 09:02

## 2019-02-17 RX ADMIN — Medication 1 CAPSULE: at 08:02

## 2019-02-17 RX ADMIN — IPRATROPIUM BROMIDE AND ALBUTEROL SULFATE 3 ML: .5; 3 SOLUTION RESPIRATORY (INHALATION) at 03:02

## 2019-02-17 RX ADMIN — SEVELAMER CARBONATE 1600 MG: 800 TABLET, FILM COATED ORAL at 08:02

## 2019-02-17 RX ADMIN — IPRATROPIUM BROMIDE AND ALBUTEROL SULFATE 3 ML: .5; 3 SOLUTION RESPIRATORY (INHALATION) at 07:02

## 2019-02-17 RX ADMIN — MICONAZOLE NITRATE: 20 OINTMENT TOPICAL at 08:02

## 2019-02-17 RX ADMIN — SEVELAMER CARBONATE 1600 MG: 800 TABLET, FILM COATED ORAL at 12:02

## 2019-02-17 RX ADMIN — IPRATROPIUM BROMIDE AND ALBUTEROL SULFATE 3 ML: .5; 3 SOLUTION RESPIRATORY (INHALATION) at 04:02

## 2019-02-17 RX ADMIN — HEPARIN SODIUM 5000 UNITS: 5000 INJECTION, SOLUTION INTRAVENOUS; SUBCUTANEOUS at 01:02

## 2019-02-17 RX ADMIN — IPRATROPIUM BROMIDE AND ALBUTEROL SULFATE 3 ML: .5; 3 SOLUTION RESPIRATORY (INHALATION) at 11:02

## 2019-02-17 RX ADMIN — SODIUM CHLORIDE SOLN NEBU 3% 4 ML: 3 NEBU SOLN at 08:02

## 2019-02-17 RX ADMIN — HEPARIN SODIUM 5000 UNITS: 5000 INJECTION, SOLUTION INTRAVENOUS; SUBCUTANEOUS at 06:02

## 2019-02-17 RX ADMIN — IPRATROPIUM BROMIDE AND ALBUTEROL SULFATE 3 ML: .5; 3 SOLUTION RESPIRATORY (INHALATION) at 08:02

## 2019-02-17 RX ADMIN — MICONAZOLE NITRATE: 20 OINTMENT TOPICAL at 09:02

## 2019-02-17 RX ADMIN — SODIUM CHLORIDE SOLN NEBU 3% 4 ML: 3 NEBU SOLN at 07:02

## 2019-02-17 NOTE — PROGRESS NOTES
"Nephrology  Progress Note    Admit Date: 1/28/2019   LOS: 20 days     SUBJECTIVE:     Follow-up For:  Septic shock/ATN    Interval History:    No c/o this am.  Stable BPs.  Asking when he can go home.  Reports voiding more.    Review of Systems:   Constitutional: no fever or chills   Respiratory: no cough or shorness of breath   Cardiovascular: no chest pain or palpitations   Gastrointestinal: no nausea or vomiting, no abdominal pain or change in bowel habits   Genitourinary: no hematuria or dysuria   Musculoskeletal: no arthralgias or myalgias   Neurological: no seizures or tremors    OBJECTIVE:     Vital Signs Range (Last 24H):  /65 (Patient Position: Sitting)   Pulse 102   Temp 99.8 °F (37.7 °C) (Oral)   Resp 18   Ht 6' 3" (1.905 m)   Wt (!) 159.8 kg (352 lb 4.7 oz)   SpO2 (!) 91%   BMI 44.03 kg/m²     Temp:  [98.3 °F (36.8 °C)-99.8 °F (37.7 °C)]   Pulse:  []   Resp:  [16-20]   BP: (105-130)/(59-71)   SpO2:  [89 %-95 %]     I & O (Last 24H):    Intake/Output Summary (Last 24 hours) at 2/17/2019 1333  Last data filed at 2/17/2019 0500  Gross per 24 hour   Intake 400 ml   Output 150 ml   Net 250 ml       Physical Exam:  General appearance:  Disheveled and morbidly obese.  Labile mood/mentation   Eyes:  Conjunctivae/corneas clear. EOMI.  Lungs: diminished.   Heart: Regular rate and rhythm, distant heart tones.  Abdomen: Soft, non-tender non-distended; bowel sounds normal; no masses,  no organomegaly, morbidly obese  Extremities:  Chronic Woody edema.   peeling/flaky LE's (R>L).  Overall much improved  Skin:  Right lower extremity cellulitis improved.   Right IJ CHERIE       Laboratory Data:  No results for input(s): WBC, RBC, HGB, HCT, PLT, MCV, MCH, MCHC in the last 24 hours.    BMP:   Recent Labs   Lab 02/15/19  0420      *   K 3.9   CL 97   CO2 24   BUN 42*   CREATININE 7.7*   CALCIUM 10.6*   MG 2.1     Lab Results   Component Value Date    CALCIUM 10.6 (H) 02/15/2019    PHOS 6.4 (H) " 02/15/2019       Lab Results   Component Value Date    PTH 24.3 02/14/2019    CALCIUM 10.6 (H) 02/15/2019    CAION 1.32 02/14/2019    PHOS 6.4 (H) 02/15/2019       No results found for: URICACID    BNP  No results for input(s): BNP, BNPTRIAGEBLO in the last 168 hours.    Medications:  Medication list was reviewed and changes noted under Assessment/Plan.    Diagnostic Results:    X-Ray Chest 1 View for PICC_Central line   Final Result      As above         Electronically signed by: Kalyan Galvez MD   Date:    02/08/2019   Time:    14:46      X-Ray Chest AP Portable   Final Result      NG tube in place.         Electronically signed by: Viviane Persaud MD   Date:    02/02/2019   Time:    23:59      X-Ray Chest 1 View   Final Result      As above.         Electronically signed by: Keon Jonas MD   Date:    01/31/2019   Time:    19:14      X-Ray Chest AP Portable   Final Result      Lines and tubes are grossly unchanged.      Interval improvement in aeration of the lungs.         Electronically signed by: Connor Babcock MD   Date:    01/31/2019   Time:    16:42      X-Ray Chest 1 View   Final Result      Overall no significant interval change         Electronically signed by: Connor Babcock MD   Date:    01/30/2019   Time:    08:24      US Extremity Non Vascular Limited Right   Final Result      Nonspecific subcutaneous edema, cutaneous thickening, could represent cellulitis changes.  No definite necrotizing fasciitis, gas or abscess.         Electronically signed by: Tin Leone MD   Date:    01/29/2019   Time:    13:09      X-Ray Chest AP Portable   Final Result   Addendum 1 of 1      Enteric tube projects in unchanged radiographic position with tip coursing    below the diaphragm and appearing to extend beyond the field of view.         Electronically signed by: Trena Sena MD   Date:    01/29/2019   Time:    00:39      Final      As above.         Electronically signed by: Trena Sena MD    Date:    01/29/2019   Time:    00:04      US Lower Extremity Veins Right   Final Result      Limited examination.  No evidence of deep venous thrombosis in the right lower extremity.         Electronically signed by: Vanita Oviedo   Date:    01/28/2019   Time:    21:10      X-Ray Chest AP Portable   Final Result      Cardiomegaly with findings suggesting pulmonary edema, correlation advised.  Differential would include infection.         Electronically signed by: Kalyan Galvez MD   Date:    01/28/2019   Time:    20:19          ASSESSMENT/PLAN:     1. Multifactorial essentially anuric acute renal failure secondary to ATN from septic shock, rhabdomyolysis, with hyponatremia, and hyperkalemia (N17.0, N17.9, E87.5, E87.1, M62.82, A41.9):  Transferred from outlying facility for CRRT and has transitioned to standard hemodialysis.   Timeframe of renal recovery unclear at this time.  Tunneled line placed.  Placed on MWF schedule for now.  Consulted SW to start process of outpt placement for HD unit near home or place of rehab/SNF.  Renally dose meds, avoid nephrotoxins, and monitor I/O's closely.  Reports voiding more.  Will ask nurse to accurately record UOP.  Pt told to only void in urinal or in hat on toilet, if possible.  2. Hyperphosphatemia: Changed to Renvela tabs with meals.  3. Hypercalcemia (E83.52):  Ionized ca/PTH wnl.  Due to immobilization?  Low chapis bath on HD.  4. RLE Cellulitis/woody edema (L03.115): Defer to wound care and ID.   5. Anemia of illness:  Iron/b12/folate wnl.  Started ONDINA. Nephrocaps.     6. HTN: Overall well controlled.  Low-normal BPs.  Stop Coreg.  7. Morbid obesity (E44):  Severe morbid obesity the root of most of this patients' medical issues.  Defer.      Will continue to follow for renal needs.   Labs MWF only.   Ok to transfer from [facility] once outpt/SNF HD arranged.     Javed Vance MD  Nephrology

## 2019-02-17 NOTE — PLAN OF CARE
Problem: Adult Inpatient Plan of Care  Goal: Plan of Care Review  Outcome: Ongoing (interventions implemented as appropriate)  Initially seemed to agree to wear Bipap tonight but when I actually turned the machine on and tried to get the mask on him he said he would not wear it.

## 2019-02-17 NOTE — ASSESSMENT & PLAN NOTE
-Admitted to inpatient status in ICU upon transfer from [place] with septic shock.  Treated with pressors and IV antibiotics.  Ultimately weaned off of pressors and transferred to the floor on 2/5.  -Possible component of pneumonia but clear cause of his infection is the severe cellulitis involving the right lower extremity.    -Ultrasound of right lower extremity did not reveal evidence of abscess or gas.    -General surgery was consulted and recommended serial exams for now and no surgical intervention at this time.    -Cellulitis continues to improve but still with notable erythema.  WBC has normalized and bood cultures no growth to date.    -Continue to elevate right lower extremity as tolerated.    -Patient completed a course of antibiotics on 2/11/2019.  Continue with wound care.

## 2019-02-17 NOTE — PLAN OF CARE
Problem: Adult Inpatient Plan of Care  Goal: Patient-Specific Goal (Individualization)  Outcome: Ongoing (interventions implemented as appropriate)  Bed in low and locked position and able to reposition per self and up with assist.  Remains free of injury during shift.

## 2019-02-17 NOTE — PROGRESS NOTES
Pt received on 1LNC;SPO2 adequate. Treatments were given and tolerated well. No changes made. Will continue to monitor.

## 2019-02-17 NOTE — PROGRESS NOTES
[facility] Hospital Medicine  Progress Note    Patient Name: Jones Red  MRN: [id number]  Patient Class: IP- Inpatient   Admission Date: 1/28/2019  Length of Stay: 20 days  Attending Physician: Selwyn So MD  Primary Care Provider: Primary Doctor No        Subjective:     Principal Problem:Septic shock    HPI:  Mr. Jones Red is a 53 y.o. male, with PMH of HTN, COPD, chronic respiratory failure (on home O2), Diastolic CHF, LYNDON, chronic venous insufficiency, and questionable seizures history (suspected to be 2/2 alcohol withdrawal), who preset tia to [facility] on 1/22/19 2/2 SOB. This was associated with cough productive of brown sputum. He was started in BiPap, but did ultimately degrade, and was intubated, and maintained on a ventilator. He was additionally altered upon arrival to the ED. History was reportedly obtained by a friend, and it was reported that he was found down at home, and was unable to get up. He as admitted to the ICU, started on IV fluids and pressors.     Hospital Course:  Mr. Red is a 53-year-old man with history of hypertension, diastolic heart failure, chronic obstructive pulmonary disease with chronic hypoxic respiratory failure on home oxygen, prior alcohol abuse, significant ongoing tobacco use (3 packs per day), and morbid obesity with septic shock secondary to right lower extremity cellulitis complicating chronic venous stasis.  Patient intubated at outside hospital after failing a trial of BiPAP.  Patient also with evidence of worsening renal failure with rhabdomyolysis.  Patient transferred from [facility] in [place], [place] to [facility] on 1/28/2019.  Patient started on renal replacement therapy.  He was extubated on 2/2/19 to bipap and transferred to the floor on 2/5/19.  Ultrasound of the right lower extremity did not reveal evidence of abscess, subcutaneous gas, or evidence of necrotizing fascitits.   General surgery and wound care consulted.  No surgical intervention recommended at this time.  He was seen by ID who recommended continuing vancomycin and cefepime until 2/11/19.  Mental status improved. He is tolerating his diet and continues on dialysis.  We are presently working with PT and OT daily in attempts to gain strength.  We are working on placement in SNF or inpatient rehab closer to his home which is complicated due to his high medical demands.    Interval History:  No acute events.    Review of Systems   Constitutional: Negative for chills and fever.   HENT: Negative for congestion and dental problem.    Eyes: Negative for discharge and itching.   Respiratory: Negative for shortness of breath.    Cardiovascular: Negative for chest pain.   Gastrointestinal: Negative for abdominal pain, constipation, diarrhea, nausea and vomiting.   Endocrine: Negative for cold intolerance and heat intolerance.   Musculoskeletal: Negative for arthralgias and back pain.   Neurological: Negative for dizziness and facial asymmetry.   Hematological: Negative for adenopathy.   Psychiatric/Behavioral: Negative for agitation and behavioral problems.     Objective:     Vital Signs (Most Recent):  Temp: 99.8 °F (37.7 °C) (02/17/19 0729)  Pulse: 101 (02/17/19 0904)  Resp: 18 (02/17/19 0847)  BP: 105/65 (02/17/19 0729)  SpO2: (!) 91 % (02/17/19 0904) Vital Signs (24h Range):  Temp:  [97.8 °F (36.6 °C)-99.8 °F (37.7 °C)] 99.8 °F (37.7 °C)  Pulse:  [] 101  Resp:  [16-20] 18  SpO2:  [89 %-95 %] 91 %  BP: (101-130)/(58-71) 105/65     Weight: (!) 159.8 kg (352 lb 4.7 oz)  Body mass index is 44.03 kg/m².    Intake/Output Summary (Last 24 hours) at 2/17/2019 1155  Last data filed at 2/17/2019 0500  Gross per 24 hour   Intake 400 ml   Output 150 ml   Net 250 ml      Physical Exam   Constitutional: He is oriented to person, place, and time. Vital signs are normal. He appears well-developed.  Non-toxic appearance. He does not have a  sickly appearance. He does not appear ill. No distress.   Morbid obesity.   HENT:   Head: Normocephalic and atraumatic.   Right Ear: External ear normal.   Left Ear: External ear normal.   Eyes: Conjunctivae and EOM are normal. Pupils are equal, round, and reactive to light. No scleral icterus.   Neck: Normal range of motion. Neck supple. No JVD present. No tracheal deviation present.   Cardiovascular: Normal rate, regular rhythm, normal heart sounds and intact distal pulses. Exam reveals no gallop and no friction rub.   No murmur heard.  Pulmonary/Chest: Effort normal and breath sounds normal. No stridor. No respiratory distress. He has no wheezes. He has no rales.   Distant lung sounds.  Moving air well.  Some crackles.   Abdominal: Soft. Bowel sounds are normal. He exhibits no distension and no mass. There is no tenderness. There is no guarding.   Musculoskeletal: Normal range of motion. He exhibits no edema or deformity.   Neurological: He is alert and oriented to person, place, and time. He exhibits normal muscle tone.   Oriented x 3.  Overall mental status much improved.  Limited insight into his health care issues.   Skin: Skin is warm and dry. No rash noted. He is not diaphoretic. No erythema.   Erythema resolved.  Some skin breakdown in right skin but does not appear infected at this time.   Nursing note and vitals reviewed.    Significant Labs: All pertinent labs within the past 24 hours have been reviewed.    Significant Imaging: I have reviewed all pertinent imaging results/findings within the past 24 hours.    Assessment/Plan:      * Septic shock    -Admitted to inpatient status in ICU upon transfer from [place] with septic shock.  Treated with pressors and IV antibiotics.  Ultimately weaned off of pressors and transferred to the floor on 2/5.  -Possible component of pneumonia but clear cause of his infection is the severe cellulitis involving the right lower extremity.    -Ultrasound of right  lower extremity did not reveal evidence of abscess or gas.    -General surgery was consulted and recommended serial exams for now and no surgical intervention at this time.    -Cellulitis continues to improve but still with notable erythema.  WBC has normalized and bood cultures no growth to date.    -Continue to elevate right lower extremity as tolerated.    -Patient completed a course of antibiotics on 2/11/2019.  Continue with wound care.     Acute on chronic diastolic heart failure    -Marked improvement with dialysis which we need to continue.    -Continue with additional volume removal with renal replacement therapy as recommended by Nephrology.  -No ARB due to renal dysfunction.  -Continue coreg.     Acute on chronic respiratory failure with hypoxia and hypercapnia    -Successfully extubated 2/2/2018.  Patient did well on BiPAP following extubation.  He has needed intermittent BiPAP.    -Continue with bronchodilator treatments as I suspect patient has COPD given wheezing and significant tobacco smoking history.    -Continue volume removal with CRRT as able.  -continue supplemental O2 requirements for goal sat 88-92%  -Completed course of steroids   -Will need outpatient PFTs and probable ICS/LAMA/LABA. When patient is being discharged will start him on Spiriva.         Acute renal failure    -Due to ATN from sepsis with shock and rhabdomyolysis.    -Unfortunately it looks as if he has progressed to ESRD  -We were unable HD on 2/5 due to clotted trialysis catheter.  Received new HD catheter on 2/6.  Completed dialysis again yesterday.  -Continue with dialysis per nephrology.  -Working on outpatient placement as he will need ongoing dialysis     Tobacco abuse    -Patient counseled on the importance of smoking cessation.  -NRT offered     Debility    Continue with therapy.  Attempting skilled nursing or inpatient rehab placement for further therapy.     Encephalopathy, metabolic    Much improved.  Off restraints  today.  Slowly improving.       Essential hypertension    Reasonably controlled with current regimen.  Will continue with current regimen and continue to monitor.       VTE Risk Mitigation (From admission, onward)        Ordered     heparin (porcine) injection 2,000 Units  Once      02/06/19 1729     heparin (porcine) injection 5,000 Units  As needed (PRN)      01/31/19 1825     heparin (porcine) injection 2,000 Units  As needed (PRN)      01/30/19 1056     heparin (porcine) injection 5,000 Units  Every 8 hours      01/29/19 2044     heparin (porcine) injection 5,000 Units  As needed (PRN)      01/29/19 1335     IP VTE HIGH RISK PATIENT  Once      01/28/19 1947              Selwyn So MD  Department of Hospital Medicine   [facility]

## 2019-02-17 NOTE — SUBJECTIVE & OBJECTIVE
Interval History:  No acute events.    Review of Systems   Constitutional: Negative for chills and fever.   HENT: Negative for congestion and dental problem.    Eyes: Negative for discharge and itching.   Respiratory: Negative for shortness of breath.    Cardiovascular: Negative for chest pain.   Gastrointestinal: Negative for abdominal pain, constipation, diarrhea, nausea and vomiting.   Endocrine: Negative for cold intolerance and heat intolerance.   Musculoskeletal: Negative for arthralgias and back pain.   Neurological: Negative for dizziness and facial asymmetry.   Hematological: Negative for adenopathy.   Psychiatric/Behavioral: Negative for agitation and behavioral problems.     Objective:     Vital Signs (Most Recent):  Temp: 99.8 °F (37.7 °C) (02/17/19 0729)  Pulse: 101 (02/17/19 0904)  Resp: 18 (02/17/19 0847)  BP: 105/65 (02/17/19 0729)  SpO2: (!) 91 % (02/17/19 0904) Vital Signs (24h Range):  Temp:  [97.8 °F (36.6 °C)-99.8 °F (37.7 °C)] 99.8 °F (37.7 °C)  Pulse:  [] 101  Resp:  [16-20] 18  SpO2:  [89 %-95 %] 91 %  BP: (101-130)/(58-71) 105/65     Weight: (!) 159.8 kg (352 lb 4.7 oz)  Body mass index is 44.03 kg/m².    Intake/Output Summary (Last 24 hours) at 2/17/2019 1155  Last data filed at 2/17/2019 0500  Gross per 24 hour   Intake 400 ml   Output 150 ml   Net 250 ml      Physical Exam   Constitutional: He is oriented to person, place, and time. Vital signs are normal. He appears well-developed.  Non-toxic appearance. He does not have a sickly appearance. He does not appear ill. No distress.   Morbid obesity.   HENT:   Head: Normocephalic and atraumatic.   Right Ear: External ear normal.   Left Ear: External ear normal.   Eyes: Conjunctivae and EOM are normal. Pupils are equal, round, and reactive to light. No scleral icterus.   Neck: Normal range of motion. Neck supple. No JVD present. No tracheal deviation present.   Cardiovascular: Normal rate, regular rhythm, normal heart sounds and intact  distal pulses. Exam reveals no gallop and no friction rub.   No murmur heard.  Pulmonary/Chest: Effort normal and breath sounds normal. No stridor. No respiratory distress. He has no wheezes. He has no rales.   Distant lung sounds.  Moving air well.  Some crackles.   Abdominal: Soft. Bowel sounds are normal. He exhibits no distension and no mass. There is no tenderness. There is no guarding.   Musculoskeletal: Normal range of motion. He exhibits no edema or deformity.   Neurological: He is alert and oriented to person, place, and time. He exhibits normal muscle tone.   Oriented x 3.  Overall mental status much improved.  Limited insight into his health care issues.   Skin: Skin is warm and dry. No rash noted. He is not diaphoretic. No erythema.   Erythema resolved.  Some skin breakdown in right skin but does not appear infected at this time.   Nursing note and vitals reviewed.    Significant Labs: All pertinent labs within the past 24 hours have been reviewed.    Significant Imaging: I have reviewed all pertinent imaging results/findings within the past 24 hours.

## 2019-02-17 NOTE — NURSING
Pt resting quietly.  Pt ambulated with walker to [facility] and voided per urinal dark osmani colored urine. Pt denies any pain.  Pt applied cream to penis area .  nad noted.

## 2019-02-18 LAB
ANION GAP SERPL CALC-SCNC: 18 MMOL/L
BASOPHILS # BLD AUTO: 0.08 K/UL
BASOPHILS NFR BLD: 1.3 %
BUN SERPL-MCNC: 60 MG/DL
CALCIUM SERPL-MCNC: 9.7 MG/DL
CHLORIDE SERPL-SCNC: 94 MMOL/L
CO2 SERPL-SCNC: 21 MMOL/L
CREAT SERPL-MCNC: 8.9 MG/DL
DIFFERENTIAL METHOD: ABNORMAL
EOSINOPHIL # BLD AUTO: 0.2 K/UL
EOSINOPHIL NFR BLD: 2.9 %
ERYTHROCYTE [DISTWIDTH] IN BLOOD BY AUTOMATED COUNT: 15.7 %
EST. GFR  (AFRICAN AMERICAN): 7 ML/MIN/1.73 M^2
EST. GFR  (NON AFRICAN AMERICAN): 6 ML/MIN/1.73 M^2
GLUCOSE SERPL-MCNC: 95 MG/DL
HCT VFR BLD AUTO: 30.7 %
HGB BLD-MCNC: 9.6 G/DL
LYMPHOCYTES # BLD AUTO: 1 K/UL
LYMPHOCYTES NFR BLD: 17.4 %
MAGNESIUM SERPL-MCNC: 2 MG/DL
MCH RBC QN AUTO: 27.7 PG
MCHC RBC AUTO-ENTMCNC: 31.3 G/DL
MCV RBC AUTO: 89 FL
MONOCYTES # BLD AUTO: 1.8 K/UL
MONOCYTES NFR BLD: 31 %
NEUTROPHILS # BLD AUTO: 2.6 K/UL
NEUTROPHILS NFR BLD: 43.7 %
PHOSPHATE SERPL-MCNC: 5.4 MG/DL
PLATELET # BLD AUTO: 396 K/UL
PMV BLD AUTO: 9.6 FL
POTASSIUM SERPL-SCNC: 4.3 MMOL/L
RBC # BLD AUTO: 3.47 M/UL
SODIUM SERPL-SCNC: 133 MMOL/L
WBC # BLD AUTO: 5.93 K/UL

## 2019-02-18 PROCEDURE — 80100016 HC MAINTENANCE HEMODIALYSIS

## 2019-02-18 PROCEDURE — 94761 N-INVAS EAR/PLS OXIMETRY MLT: CPT

## 2019-02-18 PROCEDURE — 25000003 PHARM REV CODE 250: Performed by: HOSPITALIST

## 2019-02-18 PROCEDURE — 92507 TX SP LANG VOICE COMM INDIV: CPT

## 2019-02-18 PROCEDURE — A4216 STERILE WATER/SALINE, 10 ML: HCPCS | Performed by: PHYSICIAN ASSISTANT

## 2019-02-18 PROCEDURE — 25000003 PHARM REV CODE 250: Performed by: INTERNAL MEDICINE

## 2019-02-18 PROCEDURE — 83735 ASSAY OF MAGNESIUM: CPT

## 2019-02-18 PROCEDURE — 97530 THERAPEUTIC ACTIVITIES: CPT

## 2019-02-18 PROCEDURE — 25000242 PHARM REV CODE 250 ALT 637 W/ HCPCS: Performed by: HOSPITALIST

## 2019-02-18 PROCEDURE — 63600175 PHARM REV CODE 636 W HCPCS: Performed by: NURSE PRACTITIONER

## 2019-02-18 PROCEDURE — 63600175 PHARM REV CODE 636 W HCPCS: Performed by: INTERNAL MEDICINE

## 2019-02-18 PROCEDURE — 94664 DEMO&/EVAL PT USE INHALER: CPT

## 2019-02-18 PROCEDURE — 63600175 PHARM REV CODE 636 W HCPCS: Performed by: STUDENT IN AN ORGANIZED HEALTH CARE EDUCATION/TRAINING PROGRAM

## 2019-02-18 PROCEDURE — 94660 CPAP INITIATION&MGMT: CPT

## 2019-02-18 PROCEDURE — 84100 ASSAY OF PHOSPHORUS: CPT

## 2019-02-18 PROCEDURE — 80048 BASIC METABOLIC PNL TOTAL CA: CPT

## 2019-02-18 PROCEDURE — 99232 PR SUBSEQUENT HOSPITAL CARE,LEVL II: ICD-10-PCS | Mod: ,,, | Performed by: HOSPITALIST

## 2019-02-18 PROCEDURE — 25000003 PHARM REV CODE 250: Performed by: PHYSICIAN ASSISTANT

## 2019-02-18 PROCEDURE — 85025 COMPLETE CBC W/AUTO DIFF WBC: CPT

## 2019-02-18 PROCEDURE — 94640 AIRWAY INHALATION TREATMENT: CPT

## 2019-02-18 PROCEDURE — 99900035 HC TECH TIME PER 15 MIN (STAT)

## 2019-02-18 PROCEDURE — 11000001 HC ACUTE MED/SURG PRIVATE ROOM

## 2019-02-18 PROCEDURE — 25000242 PHARM REV CODE 250 ALT 637 W/ HCPCS: Performed by: STUDENT IN AN ORGANIZED HEALTH CARE EDUCATION/TRAINING PROGRAM

## 2019-02-18 PROCEDURE — 25000003 PHARM REV CODE 250: Performed by: NURSE PRACTITIONER

## 2019-02-18 PROCEDURE — 99232 SBSQ HOSP IP/OBS MODERATE 35: CPT | Mod: ,,, | Performed by: HOSPITALIST

## 2019-02-18 RX ORDER — CARVEDILOL 3.12 MG/1
3.12 TABLET ORAL 2 TIMES DAILY
Status: DISCONTINUED | OUTPATIENT
Start: 2019-02-18 | End: 2019-02-25 | Stop reason: HOSPADM

## 2019-02-18 RX ORDER — ACETAMINOPHEN 500 MG
1000 TABLET ORAL EVERY 6 HOURS PRN
Status: DISCONTINUED | OUTPATIENT
Start: 2019-02-18 | End: 2019-02-25 | Stop reason: HOSPADM

## 2019-02-18 RX ADMIN — SEVELAMER CARBONATE 1600 MG: 800 TABLET, FILM COATED ORAL at 12:02

## 2019-02-18 RX ADMIN — IPRATROPIUM BROMIDE AND ALBUTEROL SULFATE 3 ML: .5; 3 SOLUTION RESPIRATORY (INHALATION) at 12:02

## 2019-02-18 RX ADMIN — CARVEDILOL 3.12 MG: 3.12 TABLET, FILM COATED ORAL at 12:02

## 2019-02-18 RX ADMIN — HEPARIN SODIUM 5000 UNITS: 5000 INJECTION, SOLUTION INTRAVENOUS; SUBCUTANEOUS at 05:02

## 2019-02-18 RX ADMIN — SODIUM CHLORIDE SOLN NEBU 3% 4 ML: 3 NEBU SOLN at 07:02

## 2019-02-18 RX ADMIN — IPRATROPIUM BROMIDE AND ALBUTEROL SULFATE 3 ML: .5; 3 SOLUTION RESPIRATORY (INHALATION) at 03:02

## 2019-02-18 RX ADMIN — SEVELAMER CARBONATE 1600 MG: 800 TABLET, FILM COATED ORAL at 08:02

## 2019-02-18 RX ADMIN — HEPARIN SODIUM 1000 UNITS: 1000 INJECTION, SOLUTION INTRAVENOUS; SUBCUTANEOUS at 10:02

## 2019-02-18 RX ADMIN — IPRATROPIUM BROMIDE AND ALBUTEROL SULFATE 3 ML: .5; 3 SOLUTION RESPIRATORY (INHALATION) at 08:02

## 2019-02-18 RX ADMIN — SEVELAMER CARBONATE 1600 MG: 800 TABLET, FILM COATED ORAL at 05:02

## 2019-02-18 RX ADMIN — HEPARIN SODIUM 5000 UNITS: 5000 INJECTION, SOLUTION INTRAVENOUS; SUBCUTANEOUS at 09:02

## 2019-02-18 RX ADMIN — Medication 1 CAPSULE: at 08:02

## 2019-02-18 RX ADMIN — MICONAZOLE NITRATE: 20 OINTMENT TOPICAL at 09:02

## 2019-02-18 RX ADMIN — MICONAZOLE NITRATE: 20 OINTMENT TOPICAL at 08:02

## 2019-02-18 RX ADMIN — SODIUM CHLORIDE SOLN NEBU 3% 4 ML: 3 NEBU SOLN at 08:02

## 2019-02-18 RX ADMIN — IPRATROPIUM BROMIDE AND ALBUTEROL SULFATE 3 ML: .5; 3 SOLUTION RESPIRATORY (INHALATION) at 11:02

## 2019-02-18 RX ADMIN — Medication 3 ML: at 09:02

## 2019-02-18 RX ADMIN — HEPARIN SODIUM 2000 UNITS: 1000 INJECTION, SOLUTION INTRAVENOUS; SUBCUTANEOUS at 08:02

## 2019-02-18 RX ADMIN — HEPARIN SODIUM 5000 UNITS: 5000 INJECTION, SOLUTION INTRAVENOUS; SUBCUTANEOUS at 11:02

## 2019-02-18 RX ADMIN — ERYTHROPOIETIN 20000 UNITS: 20000 INJECTION, SOLUTION INTRAVENOUS; SUBCUTANEOUS at 08:02

## 2019-02-18 RX ADMIN — IPRATROPIUM BROMIDE AND ALBUTEROL SULFATE 3 ML: .5; 3 SOLUTION RESPIRATORY (INHALATION) at 07:02

## 2019-02-18 RX ADMIN — HEPARIN SODIUM 5000 UNITS: 5000 INJECTION, SOLUTION INTRAVENOUS; SUBCUTANEOUS at 01:02

## 2019-02-18 RX ADMIN — CARVEDILOL 3.12 MG: 3.12 TABLET, FILM COATED ORAL at 09:02

## 2019-02-18 RX ADMIN — HEPARIN SODIUM 1000 UNITS: 1000 INJECTION, SOLUTION INTRAVENOUS; SUBCUTANEOUS at 09:02

## 2019-02-18 NOTE — PLAN OF CARE
Problem: Adult Inpatient Plan of Care  Goal: Plan of Care Review  Outcome: Ongoing (interventions implemented as appropriate)  Pt resting in bed. NAD, VSS on RA, AOx4. Pt not complaining of pain. Ambulatory with stand by assist and walker. Pt tolerated dialysis today. Worked with PT/ OT/ SPT. POC reviewed with pt. Bed low and locked. Personal items and call light within reach. Will continue to monitor.

## 2019-02-18 NOTE — PROGRESS NOTES
[facility] Hospital Medicine  Progress Note    Patient Name: Jones Red  MRN: [id number]  Patient Class: IP- Inpatient   Admission Date: 1/28/2019  Length of Stay: 21 days  Attending Physician: Selwyn So MD  Primary Care Provider: Primary Doctor No        Subjective:     Principal Problem:Septic shock    HPI:  Mr. Jones Red is a 53 y.o. male, with PMH of HTN, COPD, chronic respiratory failure (on home O2), Diastolic CHF, LYNDON, chronic venous insufficiency, and questionable seizures history (suspected to be 2/2 alcohol withdrawal), who preset tia to [facility] on 1/22/19 2/2 SOB. This was associated with cough productive of brown sputum. He was started in BiPap, but did ultimately degrade, and was intubated, and maintained on a ventilator. He was additionally altered upon arrival to the ED. History was reportedly obtained by a friend, and it was reported that he was found down at home, and was unable to get up. He as admitted to the ICU, started on IV fluids and pressors.     Hospital Course:  Mr. Red is a 53-year-old man with history of hypertension, diastolic heart failure, chronic obstructive pulmonary disease with chronic hypoxic respiratory failure on home oxygen, prior alcohol abuse, significant ongoing tobacco use (3 packs per day), and morbid obesity with septic shock secondary to right lower extremity cellulitis complicating chronic venous stasis.  Patient intubated at outside hospital after failing a trial of BiPAP.  Patient also with evidence of worsening renal failure with rhabdomyolysis.  Patient transferred from [facility] in [place], [place] to [facility] on 1/28/2019.  Patient started on renal replacement therapy.  He was extubated on 2/2/19 to bipap and transferred to the floor on 2/5/19.  Ultrasound of the right lower extremity did not reveal evidence of abscess, subcutaneous gas, or evidence of necrotizing fascitits.   General surgery and wound care consulted.  No surgical intervention recommended at this time.  He was seen by ID who recommended continuing vancomycin and cefepime until 2/11/19.  Mental status improved. He is tolerating his diet and continues on dialysis.  We are presently working with PT and OT daily in attempts to gain strength.  We are working on placement in SNF or inpatient rehab closer to his home which is complicated due to his high medical demands.  Patient's confusion refused and patient clearly refusing placement and would like to go home with home health.  Working with case management to secure outpatient hemodialysis close to his home.    Interval History:  No acute events.    Review of Systems   Constitutional: Negative for chills and fever.   HENT: Negative for congestion and dental problem.    Eyes: Negative for discharge and itching.   Respiratory: Negative for shortness of breath.    Cardiovascular: Negative for chest pain.   Gastrointestinal: Negative for abdominal pain, constipation, diarrhea, nausea and vomiting.   Endocrine: Negative for cold intolerance and heat intolerance.   Musculoskeletal: Negative for arthralgias and back pain.   Neurological: Negative for dizziness and facial asymmetry.   Hematological: Negative for adenopathy.   Psychiatric/Behavioral: Negative for agitation and behavioral problems.     Objective:     Vital Signs (Most Recent):  Temp: 99.3 °F (37.4 °C) (02/18/19 0745)  Pulse: (!) 113 (02/18/19 1100)  Resp: (!) 23 (02/18/19 0830)  BP: 106/84 (02/18/19 1100)  SpO2: (!) 92 % (02/18/19 0830) Vital Signs (24h Range):  Temp:  [97.7 °F (36.5 °C)-99.9 °F (37.7 °C)] 99.3 °F (37.4 °C)  Pulse:  [] 113  Resp:  [18-23] 23  SpO2:  [90 %-99 %] 92 %  BP: ()/(43-89) 106/84     Weight: (!) 159.9 kg (352 lb 8.3 oz)  Body mass index is 44.06 kg/m².    Intake/Output Summary (Last 24 hours) at 2/18/2019 1117  Last data filed at 2/18/2019 0506  Gross per 24 hour   Intake 750 ml    Output 300 ml   Net 450 ml      Physical Exam   Constitutional: He is oriented to person, place, and time. Vital signs are normal. He appears well-developed.  Non-toxic appearance. He does not have a sickly appearance. He does not appear ill. No distress.   Morbid obesity.   HENT:   Head: Normocephalic and atraumatic.   Right Ear: External ear normal.   Left Ear: External ear normal.   Eyes: Conjunctivae and EOM are normal. Pupils are equal, round, and reactive to light. No scleral icterus.   Neck: Normal range of motion. Neck supple. No JVD present. No tracheal deviation present.   Cardiovascular: Normal rate, regular rhythm, normal heart sounds and intact distal pulses. Exam reveals no gallop and no friction rub.   No murmur heard.  Pulmonary/Chest: Effort normal and breath sounds normal. No stridor. No respiratory distress. He has no wheezes. He has no rales.   Distant lung sounds.  Moving air well.  Some crackles.   Abdominal: Soft. Bowel sounds are normal. He exhibits no distension and no mass. There is no tenderness. There is no guarding.   Musculoskeletal: Normal range of motion. He exhibits no edema or deformity.   Neurological: He is alert and oriented to person, place, and time. He exhibits normal muscle tone.   Oriented x 3.  Overall mental status much improved.  Limited insight into his health care issues.   Skin: Skin is warm and dry. No rash noted. He is not diaphoretic. No erythema.   Erythema resolved.  Some skin breakdown in right skin but does not appear infected at this time.   Nursing note and vitals reviewed.    Significant Labs: All pertinent labs within the past 24 hours have been reviewed.    Significant Imaging: I have reviewed all pertinent imaging results/findings within the past 24 hours.    Assessment/Plan:      * Septic shock    -Admitted to inpatient status in ICU upon transfer from [place] with septic shock.  Treated with pressors and IV antibiotics.  Ultimately weaned off of  pressors and transferred to the floor on 2/5.  -Possible component of pneumonia but clear cause of his infection is the severe cellulitis involving the right lower extremity.    -Ultrasound of right lower extremity did not reveal evidence of abscess or gas.    -General surgery was consulted and recommended serial exams for now and no surgical intervention at this time.    -Cellulitis continues to improve but still with notable erythema.  WBC has normalized and bood cultures no growth to date.    -Continue to elevate right lower extremity as tolerated.    -Patient completed a course of antibiotics on 2/11/2019.  Continue with wound care.  -Patient stable to be discharged.  Awaiting setup of outpatient hemodialysis as he has declined rehab or skilled nursing placement.      Acute on chronic diastolic heart failure    -Marked improvement with dialysis which we need to continue.    -Continue with additional volume removal with renal replacement therapy as recommended by Nephrology.  -No ARB due to renal dysfunction.  -Continue coreg.     Acute on chronic respiratory failure with hypoxia and hypercapnia    -Successfully extubated 2/2/2018.  Patient did well on BiPAP following extubation.  He has needed intermittent BiPAP.    -Continue with bronchodilator treatments as I suspect patient has COPD given wheezing and significant tobacco smoking history.    -Continue volume removal with CRRT as able.  -continue supplemental O2 requirements for goal sat 88-92%  -Completed course of steroids   -Will need outpatient PFTs and probable ICS/LAMA/LABA. When patient is being discharged will start him on Spiriva.         Acute renal failure    -Due to ATN from sepsis with shock and rhabdomyolysis.    -Unfortunately it looks as if he has progressed to ESRD  -We were unable HD on 2/5 due to clotted trialysis catheter.  Received new HD catheter on 2/6.  Completed dialysis again yesterday.  -Continue with dialysis per  nephrology.  -Working on outpatient placement as he will need ongoing dialysis     Tobacco abuse    -Patient counseled on the importance of smoking cessation.  -NRT offered     Debility    Continue with therapy.  Attempting skilled nursing or inpatient rehab placement for further therapy.     Encephalopathy, metabolic    Much improved.  Off restraints today.  Slowly improving.       Essential hypertension    Reasonably controlled with current regimen.  Will continue with current regimen and continue to monitor.       VTE Risk Mitigation (From admission, onward)        Ordered     heparin (porcine) injection 2,000 Units  Once      02/06/19 1729     heparin (porcine) injection 5,000 Units  As needed (PRN)      01/31/19 1825     heparin (porcine) injection 2,000 Units  As needed (PRN)      01/30/19 1056     heparin (porcine) injection 5,000 Units  Every 8 hours      01/29/19 2044     heparin (porcine) injection 5,000 Units  As needed (PRN)      01/29/19 1335     IP VTE HIGH RISK PATIENT  Once      01/28/19 1947              Selwyn So MD  Department of Hospital Medicine   [facility]

## 2019-02-18 NOTE — PLAN OF CARE
CM ordered 24 in wheel chair with standard cushion and bsc for home use.    Pt to be transported home with van transportation once outpatient HD arrangements are complete. At this point [facility] is pending nephrologist review at their facility.    CM to follow for plans and arrangments.

## 2019-02-18 NOTE — SUBJECTIVE & OBJECTIVE
Interval History:  No acute events.    Review of Systems   Constitutional: Negative for chills and fever.   HENT: Negative for congestion and dental problem.    Eyes: Negative for discharge and itching.   Respiratory: Negative for shortness of breath.    Cardiovascular: Negative for chest pain.   Gastrointestinal: Negative for abdominal pain, constipation, diarrhea, nausea and vomiting.   Endocrine: Negative for cold intolerance and heat intolerance.   Musculoskeletal: Negative for arthralgias and back pain.   Neurological: Negative for dizziness and facial asymmetry.   Hematological: Negative for adenopathy.   Psychiatric/Behavioral: Negative for agitation and behavioral problems.     Objective:     Vital Signs (Most Recent):  Temp: 99.3 °F (37.4 °C) (02/18/19 0745)  Pulse: (!) 113 (02/18/19 1100)  Resp: (!) 23 (02/18/19 0830)  BP: 106/84 (02/18/19 1100)  SpO2: (!) 92 % (02/18/19 0830) Vital Signs (24h Range):  Temp:  [97.7 °F (36.5 °C)-99.9 °F (37.7 °C)] 99.3 °F (37.4 °C)  Pulse:  [] 113  Resp:  [18-23] 23  SpO2:  [90 %-99 %] 92 %  BP: ()/(43-89) 106/84     Weight: (!) 159.9 kg (352 lb 8.3 oz)  Body mass index is 44.06 kg/m².    Intake/Output Summary (Last 24 hours) at 2/18/2019 1117  Last data filed at 2/18/2019 0506  Gross per 24 hour   Intake 750 ml   Output 300 ml   Net 450 ml      Physical Exam   Constitutional: He is oriented to person, place, and time. Vital signs are normal. He appears well-developed.  Non-toxic appearance. He does not have a sickly appearance. He does not appear ill. No distress.   Morbid obesity.   HENT:   Head: Normocephalic and atraumatic.   Right Ear: External ear normal.   Left Ear: External ear normal.   Eyes: Conjunctivae and EOM are normal. Pupils are equal, round, and reactive to light. No scleral icterus.   Neck: Normal range of motion. Neck supple. No JVD present. No tracheal deviation present.   Cardiovascular: Normal rate, regular rhythm, normal heart sounds and  intact distal pulses. Exam reveals no gallop and no friction rub.   No murmur heard.  Pulmonary/Chest: Effort normal and breath sounds normal. No stridor. No respiratory distress. He has no wheezes. He has no rales.   Distant lung sounds.  Moving air well.  Some crackles.   Abdominal: Soft. Bowel sounds are normal. He exhibits no distension and no mass. There is no tenderness. There is no guarding.   Musculoskeletal: Normal range of motion. He exhibits no edema or deformity.   Neurological: He is alert and oriented to person, place, and time. He exhibits normal muscle tone.   Oriented x 3.  Overall mental status much improved.  Limited insight into his health care issues.   Skin: Skin is warm and dry. No rash noted. He is not diaphoretic. No erythema.   Erythema resolved.  Some skin breakdown in right skin but does not appear infected at this time.   Nursing note and vitals reviewed.    Significant Labs: All pertinent labs within the past 24 hours have been reviewed.    Significant Imaging: I have reviewed all pertinent imaging results/findings within the past 24 hours.

## 2019-02-18 NOTE — PT/OT/SLP PROGRESS
"Speech Language Pathology Treatment    Patient Name:  Jones Red   MRN:  [id number]  Admitting Diagnosis: Septic shock    Recommendations:                 General Recommendations:     1. Speech Pathology 3-5x/week for ongoing assessment of diet tolerance and remediation of cognitive communication deficits and motor speech deficits    Diet recommendations:  Regular, Liquid Diet Level: Thin     Aspiration Precautions: 1 bite/sip at a time, Assistance with meals, Avoid talking while eating, Eliminate distractions, Feed only when awake/alert, HOB to 90 degrees, Meds whole 1 at a time, Remain upright 30 minutes post meal, Small bites/sips and Standard aspiration precautions     General Precautions: Standard, aspiration    Subjective      Pt awake, alert, reclined in bed. Originally on room air, but requesting oxygen supplementation via NC. RN present to facilitate NC use.     Pain/Comfort:  Pain Rating 1: other (see comments)(Unable to quantify pain rating, but asking RN for pain medication)  Location 1: head  Pain Addressed 1: Distraction, Nurse notified  Objective:     Has the patient been evaluated by SLP for swallowing?   Yes  Keep patient NPO? No   Current Respiratory Status: nasal cannula      COGNITIVE COMMUNICATION: Pt awake, alert, agreeable to session; however, stated "I'm pretty full to do speech." Oriented to name, , city, general place, hospital, month, and day of week with 100% acc. Not oriented to year or reason for hospitalization. Required min cues to reference external memory board. Pt continues to be highly distractible and benefits from reduced environmental stimuli. Flat affect continues; however, increased eye contact throughout conversation noted. Able to sustain attention in quiet environment for 3-4 minutes prior to redirection. Dcr attention c/b need for repetition of simple directions this date; however, improved topic maintenance noted. Pt continues to present with dcr recall of course of " "hospitalization and recent memory. Written memory log from previous session found at bedside. Pt stated "I forgot to use it." SLP reviewed rationale and use of written log and updated current date with events of the day. Pt with increasing ability to recall familiar hospital staff, but dcr recall of details of day (which therapies completed, MD visits, etc). Discussed recommendation to continue use of written log at home; however, pt with dcr understanding of rationale in home setting. Pt continues to demonstrate significantly dcr insight to deficits and safety awareness. Pt also presents with dcr problem solving skills. Required max assist to utilize hospital phone to make phone call. Able to express wants and needs given mod assistance approx 75% of the time.     MOTOR SPEECH: Speech c/b heavy regional dialect. Pt is able 80% intelligible this date at the simple conversational level to an unfamiliar listener. Intelligibility improves to 85-90% given min-mod cues to increase volume of speech. Pt continues to demonstrate dcr self-monitoring of rate and volume of speech. Pt continues to require mod-max cues to repair communication breakdowns.     Assessment:     Jones Red is a 53 y.o. male with an SLP diagnosis of Cognitive-communication Impairment. Continues to present with significantly dcr insight to deficits and recent memory.     Goals:   Multidisciplinary Problems     SLP Goals        Problem: SLP Goal    Goal Priority Disciplines Outcome   SLP Goal     SLP Ongoing (interventions implemented as appropriate)   Description:  1. Pt will be able to follow simple 1 step commands 75% of time with moderate verbal and visual cues. (MET)  2. Increase speech intelligibility to 75% at the simple sentence level with moderate verbal cues to slow rate, raise volume.  3. Pt will be able to consume thin liquids in small single sips via cup or straw, with no signs of airway threat or aspiration given max assistance. (MET) "   4. Pt will be able to advance to purees and solid consistencies with no signs of airway threat or aspiration given max assistance. (MET)  5. Increase orientation to month, date, day of week, place and reason for hospitalization with 75% acc given max verbal and written cues.  6. Increase ability to express basic wants and needs 75% of time given min assistance   7. Pt will be able to sustain attention to task during simple functional tasks for 10 mins given min cues.                      Plan:     · Patient to be seen:  3 x/week, 5 x/week   · Plan of Care expires:  03/02/19  · Plan of Care reviewed with:  patient, other (see comments)(RN)   · SLP Follow-Up:  Yes       Discharge recommendations:  nursing facility, skilled       Time Tracking:     SLP Treatment Date:   02/18/19  Speech Start Time:  1524  Speech Stop Time:  1545  Speech Total Time (min):  21 min    Billable Minutes: Speech Language Therapy individual 21 mins    Nloan Aguirre CCC-SLP  02/18/2019

## 2019-02-18 NOTE — PLAN OF CARE
Problem: SLP Goal  Goal: SLP Goal  1. Pt will be able to follow simple 1 step commands 75% of time with moderate verbal and visual cues. (MET)  2. Increase speech intelligibility to 75% at the simple sentence level with moderate verbal cues to slow rate, raise volume.  3. Pt will be able to consume thin liquids in small single sips via cup or straw, with no signs of airway threat or aspiration given max assistance. (MET)   4. Pt will be able to advance to purees and solid consistencies with no signs of airway threat or aspiration given max assistance. (MET)  5. Increase orientation to month, date, day of week, place and reason for hospitalization with 75% acc given max verbal and written cues.  6. Increase ability to express basic wants and needs 75% of time given min assistance   7. Pt will be able to sustain attention to task during simple functional tasks for 10 mins given min cues.     Outcome: Ongoing (interventions implemented as appropriate)  Pt seen for ongoing assessment and remediation of cognitive communication deficits and motor speech deficits.     Comments: Speech therapy to follow up 3-5x per week for ongoing assessment and remediation of cognitive communication deficits and motor speech deficits. Speech therapy services recommended to continue at the next level of care.

## 2019-02-18 NOTE — ASSESSMENT & PLAN NOTE
-Admitted to inpatient status in ICU upon transfer from [facility] with septic shock.  Treated with pressors and IV antibiotics.  Ultimately weaned off of pressors and transferred to the floor on 2/5.  -Possible component of pneumonia but clear cause of his infection is the severe cellulitis involving the right lower extremity.    -Ultrasound of right lower extremity did not reveal evidence of abscess or gas.    -General surgery was consulted and recommended serial exams for now and no surgical intervention at this time.    -Cellulitis continues to improve but still with notable erythema.  WBC has normalized and bood cultures no growth to date.    -Continue to elevate right lower extremity as tolerated.    -Patient completed a course of antibiotics on 2/11/2019.  Continue with wound care.  -Patient stable to be discharged.  Awaiting setup of outpatient hemodialysis as he has declined rehab or skilled nursing placement.

## 2019-02-18 NOTE — PROGRESS NOTES
"Nephrology  Progress Note    Admit Date: 1/28/2019   LOS: 21 days     SUBJECTIVE:     Follow-up For:  Septic shock/ATN    Interval History:    No c/o this am.  Seen on HD and line working well.  Discussed with team and anticipating DC home today.  Still awaiting finalized outpt HD plans in [place].     Review of Systems:   Constitutional: no fever or chills   Respiratory: no cough or shortness of breath   Cardiovascular: no chest pain or palpitations   Gastrointestinal: no nausea or vomiting, no abdominal pain or change in bowel habits   Genitourinary: no hematuria or dysuria   Musculoskeletal: no arthralgias or myalgias   Neurological: no seizures or tremors    OBJECTIVE:     Vital Signs Range (Last 24H):  BP (!) 91/48   Pulse (!) 120   Temp 99.3 °F (37.4 °C)   Resp (!) 23   Ht 6' 3" (1.905 m)   Wt (!) 159.9 kg (352 lb 8.3 oz)   SpO2 (!) 92%   BMI 44.06 kg/m²     Temp:  [97.7 °F (36.5 °C)-99.9 °F (37.7 °C)]   Pulse:  []   Resp:  [18-23]   BP: ()/(48-71)   SpO2:  [90 %-99 %]     I & O (Last 24H):    Intake/Output Summary (Last 24 hours) at 2/18/2019 0920  Last data filed at 2/18/2019 0506  Gross per 24 hour   Intake 750 ml   Output 300 ml   Net 450 ml       Physical Exam:  General appearance:  NAD and likely back to baseline.     Eyes:  Conjunctivae/corneas clear. EOMI.  Lungs: diminished.   Heart: Regular rate and rhythm, distant heart tones.  Abdomen: Soft, non-tender non-distended; bowel sounds normal; no masses,  no organomegaly, morbidly obese  Extremities:  Chronic Woody edema.   peeling/flaky LE's (R>L).  Overall much improved  Skin:  Right lower extremity cellulitis improved.   Right IJ CHERIE       Laboratory Data:  Recent Labs   Lab 02/18/19  0410   WBC 5.93   RBC 3.47*   HGB 9.6*   HCT 30.7*   *   MCV 89   MCH 27.7   MCHC 31.3*       BMP:   Recent Labs   Lab 02/18/19  0410   GLU 95   *   K 4.3   CL 94*   CO2 21*   BUN 60*   CREATININE 8.9*   CALCIUM 9.7   MG 2.0     Lab " Results   Component Value Date    CALCIUM 9.7 02/18/2019    PHOS 5.4 (H) 02/18/2019       Lab Results   Component Value Date    PTH 24.3 02/14/2019    CALCIUM 9.7 02/18/2019    CAION 1.32 02/14/2019    PHOS 5.4 (H) 02/18/2019       No results found for: URICACID    BNP  No results for input(s): BNP, BNPTRIAGEBLO in the last 168 hours.    Medications:  Medication list was reviewed and changes noted under Assessment/Plan.    Diagnostic Results:    X-Ray Chest 1 View for PICC_Central line   Final Result      As above         Electronically signed by: Kalyan Galvez MD   Date:    02/08/2019   Time:    14:46      X-Ray Chest AP Portable   Final Result      NG tube in place.         Electronically signed by: Viviane Persaud MD   Date:    02/02/2019   Time:    23:59      X-Ray Chest 1 View   Final Result      As above.         Electronically signed by: Keon Jonas MD   Date:    01/31/2019   Time:    19:14      X-Ray Chest AP Portable   Final Result      Lines and tubes are grossly unchanged.      Interval improvement in aeration of the lungs.         Electronically signed by: Connor Babcock MD   Date:    01/31/2019   Time:    16:42      X-Ray Chest 1 View   Final Result      Overall no significant interval change         Electronically signed by: Connor Babcock MD   Date:    01/30/2019   Time:    08:24      US Extremity Non Vascular Limited Right   Final Result      Nonspecific subcutaneous edema, cutaneous thickening, could represent cellulitis changes.  No definite necrotizing fasciitis, gas or abscess.         Electronically signed by: Tin Leone MD   Date:    01/29/2019   Time:    13:09      X-Ray Chest AP Portable   Final Result   Addendum 1 of 1      Enteric tube projects in unchanged radiographic position with tip coursing    below the diaphragm and appearing to extend beyond the field of view.         Electronically signed by: Trena Sena MD   Date:    01/29/2019   Time:    00:39      Final      As  above.         Electronically signed by: Trena Sena MD   Date:    01/29/2019   Time:    00:04      US Lower Extremity Veins Right   Final Result      Limited examination.  No evidence of deep venous thrombosis in the right lower extremity.         Electronically signed by: Vanita Oviedo   Date:    01/28/2019   Time:    21:10      X-Ray Chest AP Portable   Final Result      Cardiomegaly with findings suggesting pulmonary edema, correlation advised.  Differential would include infection.         Electronically signed by: Kalyan Galvez MD   Date:    01/28/2019   Time:    20:19          ASSESSMENT/PLAN:     1. Persistent Multifactorial initially anuric and now oliguric acute renal failure secondary to ATN from septic shock, rhabdomyolysis, with hyponatremia, and hyperkalemia (N17.0, N17.9, E87.5, E87.1, M62.82, A41.9):  Transferred from outlying facility for CRRT and has transitioned to standard hemodialysis.   Timeframe of renal recovery unclear at this time.  Tunneled line placed.  Placed on F schedule for now.  Consulted SW to start process of outpt placement for HD unit near home.    Reports voiding more.  Will ask nurse to accurately record UOP.  Pt told to only void in urinal or in hat on toilet, if possible.  Line working better and TPA instilled.  Renally dose meds, avoid nephrotoxins, and monitor I/O's closely.  2. Hyperphosphatemia: Changed to Renvela tabs with meals.  3. Hypercalcemia (E83.52):  Ionized ca/PTH wnl.  Due to immobilization?  Low chapis bath on HD.  4. RLE Cellulitis/woody edema (L03.115): Defer to wound care and ID.  Much improved.   5. Anemia of illness:  Iron/b12/folate wnl.  Started ONDINA. Nephrocaps.     6. HTN: Overall well controlled.  Low-normal BPs.  BB only for now.   7. Morbid obesity (E44):  Severe morbid obesity the root of most of this patients' medical issues.  Defer.      DC Home once outpt HD plans finalized.      Sterling Lopez, DAVIDP  Nephrology

## 2019-02-18 NOTE — PT/OT/SLP PROGRESS
"Physical Therapy Treatment    Patient Name:  Jones Red   MRN:  [id number]    Recommendations:     Discharge Recommendations:  nursing facility, skilled   Discharge Equipment Recommendations: (defer to next level of care )   Barriers to discharge: Decreased caregiver support    Assessment:     Jones Red is a 53 y.o. male admitted with a medical diagnosis of Septic shock.  He presents with the following impairments/functional limitations:  weakness, impaired functional mobilty, decreased safety awareness, gait instability, impaired balance, impaired self care skills, decreased lower extremity function, impaired endurance.    Due to accomplishment of previous PT goals, PT goals updated to reflect patient's current mobility status. Patient demonstrating good progress with therapy but did not partake in OOB today 2/2 complaints of fatigue. Patient mod I for supine <> sit. Tolerated sitting EOB > 5 mins requiring supervision while PT measured patient for w/c. Although patient would benefit from SNF upon d/c, patient continues to refuse. In order to optimize patient safety and functional outcomes, patient would benefit from continued skilled physical therapy upon d/c.    Rehab Prognosis: Good; patient would benefit from acute skilled PT services to address these deficits and reach maximum level of function.    Recent Surgery: Procedure(s) (LRB):  Insertion,catheter,tunneled (Right) 12 Days Post-Op    Plan:     During this hospitalization, patient to be seen 5 x/week to address the identified rehab impairments via gait training, therapeutic activities, therapeutic exercises and progress toward the following goals:    · Plan of Care Expires:  03/03/19    Subjective     Chief Complaint: Too tired to participate  Patient/Family Comments/goals: "Not today. I'm not walking or getting out this bed."  Pain/Comfort:  · Pain Rating 1: 0/10  · Pain Rating Post-Intervention 1: 0/10      Objective:     Communicated with RN prior to " "session.  Patient found all lines intact and call button in reach blood pressure cuff, central line, mosley catheter, bowel management system, peripheral IV, telemetry, pulse ox (continuous)  upon PT entry to room.     General Precautions: Standard, aspiration, fall   Orthopedic Precautions:N/A   Braces:       Functional Mobility:  · Bed Mobility:  Max verbal encouragement to participate. Some impulsivity noted    · Supine to Sit: modified independence  · Sit to Supine: modified independence      AM-PAC 6 CLICK MOBILITY  Turning over in bed (including adjusting bedclothes, sheets and blankets)?: 4  Sitting down on and standing up from a chair with arms (e.g., wheelchair, bedside commode, etc.): 3  Moving from lying on back to sitting on the side of the bed?: 4  Moving to and from a bed to a chair (including a wheelchair)?: 3  Need to walk in hospital room?: 3  Climbing 3-5 steps with a railing?: 2  Basic Mobility Total Score: 19       Therapeutic Activities and Exercises:  · Educated patient on importance of participation in therapy    Wheelchair rec  · Standard, bariatric wheelchair  · Hip-to-hip: 56 cm (22") -- needs 24" seat width  · Standard leg supports and back height  · Basic cushion    Patient left supine with all lines intact, call button in reach and RN notified..    GOALS:   Multidisciplinary Problems     Physical Therapy Goals        Problem: Physical Therapy Goal    Goal Priority Disciplines Outcome Goal Variances Interventions   Physical Therapy Goal     PT, PT/OT Ongoing (interventions implemented as appropriate)     Description:  Goals to be met by: 3/3/19    Patient will increase functional independence with mobility by performin. Supine to sit with min A. -- MET 2/10/19  REVISED: Supine>sit modified independently.   2. Sit to supine with min A. -- MET 2/10/19  REVISED: Sit>supine modified independently. MET 19  3. Rolling R and L with min A. -- MET 2/10/19  4. Sitting at edge of bed >5 " minutes with min A. -- MET 2/10/19  5. PT will assess sit<>stand. -- MET 2/10/19  REVISED: Sit<>stand with supervision and standard or rolling walker. MET 2/16/19  6. Gait > 50 ft with standard or rolling walker with min A. MET 2/16/19  REVISED: Patient will ambulate >150' with RW with CGA for safety  7. UPDATED: patient will t/f bed to wheelchair via stand-step t/f with RW with SBA for safety  8. UPDATED: Patient will propel himself in wheelchair in all directions > 150' while in open environment with Mod I and no verbal cues for safety                        Time Tracking:     PT Received On: 02/18/19  PT Start Time: 1430     PT Stop Time: 1444  PT Total Time (min): 14 min     Billable Minutes: Therapeutic Activity 14    Treatment Type: Treatment  PT/PTA: PT     PTA Visit Number: 0     Sho Conde, PT  02/18/2019

## 2019-02-18 NOTE — PLAN OF CARE
Problem: Adult Inpatient Plan of Care  Goal: Plan of Care Review  Outcome: Ongoing (interventions implemented as appropriate)  Pt nasal cannula on standby, pt on room air SpO2 90%. Aerosol treatments given and tolerated well. Aerobika done with good effort. Pt refused to wear BiPAP, machine at bedside on standby. Will continue to monitor.

## 2019-02-18 NOTE — PLAN OF CARE
Problem: Physical Therapy Goal  Goal: Physical Therapy Goal  Goals to be met by: 3/3/19    Patient will increase functional independence with mobility by performin. Supine to sit with min A. -- MET 2/10/19  REVISED: Supine>sit modified independently.   2. Sit to supine with min A. -- MET 2/10/19  REVISED: Sit>supine modified independently. MET 19  3. Rolling R and L with min A. -- MET 2/10/19  4. Sitting at edge of bed >5 minutes with min A. -- MET 2/10/19  5. PT will assess sit<>stand. -- MET 2/10/19  REVISED: Sit<>stand with supervision and standard or rolling walker. MET 19  6. Gait > 50 ft with standard or rolling walker with min A. MET 19  REVISED: Patient will ambulate >150' with RW with CGA for safety  7. UPDATED: patient will t/f bed to wheelchair via stand-step t/f with RW with SBA for safety  8. UPDATED: Patient will propel himself in wheelchair in all directions > 150' while in open environment with Mod I and no verbal cues for safety      Outcome: Ongoing (interventions implemented as appropriate)  Due to accomplishment of previous PT goals, PT goals updated to reflect patient's current mobility status. Patient demonstrating good progress with therapy but did not partake in OOB today 2/2 complaints of fatigue. Patient mod I for supine <> sit. Tolerated sitting EOB > 5 mins requiring supervision while PT measured patient for w/c. Although patient would benefit from SNF upon d/c, patient continues to refuse. In order to optimize patient safety and functional outcomes, patient would benefit from continued skilled physical therapy upon d/c.

## 2019-02-18 NOTE — PLAN OF CARE
Problem: Adult Inpatient Plan of Care  Goal: Plan of Care Review  Outcome: Ongoing (interventions implemented as appropriate)  Pt rested quietly. nad noted. Safety intact. Ambulated with walker with steady gait.  Pt has productive cough milky colored phlegm.  obs continues

## 2019-02-18 NOTE — PT/OT/SLP PROGRESS
Physical Therapy      Patient Name:  Jones Red   MRN:  [id number]    Patient not seen today secondary to Dialysis. Will follow-up later this afternoon or tomorrow, pending time.    Sho Conde, PT

## 2019-02-18 NOTE — PLAN OF CARE
Problem: Adult Inpatient Plan of Care  Goal: Plan of Care Review  Outcome: Ongoing (interventions implemented as appropriate)  Pt remains on RA. Neb tx and MDI given and tolerated well. KHADAR

## 2019-02-19 PROCEDURE — 92507 TX SP LANG VOICE COMM INDIV: CPT

## 2019-02-19 PROCEDURE — 25000003 PHARM REV CODE 250: Performed by: HOSPITALIST

## 2019-02-19 PROCEDURE — 99900035 HC TECH TIME PER 15 MIN (STAT)

## 2019-02-19 PROCEDURE — 99231 SBSQ HOSP IP/OBS SF/LOW 25: CPT | Mod: ,,, | Performed by: HOSPITALIST

## 2019-02-19 PROCEDURE — 27000221 HC OXYGEN, UP TO 24 HOURS

## 2019-02-19 PROCEDURE — 25000242 PHARM REV CODE 250 ALT 637 W/ HCPCS: Performed by: STUDENT IN AN ORGANIZED HEALTH CARE EDUCATION/TRAINING PROGRAM

## 2019-02-19 PROCEDURE — 25000003 PHARM REV CODE 250: Performed by: NURSE PRACTITIONER

## 2019-02-19 PROCEDURE — 94761 N-INVAS EAR/PLS OXIMETRY MLT: CPT

## 2019-02-19 PROCEDURE — 97110 THERAPEUTIC EXERCISES: CPT

## 2019-02-19 PROCEDURE — 63600175 PHARM REV CODE 636 W HCPCS: Performed by: STUDENT IN AN ORGANIZED HEALTH CARE EDUCATION/TRAINING PROGRAM

## 2019-02-19 PROCEDURE — 25000242 PHARM REV CODE 250 ALT 637 W/ HCPCS: Performed by: HOSPITALIST

## 2019-02-19 PROCEDURE — 25000003 PHARM REV CODE 250: Performed by: INTERNAL MEDICINE

## 2019-02-19 PROCEDURE — 11000001 HC ACUTE MED/SURG PRIVATE ROOM

## 2019-02-19 PROCEDURE — 99231 PR SUBSEQUENT HOSPITAL CARE,LEVL I: ICD-10-PCS | Mod: ,,, | Performed by: HOSPITALIST

## 2019-02-19 PROCEDURE — 97803 MED NUTRITION INDIV SUBSEQ: CPT

## 2019-02-19 PROCEDURE — 97116 GAIT TRAINING THERAPY: CPT

## 2019-02-19 PROCEDURE — 94640 AIRWAY INHALATION TREATMENT: CPT

## 2019-02-19 PROCEDURE — 94664 DEMO&/EVAL PT USE INHALER: CPT

## 2019-02-19 RX ORDER — CARVEDILOL 3.12 MG/1
3.12 TABLET ORAL 2 TIMES DAILY
Qty: 60 TABLET | Refills: 1 | Status: CANCELLED | OUTPATIENT
Start: 2019-02-19 | End: 2020-02-19

## 2019-02-19 RX ORDER — SEVELAMER CARBONATE 800 MG/1
1600 TABLET, FILM COATED ORAL
Qty: 180 TABLET | Refills: 1 | Status: CANCELLED | OUTPATIENT
Start: 2019-02-19 | End: 2020-02-19

## 2019-02-19 RX ADMIN — IPRATROPIUM BROMIDE AND ALBUTEROL SULFATE 3 ML: .5; 3 SOLUTION RESPIRATORY (INHALATION) at 11:02

## 2019-02-19 RX ADMIN — MICONAZOLE NITRATE: 20 OINTMENT TOPICAL at 09:02

## 2019-02-19 RX ADMIN — IPRATROPIUM BROMIDE AND ALBUTEROL SULFATE 3 ML: .5; 3 SOLUTION RESPIRATORY (INHALATION) at 07:02

## 2019-02-19 RX ADMIN — SEVELAMER CARBONATE 1600 MG: 800 TABLET, FILM COATED ORAL at 05:02

## 2019-02-19 RX ADMIN — MICONAZOLE NITRATE: 20 OINTMENT TOPICAL at 08:02

## 2019-02-19 RX ADMIN — HEPARIN SODIUM 5000 UNITS: 5000 INJECTION, SOLUTION INTRAVENOUS; SUBCUTANEOUS at 09:02

## 2019-02-19 RX ADMIN — SODIUM CHLORIDE SOLN NEBU 3% 4 ML: 3 NEBU SOLN at 07:02

## 2019-02-19 RX ADMIN — CARVEDILOL 3.12 MG: 3.12 TABLET, FILM COATED ORAL at 09:02

## 2019-02-19 RX ADMIN — HEPARIN SODIUM 5000 UNITS: 5000 INJECTION, SOLUTION INTRAVENOUS; SUBCUTANEOUS at 05:02

## 2019-02-19 RX ADMIN — IPRATROPIUM BROMIDE AND ALBUTEROL SULFATE 3 ML: .5; 3 SOLUTION RESPIRATORY (INHALATION) at 03:02

## 2019-02-19 RX ADMIN — Medication 1 CAPSULE: at 08:02

## 2019-02-19 RX ADMIN — SEVELAMER CARBONATE 1600 MG: 800 TABLET, FILM COATED ORAL at 01:02

## 2019-02-19 RX ADMIN — IPRATROPIUM BROMIDE AND ALBUTEROL SULFATE 3 ML: .5; 3 SOLUTION RESPIRATORY (INHALATION) at 04:02

## 2019-02-19 RX ADMIN — CARVEDILOL 3.12 MG: 3.12 TABLET, FILM COATED ORAL at 08:02

## 2019-02-19 RX ADMIN — SEVELAMER CARBONATE 1600 MG: 800 TABLET, FILM COATED ORAL at 08:02

## 2019-02-19 RX ADMIN — ACETAMINOPHEN 1000 MG: 500 TABLET ORAL at 08:02

## 2019-02-19 RX ADMIN — HEPARIN SODIUM 5000 UNITS: 5000 INJECTION, SOLUTION INTRAVENOUS; SUBCUTANEOUS at 01:02

## 2019-02-19 NOTE — ASSESSMENT & PLAN NOTE
-Continue with therapy.    -Patient refuses SNF placement  -Will anticipate home with  for PT and OT.

## 2019-02-19 NOTE — PROGRESS NOTES
"[facility]  Adult Nutrition  Progress Note    SUMMARY       Recommendations    Recommendation:   1. Continue renal diet   2. Assess diet education needs if cognitive status improves    Goals:   1. Meet >75% EEN and EPN   2. Promote nutrition related labs wnl    Nutrition Goal Status: progressing towards goal  Communication of RD Recs: discussed on rounds    Reason for Assessment    Reason For Assessment: RD follow-up  Diagnosis: infection/sepsis(Septic shock/ATN)  Relevant Medical History: HTN, COPD, Chronic respiratory failure (on home O2), Diastolic CHF, LYNDON, chronic venous insufficiency, and questionable seizures history (suspected to be 2/2 alcohol withdrawal)  Interdisciplinary Rounds: attended  General Information Comments: Pt endorses good appetite, is tolerating diet. Denies N/V/D/C. LBM yesterday.   Nutrition Discharge Planning: d/c on renal diet    Nutrition Risk Screen    Nutrition Risk Screen: no indicators present    Nutrition/Diet History    Spiritual, Cultural Beliefs, Scientology Practices, Values that Affect Care: (none stated)  Factors Affecting Nutritional Intake: impaired cognitive status/motor control    Anthropometrics    Temp: 98.7 °F (37.1 °C)  Height Method: Estimated  Height: 6' 3" (190.5 cm)  Height (inches): 75 in  Weight Method: Bed Scale  Weight: (!) 184 kg (405 lb 10.3 oz)  Weight (lb): (!) 405.65 lb  Ideal Body Weight (IBW), Male: 196 lb  % Ideal Body Weight, Male (lb): 186.72 lb  BMI (Calculated): 45.8  BMI Grade: greater than 40 - morbid obesity       Lab/Procedures/Meds    Pertinent Labs: Reviewed  Lab Results   Component Value Date     (L) 02/18/2019    K 4.3 02/18/2019    CL 94 (L) 02/18/2019    CO2 21 (L) 02/18/2019    BUN 60 (H) 02/18/2019    CREATININE 8.9 (H) 02/18/2019    PHOS 5.4 (H) 02/18/2019    ESTGFRAFRICA 7 (A) 02/18/2019    EGFRNONAA 6 (A) 02/18/2019     Pertinent Meds: Reviewed  Scheduled Meds:   epoetin edin (PROCRIT) injection  20,000 Units " Intravenous Q7 Days    sevelamer carbonate  1,600 mg Oral TID WM    sodium chloride 3%  4 mL Nebulization BID    vitamin renal formula (B-complex-vitamin c-folic acid)  1 capsule Oral Daily     Estimated/Assessed Needs    Weight Used For Calorie Calculations: (!) 178 kg (392 lb 6.7 oz)  Energy Calorie Requirements (kcal): 2720  Energy Need Method: [place]-St Jeor  Protein Requirements: 143-178 gm/d (0.8-1 gm/kg)  Weight Used For Protein Calculations: (!) 178 kg (392 lb 6.7 oz)  Fluid Requirements (mL): 2720(or per team)  Estimated Fluid Requirement Method: RDA Method  RDA Method (mL): 2720    Nutrition Prescription Ordered    Current Diet Order: renal    Evaluation of Received Nutrient/Fluid Intake     % Intake of Estimated Energy Needs: 75 - 100 %  % Meal Intake: 75 - 100 %    Nutrition Risk    Level of Risk/Frequency of Follow-up: low     Assessment and Plan    Nutrition Problem  Inadequate oral intake  Related to (etiology):   Altered cognitive function  Signs and Symptoms (as evidenced by):   Diet just advanced, finishing <75% of meals  Interventions (treatment strategy):  Collaboration with other providers  Nutrition Diagnosis Status:   Resolved    Monitor and Evaluation    Food and Nutrient Intake: energy intake, food and beverage intake  Food and Nutrient Adminstration: diet order  Physical Activity and Function: nutrition-related ADLs and IADLs  Anthropometric Measurements: weight, weight change  Biochemical Data, Medical Tests and Procedures: electrolyte and renal panel, gastrointestinal profile, glucose/endocrine profile, inflammatory profile, lipid profile  Nutrition-Focused Physical Findings: overall appearance, extremities, muscles and bones, skin     Malnutrition Assessment        Orbital Region (Subcutaneous Fat Loss): well nourished  Upper Arm Region (Subcutaneous Fat Loss): well nourished   Taoism Region (Muscle Loss): well nourished  Clavicle Bone Region (Muscle Loss): well nourished  Clavicle  and Acromion Bone Region (Muscle Loss): well nourished  Dorsal Hand (Muscle Loss): well nourished  Patellar Region (Muscle Loss): well nourished  Anterior Thigh Region (Muscle Loss): well nourished  Posterior Calf Region (Muscle Loss): well nourished     Nutrition Follow-Up    RD Follow-up?: Yes    Patience High, MS, RD, LDN   Dietitian, [facility]  [phone]

## 2019-02-19 NOTE — PLAN OF CARE
CM received a call from [facility] today stating HD unit in [place] states they don't feel they can accept this pt, as they are unable to find a MD to accept him due to his multiple health needs.    CM informed [facility] that his wife states his MD is Larry Ramirez in [place].   CM now pending a call back from julien at [facility].    CM to follow for plans and arrangements.     02/19/19 1315   Discharge Assessment   Assessment Type Discharge Planning Reassessment   Confirmed/corrected address and phone number on facesheet? Yes   Assessment information obtained from? Patient;Medical Record   Communicated expected length of stay with patient/caregiver yes   Prior to hospitilization cognitive status: Alert/Oriented   Prior to hospitalization functional status: Partially Dependent   Current cognitive status: Alert/Oriented   Current Functional Status: Partially Dependent   Lives With spouse   Able to Return to Prior Arrangements yes   Is patient able to care for self after discharge? Yes  (with family assists)

## 2019-02-19 NOTE — PT/OT/SLP PROGRESS
Physical Therapy Treatment    Patient Name:  Jones Red   MRN:  [id number]    Recommendations:     Discharge Recommendations:  nursing facility, skilled (though pt refusing and wants to go home)  Discharge Equipment Recommendations: (bariatric 3-in-1 commode, TTB)   Barriers to discharge: Decreased caregiver support    Assessment:     Jones Red is a 53 y.o. male admitted with a medical diagnosis of Septic shock.  He presents with the following impairments/functional limitations:  weakness, impaired endurance, impaired self care skills, impaired functional mobilty, gait instability, impaired balance, decreased lower extremity function, decreased safety awareness, impaired skin, edema ; pt with improved mobility today, NO LOB , mild SOB noted..    Rehab Prognosis: Good; patient would benefit from acute skilled PT services to address these deficits and reach maximum level of function.    Recent Surgery: Procedure(s) (LRB):  Insertion,catheter,tunneled (Right) 13 Days Post-Op    Plan:     During this hospitalization, patient to be seen 5 x/week to address the identified rehab impairments via gait training, therapeutic activities, therapeutic exercises and progress toward the following goals:    · Plan of Care Expires:  03/03/19    Subjective     Chief Complaint: pt without c/o's  Patient/Family Comments/goals: pt agreeable to session today, asking to speak with his .   Pain/Comfort:  · Pain Rating 1: 0/10  · Pain Rating Post-Intervention 1: 0/10      Objective:     Communicated with nurse prior to session.  Patient found L sidelying in bed with PICC line, telemetry, oxygen(O2@2L)  upon PT entry to room.     General Precautions: Standard, aspiration   Orthopedic Precautions:N/A   Braces: N/A     Functional Mobility:  · Bed Mobility:     · Supine to Sit: modified independence and supervision  · Transfers:     · Sit to Stand:  supervision with rolling walker  · Gait: amb'd 80' with bariatric RW and CGA,  O2@2L      AM-PAC 6 CLICK MOBILITY  Turning over in bed (including adjusting bedclothes, sheets and blankets)?: 4  Sitting down on and standing up from a chair with arms (e.g., wheelchair, bedside commode, etc.): 3  Moving from lying on back to sitting on the side of the bed?: 4  Moving to and from a bed to a chair (including a wheelchair)?: 3  Need to walk in hospital room?: 3  Climbing 3-5 steps with a railing?: 2  Basic Mobility Total Score: 19       Therapeutic Activities and Exercises:   pt perf'd seated LE ex's of AP's, LAQ's x 10 ea.     Patient left sitting up at EOB to finish lunch with all lines intact, call button in reach and nurse notified..    GOALS:   Multidisciplinary Problems     Physical Therapy Goals        Problem: Physical Therapy Goal    Goal Priority Disciplines Outcome Goal Variances Interventions   Physical Therapy Goal     PT, PT/OT Ongoing (interventions implemented as appropriate)     Description:  Goals to be met by: 3/3/19    Patient will increase functional independence with mobility by performin. Supine to sit with min A. -- MET 2/10/19  REVISED: Supine>sit modified independently.   2. Sit to supine with min A. -- MET 2/10/19  REVISED: Sit>supine modified independently. MET 19  3. Rolling R and L with min A. -- MET 2/10/19  4. Sitting at edge of bed >5 minutes with min A. -- MET 2/10/19  5. PT will assess sit<>stand. -- MET 2/10/19  REVISED: Sit<>stand with supervision and standard or rolling walker. MET 19  6. Gait > 50 ft with standard or rolling walker with min A. MET 19  REVISED: Patient will ambulate >150' with RW with CGA for safety  7. UPDATED: patient will t/f bed to wheelchair via stand-step t/f with RW with SBA for safety  8. UPDATED: Patient will propel himself in wheelchair in all directions > 150' while in open environment with Mod I and no verbal cues for safety                        Time Tracking:     PT Received On: 19  PT Start Time: 1254      PT Stop Time: 1318  PT Total Time (min): 24 min     Billable Minutes: Gait Training 14 and Therapeutic Exercise 10    Treatment Type: Treatment  PT/PTA: PTA     PTA Visit Number: 1     Coretta Evans PTA  02/19/2019

## 2019-02-19 NOTE — PLAN OF CARE
Problem: Adult Inpatient Plan of Care  Goal: Plan of Care Review  Outcome: Ongoing (interventions implemented as appropriate)  Saturation 90% on room air. Patient will not wear O2 cannula.

## 2019-02-19 NOTE — PLAN OF CARE
[facility] [address] [place] [address]  [phone]         HOME  HEALTH ORDERS        Admit to Home Health    Diagnoses:  Active Hospital Problems    Diagnosis  POA    *Septic shock [A41.9, R65.21]  Yes    Debility [R53.81]  Yes    Encephalopathy, metabolic [G93.41]  Yes    Essential hypertension [I10]  No    Tobacco abuse [Z72.0]  Yes    Acute renal failure [N17.9]  Yes    Acute on chronic diastolic heart failure [I50.33]  Yes    Acute on chronic respiratory failure with hypoxia and hypercapnia [J96.21, J96.22]  Yes      Resolved Hospital Problems    Diagnosis Date Resolved POA    Encounter for fitting and adjustment of dialysis catheter [Z49.01] 02/07/2019 Not Applicable    Increased oropharyngeal secretions [K11.7] 02/03/2019 Unknown    Acute respiratory failure with hypoxia and hypercapnia [J96.01, J96.02] 02/01/2019 Yes    Cellulitis of right lower extremity [L03.115] 02/12/2019 Yes    Rhabdomyolysis [M62.82] 02/01/2019 Yes    Hyponatremia [E87.1] 02/01/2019 Yes       Patient is homebound due to: Pt requires home health services due to taxing effort to leave the home as a result of weakness/debility from Septic shock    Face to face services were provided on 2/19/19    Allergies:  Review of patient's allergies indicates:  No Known Allergies    Diet: cardiac, renal    Activity: ambulate with assistance     Nursing:   SN to complete comprehensive assessment including routine vital signs. Instruct on disease process and s/s of complications to report to MD. Review/verify medication list sent home with the patient at time of discharge  and instruct patient/caregiver as needed. Frequency may be adjusted depending on start of care date.    Notify MD if SBP > 160 or < 90; DBP > 90 or < 50; HR > 120 or < 50; Temp > 101; Other:       CONSULTS:      Physical Therapy to evaluate and treat. Evaluate for home safety and equipment needs; Establish/upgrade home exercise  program. Perform / instruct on therapeutic exercises, gait training, transfer training, and Range of Motion.    Occupational Therapy to evaluate and treat. Evaluate home environment for safety and equipment needs. Perform/Instruct on transfers, ADL training, ROM, and therapeutic exercises.     to evaluate for community resources/long-range planning.     Aide to provide assistance with personal care, ADLs, and vital signs    Medications:         _________________________________  Jones Milner MD      2/19/2019

## 2019-02-19 NOTE — PROGRESS NOTES
[facility] Hospital Medicine  Progress Note    Patient Name: Jones Red  MRN: [id number]  Patient Class: IP- Inpatient   Admission Date: 1/28/2019  Length of Stay: 22 days  Attending Physician: Jones Milner MD  Primary Care Provider: PEE Romano MD        Subjective:     Principal Problem:Septic shock    HPI:  Mr. Jones Red is a 53 y.o. male, with PMH of HTN, COPD, chronic respiratory failure (on home O2), Diastolic CHF, LYNDON, chronic venous insufficiency, and questionable seizures history (suspected to be 2/2 alcohol withdrawal), who preset tia to [facility] on 1/22/19 2/2 SOB. This was associated with cough productive of brown sputum. He was started in BiPap, but did ultimately degrade, and was intubated, and maintained on a ventilator. He was additionally altered upon arrival to the ED. History was reportedly obtained by a friend, and it was reported that he was found down at home, and was unable to get up. He as admitted to the ICU, started on IV fluids and pressors.     Hospital Course:  Mr. Red is a 53-year-old man with history of hypertension, diastolic heart failure, chronic obstructive pulmonary disease with chronic hypoxic respiratory failure on home oxygen, prior alcohol abuse, significant ongoing tobacco use (3 packs per day), and morbid obesity with septic shock secondary to right lower extremity cellulitis complicating chronic venous stasis.  Patient intubated at outside hospital after failing a trial of BiPAP.  Patient also with evidence of worsening renal failure with rhabdomyolysis.  Patient transferred from [facility] in [place], [place] to [facility] on 1/28/2019.  Patient started on renal replacement therapy.  He was extubated on 2/2/19 to bipap and transferred to the floor on 2/5/19.  Ultrasound of the right lower extremity did not reveal evidence of abscess, subcutaneous gas, or evidence of necrotizing fascitits.   General surgery and wound care consulted.  No surgical intervention recommended at this time.  He was seen by ID who recommended continuing vancomycin and cefepime until 2/11/19.  Mental status improved. He is tolerating his diet and continues on dialysis.  We are presently working with PT and OT daily in attempts to gain strength.  We are working on placement in SNF or inpatient rehab closer to his home which is complicated due to his high medical demands.  Patient's confusion refused and patient clearly refusing placement and would like to go home with home health.  Working with case management to secure outpatient hemodialysis close to his home.    Interval History:  No acute events.  Isolated fever noted yesterday afternoon but none since.  Patient very eager to leave hospital.  Denies pain or sob at this time.    Review of Systems   Constitutional: Negative for chills and fever.   HENT: Negative for congestion and dental problem.    Eyes: Negative for discharge and itching.   Respiratory: Negative for shortness of breath.    Cardiovascular: Negative for chest pain.   Gastrointestinal: Negative for abdominal pain, constipation, diarrhea, nausea and vomiting.   Endocrine: Negative for cold intolerance and heat intolerance.   Musculoskeletal: Negative for arthralgias and back pain.   Neurological: Negative for dizziness and facial asymmetry.   Hematological: Negative for adenopathy.   Psychiatric/Behavioral: Negative for agitation and behavioral problems.     Objective:     Vital Signs (Most Recent):  Temp: 98 °F (36.7 °C) (02/19/19 0754)  Pulse: 92 (02/19/19 0754)  Resp: 20 (02/19/19 0754)  BP: 111/63 (02/19/19 0754)  SpO2: (!) 94 % (02/19/19 0754) Vital Signs (24h Range):  Temp:  [98 °F (36.7 °C)-100.6 °F (38.1 °C)] 98 °F (36.7 °C)  Pulse:  [] 92  Resp:  [18-22] 20  SpO2:  [90 %-96 %] 94 %  BP: ()/(51-88) 111/63     Weight: (!) 184 kg (405 lb 10.3 oz)  Body mass index is 50.7 kg/m².    Intake/Output  Summary (Last 24 hours) at 2/19/2019 1032  Last data filed at 2/18/2019 1820  Gross per 24 hour   Intake 1720 ml   Output 3000 ml   Net -1280 ml      Physical Exam   Constitutional: He is oriented to person, place, and time. Vital signs are normal. He appears well-developed.  Non-toxic appearance. He does not have a sickly appearance. He does not appear ill. No distress.   Morbid obesity.   HENT:   Head: Normocephalic and atraumatic.   Right Ear: External ear normal.   Left Ear: External ear normal.   Eyes: Conjunctivae and EOM are normal. Pupils are equal, round, and reactive to light. No scleral icterus.   Neck: Normal range of motion. Neck supple. No JVD present. No tracheal deviation present.   Cardiovascular: Normal rate, regular rhythm, normal heart sounds and intact distal pulses. Exam reveals no gallop and no friction rub.   No murmur heard.  Pulmonary/Chest: Effort normal and breath sounds normal. No stridor. No respiratory distress. He has no wheezes. He has no rales.   Distant lung sounds.  Moving air well.  Some crackles.   Abdominal: Soft. Bowel sounds are normal. He exhibits no distension and no mass. There is no tenderness. There is no guarding.   Musculoskeletal: Normal range of motion. He exhibits no edema or deformity.   Neurological: He is alert and oriented to person, place, and time. He exhibits normal muscle tone.   Oriented x 3.  Overall mental status much improved.  Limited insight into his health care issues.   Skin: Skin is warm and dry. No rash noted. He is not diaphoretic. No erythema.   Erythema resolved.  Some skin breakdown in right skin but does not appear infected at this time.   Nursing note and vitals reviewed.    Significant Labs: All pertinent labs within the past 24 hours have been reviewed.    Significant Imaging: I have reviewed all pertinent imaging results/findings within the past 24 hours.    Assessment/Plan:      * Septic shock    -Admitted to inpatient status in ICU upon  transfer from [facility] with septic shock.  Treated with pressors and IV antibiotics.  Ultimately weaned off of pressors and transferred to the floor on 2/5.  -Possible component of pneumonia but clear cause of his infection is the severe cellulitis involving the right lower extremity.    -Ultrasound of right lower extremity did not reveal evidence of abscess or gas.    -General surgery was consulted and recommended serial exams for now and no surgical intervention at this time.    -Cellulitis continues to improve but still with notable erythema.  WBC has normalized and bood cultures no growth to date.    -Continue to elevate right lower extremity as tolerated.    -Patient completed a course of antibiotics on 2/11/2019.  Continue with wound care.  -Patient stable to be discharged.  Awaiting setup of outpatient hemodialysis as he has declined rehab or skilled nursing placement.      Debility    -Continue with therapy.    -Patient refuses SNF placement  -Will anticipate home with  for PT and OT.     Encephalopathy, metabolic    -Much improved.  Off restraints today.  Slowly improving.       Essential hypertension    -Reasonably controlled with current regimen.  Will continue with current regimen and continue to monitor.     Tobacco abuse    -Patient counseled on the importance of smoking cessation.  -NRT offered     Acute on chronic respiratory failure with hypoxia and hypercapnia    -Successfully extubated 2/2/2018.  Patient did well on BiPAP following extubation.  He has needed intermittent BiPAP.    -Continue with bronchodilator treatments as I suspect patient has COPD given wheezing and significant tobacco smoking history.    -Continue volume removal with CRRT as able.  -continue supplemental O2 requirements for goal sat 88-92%  -Completed course of steroids   -Will need outpatient PFTs and probable ICS/LAMA/LABA. When patient is being discharged will start him on Spiriva.         Acute on chronic diastolic  heart failure    -Marked improvement with dialysis which we need to continue.    -Continue with additional volume removal with renal replacement therapy as recommended by Nephrology.  -No ARB due to renal dysfunction.  -Continue coreg.     Acute renal failure    -Due to ATN from sepsis with shock and rhabdomyolysis.    -Unfortunately it looks as if he has progressed to ESRD  -We were unable to do HD on 2/5 due to clotted trialysis catheter.  Received new HD catheter on 2/6.    -Continue with dialysis per nephrology.  -Working on outpatient placement as he will need ongoing dialysis       VTE Risk Mitigation (From admission, onward)        Ordered     heparin (porcine) injection 2,000 Units  Once      02/06/19 1729     heparin (porcine) injection 5,000 Units  As needed (PRN)      01/31/19 1825     heparin (porcine) injection 2,000 Units  As needed (PRN)      01/30/19 1056     heparin (porcine) injection 5,000 Units  Every 8 hours      01/29/19 2044     heparin (porcine) injection 5,000 Units  As needed (PRN)      01/29/19 1335     IP VTE HIGH RISK PATIENT  Once      01/28/19 1947              Jones Milner MD  Department of Hospital Medicine   [facility]

## 2019-02-19 NOTE — PLAN OF CARE
Problem: Adult Inpatient Plan of Care  Goal: Plan of Care Review  Outcome: Ongoing (interventions implemented as appropriate)  Pt resting in bed. NAD, VSS on RA, AOx4. Pt complains of pain to lower legs. Ambulatory with stand by assist and walker. Worked with PT/ OT/ SPT. POC reviewed with pt. Bed low and locked. Personal items and call light within reach. Will continue to monitor.

## 2019-02-19 NOTE — PLAN OF CARE
Problem: SLP Goal  Goal: SLP Goal  1. Pt will be able to follow simple 1 step commands 75% of time with moderate verbal and visual cues. (MET)  2. Increase speech intelligibility to 75% at the simple sentence level with moderate verbal cues to slow rate, raise volume.  3. Pt will be able to consume thin liquids in small single sips via cup or straw, with no signs of airway threat or aspiration given max assistance. (MET)   4. Pt will be able to advance to purees and solid consistencies with no signs of airway threat or aspiration given max assistance. (MET)  5. Increase orientation to month, date, day of week, place and reason for hospitalization with 75% acc given max verbal and written cues.  6. Increase ability to express basic wants and needs 75% of time given min assistance   7. Pt will be able to sustain attention to task during simple functional tasks for 10 mins given min cues.     Outcome: Ongoing (interventions implemented as appropriate)  Pt seen bedside for ongoing remediation of cognitive communication strategies. Pt continues to present with dcr orientation, dcr attention, dcr recall, and dcr insight to deficits.     Comments: Speech therapy to follow up 3-5x per week for ongoing assessment and remediation of cognitive communication deficits. Speech therapy services recommended to continue at the next level of care.

## 2019-02-19 NOTE — ASSESSMENT & PLAN NOTE
-Reasonably controlled with current regimen.  Will continue with current regimen and continue to monitor.

## 2019-02-19 NOTE — PT/OT/SLP PROGRESS
Speech Language Pathology Treatment    Patient Name:  Jones Red   MRN:  [id number]  Admitting Diagnosis: Septic shock    Recommendations:                 General Recommendations:                1. Speech Pathology 3-5x/week for ongoing assessment of diet tolerance and remediation of cognitive communication deficits and motor speech deficits     Diet recommendations:  Regular, Liquid Diet Level: Thin      Aspiration Precautions: 1 bite/sip at a time, Assistance with meals, Avoid talking while eating, Eliminate distractions, Feed only when awake/alert, HOB to 90 degrees, Meds whole 1 at a time, Remain upright 30 minutes post meal, Small bites/sips and Standard aspiration precautions      General Precautions: Standard, aspiration    Subjective     Pt awake, reclined in bed, eating breakfast. Agreeable to session.     Pain/Comfort:  ·  0/10; no pain reported     Objective:     Has the patient been evaluated by SLP for swallowing?   Yes  Keep patient NPO? No   Current Respiratory Status: room air      Cognitive Communication: Pt awake, alert, agreeable to session. Pt continues to demonstrate confusion c/b dcr orientation and dcr attention. Oriented to name, , place, hospital, and month with 100% acc. Stating day of week as Wednesday, year as , and general time as evening. Pt required mod verbal cues to reference external memory board to identify correct year and day of week, and to reference clock for time of day. Not oriented to reason for hospitalization. Presents with dcr monitoring of the passage of time. Pt continues to be highly distractible and benefits from reduced environmental distractions. Able to focus and attend to SLP for 2-3 minute increments given mod cues for redirection and eye contact. Pt intermittently able to recall correct orientation information throughout session. Continues to demonstrate confusional state c/b dcr orientation and dcr attention. Pt presents with dcr recall of course of  "hospitalization. Is able to recognize familiar hospital staff, but does not recall names or purpose of staff. Pt demonstrates poor insight to deficits and dcr safety awareness. Pt found with unused written log from previous session found at bedside. Pt unable to recall purpose of written log, and stated "I didn't use it because I went to sleep." Pt continues to require extensive explanation regarding the rationale for speech therapy services. Pt also demonstrates dcr functional problem solving skills. Unable to complete simple subtraction task. Pt will require 24 hour supervision and assistance post-acute care d/c.     Motor Speech: Speech c/b heavy regional dialect. Pt is able 80% intelligible this date at the simple conversational level to an unfamiliar listener. Intelligibility improves to 85-90% given min-mod cues to increase volume of speech. Pt continues to demonstrate dcr self-monitoring of rate and volume of speech. Pt continues to require mod-max cues to repair communication breakdowns. Pt denies concern with speech and states "This is how we all talk in [place]." Requires extensive explanation regarding dialect vs. awareness of conversation partner comprehension.    Swallowing: Pt reportedly has been tolerating regular diet with thin liquids without concern. Upon entry to room, pt was eating in reclined position in bed. No overt s/s of aspiration or airway threat observed; however, SLP reviewed recommendations to eat upright with all meals to reduce risk of aspiration.     Assessment:     Jones Red is a 53 y.o. male with an SLP diagnosis of Cognitive Communication Impairment. Pt continues to present with dcr orientation, dcr attention, dcr executive function, and dcr safety awareness. Pt will require 24 hour supervision and assistance following d/c. Speech therapy recommended to continue at the next level of care.     Goals:   Multidisciplinary Problems     SLP Goals        Problem: SLP Goal    " Goal Priority Disciplines Outcome   SLP Goal     SLP Ongoing (interventions implemented as appropriate)   Description:  1. Pt will be able to follow simple 1 step commands 75% of time with moderate verbal and visual cues. (MET)  2. Increase speech intelligibility to 75% at the simple sentence level with moderate verbal cues to slow rate, raise volume.  3. Pt will be able to consume thin liquids in small single sips via cup or straw, with no signs of airway threat or aspiration given max assistance. (MET)   4. Pt will be able to advance to purees and solid consistencies with no signs of airway threat or aspiration given max assistance. (MET)  5. Increase orientation to month, date, day of week, place and reason for hospitalization with 75% acc given max verbal and written cues.  6. Increase ability to express basic wants and needs 75% of time given min assistance   7. Pt will be able to sustain attention to task during simple functional tasks for 10 mins given min cues.                      Plan:     · Patient to be seen:  3 x/week, 5 x/week   · Plan of Care expires:  03/02/19  · Plan of Care reviewed with:  patient   · SLP Follow-Up:  Yes       Discharge recommendations:  nursing facility, skilled     Time Tracking:     SLP Treatment Date:   02/19/19  Speech Start Time:  1027  Speech Stop Time:  1046     Speech Total Time (min):  19 min    Billable Minutes: Speech Therapy Individual 19 minutes    Nolan Aguirre CCC-SLP  02/19/2019

## 2019-02-19 NOTE — PT/OT/SLP PROGRESS
Occupational Therapy   Treatment    Name: Jones Red  MRN: [id number]  Admitting Diagnosis:  Septic shock  12 Days Post-Op    Recommendations:     Discharge Recommendations: nursing facility, skilled(Pt. not agreeable to SNF; will otherwise need 24/7 supervision/assist with post-acute OT/PT/ST)  Discharge Equipment Recommendations:  other (see comments)(bariatric 3-in-1 commode and TTB)  Barriers to discharge:  Other (Comment), Decreased caregiver support(current functional level)    Assessment:   Encouragement needed for participation though with c/o acid reflux and headache.  SBA for bed mobility.  Requires total assist to don socks to BLE while seated EOB.  Requires consistent verbal cues for safe hand placement for functional transfers with minimal carryover.  Ambulated short household distance bed<>bedside chair with CGA for safety with RW; demos forward flexed posture and requires consistent verbal cues for upright trunk.  Progressing towards goals.  To benefit from continued acute care OT services to increase independence in self-care/functional transfers.  Continue POC.     Jones Red is a 53 y.o. male with a medical diagnosis of Septic shock.  He presents with below deficits decreasing independence in self-care/functional transfers. Performance deficits affecting function are weakness, impaired endurance, impaired self care skills, impaired functional mobilty, gait instability, impaired balance, decreased safety awareness, decreased lower extremity function, pain, impaired skin, impaired cardiopulmonary response to activity.     Rehab Prognosis:  Good; patient would benefit from acute skilled OT services to address these deficits and reach maximum level of function.       Plan:     Patient to be seen 5 x/week to address the above listed problems via self-care/home management, therapeutic activities, therapeutic exercises  · Plan of Care Expires: 03/03/19  · Plan of Care Reviewed with:  patient    Subjective     Pain/Comfort:  · Pain Rating 1: 5/10  · Location - Orientation 1: generalized  · Location 1: head  · Pain Addressed 1: Nurse notified, Distraction  · Pain Rating Post-Intervention 1: 5/10    Objective:     Communicated with: Nursing prior to session.  Patient found HOB elevated with oxygen, telemetry, PICC line upon OT entry to room and ST present.    General Precautions: Standard, aspiration, fall   Orthopedic Precautions:N/A   Braces: N/A     Occupational Performance:     Bed Mobility:    · Patient completed Supine to Sit with stand by assistance  · Patient completed Sit to Supine with stand by assistance     Functional Mobility/Transfers:  · Patient completed Sit <> Stand Transfer with contact guard assistance  with  rolling walker   · Patient completed Bed <> Chair Transfer using Step Transfer technique with contact guard assistance with rolling walker  · Functional Mobility: Ambulated short household distance with RW and CGA for steadying/safety; increased verbal cues for upright trunk with minimal carryover.    Activities of Daily Living:  · Lower Body Dressing: total assistance to don socks seated EOB      [facility] 6 Click ADL: 15    Treatment & Education:  -Educated on functional transfer safety and self-pacing  -Seated overhead reaching there-act with rest break as needed and increased encouragement for completion  -Instructed on deep breathing and self-pacing throughout activity     Patient left HOB elevated with all lines intact, call button in reach and nursing notifiedEducation:      GOALS:   Multidisciplinary Problems     Occupational Therapy Goals        Problem: Occupational Therapy Goal    Goal Priority Disciplines Outcome Interventions   Occupational Therapy Goal     OT, PT/OT Ongoing (interventions implemented as appropriate)    Description:  Goals to be met by 3/3/19  1. Max A self-feeding  2. Mod A G/H (wash face and hands) supported sitting (MET 2/8/19)     REVISED: Mod A  G/H (wash face and hands and mouth wash) sitting EOB. GOAL MET 2/11/19.  3. Min A hospital gown change bed level  3. 50% assist with UE ROM exercises MET 2/8/19       REVISED: UE AROM exercises  HHA with Min A VC for continuation of task  5.  Assess bed mobility.  GOAL MET 2/11/19.  6. Step transfer to [facility] with MIN A.    7. MAX A with toileting.   8. Grooming/hygiene in stance with CGA at sink with seated rest breaks as needed.  GOAL MET 2/14/19.                            Time Tracking:     OT Date of Treatment: 02/18/19  OT Start Time: 1540  OT Stop Time: 1613  OT Total Time (min): 33 min    Billable Minutes:Therapeutic Activity 33    Matilda Xie OT  2/18/2019

## 2019-02-19 NOTE — PLAN OF CARE
Problem: Occupational Therapy Goal  Goal: Occupational Therapy Goal  Goals to be met by 3/3/19  1. Max A self-feeding  2. Mod A G/H (wash face and hands) supported sitting (MET 2/8/19)     REVISED: Mod A G/H (wash face and hands and mouth wash) sitting EOB. GOAL MET 2/11/19.  3. Min A hospital gown change bed level  3. 50% assist with UE ROM exercises MET 2/8/19       REVISED: UE AROM exercises  HHA with Min A VC for continuation of task  5.  Assess bed mobility.  GOAL MET 2/11/19.  6. Step transfer to BSC with MIN A.    7. MAX A with toileting.   8. Grooming/hygiene in stance with CGA at sink with seated rest breaks as needed.  GOAL MET 2/14/19.           Outcome: Ongoing (interventions implemented as appropriate)  Encouragement needed for participation though with c/o acid reflux and headache.  SBA for bed mobility.  Requires total assist to don socks to BLE while seated EOB.  Requires consistent verbal cues for safe hand placement for functional transfers with minimal carryover.  Ambulated short household distance bed<>bedside chair with CGA for safety with RW; demos forward flexed posture and requires consistent verbal cues for upright trunk.  Progressing towards goals.  To benefit from continued acute care OT services to increase independence in self-care/functional transfers.  Continue POC.

## 2019-02-19 NOTE — PLAN OF CARE
Problem: Physical Therapy Goal  Goal: Physical Therapy Goal  Goals to be met by: 3/3/19    Patient will increase functional independence with mobility by performin. Supine to sit with min A. -- MET 2/10/19  REVISED: Supine>sit modified independently.   2. Sit to supine with min A. -- MET 2/10/19  REVISED: Sit>supine modified independently. MET 19  3. Rolling R and L with min A. -- MET 2/10/19  4. Sitting at edge of bed >5 minutes with min A. -- MET 2/10/19  5. PT will assess sit<>stand. -- MET 2/10/19  REVISED: Sit<>stand with supervision and standard or rolling walker. MET 19  6. Gait > 50 ft with standard or rolling walker with min A. MET 19  REVISED: Patient will ambulate >150' with RW with CGA for safety  7. UPDATED: patient will t/f bed to wheelchair via stand-step t/f with RW with SBA for safety  8. UPDATED: Patient will propel himself in wheelchair in all directions > 150' while in open environment with Mod I and no verbal cues for safety       Pt progressing towards goals, sup to sit mod Ind./Supervision, amb'd 80' with RW and CGA, O2@2L. Recommending cont PT in SNF, although pt refusing and wants to go home.

## 2019-02-19 NOTE — PLAN OF CARE
Problem: Adult Inpatient Plan of Care  Goal: Plan of Care Review  Outcome: Ongoing (interventions implemented as appropriate)  Patient placed on 2 liters nasal cannula.

## 2019-02-19 NOTE — SUBJECTIVE & OBJECTIVE
Interval History:  No acute events.  Isolated fever noted yesterday afternoon but none since.  Patient very eager to leave hospital.  Denies pain or sob at this time.    Review of Systems   Constitutional: Negative for chills and fever.   HENT: Negative for congestion and dental problem.    Eyes: Negative for discharge and itching.   Respiratory: Negative for shortness of breath.    Cardiovascular: Negative for chest pain.   Gastrointestinal: Negative for abdominal pain, constipation, diarrhea, nausea and vomiting.   Endocrine: Negative for cold intolerance and heat intolerance.   Musculoskeletal: Negative for arthralgias and back pain.   Neurological: Negative for dizziness and facial asymmetry.   Hematological: Negative for adenopathy.   Psychiatric/Behavioral: Negative for agitation and behavioral problems.     Objective:     Vital Signs (Most Recent):  Temp: 98 °F (36.7 °C) (02/19/19 0754)  Pulse: 92 (02/19/19 0754)  Resp: 20 (02/19/19 0754)  BP: 111/63 (02/19/19 0754)  SpO2: (!) 94 % (02/19/19 0754) Vital Signs (24h Range):  Temp:  [98 °F (36.7 °C)-100.6 °F (38.1 °C)] 98 °F (36.7 °C)  Pulse:  [] 92  Resp:  [18-22] 20  SpO2:  [90 %-96 %] 94 %  BP: ()/(51-88) 111/63     Weight: (!) 184 kg (405 lb 10.3 oz)  Body mass index is 50.7 kg/m².    Intake/Output Summary (Last 24 hours) at 2/19/2019 1032  Last data filed at 2/18/2019 1820  Gross per 24 hour   Intake 1720 ml   Output 3000 ml   Net -1280 ml      Physical Exam   Constitutional: He is oriented to person, place, and time. Vital signs are normal. He appears well-developed.  Non-toxic appearance. He does not have a sickly appearance. He does not appear ill. No distress.   Morbid obesity.   HENT:   Head: Normocephalic and atraumatic.   Right Ear: External ear normal.   Left Ear: External ear normal.   Eyes: Conjunctivae and EOM are normal. Pupils are equal, round, and reactive to light. No scleral icterus.   Neck: Normal range of motion. Neck supple.  No JVD present. No tracheal deviation present.   Cardiovascular: Normal rate, regular rhythm, normal heart sounds and intact distal pulses. Exam reveals no gallop and no friction rub.   No murmur heard.  Pulmonary/Chest: Effort normal and breath sounds normal. No stridor. No respiratory distress. He has no wheezes. He has no rales.   Distant lung sounds.  Moving air well.  Some crackles.   Abdominal: Soft. Bowel sounds are normal. He exhibits no distension and no mass. There is no tenderness. There is no guarding.   Musculoskeletal: Normal range of motion. He exhibits no edema or deformity.   Neurological: He is alert and oriented to person, place, and time. He exhibits normal muscle tone.   Oriented x 3.  Overall mental status much improved.  Limited insight into his health care issues.   Skin: Skin is warm and dry. No rash noted. He is not diaphoretic. No erythema.   Erythema resolved.  Some skin breakdown in right skin but does not appear infected at this time.   Nursing note and vitals reviewed.    Significant Labs: All pertinent labs within the past 24 hours have been reviewed.    Significant Imaging: I have reviewed all pertinent imaging results/findings within the past 24 hours.

## 2019-02-19 NOTE — PLAN OF CARE
Problem: Adult Inpatient Plan of Care  Goal: Plan of Care Review  Outcome: Ongoing (interventions implemented as appropriate)  Patient is uncooperative and frankly seems incoherent most of the time.

## 2019-02-19 NOTE — PROGRESS NOTES
"Nephrology  Progress Note    Admit Date: 1/28/2019   LOS: 22 days     SUBJECTIVE:     Follow-up For:  Septic shock/ATN    Interval History:    Still awaiting finalized outpt HD plans in [place].  Low grade temp noted yesterday.  No CP/SOB.     Review of Systems:   Constitutional: no fever or chills   Respiratory: no cough or shortness of breath   Cardiovascular: no chest pain or palpitations   Gastrointestinal: no nausea or vomiting, no abdominal pain or change in bowel habits   Genitourinary: no hematuria or dysuria   Musculoskeletal: no arthralgias or myalgias   Neurological: no seizures or tremors    OBJECTIVE:     Vital Signs Range (Last 24H):  /63 (BP Location: Left arm, Patient Position: Lying)   Pulse 92   Temp 98 °F (36.7 °C) (Oral)   Resp 20   Ht 6' 3" (1.905 m)   Wt (!) 184 kg (405 lb 10.3 oz)   SpO2 (!) 94%   BMI 50.70 kg/m²     Temp:  [98 °F (36.7 °C)-100.6 °F (38.1 °C)]   Pulse:  []   Resp:  [18-22]   BP: ()/(51-88)   SpO2:  [90 %-96 %]     I & O (Last 24H):    Intake/Output Summary (Last 24 hours) at 2/19/2019 1115  Last data filed at 2/18/2019 1820  Gross per 24 hour   Intake 1720 ml   Output 3000 ml   Net -1280 ml       Physical Exam:  General appearance:  NAD and likely back to baseline.     Eyes:  Conjunctivae/corneas clear. EOMI.  Lungs: diminished.   Heart: Regular rate and rhythm, distant heart tones.  Abdomen: Soft, non-tender non-distended; bowel sounds normal; no masses,  no organomegaly, morbidly obese  Extremities:  Chronic Woody edema.   peeling/flaky LE's (R>L).  Overall much improved  Skin:  Right lower extremity cellulitis improved.   Right IJ CHERIE       Laboratory Data:  No results for input(s): WBC, RBC, HGB, HCT, PLT, MCV, MCH, MCHC in the last 24 hours.    BMP:   Recent Labs   Lab 02/18/19  0410   GLU 95   *   K 4.3   CL 94*   CO2 21*   BUN 60*   CREATININE 8.9*   CALCIUM 9.7   MG 2.0     Lab Results   Component Value Date    CALCIUM 9.7 02/18/2019 "    PHOS 5.4 (H) 02/18/2019       Lab Results   Component Value Date    PTH 24.3 02/14/2019    CALCIUM 9.7 02/18/2019    CAION 1.32 02/14/2019    PHOS 5.4 (H) 02/18/2019       No results found for: URICACID    BNP  No results for input(s): BNP, BNPTRIAGEBLO in the last 168 hours.    Medications:  Medication list was reviewed and changes noted under Assessment/Plan.    Diagnostic Results:    X-Ray Chest 1 View for PICC_Central line   Final Result      As above         Electronically signed by: Kalyan Galvez MD   Date:    02/08/2019   Time:    14:46      X-Ray Chest AP Portable   Final Result      NG tube in place.         Electronically signed by: iVviane Persaud MD   Date:    02/02/2019   Time:    23:59      X-Ray Chest 1 View   Final Result      As above.         Electronically signed by: Keon Jonas MD   Date:    01/31/2019   Time:    19:14      X-Ray Chest AP Portable   Final Result      Lines and tubes are grossly unchanged.      Interval improvement in aeration of the lungs.         Electronically signed by: Connor Babcock MD   Date:    01/31/2019   Time:    16:42      X-Ray Chest 1 View   Final Result      Overall no significant interval change         Electronically signed by: Connor Babcock MD   Date:    01/30/2019   Time:    08:24      US Extremity Non Vascular Limited Right   Final Result      Nonspecific subcutaneous edema, cutaneous thickening, could represent cellulitis changes.  No definite necrotizing fasciitis, gas or abscess.         Electronically signed by: Tin Leone MD   Date:    01/29/2019   Time:    13:09      X-Ray Chest AP Portable   Final Result   Addendum 1 of 1      Enteric tube projects in unchanged radiographic position with tip coursing    below the diaphragm and appearing to extend beyond the field of view.         Electronically signed by: Trena Sena MD   Date:    01/29/2019   Time:    00:39      Final      As above.         Electronically signed by: Trena Sena MD    Date:    01/29/2019   Time:    00:04      US Lower Extremity Veins Right   Final Result      Limited examination.  No evidence of deep venous thrombosis in the right lower extremity.         Electronically signed by: Vanita Oviedo   Date:    01/28/2019   Time:    21:10      X-Ray Chest AP Portable   Final Result      Cardiomegaly with findings suggesting pulmonary edema, correlation advised.  Differential would include infection.         Electronically signed by: Kalyan Galvez MD   Date:    01/28/2019   Time:    20:19          ASSESSMENT/PLAN:     1. Persistent Multifactorial initially anuric and now oliguric acute renal failure secondary to ATN from septic shock, rhabdomyolysis, with hyponatremia, and hyperkalemia (N17.0, N17.9, E87.5, E87.1, M62.82, A41.9):  [facility] and has transitioned to standard hemodialysis.   Timeframe of renal recovery unclear at this time.  Tunneled line placed.  Placed on MWF schedule for now.  Consulted SW to start process of outpt placement for HD unit near home.    Reports voiding more.  Asked nurse to accurately record UOP.  Pt told to only void in urinal or in hat on toilet, if possible.  Line working better and TPA instilled.  Renally dose meds, avoid nephrotoxins, and monitor I/O's closely.  2. Hyperphosphatemia: Changed to Renvela tabs with meals.  3. Hypercalcemia (E83.52):  Ionized ca/PTH wnl.  Due to immobilization?  Low chapis bath on HD.  4. RLE Cellulitis/woody edema (L03.115): Defer to wound care and ID.  Much improved.   5. Anemia of illness:  Iron/b12/folate wnl.  Started ONDINA. Nephrocaps.     6. HTN: Overall well controlled.  Low-normal BPs.  BB only for now.   7. Morbid obesity (E44):  Severe morbid obesity the root of most of this patients' medical issues.  Defer.      DC Home once outpt HD plans finalized.      Sterling Lopez, [facility]  Nephrology

## 2019-02-19 NOTE — PROGRESS NOTES
Wound Care follow upfor cellulitis to right leg. Patient sitting on the side of the bed, with legs dangling. Picture taken.     All previously opened areas are closed, with mostly dried scabs. There is lifting of dry skin to areas of the foot, with a large dried scab to the lower anterior calf. Only hyperpigmentation is noted to the right medial thigh. Patient has greatly improved, with no open areas or draining noted.    Jane Mitchell RN, Wound and Ostomy    Right anterior leg      Right medial leg      Right medial thigh

## 2019-02-19 NOTE — ASSESSMENT & PLAN NOTE
-Admitted to inpatient status in ICU upon transfer from [place] with septic shock.  Treated with pressors and IV antibiotics.  Ultimately weaned off of pressors and transferred to the floor on 2/5.  -Possible component of pneumonia but clear cause of his infection is the severe cellulitis involving the right lower extremity.    -Ultrasound of right lower extremity did not reveal evidence of abscess or gas.    -General surgery was consulted and recommended serial exams for now and no surgical intervention at this time.    -Cellulitis continues to improve but still with notable erythema.  WBC has normalized and bood cultures no growth to date.    -Continue to elevate right lower extremity as tolerated.    -Patient completed a course of antibiotics on 2/11/2019.  Continue with wound care.  -Patient stable to be discharged.  Awaiting setup of outpatient hemodialysis as he has declined rehab or skilled nursing placement.

## 2019-02-19 NOTE — ASSESSMENT & PLAN NOTE
-Due to ATN from sepsis with shock and rhabdomyolysis.    -Unfortunately it looks as if he has progressed to ESRD  -We were unable to do HD on 2/5 due to clotted trialysis catheter.  Received new HD catheter on 2/6.    -Continue with dialysis per nephrology.  -Working on outpatient placement as he will need ongoing dialysis

## 2019-02-19 NOTE — PLAN OF CARE
Problem: Adult Inpatient Plan of Care  Goal: Optimal Comfort and Wellbeing  Outcome: Ongoing (interventions implemented as appropriate)  Plan of care reviewed with patient, medications explained. Pt currently resting comfortably in bed and appears to be sleeping in between care. VSS, bed in lowest, locked position with call light in reach. Purposeful rounding done.  No new needs identified at this time, will continue to monitor.

## 2019-02-19 NOTE — PLAN OF CARE
CM spoke to to pt's spouse, Leanne,[phone], to inform her pt would be discharged home once HD arrangements are complete.    Leanne confirmed that someone would be at pt's home for discharge on tomorrow.    HD arrangements not yet complete with [facility] facility in [place].     [facility] to [facility] with [facility].    Transportation home will be arranged thru [facility], placed in will call by Lynnette Teixeira RN.

## 2019-02-20 LAB
ANION GAP SERPL CALC-SCNC: 16 MMOL/L
BASOPHILS # BLD AUTO: 0.01 K/UL
BASOPHILS NFR BLD: 0.2 %
BUN SERPL-MCNC: 46 MG/DL
CALCIUM SERPL-MCNC: 8.1 MG/DL
CHLORIDE SERPL-SCNC: 93 MMOL/L
CO2 SERPL-SCNC: 23 MMOL/L
CREAT SERPL-MCNC: 8.3 MG/DL
DIFFERENTIAL METHOD: ABNORMAL
EOSINOPHIL # BLD AUTO: 0.5 K/UL
EOSINOPHIL NFR BLD: 12.7 %
ERYTHROCYTE [DISTWIDTH] IN BLOOD BY AUTOMATED COUNT: 15.5 %
EST. GFR  (AFRICAN AMERICAN): 8 ML/MIN/1.73 M^2
EST. GFR  (NON AFRICAN AMERICAN): 7 ML/MIN/1.73 M^2
GLUCOSE SERPL-MCNC: 105 MG/DL
HCT VFR BLD AUTO: 29.6 %
HGB BLD-MCNC: 9.1 G/DL
LYMPHOCYTES # BLD AUTO: 1.2 K/UL
LYMPHOCYTES NFR BLD: 29.8 %
MAGNESIUM SERPL-MCNC: 2.1 MG/DL
MCH RBC QN AUTO: 27.4 PG
MCHC RBC AUTO-ENTMCNC: 30.7 G/DL
MCV RBC AUTO: 89 FL
MONOCYTES # BLD AUTO: 0.6 K/UL
MONOCYTES NFR BLD: 14.9 %
NEUTROPHILS # BLD AUTO: 1.7 K/UL
NEUTROPHILS NFR BLD: 41.4 %
PHOSPHATE SERPL-MCNC: 7 MG/DL
PLATELET # BLD AUTO: 336 K/UL
PMV BLD AUTO: 9.6 FL
POCT GLUCOSE: 220 MG/DL (ref 70–110)
POTASSIUM SERPL-SCNC: 4 MMOL/L
RBC # BLD AUTO: 3.32 M/UL
SODIUM SERPL-SCNC: 132 MMOL/L
WBC # BLD AUTO: 4.16 K/UL

## 2019-02-20 PROCEDURE — 99232 SBSQ HOSP IP/OBS MODERATE 35: CPT | Mod: ,,, | Performed by: HOSPITALIST

## 2019-02-20 PROCEDURE — 25000242 PHARM REV CODE 250 ALT 637 W/ HCPCS: Performed by: HOSPITALIST

## 2019-02-20 PROCEDURE — 99232 PR SUBSEQUENT HOSPITAL CARE,LEVL II: ICD-10-PCS | Mod: ,,, | Performed by: HOSPITALIST

## 2019-02-20 PROCEDURE — 25000003 PHARM REV CODE 250: Performed by: INTERNAL MEDICINE

## 2019-02-20 PROCEDURE — 94664 DEMO&/EVAL PT USE INHALER: CPT

## 2019-02-20 PROCEDURE — 11000001 HC ACUTE MED/SURG PRIVATE ROOM

## 2019-02-20 PROCEDURE — 94640 AIRWAY INHALATION TREATMENT: CPT

## 2019-02-20 PROCEDURE — 85025 COMPLETE CBC W/AUTO DIFF WBC: CPT

## 2019-02-20 PROCEDURE — 80100016 HC MAINTENANCE HEMODIALYSIS

## 2019-02-20 PROCEDURE — 94761 N-INVAS EAR/PLS OXIMETRY MLT: CPT

## 2019-02-20 PROCEDURE — 25000003 PHARM REV CODE 250: Performed by: HOSPITALIST

## 2019-02-20 PROCEDURE — 83735 ASSAY OF MAGNESIUM: CPT

## 2019-02-20 PROCEDURE — 97530 THERAPEUTIC ACTIVITIES: CPT

## 2019-02-20 PROCEDURE — 63600175 PHARM REV CODE 636 W HCPCS: Performed by: NURSE PRACTITIONER

## 2019-02-20 PROCEDURE — 25000242 PHARM REV CODE 250 ALT 637 W/ HCPCS: Performed by: STUDENT IN AN ORGANIZED HEALTH CARE EDUCATION/TRAINING PROGRAM

## 2019-02-20 PROCEDURE — 99900035 HC TECH TIME PER 15 MIN (STAT)

## 2019-02-20 PROCEDURE — 63600175 PHARM REV CODE 636 W HCPCS: Performed by: STUDENT IN AN ORGANIZED HEALTH CARE EDUCATION/TRAINING PROGRAM

## 2019-02-20 PROCEDURE — 94660 CPAP INITIATION&MGMT: CPT

## 2019-02-20 PROCEDURE — 80048 BASIC METABOLIC PNL TOTAL CA: CPT

## 2019-02-20 PROCEDURE — 27000646 HC AEROBIKA DEVICE

## 2019-02-20 PROCEDURE — 84100 ASSAY OF PHOSPHORUS: CPT

## 2019-02-20 PROCEDURE — 94668 MNPJ CHEST WALL SBSQ: CPT

## 2019-02-20 PROCEDURE — 63600175 PHARM REV CODE 636 W HCPCS: Performed by: INTERNAL MEDICINE

## 2019-02-20 PROCEDURE — 25000003 PHARM REV CODE 250: Performed by: NURSE PRACTITIONER

## 2019-02-20 RX ORDER — DIPHENHYDRAMINE HCL 12.5MG/5ML
12.5 ELIXIR ORAL ONCE
Status: COMPLETED | OUTPATIENT
Start: 2019-02-20 | End: 2019-02-20

## 2019-02-20 RX ADMIN — IPRATROPIUM BROMIDE AND ALBUTEROL SULFATE 3 ML: .5; 3 SOLUTION RESPIRATORY (INHALATION) at 04:02

## 2019-02-20 RX ADMIN — HEPARIN SODIUM 5000 UNITS: 5000 INJECTION, SOLUTION INTRAVENOUS; SUBCUTANEOUS at 05:02

## 2019-02-20 RX ADMIN — IPRATROPIUM BROMIDE AND ALBUTEROL SULFATE 3 ML: .5; 3 SOLUTION RESPIRATORY (INHALATION) at 07:02

## 2019-02-20 RX ADMIN — SEVELAMER CARBONATE 1600 MG: 800 TABLET, FILM COATED ORAL at 09:02

## 2019-02-20 RX ADMIN — HEPARIN SODIUM 5000 UNITS: 5000 INJECTION, SOLUTION INTRAVENOUS; SUBCUTANEOUS at 02:02

## 2019-02-20 RX ADMIN — IPRATROPIUM BROMIDE AND ALBUTEROL SULFATE 3 ML: .5; 3 SOLUTION RESPIRATORY (INHALATION) at 11:02

## 2019-02-20 RX ADMIN — SODIUM CHLORIDE SOLN NEBU 3% 4 ML: 3 NEBU SOLN at 07:02

## 2019-02-20 RX ADMIN — Medication 1 CAPSULE: at 09:02

## 2019-02-20 RX ADMIN — MICONAZOLE NITRATE: 20 OINTMENT TOPICAL at 09:02

## 2019-02-20 RX ADMIN — METHYLPREDNISOLONE SODIUM SUCCINATE 40 MG: 40 INJECTION, POWDER, FOR SOLUTION INTRAMUSCULAR; INTRAVENOUS at 10:02

## 2019-02-20 RX ADMIN — DIPHENHYDRAMINE HYDROCHLORIDE 12.5 MG: 12.5 SOLUTION ORAL at 11:02

## 2019-02-20 RX ADMIN — HEPARIN SODIUM 2000 UNITS: 1000 INJECTION, SOLUTION INTRAVENOUS; SUBCUTANEOUS at 08:02

## 2019-02-20 RX ADMIN — ACETAMINOPHEN 1000 MG: 500 TABLET ORAL at 06:02

## 2019-02-20 RX ADMIN — CARVEDILOL 3.12 MG: 3.12 TABLET, FILM COATED ORAL at 09:02

## 2019-02-20 RX ADMIN — HEPARIN SODIUM 5000 UNITS: 5000 INJECTION, SOLUTION INTRAVENOUS; SUBCUTANEOUS at 09:02

## 2019-02-20 RX ADMIN — IPRATROPIUM BROMIDE AND ALBUTEROL SULFATE 3 ML: .5; 3 SOLUTION RESPIRATORY (INHALATION) at 03:02

## 2019-02-20 RX ADMIN — SEVELAMER CARBONATE 1600 MG: 800 TABLET, FILM COATED ORAL at 05:02

## 2019-02-20 RX ADMIN — SEVELAMER CARBONATE 1600 MG: 800 TABLET, FILM COATED ORAL at 12:02

## 2019-02-20 NOTE — SUBJECTIVE & OBJECTIVE
Interval History:  No acute events.  Patient very eager to leave hospital.  Denies pain or sob at this time.  Noted to have dry cough at this time.  Seen during dialysis.    Review of Systems   Constitutional: Negative for chills and fever.   HENT: Negative for congestion and dental problem.    Eyes: Negative for discharge and itching.   Respiratory: Negative for shortness of breath.    Cardiovascular: Negative for chest pain.   Gastrointestinal: Negative for abdominal pain, constipation, diarrhea, nausea and vomiting.   Endocrine: Negative for cold intolerance and heat intolerance.   Musculoskeletal: Negative for arthralgias and back pain.   Neurological: Negative for dizziness and facial asymmetry.   Hematological: Negative for adenopathy.   Psychiatric/Behavioral: Negative for agitation and behavioral problems.     Objective:     Vital Signs (Most Recent):  Temp: 99.1 °F (37.3 °C) (02/20/19 0733)  Pulse: 96 (02/20/19 0733)  Resp: 20 (02/20/19 0733)  BP: (!) 87/52 (02/20/19 0733)  SpO2: (!) 91 % (02/20/19 0733) Vital Signs (24h Range):  Temp:  [98.2 °F (36.8 °C)-99.1 °F (37.3 °C)] 99.1 °F (37.3 °C)  Pulse:  [78-99] 96  Resp:  [17-22] 20  SpO2:  [86 %-96 %] 91 %  BP: ()/(47-60) 87/52     Weight: (!) 159.4 kg (351 lb 6.6 oz)  Body mass index is 43.92 kg/m².    Intake/Output Summary (Last 24 hours) at 2/20/2019 0918  Last data filed at 2/19/2019 2208  Gross per 24 hour   Intake 120 ml   Output --   Net 120 ml      Physical Exam   Constitutional: He is oriented to person, place, and time. Vital signs are normal. He appears well-developed.  Non-toxic appearance. He does not have a sickly appearance. He does not appear ill. No distress.   Morbid obesity.   HENT:   Head: Normocephalic and atraumatic.   Right Ear: External ear normal.   Left Ear: External ear normal.   Eyes: Conjunctivae and EOM are normal. Pupils are equal, round, and reactive to light. No scleral icterus.   Neck: Normal range of motion. Neck  supple. No JVD present. No tracheal deviation present.   Cardiovascular: Normal rate, regular rhythm, normal heart sounds and intact distal pulses. Exam reveals no gallop and no friction rub.   No murmur heard.  Pulmonary/Chest: Effort normal and breath sounds normal. No stridor. No respiratory distress. He has no wheezes. He has no rales.   Distant lung sounds.  Moving air well.   Abdominal: Soft. Bowel sounds are normal. He exhibits no distension and no mass. There is no tenderness. There is no guarding.   Musculoskeletal: Normal range of motion. He exhibits no edema or deformity.   Neurological: He is alert and oriented to person, place, and time. He exhibits normal muscle tone.   Oriented x 3.  Overall mental status much improved.  Limited insight into his health care issues.   Skin: Skin is warm and dry. No rash noted. He is not diaphoretic. No erythema.   Erythema resolved.  Some skin breakdown in right skin but does not appear infected at this time.   Nursing note and vitals reviewed.    Significant Labs: All pertinent labs within the past 24 hours have been reviewed.    Significant Imaging: I have reviewed all pertinent imaging results/findings within the past 24 hours.

## 2019-02-20 NOTE — ASSESSMENT & PLAN NOTE
-Admitted to inpatient status in ICU upon transfer from [place] with septic shock.  Treated with pressors and IV antibiotics.  Ultimately weaned off of pressors and transferred to the floor on 2/5.  -Possible component of pneumonia but clear cause of his infection is the severe cellulitis involving the right lower extremity.    -Ultrasound of right lower extremity did not reveal evidence of abscess or gas.    -General surgery was consulted and recommended serial exams for now and no surgical intervention at this time.    -Cellulitis continues to improve but still with notable erythema.  WBC has normalized and bood cultures no growth to date.    -Continue to elevate right lower extremity as tolerated.    -Patient completed a course of antibiotics on 2/11/2019.  Continue with wound care.  -Patient stable to be discharged.  Awaiting setup of outpatient hemodialysis as he has declined rehab or skilled nursing placement.

## 2019-02-20 NOTE — NURSING
Administered IV methylprednisone per order in dialysis. Dialysis nurse called nursing and informed of pt c/o itching. Rash noted on pt abdomen. NAD, equal bilateral BS. MD notified. Will continue to monitor.

## 2019-02-20 NOTE — PLAN OF CARE
Problem: Adult Inpatient Plan of Care  Goal: Plan of Care Review  Outcome: Ongoing (interventions implemented as appropriate)  Pt resting in bed. NAD, VSS on RA, AOx4. Pt not complaining of pain. Ambulatory with stand by assist and walker. Wheelchair also at bedside. Pt tolerated dialysis today. Worked with PT/ OT. POC reviewed with pt. Pt tolerating resp tmts.  Bed low and locked. Personal items and call light within reach. Will continue to monitor.

## 2019-02-20 NOTE — PT/OT/SLP PROGRESS
Physical Therapy      Patient Name:  Jones Red   MRN:  [id number]    Patient not seen today secondary to Dialysis in AM, and pt with low BP's in PM, 100/55.  Will follow-up in AM.    Coretta Evans PTA

## 2019-02-20 NOTE — PLAN OF CARE
CM left a message for Chani at [facility] admitting to inquire about facility placement. Pending call back to discuss [facility] or [facility].    Pending call back.

## 2019-02-20 NOTE — PROGRESS NOTES
Pt received on RA;SPO2 adequate. Treatments were given and tolerated well. No changes made. Will continue to monitor.

## 2019-02-20 NOTE — PLAN OF CARE
CM has not had a call back from [facility] admissions after multiple calls.    CM to attempt to call again 2/21/19.

## 2019-02-20 NOTE — PLAN OF CARE
Problem: Adult Inpatient Plan of Care  Goal: Plan of Care Review  Outcome: Ongoing (interventions implemented as appropriate)  Patient ambulated to bathroom and back to bed independently. Discussed with patient about using urinal. He verbalized that he understood request and instructions, but has not used urinal yet this shift. Patient lying quietly in bed with eyes closed. No distress or discomfort noted. Right arm PICC line and right chest wall Jasvir Split Catheter intact. Call light within reach. Encouraged patient to call for any and all needs. Patient verbalized understanding of instructions. Will continue to monitor patient.

## 2019-02-20 NOTE — PROGRESS NOTES
"Nephrology  Progress Note    Admit Date: 1/28/2019   LOS: 23 days     SUBJECTIVE:     Follow-up For:  Septic shock/ATN    Interval History:    Seen on dialysis and complains of cough.  Expiratory wheeze noted.  No chest pain or shortness of Breath.  Still awaiting outpatient dialysis arrangements.    Review of Systems:   Constitutional: no fever or chills   Respiratory: + cough  Cardiovascular: no chest pain or palpitations   Gastrointestinal: no nausea or vomiting, no abdominal pain or change in bowel habits   Genitourinary: no hematuria or dysuria   Musculoskeletal: no arthralgias or myalgias   Neurological: no seizures or tremors    OBJECTIVE:     Vital Signs Range (Last 24H):  /62 (BP Location: Left arm, Patient Position: Lying)   Pulse 91   Temp 98.4 °F (36.9 °C) (Oral)   Resp 16   Ht 6' 3" (1.905 m)   Wt (!) 159.4 kg (351 lb 6.6 oz)   SpO2 (!) 91%   BMI 43.92 kg/m²     Temp:  [98.2 °F (36.8 °C)-99.1 °F (37.3 °C)]   Pulse:  [78-99]   Resp:  [16-22]   BP: ()/(47-94)   SpO2:  [86 %-96 %]     I & O (Last 24H):    Intake/Output Summary (Last 24 hours) at 2/20/2019 1016  Last data filed at 2/19/2019 2208  Gross per 24 hour   Intake 120 ml   Output --   Net 120 ml       Physical Exam:  General appearance:  NAD and likely back to baseline.     Eyes:  Conjunctivae/corneas clear. EOMI.  Lungs:  Expiratory wheeze.   Heart: Regular rate and rhythm, distant heart tones.  Abdomen: Soft, non-tender non-distended; bowel sounds normal; no masses,  no organomegaly, morbidly obese  Extremities:  Chronic Woody edema.   peeling/flaky LE's (R>L).  Overall much improved  Skin:  Right lower extremity cellulitis improved.   Right IJ CHERIE       Laboratory Data:  Recent Labs   Lab 02/20/19  0505   WBC 4.16   RBC 3.32*   HGB 9.1*   HCT 29.6*      MCV 89   MCH 27.4   MCHC 30.7*       BMP:   Recent Labs   Lab 02/20/19  0505      *   K 4.0   CL 93*   CO2 23   BUN 46*   CREATININE 8.3*   CALCIUM 8.1* "   MG 2.1     Lab Results   Component Value Date    CALCIUM 8.1 (L) 02/20/2019    PHOS 7.0 (H) 02/20/2019       Lab Results   Component Value Date    PTH 24.3 02/14/2019    CALCIUM 8.1 (L) 02/20/2019    CAION 1.32 02/14/2019    PHOS 7.0 (H) 02/20/2019       No results found for: URICACID    BNP  No results for input(s): BNP, BNPTRIAGEBLO in the last 168 hours.    Medications:  Medication list was reviewed and changes noted under Assessment/Plan.    Diagnostic Results:    X-Ray Chest 1 View for PICC_Central line   Final Result      As above         Electronically signed by: Kalyan Galvez MD   Date:    02/08/2019   Time:    14:46      X-Ray Chest AP Portable   Final Result      NG tube in place.         Electronically signed by: Viviane Persaud MD   Date:    02/02/2019   Time:    23:59      X-Ray Chest 1 View   Final Result      As above.         Electronically signed by: Keon Jonas MD   Date:    01/31/2019   Time:    19:14      X-Ray Chest AP Portable   Final Result      Lines and tubes are grossly unchanged.      Interval improvement in aeration of the lungs.         Electronically signed by: Connor Babcock MD   Date:    01/31/2019   Time:    16:42      X-Ray Chest 1 View   Final Result      Overall no significant interval change         Electronically signed by: Connor Babcock MD   Date:    01/30/2019   Time:    08:24      US Extremity Non Vascular Limited Right   Final Result      Nonspecific subcutaneous edema, cutaneous thickening, could represent cellulitis changes.  No definite necrotizing fasciitis, gas or abscess.         Electronically signed by: Tin Leone MD   Date:    01/29/2019   Time:    13:09      X-Ray Chest AP Portable   Final Result   Addendum 1 of 1      Enteric tube projects in unchanged radiographic position with tip coursing    below the diaphragm and appearing to extend beyond the field of view.         Electronically signed by: Trena Sena MD   Date:    01/29/2019    Time:    00:39      Final      As above.         Electronically signed by: Trena Sena MD   Date:    01/29/2019   Time:    00:04      US Lower Extremity Veins Right   Final Result      Limited examination.  No evidence of deep venous thrombosis in the right lower extremity.         Electronically signed by: Vanita Oviedo   Date:    01/28/2019   Time:    21:10      X-Ray Chest AP Portable   Final Result      Cardiomegaly with findings suggesting pulmonary edema, correlation advised.  Differential would include infection.         Electronically signed by: Kalyan Galvez MD   Date:    01/28/2019   Time:    20:19          ASSESSMENT/PLAN:     1. Persistent Multifactorial initially anuric and now oliguric acute renal failure secondary to ATN from septic shock, rhabdomyolysis, with hyponatremia, and hyperkalemia (N17.0, N17.9, E87.5, E87.1, M62.82, A41.9):  [facility] and has transitioned to standard hemodialysis.   Timeframe of renal recovery unclear at this time.  Tunneled line placed.  Placed on F schedule for now.  Consulted SW to start process of outpt placement for HD unit near home.    Reports voiding more.  Asked nurse to accurately record UOP.  Pt told to only void in urinal or in hat on toilet, if possible.  Line working better and TPA instilled.  Renally dose meds, avoid nephrotoxins, and monitor I/O's closely.  2. Hyperphosphatemia: Changed to Renvela tabs with meals.  3. Hypercalcemia (E83.52):  Ionized ca/PTH wnl.  Due to immobilization?  Low chapis bath on HD.  4. RLE Cellulitis/woody edema (L03.115): Defer to wound care and ID.  Much improved.   5. Anemia of illness:  Iron/b12/folate wnl.  Started ONDINA. Nephrocaps.     6. Bronchospasm:  Nebs and 1 dose of steroids now  7. HTN: Low-normal BPs.  BB only for now.   8. Morbid obesity (E44):  Severe morbid obesity the root of most of this patients' medical issues.  Defer.      DC Home once outpt HD plans finalized.       Sterling Lopez, [facility]  Nephrology

## 2019-02-20 NOTE — PLAN OF CARE
Problem: Adult Inpatient Plan of Care  Goal: Plan of Care Review  Outcome: Ongoing (interventions implemented as appropriate)  Patient seemed more alert and cooperative on first round at 7PM but when I later made an effort to put him on Bipap he refused in very strong language.

## 2019-02-20 NOTE — PLAN OF CARE
Blood work drawn this morning from right arm double lumen PICC line. Both the purple port and the red port both flush and blood return noted. Both ports were flushed this morning. Right chest wall sepideh split catheter intact. Will continue to monitor patient.

## 2019-02-20 NOTE — PROGRESS NOTES
[facility] Hospital Medicine  Progress Note    Patient Name: Jones Red  MRN: [id number]  Patient Class: IP- Inpatient   Admission Date: 1/28/2019  Length of Stay: 23 days  Attending Physician: Jones Milner MD  Primary Care Provider: PEE Romano MD        Subjective:     Principal Problem:Septic shock    HPI:  Mr. Jones Red is a 53 y.o. male, with PMH of HTN, COPD, chronic respiratory failure (on home O2), Diastolic CHF, LYNDON, chronic venous insufficiency, and questionable seizures history (suspected to be 2/2 alcohol withdrawal), who preset tia to [facility] on 1/22/19 2/2 SOB. This was associated with cough productive of brown sputum. He was started in BiPap, but did ultimately degrade, and was intubated, and maintained on a ventilator. He was additionally altered upon arrival to the ED. History was reportedly obtained by a friend, and it was reported that he was found down at home, and was unable to get up. He as admitted to the ICU, started on IV fluids and pressors.     Hospital Course:  Mr. Red is a 53-year-old man with history of hypertension, diastolic heart failure, chronic obstructive pulmonary disease with chronic hypoxic respiratory failure on home oxygen, prior alcohol abuse, significant ongoing tobacco use (3 packs per day), and morbid obesity with septic shock secondary to right lower extremity cellulitis complicating chronic venous stasis.  Patient intubated at outside hospital after failing a trial of BiPAP.  Patient also with evidence of worsening renal failure with rhabdomyolysis.  Patient transferred from [facility] in [place], [place] to [facility] on 1/28/2019.  Patient started on renal replacement therapy.  He was extubated on 2/2/19 to bipap and transferred to the floor on 2/5/19.  Ultrasound of the right lower extremity did not reveal evidence of abscess, subcutaneous gas, or evidence of necrotizing fascitits.   General surgery and wound care consulted.  No surgical intervention recommended at this time.  He was seen by ID who recommended continuing vancomycin and cefepime until 2/11/19.  Mental status improved. He is tolerating his diet and continues on dialysis.  We are presently working with PT and OT daily in attempts to gain strength.  We are working on placement in SNF or inpatient rehab closer to his home which is complicated due to his high medical demands.  Patient's confusion refused and patient clearly refusing placement and would like to go home with home health.  Working with case management to secure outpatient hemodialysis close to his home.    Interval History:  No acute events.  Patient very eager to leave hospital.  Denies pain or sob at this time.  Noted to have dry cough at this time.  Seen during dialysis.    Review of Systems   Constitutional: Negative for chills and fever.   HENT: Negative for congestion and dental problem.    Eyes: Negative for discharge and itching.   Respiratory: Negative for shortness of breath.    Cardiovascular: Negative for chest pain.   Gastrointestinal: Negative for abdominal pain, constipation, diarrhea, nausea and vomiting.   Endocrine: Negative for cold intolerance and heat intolerance.   Musculoskeletal: Negative for arthralgias and back pain.   Neurological: Negative for dizziness and facial asymmetry.   Hematological: Negative for adenopathy.   Psychiatric/Behavioral: Negative for agitation and behavioral problems.     Objective:     Vital Signs (Most Recent):  Temp: 99.1 °F (37.3 °C) (02/20/19 0733)  Pulse: 96 (02/20/19 0733)  Resp: 20 (02/20/19 0733)  BP: (!) 87/52 (02/20/19 0733)  SpO2: (!) 91 % (02/20/19 0733) Vital Signs (24h Range):  Temp:  [98.2 °F (36.8 °C)-99.1 °F (37.3 °C)] 99.1 °F (37.3 °C)  Pulse:  [78-99] 96  Resp:  [17-22] 20  SpO2:  [86 %-96 %] 91 %  BP: ()/(47-60) 87/52     Weight: (!) 159.4 kg (351 lb 6.6 oz)  Body mass index is 43.92  kg/m².    Intake/Output Summary (Last 24 hours) at 2/20/2019 0918  Last data filed at 2/19/2019 2208  Gross per 24 hour   Intake 120 ml   Output --   Net 120 ml      Physical Exam   Constitutional: He is oriented to person, place, and time. Vital signs are normal. He appears well-developed.  Non-toxic appearance. He does not have a sickly appearance. He does not appear ill. No distress.   Morbid obesity.   HENT:   Head: Normocephalic and atraumatic.   Right Ear: External ear normal.   Left Ear: External ear normal.   Eyes: Conjunctivae and EOM are normal. Pupils are equal, round, and reactive to light. No scleral icterus.   Neck: Normal range of motion. Neck supple. No JVD present. No tracheal deviation present.   Cardiovascular: Normal rate, regular rhythm, normal heart sounds and intact distal pulses. Exam reveals no gallop and no friction rub.   No murmur heard.  Pulmonary/Chest: Effort normal and breath sounds normal. No stridor. No respiratory distress. He has no wheezes. He has no rales.   Distant lung sounds.  Moving air well.   Abdominal: Soft. Bowel sounds are normal. He exhibits no distension and no mass. There is no tenderness. There is no guarding.   Musculoskeletal: Normal range of motion. He exhibits no edema or deformity.   Neurological: He is alert and oriented to person, place, and time. He exhibits normal muscle tone.   Oriented x 3.  Overall mental status much improved.  Limited insight into his health care issues.   Skin: Skin is warm and dry. No rash noted. He is not diaphoretic. No erythema.   Erythema resolved.  Some skin breakdown in right skin but does not appear infected at this time.   Nursing note and vitals reviewed.    Significant Labs: All pertinent labs within the past 24 hours have been reviewed.    Significant Imaging: I have reviewed all pertinent imaging results/findings within the past 24 hours.    Assessment/Plan:      * Septic shock    -Admitted to inpatient status in ICU upon  transfer from [place] with septic shock.  Treated with pressors and IV antibiotics.  Ultimately weaned off of pressors and transferred to the floor on 2/5.  -Possible component of pneumonia but clear cause of his infection is the severe cellulitis involving the right lower extremity.    -Ultrasound of right lower extremity did not reveal evidence of abscess or gas.    -General surgery was consulted and recommended serial exams for now and no surgical intervention at this time.    -Cellulitis continues to improve but still with notable erythema.  WBC has normalized and bood cultures no growth to date.    -Continue to elevate right lower extremity as tolerated.    -Patient completed a course of antibiotics on 2/11/2019.  Continue with wound care.  -Patient stable to be discharged.  Awaiting setup of outpatient hemodialysis as he has declined rehab or skilled nursing placement.      Debility    -Continue with therapy.    -Patient refuses SNF placement  -Will anticipate home with  for PT and OT.     Encephalopathy, metabolic    -Much improved.  Off restraints today.  Slowly improving.       Essential hypertension    -Reasonably controlled with current regimen.  Will continue with current regimen and continue to monitor.     Tobacco abuse    -Patient counseled on the importance of smoking cessation.  -NRT offered     Acute on chronic respiratory failure with hypoxia and hypercapnia    -Successfully extubated 2/2/2018.  Patient did well on BiPAP following extubation.  He has needed intermittent BiPAP.    -Continue with bronchodilator treatments as I suspect patient has COPD given wheezing and significant tobacco smoking history.    -Continue volume removal with CRRT as able.  -continue supplemental O2 requirements for goal sat 88-92%  -Completed course of steroids   -Will need outpatient PFTs and probable ICS/LAMA/LABA. When patient is being discharged will start him on Spiriva.         Acute on chronic diastolic  heart failure    -Marked improvement with dialysis which we need to continue.    -Continue with additional volume removal with renal replacement therapy as recommended by Nephrology.  -No ARB due to renal dysfunction.  -Continue coreg.     Acute renal failure    -Due to ATN from sepsis with shock and rhabdomyolysis.    -Unfortunately it looks as if he has progressed to ESRD  -We were unable to do HD on 2/5 due to clotted trialysis catheter.  Received new HD catheter on 2/6.    -Continue with dialysis per nephrology.  -Working on outpatient placement as he will need ongoing dialysis       VTE Risk Mitigation (From admission, onward)        Ordered     heparin (porcine) injection 2,000 Units  Once      02/06/19 1729     heparin (porcine) injection 5,000 Units  As needed (PRN)      01/31/19 1825     heparin (porcine) injection 2,000 Units  As needed (PRN)      01/30/19 1056     heparin (porcine) injection 5,000 Units  Every 8 hours      01/29/19 2044     heparin (porcine) injection 5,000 Units  As needed (PRN)      01/29/19 1335     IP VTE HIGH RISK PATIENT  Once      01/28/19 1947              Jones Milner MD  Department of Hospital Medicine   [facility]

## 2019-02-20 NOTE — PLAN OF CARE
Pt does not yet have an accepting HD facility.    Please read today's previous note.     02/20/19 1039   Post-Acute Status   Post-Acute Authorization Placement   Post-Acute Placement Status Referrals Sent

## 2019-02-21 PROCEDURE — 99231 SBSQ HOSP IP/OBS SF/LOW 25: CPT | Mod: ,,, | Performed by: HOSPITALIST

## 2019-02-21 PROCEDURE — 11000001 HC ACUTE MED/SURG PRIVATE ROOM

## 2019-02-21 PROCEDURE — 94668 MNPJ CHEST WALL SBSQ: CPT

## 2019-02-21 PROCEDURE — 94640 AIRWAY INHALATION TREATMENT: CPT

## 2019-02-21 PROCEDURE — 94761 N-INVAS EAR/PLS OXIMETRY MLT: CPT

## 2019-02-21 PROCEDURE — 63600175 PHARM REV CODE 636 W HCPCS: Performed by: STUDENT IN AN ORGANIZED HEALTH CARE EDUCATION/TRAINING PROGRAM

## 2019-02-21 PROCEDURE — 25000242 PHARM REV CODE 250 ALT 637 W/ HCPCS: Performed by: HOSPITALIST

## 2019-02-21 PROCEDURE — 94660 CPAP INITIATION&MGMT: CPT

## 2019-02-21 PROCEDURE — 25000003 PHARM REV CODE 250: Performed by: NURSE PRACTITIONER

## 2019-02-21 PROCEDURE — 99231 PR SUBSEQUENT HOSPITAL CARE,LEVL I: ICD-10-PCS | Mod: ,,, | Performed by: HOSPITALIST

## 2019-02-21 PROCEDURE — 25000242 PHARM REV CODE 250 ALT 637 W/ HCPCS: Performed by: STUDENT IN AN ORGANIZED HEALTH CARE EDUCATION/TRAINING PROGRAM

## 2019-02-21 PROCEDURE — 92507 TX SP LANG VOICE COMM INDIV: CPT

## 2019-02-21 PROCEDURE — 97116 GAIT TRAINING THERAPY: CPT

## 2019-02-21 PROCEDURE — 97530 THERAPEUTIC ACTIVITIES: CPT

## 2019-02-21 PROCEDURE — 97535 SELF CARE MNGMENT TRAINING: CPT

## 2019-02-21 PROCEDURE — 25000003 PHARM REV CODE 250: Performed by: INTERNAL MEDICINE

## 2019-02-21 PROCEDURE — 99900035 HC TECH TIME PER 15 MIN (STAT)

## 2019-02-21 RX ORDER — IPRATROPIUM BROMIDE AND ALBUTEROL SULFATE 2.5; .5 MG/3ML; MG/3ML
3 SOLUTION RESPIRATORY (INHALATION) EVERY 8 HOURS
Status: DISCONTINUED | OUTPATIENT
Start: 2019-02-21 | End: 2019-02-21

## 2019-02-21 RX ORDER — TIOTROPIUM BROMIDE 18 UG/1
1 CAPSULE ORAL; RESPIRATORY (INHALATION) DAILY
Status: DISCONTINUED | OUTPATIENT
Start: 2019-02-22 | End: 2019-02-25 | Stop reason: HOSPADM

## 2019-02-21 RX ORDER — FLUTICASONE FUROATE AND VILANTEROL 100; 25 UG/1; UG/1
1 POWDER RESPIRATORY (INHALATION) DAILY
Status: DISCONTINUED | OUTPATIENT
Start: 2019-02-21 | End: 2019-02-25 | Stop reason: HOSPADM

## 2019-02-21 RX ADMIN — FLUTICASONE FUROATE AND VILANTEROL TRIFENATATE 1 PUFF: 100; 25 POWDER RESPIRATORY (INHALATION) at 05:02

## 2019-02-21 RX ADMIN — MICONAZOLE NITRATE: 20 OINTMENT TOPICAL at 09:02

## 2019-02-21 RX ADMIN — Medication 1 CAPSULE: at 08:02

## 2019-02-21 RX ADMIN — SEVELAMER CARBONATE 1600 MG: 800 TABLET, FILM COATED ORAL at 08:02

## 2019-02-21 RX ADMIN — SODIUM CHLORIDE SOLN NEBU 3% 4 ML: 3 NEBU SOLN at 07:02

## 2019-02-21 RX ADMIN — IPRATROPIUM BROMIDE AND ALBUTEROL SULFATE 3 ML: .5; 3 SOLUTION RESPIRATORY (INHALATION) at 07:02

## 2019-02-21 RX ADMIN — SEVELAMER CARBONATE 1600 MG: 800 TABLET, FILM COATED ORAL at 05:02

## 2019-02-21 RX ADMIN — CARVEDILOL 3.12 MG: 3.12 TABLET, FILM COATED ORAL at 08:02

## 2019-02-21 RX ADMIN — HEPARIN SODIUM 5000 UNITS: 5000 INJECTION, SOLUTION INTRAVENOUS; SUBCUTANEOUS at 02:02

## 2019-02-21 RX ADMIN — CARVEDILOL 3.12 MG: 3.12 TABLET, FILM COATED ORAL at 09:02

## 2019-02-21 RX ADMIN — HEPARIN SODIUM 5000 UNITS: 5000 INJECTION, SOLUTION INTRAVENOUS; SUBCUTANEOUS at 05:02

## 2019-02-21 RX ADMIN — HEPARIN SODIUM 5000 UNITS: 5000 INJECTION, SOLUTION INTRAVENOUS; SUBCUTANEOUS at 09:02

## 2019-02-21 RX ADMIN — MICONAZOLE NITRATE: 20 OINTMENT TOPICAL at 08:02

## 2019-02-21 RX ADMIN — IPRATROPIUM BROMIDE AND ALBUTEROL SULFATE 3 ML: .5; 3 SOLUTION RESPIRATORY (INHALATION) at 03:02

## 2019-02-21 RX ADMIN — SEVELAMER CARBONATE 1600 MG: 800 TABLET, FILM COATED ORAL at 12:02

## 2019-02-21 NOTE — PLAN OF CARE
Problem: Occupational Therapy Goal  Goal: Occupational Therapy Goal  Goals to be met by 3/3/19  1. Max A self-feeding   2. Mod A G/H (wash face and hands) supported sitting (MET 2/8/19)     REVISED: Mod A G/H (wash face and hands and mouth wash) sitting EOB. GOAL MET 2/11/19.  3. Min A hospital gown change bed level - MET 2/21/19  3. 50% assist with UE ROM exercises MET 2/8/19       REVISED: UE AROM exercises  HHA with Min A VC for continuation of task  5.  Assess bed mobility.  GOAL MET 2/11/19.  6. Step transfer to [facility] with [place] A.  MET 2/21/19  7. MAX A with toileting. In progress; PT reports patient toileted with her this AM, and patient didn't need (A).   8. Grooming/hygiene in stance with CGA at sink with seated rest breaks as needed.  GOAL MET 2/14/19.           Outcome: Ongoing (interventions implemented as appropriate)  Patient seen for session this AM. Patient verbalizing not having issues with asking family members for (A) with ADL.Reviewed safe use of wheelchair.     Comments: Patient improving with functionality, making progress towards set goals. Based on session today, and participation with PT earlier this AM, recommend patient to return home with family assistance.     BOBO Reid, LUISR/MADY  2/21/2019

## 2019-02-21 NOTE — PT/OT/SLP PROGRESS
"Speech Language Pathology Treatment    Patient Name:  Jones Red   MRN:  [id number]  Admitting Diagnosis: Septic shock    Recommendations:                 General Recommendations:                1. Speech Pathology 3-5x/week for ongoing assessment of diet tolerance and remediation of cognitive communication deficits and motor speech deficits     Diet recommendations:  Regular, Liquid Diet Level: Thin      Aspiration Precautions: 1 bite/sip at a time, Assistance with meals, Avoid talking while eating, Eliminate distractions, Feed only when awake/alert, HOB to 90 degrees, Meds whole 1 at a time, Remain upright 30 minutes post meal, Small bites/sips and Standard aspiration precautions      General Precautions: Standard, aspiration    Subjective     Pt awake, reclined in bed, watching television. Agreeable to session.     Pain/Comfort:  · Pain Rating 1: 0/10; no pain reported     Objective:     Has the patient been evaluated by SLP for swallowing?   Yes  Keep patient NPO? No   Current Respiratory Status: room air      Cognitive Communication: Pt awake, alert, agreeable to session. Pt presenting with improved orientation and attention this date. Oriented to name, , place, hospital, month, and year with 100% acc. Off by one day for exact date. Increasing orientation to reason for hospitalization and general situation. Pt able to selectively attend to SLP for 5-6 minute increments given mod cues for redirection and eye contact. Improved eye contact this session provided dcr cueing. Pt demonstrating slightly improved insight to deficits; however, requires extensive rationale and explanation. Pt willing to discuss with SLP potential difficulties he may encounter following d/c, such as managing bills, calendars, and appts. Pt agreeable the he "will need some help." Improved recall noted today. Pt able to recall general events of previous 24 hours, although states "I can't remember all the details." Able to complete 15 " minutes prospective memory task without cues. Able to complete simple mental manipulation task with 75% acc. Although presenting with improving orientation, attention, safety awareness, and mental manipulation, pt will continue to require 24 hour supervision and assistance post-acute care d/c.     Motor Speech: Speech c/b heavy regional dialect. Pt is able 80% intelligible this date at the simple conversational level to an unfamiliar listener. Intelligibility improves to 85-90% given min-mod cues to increase volume of speech. Pt continues to demonstrate dcr self-monitoring of rate and volume of speech. Pt continues to require mod-max cues to repair communication breakdowns. Requires extensive explanation regarding dialect vs. awareness of conversation partner comprehension.    Assessment:     Jones Red is a 53 y.o. male with an SLP diagnosis of Cognitive Communication Impairment. Pt continues to present with dcr orientation, dcr attention, dcr executive function, and dcr safety awareness. Pt will require 24 hour supervision and assistance following d/c. Speech therapy recommended to continue at the next level of care.     Goals:   Multidisciplinary Problems     SLP Goals        Problem: SLP Goal    Goal Priority Disciplines Outcome   SLP Goal     SLP Ongoing (interventions implemented as appropriate)   Description:  1. Pt will be able to follow simple 1 step commands 75% of time with moderate verbal and visual cues. (MET)  2. Increase speech intelligibility to 75% at the simple sentence level with moderate verbal cues to slow rate, raise volume.  3. Pt will be able to consume thin liquids in small single sips via cup or straw, with no signs of airway threat or aspiration given max assistance. (MET)   4. Pt will be able to advance to purees and solid consistencies with no signs of airway threat or aspiration given max assistance. (MET)  5. Increase orientation to month, date, day of week, place and reason for  hospitalization with 75% acc given max verbal and written cues.  6. Increase ability to express basic wants and needs 75% of time given min assistance   7. Pt will be able to sustain attention to task during simple functional tasks for 10 mins given min cues.                      Plan:     · Patient to be seen:  3 x/week, 5 x/week   · Plan of Care expires:  03/02/19  · Plan of Care reviewed with:  patient, other (see comments)(RN)   · SLP Follow-Up:  Yes       Discharge recommendations:  nursing facility, skilled     Time Tracking:     SLP Treatment Date:   02/21/19  Speech Start Time:  1558  Speech Stop Time:  1624     Speech Total Time (min):  26 min    Billable Minutes: Speech Therapy Individual 26 minutes    Nolan Aguirre CCC-SLP  02/21/2019

## 2019-02-21 NOTE — PLAN OF CARE
Problem: Occupational Therapy Goal  Goal: Occupational Therapy Goal  Goals to be met by 3/3/19  1. Max A self-feeding  2. Mod A G/H (wash face and hands) supported sitting (MET 2/8/19)     REVISED: Mod A G/H (wash face and hands and mouth wash) sitting EOB. GOAL MET 2/11/19.  3. Min A hospital gown change bed level  3. 50% assist with UE ROM exercises MET 2/8/19       REVISED: UE AROM exercises  HHA with Min A VC for continuation of task  5.  Assess bed mobility.  GOAL MET 2/11/19.  6. Step transfer to [facility] with MIN A.    7. MAX A with toileting.   8. Grooming/hygiene in stance with CGA at sink with seated rest breaks as needed.  GOAL MET 2/14/19.           Outcome: Ongoing (interventions implemented as appropriate)  With c/o headache and also stating very tired.  BP checked while in sitting and found to be 104/58 and 98 bpm.  Pt. Occasionally dozing off in W/C.  Educated on W/C safety and locking wheels and removing or swiveling leg rests away prior to attempting to transfer.  SBA for functional transfer W/C<>bed and also SBA for bed mobility.  Session limited to bed mobility and transfer as Pt. Reports being tired and turning down options for ADL, there-ex, and there-act.  Progressing towards goals.  To benefit from continued acute care OT services to increase independence in self-care/functional transfers.  Continue POC.

## 2019-02-21 NOTE — PT/OT/SLP PROGRESS
"Physical Therapy Treatment    Patient Name:  Jones Red   MRN:  [id number]    Recommendations:     Discharge Recommendations:  (recommend home with family assistance as needed)   Discharge Equipment Recommendations: wheelchair, manual, commode, tub bench(w/c delivered to room)   Barriers to discharge: None (pt reports he has family/friends who can assist him at home)    Assessment:     Jones Red is a 53 y.o. male admitted with a medical diagnosis of Septic shock.  He presents with the following impairments/functional limitations:  weakness, impaired endurance, impaired functional mobilty, pain, edema, impaired cardiopulmonary response to activity ; pt with improved mobility today, inc. Amb. Dist., NO LOB noted, mild SOB, O2:89% at rest, 92% with amb.    Rehab Prognosis: Good; patient would benefit from acute skilled PT services to address these deficits and reach maximum level of function.    Recent Surgery: Procedure(s) (LRB):  Insertion,catheter,tunneled (Right) 15 Days Post-Op    Plan:     During this hospitalization, patient to be seen 5 x/week to address the identified rehab impairments via gait training, therapeutic activities, therapeutic exercises and progress toward the following goals:    · Plan of Care Expires:  03/03/19    Subjective     Chief Complaint: some back pain   Patient/Family Comments/goals: "My back has been doing this for a long time"   Pain/Comfort:  · Pain Rating 1: (pt c/o some back pain, though did not rate (chronic back pain per pt))  · Location - Orientation 1: lower  · Location 1: back  · Pain Addressed 1: Reposition, Distraction      Objective:     Communicated with nurse prior to session.  Patient found supine in bed, sleeping heavily with (HD catheter)  upon PT entry to room.     General Precautions: Standard, aspiration, fall   Orthopedic Precautions:N/A   Braces: N/A     Functional Mobility:  · Bed Mobility:     · Supine to Sit: modified independence  · Transfers:     · Sit to " Stand:  supervision with rolling walker  · Gait: amb'd 150' with bariatric RW and SBA, O2:92% on RA (w/c following for safety)  · Pt propelled w/c ~40' with SBA, using UE's and LE's      AM-PAC 6 CLICK MOBILITY  Turning over in bed (including adjusting bedclothes, sheets and blankets)?: 4  Sitting down on and standing up from a chair with arms (e.g., wheelchair, bedside commode, etc.): 3  Moving from lying on back to sitting on the side of the bed?: 4  Moving to and from a bed to a chair (including a wheelchair)?: 3  Need to walk in hospital room?: 3  Climbing 3-5 steps with a railing?: 2  Basic Mobility Total Score: 19       Therapeutic Activities and Exercises:   pt perf'd commode t/f's in bathroom to regular commode with use of railing on wall and Supervision, pt able to perform hygiene following BM with Supervision only.     Patient left sitting up EOB with all lines intact and call button in reach..    GOALS:   Multidisciplinary Problems     Physical Therapy Goals        Problem: Physical Therapy Goal    Goal Priority Disciplines Outcome Goal Variances Interventions   Physical Therapy Goal     PT, PT/OT Ongoing (interventions implemented as appropriate)     Description:  Goals to be met by: 3/3/19    Patient will increase functional independence with mobility by performin. Supine to sit with min A. -- MET 2/10/19  REVISED: Supine>sit modified independently.   2. Sit to supine with min A. -- MET 2/10/19  REVISED: Sit>supine modified independently. MET 19  3. Rolling R and L with min A. -- MET 2/10/19  4. Sitting at edge of bed >5 minutes with min A. -- MET 2/10/19  5. PT will assess sit<>stand. -- MET 2/10/19  REVISED: Sit<>stand with supervision and standard or rolling walker. MET 19  6. Gait > 50 ft with standard or rolling walker with min A. MET 19  REVISED: Patient will ambulate >150' with RW with CGA for safety  7. UPDATED: patient will t/f bed to wheelchair via stand-step t/f with RW  with SBA for safety  8. UPDATED: Patient will propel himself in wheelchair in all directions > 150' while in open environment with Mod I and no verbal cues for safety                        Time Tracking:     PT Received On: 02/21/19  PT Start Time: 1025     PT Stop Time: 1105  PT Total Time (min): 40 min     Billable Minutes: Gait Training 25 and Therapeutic Activity 15    Treatment Type: Treatment  PT/PTA: PTA     PTA Visit Number: 2     Coretta Evans PTA  02/21/2019

## 2019-02-21 NOTE — PLAN OF CARE
ASTER left a message again for Chani, [phone] ex [phone], at [facility] admissions.    I spoke to another person in admissions earlier who states she sent an email to Chani.

## 2019-02-21 NOTE — PLAN OF CARE
"Problem: Adult Inpatient Plan of Care  Goal: Plan of Care Review  Outcome: Ongoing (interventions implemented as appropriate)  Patient in no apparent distress. Sat's  89-92 % on room air.Aerosol treatments given Q 4 with Duoneb and BID with sodium chloride. During first treatment patient refused aerobika, patient stated that " someone's been using that". Reassurances given that the device is single patient use only, however patient would not accept answer. Patient placed on BIPAP for sleep with settings as documented. Patient found off at 0300. Patient stated that " I don't have any problems with my lungs" and refused to wear BIPAP. Attempt to educate patient about BIPAP was not received, and patient continued to refuse. Will continue to monitor.      "

## 2019-02-21 NOTE — PLAN OF CARE
Problem: Adult Inpatient Plan of Care  Goal: Plan of Care Review  Outcome: Ongoing (interventions implemented as appropriate)  Pt on RA. Sats 96%. No distress noted. Aerosol tx given. CPT tolerated well. BIPAP at bedside for nightly use. Will continue to monitor.

## 2019-02-21 NOTE — PROGRESS NOTES
"Nephrology  Progress Note    Admit Date: 1/28/2019   LOS: 24 days     SUBJECTIVE:     Follow-up For:  Septic shock/ATN    Interval History:    Still awaiting outpatient dialysis arrangements.  No c/o this am.      Review of Systems:   Constitutional: no fever or chills   Respiratory: + cough but less  Cardiovascular: no chest pain or palpitations   Gastrointestinal: no nausea or vomiting, no abdominal pain or change in bowel habits   Genitourinary: no hematuria or dysuria   Musculoskeletal: no arthralgias or myalgias   Neurological: no seizures or tremors    OBJECTIVE:     Vital Signs Range (Last 24H):  /63 (BP Location: Left arm, Patient Position: Lying)   Pulse 87   Temp 97.7 °F (36.5 °C) (Oral)   Resp 16   Ht 6' 3" (1.905 m)   Wt (!) 150 kg (330 lb 11 oz)   SpO2 (!) 91%   BMI 41.33 kg/m²     Temp:  [97.3 °F (36.3 °C)-98.8 °F (37.1 °C)]   Pulse:  []   Resp:  [16-23]   BP: ()/(52-99)   SpO2:  [72 %-96 %]     I & O (Last 24H):    Intake/Output Summary (Last 24 hours) at 2/21/2019 1037  Last data filed at 2/21/2019 0900  Gross per 24 hour   Intake 920 ml   Output 800 ml   Net 120 ml       Physical Exam:  General appearance:  NAD and likely back to baseline.     Eyes:  Conjunctivae/corneas clear. EOMI.  Lungs:  Expiratory wheeze improved.   Heart: Regular rate and rhythm, distant heart tones.  Abdomen: Soft, non-tender non-distended; bowel sounds normal; no masses,  no organomegaly, morbidly obese  Extremities:  Chronic Woody edema.   peeling/flaky LE's (R>L).  Overall much improved  Skin:  Right lower extremity cellulitis improved.   Right IJ CHERIE       Laboratory Data:  No results for input(s): WBC, RBC, HGB, HCT, PLT, MCV, MCH, MCHC in the last 24 hours.    BMP:   Recent Labs   Lab 02/20/19  0505      *   K 4.0   CL 93*   CO2 23   BUN 46*   CREATININE 8.3*   CALCIUM 8.1*   MG 2.1     Lab Results   Component Value Date    CALCIUM 8.1 (L) 02/20/2019    PHOS 7.0 (H) 02/20/2019 "       Lab Results   Component Value Date    PTH 24.3 02/14/2019    CALCIUM 8.1 (L) 02/20/2019    CAION 1.32 02/14/2019    PHOS 7.0 (H) 02/20/2019       No results found for: URICACID    BNP  No results for input(s): BNP, BNPTRIAGEBLO in the last 168 hours.    Medications:  Medication list was reviewed and changes noted under Assessment/Plan.    Diagnostic Results:    X-Ray Chest 1 View for PICC_Central line   Final Result      As above         Electronically signed by: Kalyan Galvez MD   Date:    02/08/2019   Time:    14:46      X-Ray Chest AP Portable   Final Result      NG tube in place.         Electronically signed by: Viviane Persaud MD   Date:    02/02/2019   Time:    23:59      X-Ray Chest 1 View   Final Result      As above.         Electronically signed by: Keon Jonas MD   Date:    01/31/2019   Time:    19:14      X-Ray Chest AP Portable   Final Result      Lines and tubes are grossly unchanged.      Interval improvement in aeration of the lungs.         Electronically signed by: Connor Babcock MD   Date:    01/31/2019   Time:    16:42      X-Ray Chest 1 View   Final Result      Overall no significant interval change         Electronically signed by: Connor Babcock MD   Date:    01/30/2019   Time:    08:24      US Extremity Non Vascular Limited Right   Final Result      Nonspecific subcutaneous edema, cutaneous thickening, could represent cellulitis changes.  No definite necrotizing fasciitis, gas or abscess.         Electronically signed by: Tin Leone MD   Date:    01/29/2019   Time:    13:09      X-Ray Chest AP Portable   Final Result   Addendum 1 of 1      Enteric tube projects in unchanged radiographic position with tip coursing    below the diaphragm and appearing to extend beyond the field of view.         Electronically signed by: Trena Sena MD   Date:    01/29/2019   Time:    00:39      Final      As above.         Electronically signed by: Trena Sena MD   Date:    01/29/2019    Time:    00:04      US Lower Extremity Veins Right   Final Result      Limited examination.  No evidence of deep venous thrombosis in the right lower extremity.         Electronically signed by: Vanita Oviedo   Date:    01/28/2019   Time:    21:10      X-Ray Chest AP Portable   Final Result      Cardiomegaly with findings suggesting pulmonary edema, correlation advised.  Differential would include infection.         Electronically signed by: Kalyan Galvez MD   Date:    01/28/2019   Time:    20:19          ASSESSMENT/PLAN:     1. Persistent Multifactorial initially anuric and now oliguric acute renal failure secondary to ATN from septic shock, rhabdomyolysis, with hyponatremia, and hyperkalemia (N17.0, N17.9, E87.5, E87.1, M62.82, A41.9):  Transferred from outlying facility for CRRT and has transitioned to standard hemodialysis.   Timeframe of renal recovery unclear at this time.  Tunneled line placed.  Placed on MWF schedule for now.  Consulted SW to start process of outpt placement for HD unit near home.    Reports voiding more.  Asked nurse to accurately record UOP.  Pt told to only void in urinal or in hat on toilet, if possible.  Line working better and TPA instilled.  Renally dose meds, avoid nephrotoxins, and monitor I/O's closely.  2. Hyperphosphatemia: Changed to Renvela tabs with meals.  3. Hypercalcemia (E83.52):  Ionized ca/PTH wnl.  Due to immobilization?  Low chapis bath on HD.  4. RLE Cellulitis/woody edema (L03.115): Defer to wound care and ID.  Much improved.   5. Anemia of illness:  Iron/b12/folate wnl.  Started ONDINA. Nephrocaps.     6. Bronchospasm:  Nebs and 1 dose of steroids yesterday with improvement.   7. HTN: Low-normal BPs.  BB only for now.   8. Morbid obesity (E44):  Severe morbid obesity the root of most of this patients' medical issues.  Defer.      DC Home once outpt HD plans finalized.      Sterling Lopez, ACNP  Nephrology

## 2019-02-21 NOTE — PROGRESS NOTES
Pt remains free from falls. Vitals were stable throughout the night on room air. Positions self independently. No complaints of pain or nausea. Bed in low position and call light within reach. Will continue to monitor.

## 2019-02-21 NOTE — ASSESSMENT & PLAN NOTE
-Admitted to inpatient status in ICU upon transfer from [facility] with septic shock.  Treated with pressors and IV antibiotics.  Ultimately weaned off of pressors and transferred to the floor on 2/5.  -Possible component of pneumonia but clear cause of his infection is the severe cellulitis involving the right lower extremity.    -Ultrasound of right lower extremity did not reveal evidence of abscess or gas.    -General surgery was consulted and recommended serial exams for now and no surgical intervention at this time.    -Cellulitis continues to improve but still with notable erythema.  WBC has normalized and bood cultures no growth to date.    -Continue to elevate right lower extremity as tolerated.    -Patient completed a course of antibiotics on 2/11/2019.  Continue with wound care.  -Patient stable to be discharged.  Awaiting setup of outpatient hemodialysis as he has declined rehab or skilled nursing placement.

## 2019-02-21 NOTE — PLAN OF CARE
I received a request for documents through the [facility] referral portal, requesting Nephrology consult and discharge summary. I called [facility] and spoke with Chani. I told her we do not have a discharge summary yet, so she said to send updated progress notes.   Nephrology consult and [facility] Medicine and Nephrology notes faxed to [phone]. Chani states once she gets acceptance from facility and financial clearance, she will fax a chair time to us.

## 2019-02-21 NOTE — PLAN OF CARE
Problem: Physical Therapy Goal  Goal: Physical Therapy Goal  Goals to be met by: 3/3/19    Patient will increase functional independence with mobility by performin. Supine to sit with min A. -- MET 2/10/19  REVISED: Supine>sit modified independently.   2. Sit to supine with min A. -- MET 2/10/19  REVISED: Sit>supine modified independently. MET 19  3. Rolling R and L with min A. -- MET 2/10/19  4. Sitting at edge of bed >5 minutes with min A. -- MET 2/10/19  5. PT will assess sit<>stand. -- MET 2/10/19  REVISED: Sit<>stand with supervision and standard or rolling walker. MET 19  6. Gait > 50 ft with standard or rolling walker with min A. MET 19  REVISED: Patient will ambulate >150' with RW with CGA for safety  7. UPDATED: patient will t/f bed to wheelchair via stand-step t/f with RW with SBA for safety  8. UPDATED: Patient will propel himself in wheelchair in all directions > 150' while in open environment with Mod I and no verbal cues for safety       Pt progressing well towards goals, sup to sit mod Ind. Using bedrails, sit to stand Supervision, amb'd 150' with bariatric RW and SBA, O2:92% on RA following. At this time, recommending home with family/friends to assist as needed.

## 2019-02-21 NOTE — PLAN OF CARE
Problem: SLP Goal  Goal: SLP Goal  1. Pt will be able to follow simple 1 step commands 75% of time with moderate verbal and visual cues. (MET)  2. Increase speech intelligibility to 75% at the simple sentence level with moderate verbal cues to slow rate, raise volume.  3. Pt will be able to consume thin liquids in small single sips via cup or straw, with no signs of airway threat or aspiration given max assistance. (MET)   4. Pt will be able to advance to purees and solid consistencies with no signs of airway threat or aspiration given max assistance. (MET)  5. Increase orientation to month, date, day of week, place and reason for hospitalization with 75% acc given max verbal and written cues.  6. Increase ability to express basic wants and needs 75% of time given min assistance   7. Pt will be able to sustain attention to task during simple functional tasks for 10 mins given min cues.     Outcome: Ongoing (interventions implemented as appropriate)  Pt seen bedside for ongoing remediation of cognitive communication deficits.     Comments: Speech therapy to follow up 3-5x per week for ongoing assessment and remediation of cognitive communication deficits.

## 2019-02-21 NOTE — SUBJECTIVE & OBJECTIVE
Interval History:  No acute events.  Seen sitting on side of bed.  Eager to leave hospital    Review of Systems   Constitutional: Negative for chills and fever.   HENT: Negative for congestion and dental problem.    Eyes: Negative for discharge and itching.   Respiratory: Negative for shortness of breath.    Cardiovascular: Negative for chest pain.   Gastrointestinal: Negative for abdominal pain, constipation, diarrhea, nausea and vomiting.   Endocrine: Negative for cold intolerance and heat intolerance.   Musculoskeletal: Negative for arthralgias and back pain.   Neurological: Negative for dizziness and facial asymmetry.   Hematological: Negative for adenopathy.   Psychiatric/Behavioral: Negative for agitation and behavioral problems.     Objective:     Vital Signs (Most Recent):  Temp: 96.3 °F (35.7 °C) (02/21/19 1120)  Pulse: 96 (02/21/19 1120)  Resp: 16 (02/21/19 1120)  BP: (!) 106/59 (02/21/19 1120)  SpO2: (!) 92 % (02/21/19 1120) Vital Signs (24h Range):  Temp:  [96.3 °F (35.7 °C)-98.8 °F (37.1 °C)] 96.3 °F (35.7 °C)  Pulse:  [] 96  Resp:  [16-23] 16  SpO2:  [72 %-96 %] 92 %  BP: (102-140)/(53-74) 106/59     Weight: (!) 150 kg (330 lb 11 oz)  Body mass index is 41.33 kg/m².    Intake/Output Summary (Last 24 hours) at 2/21/2019 1337  Last data filed at 2/21/2019 0900  Gross per 24 hour   Intake 480 ml   Output 100 ml   Net 380 ml      Physical Exam   Constitutional: He is oriented to person, place, and time. Vital signs are normal. He appears well-developed.  Non-toxic appearance. He does not have a sickly appearance. He does not appear ill. No distress.   Morbid obesity.   HENT:   Head: Normocephalic and atraumatic.   Right Ear: External ear normal.   Left Ear: External ear normal.   Eyes: Conjunctivae and EOM are normal. Pupils are equal, round, and reactive to light. No scleral icterus.   Neck: Normal range of motion. Neck supple. No JVD present. No tracheal deviation present.   Cardiovascular: Normal  rate, regular rhythm, normal heart sounds and intact distal pulses. Exam reveals no gallop and no friction rub.   No murmur heard.  Pulmonary/Chest: Effort normal and breath sounds normal. No stridor. No respiratory distress. He has no wheezes. He has no rales.   Distant lung sounds.  Moving air well.   Abdominal: Soft. Bowel sounds are normal. He exhibits no distension and no mass. There is no tenderness. There is no guarding.   Musculoskeletal: Normal range of motion. He exhibits no edema or deformity.   Neurological: He is alert and oriented to person, place, and time. He exhibits normal muscle tone.   Oriented x 3.  Overall mental status much improved.  Limited insight into his health care issues.   Skin: Skin is warm and dry. No rash noted. He is not diaphoretic. No erythema.   Erythema resolved.  Some skin breakdown in right skin but does not appear infected at this time.   Nursing note and vitals reviewed.    Significant Labs: All pertinent labs within the past 24 hours have been reviewed.    Significant Imaging: I have reviewed all pertinent imaging results/findings within the past 24 hours.

## 2019-02-21 NOTE — PROGRESS NOTES
[facility] Hospital Medicine  Progress Note    Patient Name: Jones Red  MRN: [id number]  Patient Class: IP- Inpatient   Admission Date: 1/28/2019  Length of Stay: 24 days  Attending Physician: Jones Milner MD  Primary Care Provider: PEE Romano MD        Subjective:     Principal Problem:Septic shock    HPI:  Mr. Jones Red is a 53 y.o. male, with PMH of HTN, COPD, chronic respiratory failure (on home O2), Diastolic CHF, LYNDON, chronic venous insufficiency, and questionable seizures history (suspected to be 2/2 alcohol withdrawal), who preset tia to [facility] on 1/22/19 2/2 SOB. This was associated with cough productive of brown sputum. He was started in BiPap, but did ultimately degrade, and was intubated, and maintained on a ventilator. He was additionally altered upon arrival to the ED. History was reportedly obtained by a friend, and it was reported that he was found down at home, and was unable to get up. He as admitted to the ICU, started on IV fluids and pressors.     Hospital Course:  Mr. Red is a 53-year-old man with history of hypertension, diastolic heart failure, chronic obstructive pulmonary disease with chronic hypoxic respiratory failure on home oxygen, prior alcohol abuse, significant ongoing tobacco use (3 packs per day), and morbid obesity with septic shock secondary to right lower extremity cellulitis complicating chronic venous stasis.  Patient intubated at outside hospital after failing a trial of BiPAP.  Patient also with evidence of worsening renal failure with rhabdomyolysis.  Patient transferred from [facility] in [place], [place] to [facility] on 1/28/2019.  Patient started on renal replacement therapy.  He was extubated on 2/2/19 to bipap and transferred to the floor on 2/5/19.  Ultrasound of the right lower extremity did not reveal evidence of abscess, subcutaneous gas, or evidence of necrotizing fascitits.   General surgery and wound care consulted.  No surgical intervention recommended at this time.  He was seen by ID who recommended continuing vancomycin and cefepime until 2/11/19.  Mental status improved. He is tolerating his diet and continues on dialysis.  We are presently working with PT and OT daily in attempts to gain strength.  We are working on placement in SNF or inpatient rehab closer to his home which is complicated due to his high medical demands.  Patient's confusion refused and patient clearly refusing placement and would like to go home with home health.  Working with case management to secure outpatient hemodialysis close to his home.    Interval History:  No acute events.  Seen sitting on side of bed.  Eager to leave hospital    Review of Systems   Constitutional: Negative for chills and fever.   HENT: Negative for congestion and dental problem.    Eyes: Negative for discharge and itching.   Respiratory: Negative for shortness of breath.    Cardiovascular: Negative for chest pain.   Gastrointestinal: Negative for abdominal pain, constipation, diarrhea, nausea and vomiting.   Endocrine: Negative for cold intolerance and heat intolerance.   Musculoskeletal: Negative for arthralgias and back pain.   Neurological: Negative for dizziness and facial asymmetry.   Hematological: Negative for adenopathy.   Psychiatric/Behavioral: Negative for agitation and behavioral problems.     Objective:     Vital Signs (Most Recent):  Temp: 96.3 °F (35.7 °C) (02/21/19 1120)  Pulse: 96 (02/21/19 1120)  Resp: 16 (02/21/19 1120)  BP: (!) 106/59 (02/21/19 1120)  SpO2: (!) 92 % (02/21/19 1120) Vital Signs (24h Range):  Temp:  [96.3 °F (35.7 °C)-98.8 °F (37.1 °C)] 96.3 °F (35.7 °C)  Pulse:  [] 96  Resp:  [16-23] 16  SpO2:  [72 %-96 %] 92 %  BP: (102-140)/(53-74) 106/59     Weight: (!) 150 kg (330 lb 11 oz)  Body mass index is 41.33 kg/m².    Intake/Output Summary (Last 24 hours) at 2/21/2019 0945  Last data filed at  2/21/2019 0900  Gross per 24 hour   Intake 480 ml   Output 100 ml   Net 380 ml      Physical Exam   Constitutional: He is oriented to person, place, and time. Vital signs are normal. He appears well-developed.  Non-toxic appearance. He does not have a sickly appearance. He does not appear ill. No distress.   Morbid obesity.   HENT:   Head: Normocephalic and atraumatic.   Right Ear: External ear normal.   Left Ear: External ear normal.   Eyes: Conjunctivae and EOM are normal. Pupils are equal, round, and reactive to light. No scleral icterus.   Neck: Normal range of motion. Neck supple. No JVD present. No tracheal deviation present.   Cardiovascular: Normal rate, regular rhythm, normal heart sounds and intact distal pulses. Exam reveals no gallop and no friction rub.   No murmur heard.  Pulmonary/Chest: Effort normal and breath sounds normal. No stridor. No respiratory distress. He has no wheezes. He has no rales.   Distant lung sounds.  Moving air well.   Abdominal: Soft. Bowel sounds are normal. He exhibits no distension and no mass. There is no tenderness. There is no guarding.   Musculoskeletal: Normal range of motion. He exhibits no edema or deformity.   Neurological: He is alert and oriented to person, place, and time. He exhibits normal muscle tone.   Oriented x 3.  Overall mental status much improved.  Limited insight into his health care issues.   Skin: Skin is warm and dry. No rash noted. He is not diaphoretic. No erythema.   Erythema resolved.  Some skin breakdown in right skin but does not appear infected at this time.   Nursing note and vitals reviewed.    Significant Labs: All pertinent labs within the past 24 hours have been reviewed.    Significant Imaging: I have reviewed all pertinent imaging results/findings within the past 24 hours.    Assessment/Plan:      * Septic shock    -Admitted to inpatient status in ICU upon transfer from [place] with septic shock.  Treated with pressors and IV  antibiotics.  Ultimately weaned off of pressors and transferred to the floor on 2/5.  -Possible component of pneumonia but clear cause of his infection is the severe cellulitis involving the right lower extremity.    -Ultrasound of right lower extremity did not reveal evidence of abscess or gas.    -General surgery was consulted and recommended serial exams for now and no surgical intervention at this time.    -Cellulitis continues to improve but still with notable erythema.  WBC has normalized and bood cultures no growth to date.    -Continue to elevate right lower extremity as tolerated.    -Patient completed a course of antibiotics on 2/11/2019.  Continue with wound care.  -Patient stable to be discharged.  Awaiting setup of outpatient hemodialysis as he has declined rehab or skilled nursing placement.      Debility    -Continue with therapy.    -Patient refuses SNF placement  -Will anticipate home with  for PT and OT.     Encephalopathy, metabolic    -Much improved.  Off restraints today.  Slowly improving.       Essential hypertension    -Reasonably controlled with current regimen.  Will continue with current regimen and continue to monitor.     Tobacco abuse    -Patient counseled on the importance of smoking cessation.  -NRT offered     Acute on chronic respiratory failure with hypoxia and hypercapnia    -Successfully extubated 2/2/2018.  Patient did well on BiPAP following extubation.  He has needed intermittent BiPAP.    -Continue with bronchodilator treatments as I suspect patient has COPD given wheezing and significant tobacco smoking history.    -Continue volume removal with CRRT as able.  -continue supplemental O2 requirements for goal sat 88-92%  -Completed course of steroids   -Will need outpatient PFTs and probable ICS/LAMA/LABA. When patient is being discharged will start him on Spiriva.         Acute on chronic diastolic heart failure    -Marked improvement with dialysis which we need to continue.     -Continue with additional volume removal with renal replacement therapy as recommended by Nephrology.  -No ARB due to renal dysfunction.  -Continue coreg.     Acute renal failure    -Due to ATN from sepsis with shock and rhabdomyolysis.    -Unfortunately it looks as if he has progressed to ESRD  -We were unable to do HD on 2/5 due to clotted trialysis catheter.  Received new HD catheter on 2/6.    -Continue with dialysis per nephrology.  -Working on outpatient placement as he will need ongoing dialysis       VTE Risk Mitigation (From admission, onward)        Ordered     heparin (porcine) injection 2,000 Units  Once      02/06/19 1729     heparin (porcine) injection 5,000 Units  As needed (PRN)      01/31/19 1825     heparin (porcine) injection 2,000 Units  As needed (PRN)      01/30/19 1056     heparin (porcine) injection 5,000 Units  Every 8 hours      01/29/19 2044     heparin (porcine) injection 5,000 Units  As needed (PRN)      01/29/19 1335     IP VTE HIGH RISK PATIENT  Once      01/28/19 1947              Jones Milner MD  Department of Hospital Medicine   [facility]

## 2019-02-21 NOTE — PT/OT/SLP PROGRESS
Occupational Therapy   Treatment    Name: Jones Red  MRN: [id number]  Admitting Diagnosis:  Septic shock  15 Days Post-Op    Recommendations:     Discharge Recommendations: recommend Home with family support; Patient improving with transfers. Will continue to need support for ADL with patient limited due to size.  Discharge Equipment Recommendations:  wheelchair, manual, commode, tub bench(bariatric 3-1 and TTB recommended. w/c delivered and found in room.)  Barriers to discharge:  Decreased caregiver support(reports 2 sons, wife, and extended family members can help. )    Assessment:     Jones Red is a 53 y.o. male with a medical diagnosis of Septic shock. Performance deficits affecting function are obesity, general weakness.     Rehab Prognosis:  Fair; patient would benefit from acute skilled OT services to address these deficits and reach maximum level of function.       Plan:     Patient to be seen 5 x/week to address the above listed problems via self-care/home management, therapeutic activities, therapeutic exercises  · Plan of Care Expires: 03/03/19  · Plan of Care Reviewed with: patient    Subjective     Pain/Comfort:  · Pain Rating 1: 0/10  · Pain Rating Post-Intervention 1: 0/10    Objective:     Communicated with: RN, PTA prior to session.  Patient found at EOB with (HD catheter) upon OT entry to room.    General Precautions: Standard, aspiration, fall   Orthopedic Precautions:N/A   Braces: N/A     Occupational Performance:     Bed Mobility:    · Did not occur, found patient seated EOB.    Functional Mobility/Transfers:  · Patient completed Sit <> Stand Transfer with stand by assistance  with  no assistive device   · Patient completed Bed <> Chair Transfer using Stand Pivot technique with stand by assistance with no assistive device    Activities of Daily Living:  · Feeding:  modified independence from hospital tray  · Grooming: modified independence for grooming at sink; Min(A) needed to open single  dose package of lotion, while fingers slippery of using lotion of first package, which patient was able to open. No initiate to look to decrease lotion on hands to problem solve.  · Bathing: moderate assistance for sponge bathing, with (A) needed for LE due to obesity.  · Upper Body Dressing: stand by assistance    · Lower Body Dressing: maximal assistance donning socks. Able to doff socks, with socks left on floor, as patient using one foot to pull other off.   · Toileting: stand by assistance for hygiene - per PTA report from standard commode.       [facility] 6 Click ADL: 17    Treatment & Education:  Review of safety features and management of manual wheelchair, and maneuvering of said wheelchair in spaces.     Patient left up in wheelchair. with all lines intact, call button in reach and PCT notifiedEducation:  , as PCT planning to return to patient's room for extended sponge bath.    GOALS:   Multidisciplinary Problems     Occupational Therapy Goals        Problem: Occupational Therapy Goal    Goal Priority Disciplines Outcome Interventions   Occupational Therapy Goal     OT, PT/OT Ongoing (interventions implemented as appropriate)    Description:  Goals to be met by 3/3/19  1. Max A self-feeding   2. Mod A G/H (wash face and hands) supported sitting (MET 2/8/19)     REVISED: Mod A G/H (wash face and hands and mouth wash) sitting EOB. GOAL MET 2/11/19.  3. Min A hospital gown change bed level - MET 2/21/19  3. 50% assist with UE ROM exercises MET 2/8/19       REVISED: UE AROM exercises  HHA with Min A VC for continuation of task  5.  Assess bed mobility.  GOAL MET 2/11/19.  6. [place] [facility] [place].  MET 2/21/19  7. MAX A with toileting. In progress; PT reports patient toileted with her this AM, and patient didn't need (A).   8. Grooming/hygiene in stance with CGA at sink with seated rest breaks as needed.  GOAL MET 2/14/19.                             Time Tracking:     OT Date of Treatment: 02/21/19  OT  Start Time: 1110  OT Stop Time: 1130  OT Total Time (min): 20 min    Billable Minutes:Self Care/Home Management 20 minutes    PEE Contreras OTR/L  2/21/2019

## 2019-02-21 NOTE — PT/OT/SLP PROGRESS
Occupational Therapy   Treatment    Name: Jones Red  MRN: [id number]  Admitting Diagnosis:  Septic shock  14 Days Post-Op    Recommendations:     Discharge Recommendations: nursing facility, skilled(Pt. refusing SNF; will need 24/7 supervision/assist along with post acute OT/PT services )  Discharge Equipment Recommendations:  other (see comments)(bariatric 3-in-1 commode and TTB)  Barriers to discharge:  Other (Comment), Decreased caregiver support(current level of function)    Assessment:   With c/o headache and also stating very tired.  BP checked while in sitting and found to be 104/58 and 98 bpm.  Pt. Occasionally dozing off in W/C.  Educated on W/C safety and locking wheels and removing or swiveling leg rests away prior to attempting to transfer.  SBA for functional transfer W/C<>bed and also SBA for bed mobility.  Session limited to bed mobility and transfer as Pt. Reports being tired and turning down options for ADL, there-ex, and there-act.  Continues to require verbal cues for safe hand placement and for general safety.  Progressing towards goals.  To benefit from continued acute care OT services to increase independence in self-care/functional transfers.  Continue POC.    Jones Red is a 53 y.o. male with a medical diagnosis of Septic shock.  He presents with below deficits decreasing independence in self-care/functional transfers. Performance deficits affecting function are weakness, impaired endurance, impaired self care skills, impaired functional mobilty, gait instability, impaired balance, impaired cognition, decreased safety awareness, pain, impaired skin, impaired cardiopulmonary response to activity.     Rehab Prognosis:  Good; patient would benefit from acute skilled OT services to address these deficits and reach maximum level of function.       Plan:     Patient to be seen 5 x/week to address the above listed problems via self-care/home management, therapeutic activities, therapeutic  exercises  · Plan of Care Expires: 03/03/19  · Plan of Care Reviewed with: patient    Subjective     Pain/Comfort:  · Pain Rating 1: 9/10  · Location - Orientation 1: generalized  · Location 1: head  · Pain Addressed 1: Nurse notified, Distraction  · Pain Rating Post-Intervention 1: 9/10    Objective:     Communicated with: Nursing prior to session.  Patient found up in wheelchair with PICC line, telemetry(HD cath) upon OT entry to room.    General Precautions: Standard, fall, aspiration   Orthopedic Precautions:N/A   Braces: N/A     Occupational Performance:     Bed Mobility:    · Patient completed Sit to Supine with stand by assistance     Functional Mobility/Transfers:  · Patient completed Sit <> Stand Transfer with stand by assistance  with  rolling walker   · Patient completed Bed <> Chair Transfer using Step Transfer technique with stand by assistance with rolling walker  · Functional Mobility: L-lateral steps along EOB SBA with RW.    Activities of Daily Living:  · Lower Body Dressing: total assistance to don socks to BLE      [facility] 6 Click ADL: 16    Treatment & Education:  -Educated on safe W/C placement, locking, and removing obstacles prior to transfers  -Educated on functional transfer safety    Patient left HOB elevated with all lines intact, call button in reach and nursing notifiedEducation:      GOALS:   Multidisciplinary Problems     Occupational Therapy Goals        Problem: Occupational Therapy Goal    Goal Priority Disciplines Outcome Interventions   Occupational Therapy Goal     OT, PT/OT Ongoing (interventions implemented as appropriate)    Description:  Goals to be met by 3/3/19  1. Max A self-feeding  2. Mod A G/H (wash face and hands) supported sitting (MET 2/8/19)     REVISED: Mod A G/H (wash face and hands and mouth wash) sitting EOB. GOAL MET 2/11/19.  3. Min A hospital gown change bed level  3. 50% assist with UE ROM exercises MET 2/8/19       REVISED: UE AROM exercises  HHA with Min A  VC for continuation of task  5.  Assess bed mobility.  GOAL MET 2/11/19.  6. Step transfer to [facility] with MIN A.    7. MAX A with toileting.   8. Grooming/hygiene in stance with CGA at sink with seated rest breaks as needed.  GOAL MET 2/14/19.                            Time Tracking:     OT Date of Treatment: 02/20/19  OT Start Time: 1255  OT Stop Time: 1324  OT Total Time (min): 29 min    Billable Minutes:Therapeutic Activity 29    Matilda Xie, OT  2/20/2019

## 2019-02-21 NOTE — PLAN OF CARE
Problem: Adult Inpatient Plan of Care  Goal: Plan of Care Review  Outcome: Ongoing (interventions implemented as appropriate)  Plan of care reviewed with patient.  VSS.  Purposeful rounding completed, call bell within reach, no needs at this time.  Will continue to monitor.

## 2019-02-22 LAB
ANION GAP SERPL CALC-SCNC: 11 MMOL/L
ANION GAP SERPL CALC-SCNC: 16 MMOL/L
BASOPHILS # BLD AUTO: 0.03 K/UL
BASOPHILS NFR BLD: 0.7 %
BUN SERPL-MCNC: 37 MG/DL
BUN SERPL-MCNC: 39 MG/DL
CALCIUM SERPL-MCNC: 8.4 MG/DL
CALCIUM SERPL-MCNC: 8.5 MG/DL
CHLORIDE SERPL-SCNC: 95 MMOL/L
CHLORIDE SERPL-SCNC: 97 MMOL/L
CO2 SERPL-SCNC: 24 MMOL/L
CO2 SERPL-SCNC: 26 MMOL/L
CREAT SERPL-MCNC: 3.6 MG/DL
CREAT SERPL-MCNC: 3.6 MG/DL
DIFFERENTIAL METHOD: ABNORMAL
EOSINOPHIL # BLD AUTO: 0.1 K/UL
EOSINOPHIL NFR BLD: 1.8 %
ERYTHROCYTE [DISTWIDTH] IN BLOOD BY AUTOMATED COUNT: 15.5 %
EST. GFR  (AFRICAN AMERICAN): 21 ML/MIN/1.73 M^2
EST. GFR  (AFRICAN AMERICAN): 21 ML/MIN/1.73 M^2
EST. GFR  (NON AFRICAN AMERICAN): 18 ML/MIN/1.73 M^2
EST. GFR  (NON AFRICAN AMERICAN): 18 ML/MIN/1.73 M^2
GLUCOSE SERPL-MCNC: 82 MG/DL
GLUCOSE SERPL-MCNC: 97 MG/DL
HCT VFR BLD AUTO: 29.9 %
HGB BLD-MCNC: 9.3 G/DL
LYMPHOCYTES # BLD AUTO: 1.1 K/UL
LYMPHOCYTES NFR BLD: 24.3 %
MAGNESIUM SERPL-MCNC: 1.8 MG/DL
MCH RBC QN AUTO: 27.5 PG
MCHC RBC AUTO-ENTMCNC: 31.1 G/DL
MCV RBC AUTO: 89 FL
MONOCYTES # BLD AUTO: 0.7 K/UL
MONOCYTES NFR BLD: 15.4 %
NEUTROPHILS # BLD AUTO: 2.5 K/UL
NEUTROPHILS NFR BLD: 56.7 %
PHOSPHATE SERPL-MCNC: 4.1 MG/DL
PLATELET # BLD AUTO: 275 K/UL
PMV BLD AUTO: 9.3 FL
POTASSIUM SERPL-SCNC: 3.7 MMOL/L
POTASSIUM SERPL-SCNC: 3.7 MMOL/L
RBC # BLD AUTO: 3.38 M/UL
SODIUM SERPL-SCNC: 134 MMOL/L
SODIUM SERPL-SCNC: 135 MMOL/L
WBC # BLD AUTO: 4.36 K/UL

## 2019-02-22 PROCEDURE — 84100 ASSAY OF PHOSPHORUS: CPT

## 2019-02-22 PROCEDURE — 85025 COMPLETE CBC W/AUTO DIFF WBC: CPT

## 2019-02-22 PROCEDURE — 63600175 PHARM REV CODE 636 W HCPCS: Performed by: INTERNAL MEDICINE

## 2019-02-22 PROCEDURE — 80100016 HC MAINTENANCE HEMODIALYSIS

## 2019-02-22 PROCEDURE — 27000221 HC OXYGEN, UP TO 24 HOURS

## 2019-02-22 PROCEDURE — 99900035 HC TECH TIME PER 15 MIN (STAT)

## 2019-02-22 PROCEDURE — 25000003 PHARM REV CODE 250: Performed by: INTERNAL MEDICINE

## 2019-02-22 PROCEDURE — 94660 CPAP INITIATION&MGMT: CPT

## 2019-02-22 PROCEDURE — 80048 BASIC METABOLIC PNL TOTAL CA: CPT

## 2019-02-22 PROCEDURE — 63600175 PHARM REV CODE 636 W HCPCS: Performed by: STUDENT IN AN ORGANIZED HEALTH CARE EDUCATION/TRAINING PROGRAM

## 2019-02-22 PROCEDURE — 99231 PR SUBSEQUENT HOSPITAL CARE,LEVL I: ICD-10-PCS | Mod: ,,, | Performed by: HOSPITALIST

## 2019-02-22 PROCEDURE — 99231 SBSQ HOSP IP/OBS SF/LOW 25: CPT | Mod: ,,, | Performed by: HOSPITALIST

## 2019-02-22 PROCEDURE — 94668 MNPJ CHEST WALL SBSQ: CPT

## 2019-02-22 PROCEDURE — 83735 ASSAY OF MAGNESIUM: CPT

## 2019-02-22 PROCEDURE — 11000001 HC ACUTE MED/SURG PRIVATE ROOM

## 2019-02-22 PROCEDURE — 25000003 PHARM REV CODE 250: Performed by: NURSE PRACTITIONER

## 2019-02-22 PROCEDURE — 25000242 PHARM REV CODE 250 ALT 637 W/ HCPCS: Performed by: HOSPITALIST

## 2019-02-22 PROCEDURE — 94761 N-INVAS EAR/PLS OXIMETRY MLT: CPT

## 2019-02-22 RX ADMIN — HEPARIN SODIUM 2000 UNITS: 1000 INJECTION, SOLUTION INTRAVENOUS; SUBCUTANEOUS at 08:02

## 2019-02-22 RX ADMIN — FLUTICASONE FUROATE AND VILANTEROL TRIFENATATE 1 PUFF: 100; 25 POWDER RESPIRATORY (INHALATION) at 08:02

## 2019-02-22 RX ADMIN — MICONAZOLE NITRATE: 20 OINTMENT TOPICAL at 09:02

## 2019-02-22 RX ADMIN — HEPARIN SODIUM 5000 UNITS: 5000 INJECTION, SOLUTION INTRAVENOUS; SUBCUTANEOUS at 05:02

## 2019-02-22 RX ADMIN — HEPARIN SODIUM 5000 UNITS: 5000 INJECTION, SOLUTION INTRAVENOUS; SUBCUTANEOUS at 09:02

## 2019-02-22 RX ADMIN — SEVELAMER CARBONATE 1600 MG: 800 TABLET, FILM COATED ORAL at 06:02

## 2019-02-22 RX ADMIN — HEPARIN SODIUM 5000 UNITS: 5000 INJECTION, SOLUTION INTRAVENOUS; SUBCUTANEOUS at 01:02

## 2019-02-22 RX ADMIN — TIOTROPIUM BROMIDE 18 MCG: 18 CAPSULE ORAL; RESPIRATORY (INHALATION) at 08:02

## 2019-02-22 RX ADMIN — CARVEDILOL 3.12 MG: 3.12 TABLET, FILM COATED ORAL at 09:02

## 2019-02-22 RX ADMIN — SEVELAMER CARBONATE 1600 MG: 800 TABLET, FILM COATED ORAL at 01:02

## 2019-02-22 NOTE — PT/OT/SLP PROGRESS
Occupational Therapy  Not Seen Note    Patient Name:  Jones Red   MRN:  [id number]    Patient not seen today secondary to Dialysis. Will follow-up later in the day as time and patient availability permit..    FIONA Can  2/22/2019

## 2019-02-22 NOTE — PLAN OF CARE
Problem: Adult Inpatient Plan of Care  Goal: Plan of Care Review  Outcome: Ongoing (interventions implemented as appropriate)  POC reviewed with patient. Patient free from falls or injury. All safety measures in place. Will continue to monitor.

## 2019-02-22 NOTE — PROGRESS NOTES
"Nephrology  Progress Note    Admit Date: 1/28/2019   LOS: 25 days     SUBJECTIVE:     Follow-up For:  Septic shock/ATN    Interval History:    Seen on hemodialysis.  Creatinine drastically improved today but awaiting repeat for verification.  Awaiting outpatient dialysis arrangements.  No chest pain or shortness of Breath.      Review of Systems:   Constitutional: no fever or chills   Respiratory: + cough but less  Cardiovascular: no chest pain or palpitations   Gastrointestinal: no nausea or vomiting, no abdominal pain or change in bowel habits   Genitourinary: no hematuria or dysuria   Musculoskeletal: no arthralgias or myalgias   Neurological: no seizures or tremors    OBJECTIVE:     Vital Signs Range (Last 24H):  BP (!) 136/95 (BP Location: Right arm, Patient Position: Lying)   Pulse 97   Temp 97.9 °F (36.6 °C) (Oral)   Resp 18   Ht 6' 3" (1.905 m)   Wt (!) 150 kg (330 lb 11 oz)   SpO2 95%   BMI 41.33 kg/m²     Temp:  [96.3 °F (35.7 °C)-98.2 °F (36.8 °C)]   Pulse:  []   Resp:  [16-22]   BP: (106-155)/(57-99)   SpO2:  [85 %-97 %]     I & O (Last 24H):  No intake or output data in the 24 hours ending 02/22/19 1104    Physical Exam:  General appearance:  NAD and likely back to baseline.     Eyes:  Conjunctivae/corneas clear. EOMI.  Lungs:  Clear to auscultation.   Heart: Regular rate and rhythm, distant heart tones.  Abdomen: Soft, non-tender non-distended; bowel sounds normal; no masses,  no organomegaly, morbidly obese  Extremities:  Chronic Woody edema.   peeling/flaky LE's (R>L).  Overall much improved  Skin:  Right lower extremity cellulitis improved.   Right IJ CHERIE       Laboratory Data:  Recent Labs   Lab 02/22/19  0520   WBC 4.36   RBC 3.38*   HGB 9.3*   HCT 29.9*      MCV 89   MCH 27.5   MCHC 31.1*       BMP:   Recent Labs   Lab 02/22/19  0520   GLU 97   *   K 3.7   CL 97   CO2 26   BUN 37*   CREATININE 3.6*   CALCIUM 8.4*   MG 1.8     Lab Results   Component Value Date    CALCIUM " 8.4 (L) 02/22/2019    PHOS 4.1 02/22/2019       Lab Results   Component Value Date    PTH 24.3 02/14/2019    CALCIUM 8.4 (L) 02/22/2019    CAION 1.32 02/14/2019    PHOS 4.1 02/22/2019       No results found for: URICACID    BNP  No results for input(s): BNP, BNPTRIAGEBLO in the last 168 hours.    Medications:  Medication list was reviewed and changes noted under Assessment/Plan.    Diagnostic Results:    X-Ray Chest 1 View for PICC_Central line   Final Result      As above         Electronically signed by: Kalyan Galvez MD   Date:    02/08/2019   Time:    14:46      X-Ray Chest AP Portable   Final Result      NG tube in place.         Electronically signed by: Viviane Persaud MD   Date:    02/02/2019   Time:    23:59      X-Ray Chest 1 View   Final Result      As above.         Electronically signed by: Keon Jonas MD   Date:    01/31/2019   Time:    19:14      X-Ray Chest AP Portable   Final Result      Lines and tubes are grossly unchanged.      Interval improvement in aeration of the lungs.         Electronically signed by: Connor Babcock MD   Date:    01/31/2019   Time:    16:42      X-Ray Chest 1 View   Final Result      Overall no significant interval change         Electronically signed by: Connor Babcock MD   Date:    01/30/2019   Time:    08:24      US Extremity Non Vascular Limited Right   Final Result      Nonspecific subcutaneous edema, cutaneous thickening, could represent cellulitis changes.  No definite necrotizing fasciitis, gas or abscess.         Electronically signed by: Tin Leone MD   Date:    01/29/2019   Time:    13:09      X-Ray Chest AP Portable   Final Result   Addendum 1 of 1      Enteric tube projects in unchanged radiographic position with tip coursing    below the diaphragm and appearing to extend beyond the field of view.         Electronically signed by: Trena Sena MD   Date:    01/29/2019   Time:    00:39      Final      As above.         Electronically signed  by: Trena Sena MD   Date:    01/29/2019   Time:    00:04      US Lower Extremity Veins Right   Final Result      Limited examination.  No evidence of deep venous thrombosis in the right lower extremity.         Electronically signed by: Vanita Oviedo   Date:    01/28/2019   Time:    21:10      X-Ray Chest AP Portable   Final Result      Cardiomegaly with findings suggesting pulmonary edema, correlation advised.  Differential would include infection.         Electronically signed by: Kalyan Galvez MD   Date:    01/28/2019   Time:    20:19          ASSESSMENT/PLAN:     1. Multifactorial initially anuric and now oliguric acute renal failure secondary to ATN from septic shock, rhabdomyolysis, with hyponatremia, and hyperkalemia (N17.0, N17.9, E87.5, E87.1, M62.82, A41.9):  Transferred from outlying facility for CRRT and has transitioned to standard hemodialysis.   Timeframe of renal recovery unclear at this time.  Tunneled line placed.  Placed on MWF schedule for now.  Consulted SW to start process of outpt placement for HD unit near home.    Reports voiding more.  Asked nurse to accurately record UOP but still not being recorded.  Pt told to only void in urinal or in hat on toilet, if possible.  Line working better and TPA instilled.  Renally dose meds, avoid nephrotoxins, and monitor I/O's closely.     - Creat of 3.6 this am prior to HD noted.  -awaiting repeat for verification  -will start 24 hr urine for creatinine clearance  -if this creatinine is verified can likely hold dialysis and monitor labs over the next week.  -patient needs to follow up with nephrologist in hometown as soon as possible to monitor    2. Hyperphosphatemia: Changed to Renvela tabs with meals.  3. RLE Cellulitis/woody edema (L03.115): Defer to wound care and ID.  Much improved.   4. Anemia of illness:  Iron/b12/folate wnl.  Started ONDINA. Nephrocaps.     5. HTN: Low-normal BPs.  BB only for now.   6. Morbid obesity (E44):  Severe morbid  obesity the root of most of this patients' medical issues.  Defer.    See above    Sterling Lopez, DAVIDP  Nephrology

## 2019-02-22 NOTE — PLAN OF CARE
Problem: Adult Inpatient Plan of Care  Goal: Plan of Care Review  Outcome: Ongoing (interventions implemented as appropriate)  Pt received on room air, placed on 1.5L NC due to low O2 saturation. Pt wore Bipap to sleep briefly but then requested to have it removed. CPT done. PRN tx not required. Will continue to monitor.

## 2019-02-22 NOTE — PLAN OF CARE
CM spoke with [facility] admissions this morning to inquire about acceptance at a facility near pt's home in [place], [place].    CM faxed additional records yesterday afternoon.    [facility] confirmed they had received packet and are pending review by medical director at [place], [place], which is 12 miles from pt's home. Pt and family agreed that pt would have transportation to this facility.    CM to follow for plans and arrangemetns.     02/22/19 1027   Discharge Assessment   Assessment Type Discharge Planning Reassessment   Confirmed/corrected address and phone number on facesheet? Yes   Assessment information obtained from? Patient;Medical Record;Caregiver   Communicated expected length of stay with patient/caregiver yes   Prior to hospitilization cognitive status: Alert/Oriented   Prior to hospitalization functional status: Assistive Equipment   Current cognitive status: Alert/Oriented   Current Functional Status: Assistive Equipment

## 2019-02-22 NOTE — PLAN OF CARE
Problem: Adult Inpatient Plan of Care  Goal: Plan of Care Review  Outcome: Ongoing (interventions implemented as appropriate)  Patient on room air.

## 2019-02-22 NOTE — PT/OT/SLP PROGRESS
Physical Therapy  Visit Attempt Note    Patient Name:  Jones Red   MRN:  [id number]    Patient not seen today secondary to Dialysis. Will follow-up as able.    Keyshawn Norman, PT, DPT

## 2019-02-22 NOTE — SUBJECTIVE & OBJECTIVE
Interval History:  No acute events.  Seen in HD.  Eager to leave hospital    Review of Systems   Constitutional: Negative for chills and fever.   HENT: Negative for congestion and dental problem.    Eyes: Negative for discharge and itching.   Respiratory: Negative for shortness of breath.    Cardiovascular: Negative for chest pain.   Gastrointestinal: Negative for abdominal pain, constipation, diarrhea, nausea and vomiting.   Endocrine: Negative for cold intolerance and heat intolerance.   Musculoskeletal: Negative for arthralgias and back pain.   Neurological: Negative for dizziness and facial asymmetry.   Hematological: Negative for adenopathy.   Psychiatric/Behavioral: Negative for agitation and behavioral problems.     Objective:     Vital Signs (Most Recent):  Temp: 97.9 °F (36.6 °C) (02/22/19 0755)  Pulse: 96 (02/22/19 0945)  Resp: 18 (02/22/19 0945)  BP: 108/69 (02/22/19 0945)  SpO2: 95 % (02/22/19 0812) Vital Signs (24h Range):  Temp:  [96.3 °F (35.7 °C)-98.2 °F (36.8 °C)] 97.9 °F (36.6 °C)  Pulse:  [80-96] 96  Resp:  [16-22] 18  SpO2:  [85 %-97 %] 95 %  BP: (106-155)/(57-99) 108/69     Weight: (!) 150 kg (330 lb 11 oz)  Body mass index is 41.33 kg/m².  No intake or output data in the 24 hours ending 02/22/19 1032   Physical Exam   Constitutional: He is oriented to person, place, and time. Vital signs are normal. He appears well-developed.  Non-toxic appearance. He does not have a sickly appearance. He does not appear ill. No distress.   Morbid obesity.   HENT:   Head: Normocephalic and atraumatic.   Right Ear: External ear normal.   Left Ear: External ear normal.   Eyes: Conjunctivae and EOM are normal. Pupils are equal, round, and reactive to light. No scleral icterus.   Neck: Normal range of motion. Neck supple. No JVD present. No tracheal deviation present.   Cardiovascular: Normal rate, regular rhythm, normal heart sounds and intact distal pulses. Exam reveals no gallop and no friction rub.   No murmur  heard.  Pulmonary/Chest: Effort normal and breath sounds normal. No stridor. No respiratory distress. He has no wheezes. He has no rales.   Distant lung sounds.  Moving air well.   Abdominal: Soft. Bowel sounds are normal. He exhibits no distension and no mass. There is no tenderness. There is no guarding.   Musculoskeletal: Normal range of motion. He exhibits no edema or deformity.   Neurological: He is alert and oriented to person, place, and time. He exhibits normal muscle tone.   Oriented x 3.  Overall mental status much improved.  Limited insight into his health care issues.   Skin: Skin is warm and dry. No rash noted. He is not diaphoretic. No erythema.   Erythema resolved.  Some skin breakdown in right skin but does not appear infected at this time.   Nursing note and vitals reviewed.    Significant Labs: All pertinent labs within the past 24 hours have been reviewed.    Significant Imaging: I have reviewed all pertinent imaging results/findings within the past 24 hours.

## 2019-02-22 NOTE — PROGRESS NOTES
[facility] Hospital Medicine  Progress Note    Patient Name: Jones Red  MRN: [id number]  Patient Class: IP- Inpatient   Admission Date: 1/28/2019  Length of Stay: 25 days  Attending Physician: Jones Milner MD  Primary Care Provider: PEE Romano MD        Subjective:     Principal Problem:Septic shock    HPI:  Mr. Jones Red is a 53 y.o. male, with PMH of HTN, COPD, chronic respiratory failure (on home O2), Diastolic CHF, LYNDON, chronic venous insufficiency, and questionable seizures history (suspected to be 2/2 alcohol withdrawal), who preset tia to [facility] on 1/22/19 2/2 SOB. This was associated with cough productive of brown sputum. He was started in BiPap, but did ultimately degrade, and was intubated, and maintained on a ventilator. He was additionally altered upon arrival to the ED. History was reportedly obtained by a friend, and it was reported that he was found down at home, and was unable to get up. He as admitted to the ICU, started on IV fluids and pressors.     Hospital Course:  Mr. Red is a 53-year-old man with history of hypertension, diastolic heart failure, chronic obstructive pulmonary disease with chronic hypoxic respiratory failure on home oxygen, prior alcohol abuse, significant ongoing tobacco use (3 packs per day), and morbid obesity with septic shock secondary to right lower extremity cellulitis complicating chronic venous stasis.  Patient intubated at outside hospital after failing a trial of BiPAP.  Patient also with evidence of worsening renal failure with rhabdomyolysis.  Patient transferred from [facility] in [place], [place] to [facility] on 1/28/2019.  Patient started on renal replacement therapy.  He was extubated on 2/2/19 to bipap and transferred to the floor on 2/5/19.  Ultrasound of the right lower extremity did not reveal evidence of abscess, subcutaneous gas, or evidence of necrotizing fascitits.   General surgery and wound care consulted.  No surgical intervention recommended at this time.  He was seen by ID who recommended continuing vancomycin and cefepime until 2/11/19.  Mental status improved. He is tolerating his diet and continues on dialysis.  We are presently working with PT and OT daily in attempts to gain strength.  We are working on placement in SNF or inpatient rehab closer to his home which is complicated due to his high medical demands.  Patient's confusion refused and patient clearly refusing placement and would like to go home with home health.  Working with case management to secure outpatient hemodialysis close to his home.    Interval History:  No acute events.  Seen in HD.  Eager to leave hospital    Review of Systems   Constitutional: Negative for chills and fever.   HENT: Negative for congestion and dental problem.    Eyes: Negative for discharge and itching.   Respiratory: Negative for shortness of breath.    Cardiovascular: Negative for chest pain.   Gastrointestinal: Negative for abdominal pain, constipation, diarrhea, nausea and vomiting.   Endocrine: Negative for cold intolerance and heat intolerance.   Musculoskeletal: Negative for arthralgias and back pain.   Neurological: Negative for dizziness and facial asymmetry.   Hematological: Negative for adenopathy.   Psychiatric/Behavioral: Negative for agitation and behavioral problems.     Objective:     Vital Signs (Most Recent):  Temp: 97.9 °F (36.6 °C) (02/22/19 0755)  Pulse: 96 (02/22/19 0945)  Resp: 18 (02/22/19 0945)  BP: 108/69 (02/22/19 0945)  SpO2: 95 % (02/22/19 0812) Vital Signs (24h Range):  Temp:  [96.3 °F (35.7 °C)-98.2 °F (36.8 °C)] 97.9 °F (36.6 °C)  Pulse:  [80-96] 96  Resp:  [16-22] 18  SpO2:  [85 %-97 %] 95 %  BP: (106-155)/(57-99) 108/69     Weight: (!) 150 kg (330 lb 11 oz)  Body mass index is 41.33 kg/m².  No intake or output data in the 24 hours ending 02/22/19 1032   Physical Exam   Constitutional: He is  oriented to person, place, and time. Vital signs are normal. He appears well-developed.  Non-toxic appearance. He does not have a sickly appearance. He does not appear ill. No distress.   Morbid obesity.   HENT:   Head: Normocephalic and atraumatic.   Right Ear: External ear normal.   Left Ear: External ear normal.   Eyes: Conjunctivae and EOM are normal. Pupils are equal, round, and reactive to light. No scleral icterus.   Neck: Normal range of motion. Neck supple. No JVD present. No tracheal deviation present.   Cardiovascular: Normal rate, regular rhythm, normal heart sounds and intact distal pulses. Exam reveals no gallop and no friction rub.   No murmur heard.  Pulmonary/Chest: Effort normal and breath sounds normal. No stridor. No respiratory distress. He has no wheezes. He has no rales.   Distant lung sounds.  Moving air well.   Abdominal: Soft. Bowel sounds are normal. He exhibits no distension and no mass. There is no tenderness. There is no guarding.   Musculoskeletal: Normal range of motion. He exhibits no edema or deformity.   Neurological: He is alert and oriented to person, place, and time. He exhibits normal muscle tone.   Oriented x 3.  Overall mental status much improved.  Limited insight into his health care issues.   Skin: Skin is warm and dry. No rash noted. He is not diaphoretic. No erythema.   Erythema resolved.  Some skin breakdown in right skin but does not appear infected at this time.   Nursing note and vitals reviewed.    Significant Labs: All pertinent labs within the past 24 hours have been reviewed.    Significant Imaging: I have reviewed all pertinent imaging results/findings within the past 24 hours.    Assessment/Plan:      * Septic shock    -Admitted to inpatient status in ICU upon transfer from [place] with septic shock.  Treated with pressors and IV antibiotics.  Ultimately weaned off of pressors and transferred to the floor on 2/5.  -Possible component of pneumonia but clear  cause of his infection is the severe cellulitis involving the right lower extremity.    -Ultrasound of right lower extremity did not reveal evidence of abscess or gas.    -General surgery was consulted and recommended serial exams for now and no surgical intervention at this time.    -Cellulitis continues to improve but still with notable erythema.  WBC has normalized and bood cultures no growth to date.    -Continue to elevate right lower extremity as tolerated.    -Patient completed a course of antibiotics on 2/11/2019.  Continue with wound care.  -Patient stable to be discharged.  Awaiting setup of outpatient hemodialysis as he has declined rehab or skilled nursing placement.      Debility    -Continue with therapy.    -Patient refuses SNF placement  -Will anticipate home with  for PT and OT.     Encephalopathy, metabolic    -Much improved.  Off restraints today.  Slowly improving.       Essential hypertension    -Reasonably controlled with current regimen.  Will continue with current regimen and continue to monitor.     Tobacco abuse    -Patient counseled on the importance of smoking cessation.  -NRT offered     Acute on chronic respiratory failure with hypoxia and hypercapnia    -Successfully extubated 2/2/2018.  Patient did well on BiPAP following extubation.  He has needed intermittent BiPAP.    -Continue with PRN bronchodilator treatments as I suspect patient has COPD -Continue volume removal with CRRT as able.  -continue supplemental O2 requirements for goal sat 88-92%  -Completed course of steroids   -Will need outpatient PFTs  -have started on spiriva and breo which he will need to continue.       Acute on chronic diastolic heart failure    -Marked improvement with dialysis which we need to continue.    -Continue with additional volume removal with renal replacement therapy as recommended by Nephrology.  -No ARB due to renal dysfunction.  -Continue coreg.     Acute renal failure    -Due to ATN from  sepsis with shock and rhabdomyolysis.    -Unfortunately it looks as if he has progressed to ESRD  -We were unable to do HD on 2/5 due to clotted trialysis catheter.  Received new HD catheter on 2/6.    -Continue with dialysis per nephrology.  -Working on outpatient placement as he will need ongoing dialysis       VTE Risk Mitigation (From admission, onward)        Ordered     heparin (porcine) injection 2,000 Units  Once      02/06/19 1729     heparin (porcine) injection 5,000 Units  As needed (PRN)      01/31/19 1825     heparin (porcine) injection 2,000 Units  As needed (PRN)      01/30/19 1056     heparin (porcine) injection 5,000 Units  Every 8 hours      01/29/19 2044     heparin (porcine) injection 5,000 Units  As needed (PRN)      01/29/19 1335     IP VTE HIGH RISK PATIENT  Once      01/28/19 1947              Jones Milner MD  Department of Hospital Medicine   [facility]

## 2019-02-22 NOTE — ASSESSMENT & PLAN NOTE
-Successfully extubated 2/2/2018.  Patient did well on BiPAP following extubation.  He has needed intermittent BiPAP.    -Continue with PRN bronchodilator treatments as I suspect patient has COPD -Continue volume removal with CRRT as able.  -continue supplemental O2 requirements for goal sat 88-92%  -Completed course of steroids   -Will need outpatient PFTs  -have started on spiriva and breo which he will need to continue.

## 2019-02-22 NOTE — NURSING
"Patient making inappropriate comments. Patient stated "get in bed with me". Informed patient that his comment was inappropriate and further inappropriate conversation would not be tolerated. Patient verbalized understanding.  "

## 2019-02-22 NOTE — ASSESSMENT & PLAN NOTE
-Admitted to inpatient status in ICU upon transfer from [place] with septic shock.  Treated with pressors and IV antibiotics.  Ultimately weaned off of pressors and transferred to the floor on 2/5.  -Possible component of pneumonia but clear cause of his infection is the severe cellulitis involving the right lower extremity.    -Ultrasound of right lower extremity did not reveal evidence of abscess or gas.    -General surgery was consulted and recommended serial exams for now and no surgical intervention at this time.    -Cellulitis continues to improve but still with notable erythema.  WBC has normalized and bood cultures no growth to date.    -Continue to elevate right lower extremity as tolerated.    -Patient completed a course of antibiotics on 2/11/2019.  Continue with wound care.  -Patient stable to be discharged.  Awaiting setup of outpatient hemodialysis as he has declined rehab or skilled nursing placement.

## 2019-02-23 LAB
ALBUMIN SERPL BCP-MCNC: 3 G/DL
ANION GAP SERPL CALC-SCNC: 12 MMOL/L
BUN SERPL-MCNC: 25 MG/DL
CALCIUM SERPL-MCNC: 8.2 MG/DL
CHLORIDE SERPL-SCNC: 96 MMOL/L
CO2 SERPL-SCNC: 26 MMOL/L
CREAT CL/1.73 SQ M 12H UR+SERPL-ARVRAT: ABNORMAL ML/MIN
CREAT SERPL-MCNC: 1.9 MG/DL
CREAT SERPL-MCNC: 2.2 MG/DL
CREAT UR-MCNC: >450 MG/DL
CREATININE, URINE (MG/SPEC): ABNORMAL MG/SPEC
EST. GFR  (AFRICAN AMERICAN): 39 ML/MIN/1.73 M^2
EST. GFR  (NON AFRICAN AMERICAN): 33 ML/MIN/1.73 M^2
GLUCOSE SERPL-MCNC: 100 MG/DL
PHOSPHATE SERPL-MCNC: 3.6 MG/DL
POTASSIUM SERPL-SCNC: 3.4 MMOL/L
SODIUM SERPL-SCNC: 134 MMOL/L
URINE COLLECTION DURATION: 24 HR
URINE VOLUME: 325 ML

## 2019-02-23 PROCEDURE — 94660 CPAP INITIATION&MGMT: CPT

## 2019-02-23 PROCEDURE — 99900035 HC TECH TIME PER 15 MIN (STAT)

## 2019-02-23 PROCEDURE — 27000221 HC OXYGEN, UP TO 24 HOURS

## 2019-02-23 PROCEDURE — 99231 PR SUBSEQUENT HOSPITAL CARE,LEVL I: ICD-10-PCS | Mod: ,,, | Performed by: HOSPITALIST

## 2019-02-23 PROCEDURE — 80069 RENAL FUNCTION PANEL: CPT

## 2019-02-23 PROCEDURE — 25000003 PHARM REV CODE 250: Performed by: INTERNAL MEDICINE

## 2019-02-23 PROCEDURE — 94640 AIRWAY INHALATION TREATMENT: CPT

## 2019-02-23 PROCEDURE — 11000001 HC ACUTE MED/SURG PRIVATE ROOM

## 2019-02-23 PROCEDURE — 99231 SBSQ HOSP IP/OBS SF/LOW 25: CPT | Mod: ,,, | Performed by: HOSPITALIST

## 2019-02-23 PROCEDURE — 63600175 PHARM REV CODE 636 W HCPCS: Performed by: STUDENT IN AN ORGANIZED HEALTH CARE EDUCATION/TRAINING PROGRAM

## 2019-02-23 PROCEDURE — 92526 ORAL FUNCTION THERAPY: CPT

## 2019-02-23 PROCEDURE — 25000003 PHARM REV CODE 250: Performed by: NURSE PRACTITIONER

## 2019-02-23 PROCEDURE — 94761 N-INVAS EAR/PLS OXIMETRY MLT: CPT

## 2019-02-23 PROCEDURE — 82575 CREATININE CLEARANCE TEST: CPT

## 2019-02-23 RX ORDER — POTASSIUM CHLORIDE 20 MEQ/1
20 TABLET, EXTENDED RELEASE ORAL ONCE
Status: COMPLETED | OUTPATIENT
Start: 2019-02-23 | End: 2019-02-23

## 2019-02-23 RX ADMIN — SEVELAMER CARBONATE 1600 MG: 800 TABLET, FILM COATED ORAL at 02:02

## 2019-02-23 RX ADMIN — POTASSIUM CHLORIDE 20 MEQ: 1500 TABLET, EXTENDED RELEASE ORAL at 02:02

## 2019-02-23 RX ADMIN — HEPARIN SODIUM 5000 UNITS: 5000 INJECTION, SOLUTION INTRAVENOUS; SUBCUTANEOUS at 05:02

## 2019-02-23 RX ADMIN — SEVELAMER CARBONATE 1600 MG: 800 TABLET, FILM COATED ORAL at 06:02

## 2019-02-23 RX ADMIN — CARVEDILOL 3.12 MG: 3.12 TABLET, FILM COATED ORAL at 08:02

## 2019-02-23 RX ADMIN — Medication 1 CAPSULE: at 09:02

## 2019-02-23 RX ADMIN — MICONAZOLE NITRATE: 20 OINTMENT TOPICAL at 08:02

## 2019-02-23 RX ADMIN — HEPARIN SODIUM 5000 UNITS: 5000 INJECTION, SOLUTION INTRAVENOUS; SUBCUTANEOUS at 03:02

## 2019-02-23 RX ADMIN — SEVELAMER CARBONATE 1600 MG: 800 TABLET, FILM COATED ORAL at 09:02

## 2019-02-23 RX ADMIN — MICONAZOLE NITRATE: 20 OINTMENT TOPICAL at 09:02

## 2019-02-23 RX ADMIN — HEPARIN SODIUM 5000 UNITS: 5000 INJECTION, SOLUTION INTRAVENOUS; SUBCUTANEOUS at 09:02

## 2019-02-23 RX ADMIN — CARVEDILOL 3.12 MG: 3.12 TABLET, FILM COATED ORAL at 09:02

## 2019-02-23 RX ADMIN — TIOTROPIUM BROMIDE 18 MCG: 18 CAPSULE ORAL; RESPIRATORY (INHALATION) at 08:02

## 2019-02-23 RX ADMIN — FLUTICASONE FUROATE AND VILANTEROL TRIFENATATE 1 PUFF: 100; 25 POWDER RESPIRATORY (INHALATION) at 08:02

## 2019-02-23 NOTE — PT/OT/SLP PROGRESS
Speech Language Pathology Treatment    Patient Name:  Jones Red   MRN:  [id number]  Admitting Diagnosis: Septic shock    Recommendations:                 General Recommendations:  Dysphagia therapy and Speech/language therapy  Diet recommendations:  Regular, Liquid Diet Level: Thin   Aspiration Precautions: 1 bite/sip at a time, Alternating bites/sips, Eliminate distractions, HOB to 90 degrees, Remain upright 30 minutes post meal and Small bites/sips   General Precautions: Standard, aspiration  Communication strategies: Cue for attention. Eliminate distractions. Cue for slowed speech rate.    Subjective     Awake, alert, and cooperative. Repeatedly stating that he is ready to leave acute care. Seen sitting up on the side of the bed.   Patient goals: D/c from acute care.    Pain/Comfort:  · Pain Rating 1: 0/10  · Pain Rating Post-Intervention 1: 0/10    Objective:     Has the patient been evaluated by SLP for swallowing?   Yes  Keep patient NPO? No   Current Respiratory Status: nasal cannula      COGNITION: Reduced attention and impulsivity noted, though easily redirected. Appropriately answered orientation questions. Consistently followed directions given cues for attention. Fluently expressing wants and needs.     SPEECH: Speech continues to be rapid and imprecise with dcr monitoring of rate, volume, and clarity. Judged to be intelligible ~60-70% of the time during conversation. Minimal improvement with cues to slow rate.    SWALLOWING: Observed consuming solids and thin liquid via cup. Some recall of safe swallowing strategies, but cues needed. Mildly impulsive. ORAL PHASE: Lip seal, bolus formation, and A-P transit WNL. No residue seen. PHARYNGEAL PHASE: No overt s/s of aspiration. No coughing, choking, throat clearing, or change in vocal quality. Possible s/s of pharyngeal residue characterized by multiple swallows per bolus and delayed throat clearing after meal.     Assessment:     Continues to present with  speech and cognitive communication deficits. Improved safety with PO intake with no overt s/s of aspiration noted during PO trials given cues and assist.     Goals:   Multidisciplinary Problems     SLP Goals        Problem: SLP Goal    Goal Priority Disciplines Outcome   SLP Goal     SLP Ongoing (interventions implemented as appropriate)   Description:  1. Pt will be able to follow simple 1 step commands 75% of time with moderate verbal and visual cues. (MET)  2. Increase speech intelligibility to 75% at the simple sentence level with moderate verbal cues to slow rate, raise volume.  3. Pt will be able to consume thin liquids in small single sips via cup or straw, with no signs of airway threat or aspiration given max assistance. (MET)   4. Pt will be able to advance to purees and solid consistencies with no signs of airway threat or aspiration given max assistance. (MET)  5. Increase orientation to month, date, day of week, place and reason for hospitalization with 75% acc given max verbal and written cues.  6. Increase ability to express basic wants and needs 75% of time given min assistance   7. Pt will be able to sustain attention to task during simple functional tasks for 10 mins given min cues.                      Plan:     · Patient to be seen:  3 x/week, 5 x/week   · Plan of Care expires:  03/02/19  · Plan of Care reviewed with:  patient   · SLP Follow-Up:  Yes       Discharge recommendations:  nursing facility, skilled     Time Tracking:     SLP Treatment Date:   02/23/19  Speech Start Time:  1105  Speech Stop Time:  1116     Speech Total Time (min):  11 min    Billable Minutes: Treatment Swallowing Dysfunction 11 minutes    Serjio Grigsby CCC-SLP  02/23/2019

## 2019-02-23 NOTE — ASSESSMENT & PLAN NOTE
-Due to ATN from sepsis with shock and rhabdomyolysis.    -Unfortunately it looks as if he has progressed to ESRD and he has been requiring HD.  Creatinine the last 2 days however is markedly improved.    -Received new HD catheter on 2/6.    -Continue with dialysis per nephrology.  -Working on outpatient placement as he will need ongoing dialysis  -Renal function does suddenly appear to be improving.  If this continues, we may be able to let him go home without dialysis.  Still too soon to be confident however.

## 2019-02-23 NOTE — PLAN OF CARE
Blood work drawn from right arm PICC line. Both the right port and the purple port flush sluggishly, blood return noted, and valve cap changed. Right chest wall sepideh split catheter intact. Will continue to monitor patient.

## 2019-02-23 NOTE — PROGRESS NOTES
[facility] Hospital Medicine  Progress Note    Patient Name: Jones Red  MRN: [id number]  Patient Class: IP- Inpatient   Admission Date: 1/28/2019  Length of Stay: 26 days  Attending Physician: Jones Milner MD  Primary Care Provider: PEE Romano MD        Subjective:     Principal Problem:Septic shock    HPI:  Mr. Jones Red is a 53 y.o. male, with PMH of HTN, COPD, chronic respiratory failure (on home O2), Diastolic CHF, LYNDON, chronic venous insufficiency, and questionable seizures history (suspected to be 2/2 alcohol withdrawal), who preset tia to [facility] on 1/22/19 2/2 SOB. This was associated with cough productive of brown sputum. He was started in BiPap, but did ultimately degrade, and was intubated, and maintained on a ventilator. He was additionally altered upon arrival to the ED. History was reportedly obtained by a friend, and it was reported that he was found down at home, and was unable to get up. He as admitted to the ICU, started on IV fluids and pressors.     Hospital Course:  Mr. Red is a 53-year-old man with history of hypertension, diastolic heart failure, chronic obstructive pulmonary disease with chronic hypoxic respiratory failure on home oxygen, prior alcohol abuse, significant ongoing tobacco use (3 packs per day), and morbid obesity with septic shock secondary to right lower extremity cellulitis complicating chronic venous stasis.  Patient intubated at outside hospital after failing a trial of BiPAP.  Patient also with evidence of worsening renal failure with rhabdomyolysis.  Patient transferred from [facility] in [place], [place] to [facility] on 1/28/2019.  Patient started on renal replacement therapy.  He was extubated on 2/2/19 to bipap and transferred to the floor on 2/5/19.  Ultrasound of the right lower extremity did not reveal evidence of abscess, subcutaneous gas, or evidence of necrotizing fascitits.   General surgery and wound care consulted.  No surgical intervention recommended at this time.  He was seen by ID who recommended continuing vancomycin and cefepime until 2/11/19.  Mental status improved. He is tolerating his diet and continues on dialysis.  We are presently working with PT and OT daily in attempts to gain strength.  We are working on placement in SNF or inpatient rehab closer to his home which is complicated due to his high medical demands.  Patient's confusion refused and patient clearly refusing placement and would like to go home with home health.  Working with case management to secure outpatient hemodialysis close to his home.    Interval History:  No acute events.  Creatinine does seem to be improving.  Patient eager to leave hospital.    Review of Systems   Constitutional: Negative for chills and fever.   HENT: Negative for congestion and dental problem.    Eyes: Negative for discharge and itching.   Respiratory: Negative for shortness of breath.    Cardiovascular: Negative for chest pain.   Gastrointestinal: Negative for abdominal pain, constipation, diarrhea, nausea and vomiting.   Endocrine: Negative for cold intolerance and heat intolerance.   Musculoskeletal: Negative for arthralgias and back pain.   Neurological: Negative for dizziness and facial asymmetry.   Hematological: Negative for adenopathy.   Psychiatric/Behavioral: Negative for agitation and behavioral problems.     Objective:     Vital Signs (Most Recent):  Temp: 98.6 °F (37 °C) (02/23/19 0747)  Pulse: 93 (02/23/19 0843)  Resp: (!) 21 (02/23/19 0843)  BP: (!) 127/59 (02/23/19 0747)  SpO2: 95 % (02/23/19 0843) Vital Signs (24h Range):  Temp:  [97.9 °F (36.6 °C)-100.1 °F (37.8 °C)] 98.6 °F (37 °C)  Pulse:  [] 93  Resp:  [16-25] 21  SpO2:  [90 %-96 %] 95 %  BP: (117-164)/(57-95) 127/59     Weight: (!) 159.4 kg (351 lb 6.6 oz)  Body mass index is 43.92 kg/m².    Intake/Output Summary (Last 24 hours) at 2/23/2019 1016  Last  data filed at 2/23/2019 0232  Gross per 24 hour   Intake --   Output 2075 ml   Net -2075 ml      Physical Exam   Constitutional: He is oriented to person, place, and time. Vital signs are normal. He appears well-developed.  Non-toxic appearance. He does not have a sickly appearance. He does not appear ill. No distress.   Morbid obesity.   HENT:   Head: Normocephalic and atraumatic.   Right Ear: External ear normal.   Left Ear: External ear normal.   Eyes: Conjunctivae and EOM are normal. Pupils are equal, round, and reactive to light. No scleral icterus.   Neck: Normal range of motion. Neck supple. No JVD present. No tracheal deviation present.   Cardiovascular: Normal rate, regular rhythm, normal heart sounds and intact distal pulses. Exam reveals no gallop and no friction rub.   No murmur heard.  Pulmonary/Chest: Effort normal and breath sounds normal. No stridor. No respiratory distress. He has no wheezes. He has no rales.   Distant lung sounds.  Moving air well.   Abdominal: Soft. Bowel sounds are normal. He exhibits no distension and no mass. There is no tenderness. There is no guarding.   Musculoskeletal: Normal range of motion. He exhibits no edema or deformity.   Neurological: He is alert and oriented to person, place, and time. He exhibits normal muscle tone.   Oriented x 3.  Overall mental status much improved.  Limited insight into his health care issues.   Skin: Skin is warm and dry. No rash noted. He is not diaphoretic. No erythema.   Erythema resolved.  Some skin breakdown in right skin but does not appear infected at this time.   Nursing note and vitals reviewed.    Significant Labs: All pertinent labs within the past 24 hours have been reviewed.    Significant Imaging: I have reviewed all pertinent imaging results/findings within the past 24 hours.    Assessment/Plan:      * Septic shock    -Admitted to inpatient status in ICU upon transfer from [place] with septic shock.  Treated with pressors  and IV antibiotics.  Ultimately weaned off of pressors and transferred to the floor on 2/5.  -Possible component of pneumonia but clear cause of his infection is the severe cellulitis involving the right lower extremity.    -Ultrasound of right lower extremity did not reveal evidence of abscess or gas.    -General surgery was consulted and recommended serial exams for now and no surgical intervention at this time.    -Cellulitis continues to improve but still with notable erythema.  WBC has normalized and bood cultures no growth to date.    -Continue to elevate right lower extremity as tolerated.    -Patient completed a course of antibiotics on 2/11/2019.  Continue with wound care.  -Patient stable to be discharged.  Awaiting setup of outpatient hemodialysis as he has declined rehab or skilled nursing placement.      Debility    -Continue with therapy.    -Patient refuses SNF placement  -Will anticipate home with  for PT and OT.     Encephalopathy, metabolic    -Much improved.  Off restraints today.  Slowly improving.       Essential hypertension    -Reasonably controlled with current regimen.  Will continue with current regimen and continue to monitor.     Tobacco abuse    -Patient counseled on the importance of smoking cessation.  -NRT offered     Acute on chronic respiratory failure with hypoxia and hypercapnia    -Successfully extubated 2/2/2018.  Patient did well on BiPAP following extubation.  He has needed intermittent BiPAP.    -Continue with PRN bronchodilator treatments as I suspect patient has COPD -Continue volume removal with CRRT as able.  -continue supplemental O2 requirements for goal sat 88-92%  -Completed course of steroids   -Will need outpatient PFTs  -have started on spiriva and breo which he will need to continue.       Acute on chronic diastolic heart failure    -Marked improvement with dialysis which we need to continue.    -Continue with additional volume removal with renal replacement  therapy as recommended by Nephrology.  -No ARB due to renal dysfunction.  -Continue coreg.     Acute renal failure    -Due to ATN from sepsis with shock and rhabdomyolysis.    -Unfortunately it looks as if he has progressed to ESRD and he has been requiring HD.  Creatinine the last 2 days however is markedly improved.    -Received new HD catheter on 2/6.    -Continue with dialysis per nephrology.  -Working on outpatient placement as he will need ongoing dialysis  -Renal function does suddenly appear to be improving.  If this continues, we may be able to let him go home without dialysis.  Still too soon to be confident however.       VTE Risk Mitigation (From admission, onward)        Ordered     heparin (porcine) injection 2,000 Units  Once      02/06/19 1729     heparin (porcine) injection 5,000 Units  As needed (PRN)      01/31/19 1825     heparin (porcine) injection 2,000 Units  As needed (PRN)      01/30/19 1056     heparin (porcine) injection 5,000 Units  Every 8 hours      01/29/19 2044     heparin (porcine) injection 5,000 Units  As needed (PRN)      01/29/19 1335     IP VTE HIGH RISK PATIENT  Once      01/28/19 1947              Jones Milner MD  Department of Hospital Medicine   [facility]

## 2019-02-23 NOTE — PLAN OF CARE
Problem: Adult Inpatient Plan of Care  Goal: Plan of Care Review  Outcome: Ongoing (interventions implemented as appropriate)  Pt used Bipap to sleep for approx 3 hours, requested to remove bipap then placed on 2L NC to maintain SaO2. PRN txs not required. Will continue to monitor.

## 2019-02-23 NOTE — SUBJECTIVE & OBJECTIVE
Interval History:  No acute events.  Creatinine does seem to be improving.  Patient eager to leave hospital.    Review of Systems   Constitutional: Negative for chills and fever.   HENT: Negative for congestion and dental problem.    Eyes: Negative for discharge and itching.   Respiratory: Negative for shortness of breath.    Cardiovascular: Negative for chest pain.   Gastrointestinal: Negative for abdominal pain, constipation, diarrhea, nausea and vomiting.   Endocrine: Negative for cold intolerance and heat intolerance.   Musculoskeletal: Negative for arthralgias and back pain.   Neurological: Negative for dizziness and facial asymmetry.   Hematological: Negative for adenopathy.   Psychiatric/Behavioral: Negative for agitation and behavioral problems.     Objective:     Vital Signs (Most Recent):  Temp: 98.6 °F (37 °C) (02/23/19 0747)  Pulse: 93 (02/23/19 0843)  Resp: (!) 21 (02/23/19 0843)  BP: (!) 127/59 (02/23/19 0747)  SpO2: 95 % (02/23/19 0843) Vital Signs (24h Range):  Temp:  [97.9 °F (36.6 °C)-100.1 °F (37.8 °C)] 98.6 °F (37 °C)  Pulse:  [] 93  Resp:  [16-25] 21  SpO2:  [90 %-96 %] 95 %  BP: (117-164)/(57-95) 127/59     Weight: (!) 159.4 kg (351 lb 6.6 oz)  Body mass index is 43.92 kg/m².    Intake/Output Summary (Last 24 hours) at 2/23/2019 1016  Last data filed at 2/23/2019 0232  Gross per 24 hour   Intake --   Output 2075 ml   Net -2075 ml      Physical Exam   Constitutional: He is oriented to person, place, and time. Vital signs are normal. He appears well-developed.  Non-toxic appearance. He does not have a sickly appearance. He does not appear ill. No distress.   Morbid obesity.   HENT:   Head: Normocephalic and atraumatic.   Right Ear: External ear normal.   Left Ear: External ear normal.   Eyes: Conjunctivae and EOM are normal. Pupils are equal, round, and reactive to light. No scleral icterus.   Neck: Normal range of motion. Neck supple. No JVD present. No tracheal deviation present.    Cardiovascular: Normal rate, regular rhythm, normal heart sounds and intact distal pulses. Exam reveals no gallop and no friction rub.   No murmur heard.  Pulmonary/Chest: Effort normal and breath sounds normal. No stridor. No respiratory distress. He has no wheezes. He has no rales.   Distant lung sounds.  Moving air well.   Abdominal: Soft. Bowel sounds are normal. He exhibits no distension and no mass. There is no tenderness. There is no guarding.   Musculoskeletal: Normal range of motion. He exhibits no edema or deformity.   Neurological: He is alert and oriented to person, place, and time. He exhibits normal muscle tone.   Oriented x 3.  Overall mental status much improved.  Limited insight into his health care issues.   Skin: Skin is warm and dry. No rash noted. He is not diaphoretic. No erythema.   Erythema resolved.  Some skin breakdown in right skin but does not appear infected at this time.   Nursing note and vitals reviewed.    Significant Labs: All pertinent labs within the past 24 hours have been reviewed.    Significant Imaging: I have reviewed all pertinent imaging results/findings within the past 24 hours.

## 2019-02-23 NOTE — PROGRESS NOTES
"Nephrology  Progress Note    Admit Date: 1/28/2019   LOS: 26 days     SUBJECTIVE:     Follow-up For:  Septic shock/ATN    Interval History:    Patient frustrated because he feels like he should be discharged home since it's not clear if and where he will be getting HD.  Awaiting outpatient dialysis arrangements.  No chest pain or shortness of Breath.      Review of Systems:   Constitutional: no fever or chills   Respiratory: + cough but less  Cardiovascular: no chest pain or palpitations   Gastrointestinal: no nausea or vomiting, no abdominal pain or change in bowel habits   Genitourinary: no hematuria or dysuria   Musculoskeletal: no arthralgias or myalgias   Neurological: no seizures or tremors    OBJECTIVE:     Vital Signs Range (Last 24H):  BP (!) 127/59 (BP Location: Left arm, Patient Position: Lying)   Pulse 93   Temp 98.6 °F (37 °C) (Oral)   Resp (!) 21   Ht 6' 3" (1.905 m)   Wt (!) 159.4 kg (351 lb 6.6 oz)   SpO2 95%   BMI 43.92 kg/m²     Temp:  [97.9 °F (36.6 °C)-100.1 °F (37.8 °C)]   Pulse:  []   Resp:  [16-25]   BP: (117-164)/(57-84)   SpO2:  [90 %-96 %]     I & O (Last 24H):    Intake/Output Summary (Last 24 hours) at 2/23/2019 1123  Last data filed at 2/23/2019 0232  Gross per 24 hour   Intake --   Output 2075 ml   Net -2075 ml       Physical Exam:  General appearance:  NAD and likely back to baseline.     Eyes:  Conjunctivae/corneas clear. EOMI.  Lungs:  Clear to auscultation.   Heart: Regular rate and rhythm, distant heart tones.  Abdomen: Soft, non-tender non-distended; bowel sounds normal; no masses,  no organomegaly, morbidly obese  Extremities:  Chronic Woody edema.   peeling/flaky LE's (R>L).  Overall much improved  Skin:  Right lower extremity cellulitis improved.   Right IJ CHERIE       Laboratory Data:  No results for input(s): WBC, RBC, HGB, HCT, PLT, MCV, MCH, MCHC in the last 24 hours.    BMP:   Recent Labs   Lab 02/22/19  0520  02/23/19  0505   GLU 97   < > 100   *   < > " 134*   K 3.7   < > 3.4*   CL 97   < > 96   CO2 26   < > 26   BUN 37*   < > 25*   CREATININE 3.6*   < > 2.2*   CALCIUM 8.4*   < > 8.2*   MG 1.8  --   --     < > = values in this interval not displayed.     Lab Results   Component Value Date    CALCIUM 8.2 (L) 02/23/2019    PHOS 3.6 02/23/2019       Lab Results   Component Value Date    PTH 24.3 02/14/2019    CALCIUM 8.2 (L) 02/23/2019    CAION 1.32 02/14/2019    PHOS 3.6 02/23/2019       No results found for: URICACID    BNP  No results for input(s): BNP, BNPTRIAGEBLO in the last 168 hours.    Medications:  Medication list was reviewed and changes noted under Assessment/Plan.    Diagnostic Results:    X-Ray Chest 1 View for PICC_Central line   Final Result      As above         Electronically signed by: Kalyan Galvez MD   Date:    02/08/2019   Time:    14:46      X-Ray Chest AP Portable   Final Result      NG tube in place.         Electronically signed by: Viviane Persaud MD   Date:    02/02/2019   Time:    23:59      X-Ray Chest 1 View   Final Result      As above.         Electronically signed by: Keon Jonas MD   Date:    01/31/2019   Time:    19:14      X-Ray Chest AP Portable   Final Result      Lines and tubes are grossly unchanged.      Interval improvement in aeration of the lungs.         Electronically signed by: Connor Babcock MD   Date:    01/31/2019   Time:    16:42      X-Ray Chest 1 View   Final Result      Overall no significant interval change         Electronically signed by: Connor Babcock MD   Date:    01/30/2019   Time:    08:24      US Extremity Non Vascular Limited Right   Final Result      Nonspecific subcutaneous edema, cutaneous thickening, could represent cellulitis changes.  No definite necrotizing fasciitis, gas or abscess.         Electronically signed by: Tin Leone MD   Date:    01/29/2019   Time:    13:09      X-Ray Chest AP Portable   Final Result   Addendum 1 of 1      Enteric tube projects in unchanged radiographic  position with tip coursing    below the diaphragm and appearing to extend beyond the field of view.         Electronically signed by: Trena Sena MD   Date:    01/29/2019   Time:    00:39      Final      As above.         Electronically signed by: Trena Sena MD   Date:    01/29/2019   Time:    00:04      US Lower Extremity Veins Right   Final Result      Limited examination.  No evidence of deep venous thrombosis in the right lower extremity.         Electronically signed by: Vanita Oviedo   Date:    01/28/2019   Time:    21:10      X-Ray Chest AP Portable   Final Result      Cardiomegaly with findings suggesting pulmonary edema, correlation advised.  Differential would include infection.         Electronically signed by: Kalyan Galvez MD   Date:    01/28/2019   Time:    20:19          ASSESSMENT/PLAN:     Multifactorial initially anuric and now oliguric acute renal failure secondary to ATN from septic shock, rhabdomyolysis, with hyponatremia, and hyperkalemia (N17.0, N17.9, E87.5, E87.1, M62.82, A41.9): Transferred from outlying facility for CRRT and was transitioned to standard hemodialysis.   Timeframe of renal recovery unclear at this time.  Tunneled line placed. Has not dialyzed since last Wednesday, and it appears that he truly is in renal recovery given excellent UOP and creatinine continues to trend downward.    Hyperphosphatemia: Changed to Renvela tabs with meals.    RLE Cellulitis/woody edema (L03.115): Defer to wound care and ID.  Much improved.     Anemia of illness:  Iron/b12/folate wnl.  Started ONDINA. Nephrocaps.       HTN: Low-normal BPs.  BB only for now.     Morbid obesity (E44):  Severe morbid obesity the root of most of this patients' medical issues.  Defer.    Hypokalemia:  Replace PO.    Disposition:  I think it would be reasonable to discharge the patient home tomorrow or Monday given significant spontaneous improvement of renal function.  Will need to have line removed prior to  discharge, and he will need very close follow up with his primary nephrologist with a repeat BMP 2-3 days post discharge to ensure trends continue.    Thi Quinonez MD  Nephrology

## 2019-02-23 NOTE — PLAN OF CARE
Problem: SLP Goal  Goal: SLP Goal  1. Pt will be able to follow simple 1 step commands 75% of time with moderate verbal and visual cues. (MET)  2. Increase speech intelligibility to 75% at the simple sentence level with moderate verbal cues to slow rate, raise volume.  3. Pt will be able to consume thin liquids in small single sips via cup or straw, with no signs of airway threat or aspiration given max assistance. (MET)   4. Pt will be able to advance to purees and solid consistencies with no signs of airway threat or aspiration given max assistance. (MET)  5. Increase orientation to month, date, day of week, place and reason for hospitalization with 75% acc given max verbal and written cues.  6. Increase ability to express basic wants and needs 75% of time given min assistance   7. Pt will be able to sustain attention to task during simple functional tasks for 10 mins given min cues.     Outcome: Ongoing (interventions implemented as appropriate)  Continues to present with speech and cognitive communication deficits. Improved safety with PO intake with no overt s/s of aspiration noted during PO trials given cues and assist.

## 2019-02-23 NOTE — PLAN OF CARE
Problem: Adult Inpatient Plan of Care  Goal: Plan of Care Review  Outcome: Ongoing (interventions implemented as appropriate)  Patient lying in bed with television on. Refused to keep Bipap on. Oxygen nasal cannula 2 liters was placed on patient. Right arm double lumen PICC line and right chest wall sepideh split catheter intact. 24 hour urine in progress. Encouraged patient to call for any and all needs. Patient verbalized understanding of instructions. Will continue to monitor patient.

## 2019-02-23 NOTE — PLAN OF CARE
Problem: Adult Inpatient Plan of Care  Goal: Plan of Care Review  Outcome: Ongoing (interventions implemented as appropriate)  Pt remains on 2LNC PRN. Pt has bipap at bedside for QHS. MDI given and tolerated well. Pt remains stable

## 2019-02-24 LAB
ALBUMIN SERPL BCP-MCNC: 2.9 G/DL
ANION GAP SERPL CALC-SCNC: 13 MMOL/L
BUN SERPL-MCNC: 29 MG/DL
CALCIUM SERPL-MCNC: 8.2 MG/DL
CHLORIDE SERPL-SCNC: 96 MMOL/L
CO2 SERPL-SCNC: 25 MMOL/L
CREAT SERPL-MCNC: 1.7 MG/DL
EST. GFR  (AFRICAN AMERICAN): 53 ML/MIN/1.73 M^2
EST. GFR  (NON AFRICAN AMERICAN): 46 ML/MIN/1.73 M^2
GLUCOSE SERPL-MCNC: 104 MG/DL
PHOSPHATE SERPL-MCNC: 3.6 MG/DL
POTASSIUM SERPL-SCNC: 3.6 MMOL/L
SODIUM SERPL-SCNC: 134 MMOL/L

## 2019-02-24 PROCEDURE — 27000221 HC OXYGEN, UP TO 24 HOURS

## 2019-02-24 PROCEDURE — 99231 SBSQ HOSP IP/OBS SF/LOW 25: CPT | Mod: ,,, | Performed by: HOSPITALIST

## 2019-02-24 PROCEDURE — 25000003 PHARM REV CODE 250: Performed by: NURSE PRACTITIONER

## 2019-02-24 PROCEDURE — 99900035 HC TECH TIME PER 15 MIN (STAT)

## 2019-02-24 PROCEDURE — 99231 PR SUBSEQUENT HOSPITAL CARE,LEVL I: ICD-10-PCS | Mod: ,,, | Performed by: HOSPITALIST

## 2019-02-24 PROCEDURE — 94761 N-INVAS EAR/PLS OXIMETRY MLT: CPT

## 2019-02-24 PROCEDURE — 80069 RENAL FUNCTION PANEL: CPT

## 2019-02-24 PROCEDURE — 11000001 HC ACUTE MED/SURG PRIVATE ROOM

## 2019-02-24 PROCEDURE — 25000003 PHARM REV CODE 250: Performed by: INTERNAL MEDICINE

## 2019-02-24 PROCEDURE — 63600175 PHARM REV CODE 636 W HCPCS: Performed by: STUDENT IN AN ORGANIZED HEALTH CARE EDUCATION/TRAINING PROGRAM

## 2019-02-24 PROCEDURE — 94640 AIRWAY INHALATION TREATMENT: CPT

## 2019-02-24 RX ADMIN — MICONAZOLE NITRATE: 20 OINTMENT TOPICAL at 09:02

## 2019-02-24 RX ADMIN — CARVEDILOL 3.12 MG: 3.12 TABLET, FILM COATED ORAL at 08:02

## 2019-02-24 RX ADMIN — HEPARIN SODIUM 5000 UNITS: 5000 INJECTION, SOLUTION INTRAVENOUS; SUBCUTANEOUS at 03:02

## 2019-02-24 RX ADMIN — SEVELAMER CARBONATE 1600 MG: 800 TABLET, FILM COATED ORAL at 08:02

## 2019-02-24 RX ADMIN — HEPARIN SODIUM 5000 UNITS: 5000 INJECTION, SOLUTION INTRAVENOUS; SUBCUTANEOUS at 09:02

## 2019-02-24 RX ADMIN — CARVEDILOL 3.12 MG: 3.12 TABLET, FILM COATED ORAL at 09:02

## 2019-02-24 RX ADMIN — SEVELAMER CARBONATE 1600 MG: 800 TABLET, FILM COATED ORAL at 05:02

## 2019-02-24 RX ADMIN — TIOTROPIUM BROMIDE 18 MCG: 18 CAPSULE ORAL; RESPIRATORY (INHALATION) at 07:02

## 2019-02-24 RX ADMIN — FLUTICASONE FUROATE AND VILANTEROL TRIFENATATE 1 PUFF: 100; 25 POWDER RESPIRATORY (INHALATION) at 07:02

## 2019-02-24 RX ADMIN — HEPARIN SODIUM 5000 UNITS: 5000 INJECTION, SOLUTION INTRAVENOUS; SUBCUTANEOUS at 05:02

## 2019-02-24 RX ADMIN — Medication 1 CAPSULE: at 08:02

## 2019-02-24 RX ADMIN — SEVELAMER CARBONATE 1600 MG: 800 TABLET, FILM COATED ORAL at 11:02

## 2019-02-24 NOTE — PLAN OF CARE
[facility] [address] [place] [address]  [phone]         HOME  HEALTH ORDERS        Admit to Home Health    Diagnoses:  Active Hospital Problems    Diagnosis  POA    *Septic shock [A41.9, R65.21]  Yes    Debility [R53.81]  Yes    Encephalopathy, metabolic [G93.41]  Yes    Essential hypertension [I10]  No    Tobacco abuse [Z72.0]  Yes    Acute renal failure [N17.9]  Yes    Acute on chronic diastolic heart failure [I50.33]  Yes    Acute on chronic respiratory failure with hypoxia and hypercapnia [J96.21, J96.22]  Yes      Resolved Hospital Problems    Diagnosis Date Resolved POA    Encounter for fitting and adjustment of dialysis catheter [Z49.01] 02/07/2019 Not Applicable    Increased oropharyngeal secretions [K11.7] 02/03/2019 Unknown    Acute respiratory failure with hypoxia and hypercapnia [J96.01, J96.02] 02/01/2019 Yes    Cellulitis of right lower extremity [L03.115] 02/12/2019 Yes    Rhabdomyolysis [M62.82] 02/01/2019 Yes    Hyponatremia [E87.1] 02/01/2019 Yes       Patient is homebound due to: Pt requires home health services due to taxing effort to leave the home as a result of weakness/debility from Septic shock    Face to face services were provided on 2/25/19    Allergies:  Review of patient's allergies indicates:  No Known Allergies    Diet: cardiac renal    Activity: ambulate with assistance    Nursing:   SN to complete comprehensive assessment including routine vital signs. Instruct on disease process and s/s of complications to report to MD. Review/verify medication list sent home with the patient at time of discharge  and instruct patient/caregiver as needed. Frequency may be adjusted depending on start of care date.    Notify MD if SBP > 160 or < 90; DBP > 90 or < 50; HR > 120 or < 50; Temp > 101; Other:       Wound care:  Apply bacitracin cream to right foot and lower leg twice a day    Home Oxygen 2LPM by nasal canula    Medications:       Jones Red   Home Medication Instructions ZARINA:[id number]    Printed on:02/25/19 0859   Medication Information                      albuterol (VENTOLIN HFA) 90 mcg/actuation inhaler  Inhale 2 puffs into the lungs every 4 (four) hours as needed for Wheezing or Shortness of Breath. Rescue                        carvedilol (COREG) 3.125 MG tablet  Take 1 tablet (3.125 mg total) by mouth 2 (two) times daily.             fluticasone-vilanterol (BREO) 100-25 mcg/dose diskus inhaler  Inhale 1 puff into the lungs once daily. Controller             tiotropium (SPIRIVA) 18 mcg inhalation capsule  Inhale 1 capsule (18 mcg total) into the lungs once daily. Controller                     _________________________________  Jones Milner MD      2/24/2019

## 2019-02-24 NOTE — ASSESSMENT & PLAN NOTE
-No ARB due to renal dysfunction.  -Continue coreg.  -We may need lasix as he will not be on dialysis anymore, but would prefer to hold on that.  Will need close follow up with outpatient providers to determine this.

## 2019-02-24 NOTE — SUBJECTIVE & OBJECTIVE
Interval History:  No acute events.  Creatinine continues to improve.  Patient eager to leave hospital.    Review of Systems   Constitutional: Negative for chills and fever.   HENT: Negative for congestion and dental problem.    Eyes: Negative for discharge and itching.   Respiratory: Negative for shortness of breath.    Cardiovascular: Negative for chest pain.   Gastrointestinal: Negative for abdominal pain, constipation, diarrhea, nausea and vomiting.   Endocrine: Negative for cold intolerance and heat intolerance.   Musculoskeletal: Negative for arthralgias and back pain.   Neurological: Negative for dizziness and facial asymmetry.   Hematological: Negative for adenopathy.   Psychiatric/Behavioral: Negative for agitation and behavioral problems.     Objective:     Vital Signs (Most Recent):  Temp: 96.1 °F (35.6 °C) (02/24/19 0720)  Pulse: 70 (02/24/19 0750)  Resp: 20 (02/24/19 0750)  BP: 114/67 (02/24/19 0720)  SpO2: 97 % (02/24/19 0750) Vital Signs (24h Range):  Temp:  [96.1 °F (35.6 °C)-98.5 °F (36.9 °C)] 96.1 °F (35.6 °C)  Pulse:  [70-97] 70  Resp:  [16-20] 20  SpO2:  [92 %-97 %] 97 %  BP: (105-125)/(60-67) 114/67     Weight: (!) 159.9 kg (352 lb 8.3 oz)  Body mass index is 44.06 kg/m².    Intake/Output Summary (Last 24 hours) at 2/24/2019 1040  Last data filed at 2/24/2019 0659  Gross per 24 hour   Intake 360 ml   Output 675 ml   Net -315 ml      Physical Exam   Constitutional: He is oriented to person, place, and time. Vital signs are normal. He appears well-developed.  Non-toxic appearance. He does not have a sickly appearance. He does not appear ill. No distress.   Morbid obesity.   HENT:   Head: Normocephalic and atraumatic.   Right Ear: External ear normal.   Left Ear: External ear normal.   Eyes: Conjunctivae and EOM are normal. Pupils are equal, round, and reactive to light. No scleral icterus.   Neck: Normal range of motion. Neck supple. No JVD present. No tracheal deviation present.   Cardiovascular:  Normal rate, regular rhythm, normal heart sounds and intact distal pulses. Exam reveals no gallop and no friction rub.   No murmur heard.  Pulmonary/Chest: Effort normal and breath sounds normal. No stridor. No respiratory distress. He has no wheezes. He has no rales.   Distant lung sounds.  Moving air well.   Abdominal: Soft. Bowel sounds are normal. He exhibits no distension and no mass. There is no tenderness. There is no guarding.   Musculoskeletal: Normal range of motion. He exhibits no edema or deformity.   Neurological: He is alert and oriented to person, place, and time. He exhibits normal muscle tone.   Oriented x 3.  Overall mental status much improved.  Limited insight into his health care issues.   Skin: Skin is warm and dry. No rash noted. He is not diaphoretic. No erythema.   Erythema resolved.  Some skin breakdown in right skin but does not appear infected at this time.   Nursing note and vitals reviewed.    Significant Labs: All pertinent labs within the past 24 hours have been reviewed.    Significant Imaging: I have reviewed all pertinent imaging results/findings within the past 24 hours.

## 2019-02-24 NOTE — PROGRESS NOTES
[facility] Hospital Medicine  Progress Note    Patient Name: Jones Red  MRN: [id number]  Patient Class: IP- Inpatient   Admission Date: 1/28/2019  Length of Stay: 27 days  Attending Physician: Jones Milner MD  Primary Care Provider: PEE Romano MD        Subjective:     Principal Problem:Septic shock    HPI:  Mr. Jones Red is a 53 y.o. male, with PMH of HTN, COPD, chronic respiratory failure (on home O2), Diastolic CHF, LYNDON, chronic venous insufficiency, and questionable seizures history (suspected to be 2/2 alcohol withdrawal), who preset tia to [facility] on 1/22/19 2/2 SOB. This was associated with cough productive of brown sputum. He was started in BiPap, but did ultimately degrade, and was intubated, and maintained on a ventilator. He was additionally altered upon arrival to the ED. History was reportedly obtained by a friend, and it was reported that he was found down at home, and was unable to get up. He as admitted to the ICU, started on IV fluids and pressors.     Hospital Course:  Mr. eRd is a 53-year-old man with history of hypertension, diastolic heart failure, chronic obstructive pulmonary disease with chronic hypoxic respiratory failure on home oxygen, prior alcohol abuse, significant ongoing tobacco use (3 packs per day), and morbid obesity with septic shock secondary to right lower extremity cellulitis complicating chronic venous stasis.  Patient intubated at outside hospital after failing a trial of BiPAP.  Patient also with evidence of worsening renal failure with rhabdomyolysis.  Patient transferred from [facility] in [place], [place] to [facility] on 1/28/2019.  Patient started on renal replacement therapy.  He was extubated on 2/2/19 to bipap and transferred to the floor on 2/5/19.  Ultrasound of the right lower extremity did not reveal evidence of abscess, subcutaneous gas, or evidence of necrotizing fascitits.   General surgery and wound care consulted.  No surgical intervention recommended at this time.  He was seen by ID who recommended continuing vancomycin and cefepime until 2/11/19.  Mental status improved. He is tolerating his diet and continues on dialysis.  We are presently working with PT and OT daily in attempts to gain strength.  We are working on placement in SNF or inpatient rehab closer to his home which is complicated due to his high medical demands.  Patient's confusion refused and patient clearly refusing placement and would like to go home with home health.  Working with case management to secure outpatient hemodialysis close to his home.    Interval History:  No acute events.  Creatinine continues to improve.  Patient eager to leave hospital.    Review of Systems   Constitutional: Negative for chills and fever.   HENT: Negative for congestion and dental problem.    Eyes: Negative for discharge and itching.   Respiratory: Negative for shortness of breath.    Cardiovascular: Negative for chest pain.   Gastrointestinal: Negative for abdominal pain, constipation, diarrhea, nausea and vomiting.   Endocrine: Negative for cold intolerance and heat intolerance.   Musculoskeletal: Negative for arthralgias and back pain.   Neurological: Negative for dizziness and facial asymmetry.   Hematological: Negative for adenopathy.   Psychiatric/Behavioral: Negative for agitation and behavioral problems.     Objective:     Vital Signs (Most Recent):  Temp: 96.1 °F (35.6 °C) (02/24/19 0720)  Pulse: 70 (02/24/19 0750)  Resp: 20 (02/24/19 0750)  BP: 114/67 (02/24/19 0720)  SpO2: 97 % (02/24/19 0750) Vital Signs (24h Range):  Temp:  [96.1 °F (35.6 °C)-98.5 °F (36.9 °C)] 96.1 °F (35.6 °C)  Pulse:  [70-97] 70  Resp:  [16-20] 20  SpO2:  [92 %-97 %] 97 %  BP: (105-125)/(60-67) 114/67     Weight: (!) 159.9 kg (352 lb 8.3 oz)  Body mass index is 44.06 kg/m².    Intake/Output Summary (Last 24 hours) at 2/24/2019 1040  Last data filed  at 2/24/2019 0659  Gross per 24 hour   Intake 360 ml   Output 675 ml   Net -315 ml      Physical Exam   Constitutional: He is oriented to person, place, and time. Vital signs are normal. He appears well-developed.  Non-toxic appearance. He does not have a sickly appearance. He does not appear ill. No distress.   Morbid obesity.   HENT:   Head: Normocephalic and atraumatic.   Right Ear: External ear normal.   Left Ear: External ear normal.   Eyes: Conjunctivae and EOM are normal. Pupils are equal, round, and reactive to light. No scleral icterus.   Neck: Normal range of motion. Neck supple. No JVD present. No tracheal deviation present.   Cardiovascular: Normal rate, regular rhythm, normal heart sounds and intact distal pulses. Exam reveals no gallop and no friction rub.   No murmur heard.  Pulmonary/Chest: Effort normal and breath sounds normal. No stridor. No respiratory distress. He has no wheezes. He has no rales.   Distant lung sounds.  Moving air well.   Abdominal: Soft. Bowel sounds are normal. He exhibits no distension and no mass. There is no tenderness. There is no guarding.   Musculoskeletal: Normal range of motion. He exhibits no edema or deformity.   Neurological: He is alert and oriented to person, place, and time. He exhibits normal muscle tone.   Oriented x 3.  Overall mental status much improved.  Limited insight into his health care issues.   Skin: Skin is warm and dry. No rash noted. He is not diaphoretic. No erythema.   Erythema resolved.  Some skin breakdown in right skin but does not appear infected at this time.   Nursing note and vitals reviewed.    Significant Labs: All pertinent labs within the past 24 hours have been reviewed.    Significant Imaging: I have reviewed all pertinent imaging results/findings within the past 24 hours.    Assessment/Plan:      * Septic shock    -Admitted to inpatient status in ICU upon transfer from [place] with septic shock.  Treated with pressors and IV  antibiotics.  Ultimately weaned off of pressors and transferred to the floor on 2/5.  -Possible component of pneumonia but clear cause of his infection is the severe cellulitis involving the right lower extremity.    -Ultrasound of right lower extremity did not reveal evidence of abscess or gas.    -General surgery was consulted and recommended serial exams for now and no surgical intervention at this time.    -Cellulitis continues to improve but still with notable erythema.  WBC has normalized and bood cultures no growth to date.    -Continue to elevate right lower extremity as tolerated.    -Patient completed a course of antibiotics on 2/11/2019.  Continue with wound care.  -Patient stable to be discharged.  Plan d/c tomorrow if remains stable.     Debility    -Continue with therapy.    -Patient refuses SNF placement  -Will anticipate home with  for PT and OT.     Encephalopathy, metabolic    -Much improved.  Off restraints today.  Slowly improving.       Essential hypertension    -Reasonably controlled with current regimen.  Will continue with current regimen and continue to monitor.     Tobacco abuse    -Patient counseled on the importance of smoking cessation.  -NRT offered     Acute on chronic respiratory failure with hypoxia and hypercapnia    -Successfully extubated 2/2/2018.  Patient did well on BiPAP following extubation.  He has needed intermittent BiPAP.    -Continue with PRN bronchodilator treatments as I suspect patient has COPD -Continue volume removal with CRRT as able.  -continue supplemental O2 requirements for goal sat 88-92%  -Completed course of steroids   -Will need outpatient PFTs  -have started on spiriva and breo which he will need to continue.       Acute on chronic diastolic heart failure    -No ARB due to renal dysfunction.  -Continue coreg.  -We may need lasix as he will not be on dialysis anymore, but would prefer to hold on that.  Will need close follow up with outpatient providers  to determine this.     Acute renal failure    -Due to ATN from sepsis with shock and rhabdomyolysis.    -During his stay he required dialysis and it was suspected that he may have progressed to ESRD.  We were working on finding placement for HD as an outpatient.  -Fortunately, on 2/22 his creatinine suddenly began improving and he began making urine.  His creatinine continues to improve and is 1.7 today.  -Per nephrology notes he will not need outpatient dialysis.  Will consult general surgery and ask that tunneled dialysis line be removed.  -Expect discharge home tomorrow after line removed.       VTE Risk Mitigation (From admission, onward)        Ordered     heparin (porcine) injection 2,000 Units  Once      02/06/19 1729     heparin (porcine) injection 5,000 Units  As needed (PRN)      01/31/19 1825     heparin (porcine) injection 2,000 Units  As needed (PRN)      01/30/19 1056     heparin (porcine) injection 5,000 Units  Every 8 hours      01/29/19 2044     heparin (porcine) injection 5,000 Units  As needed (PRN)      01/29/19 1335     IP VTE HIGH RISK PATIENT  Once      01/28/19 1947              Jones Milner MD  Department of Hospital Medicine   [facility]

## 2019-02-24 NOTE — PLAN OF CARE
Problem: Adult Inpatient Plan of Care  Goal: Plan of Care Review  Outcome: Ongoing (interventions implemented as appropriate)  Declines Bipap.

## 2019-02-24 NOTE — ASSESSMENT & PLAN NOTE
-Admitted to inpatient status in ICU upon transfer from [place] with septic shock.  Treated with pressors and IV antibiotics.  Ultimately weaned off of pressors and transferred to the floor on 2/5.  -Possible component of pneumonia but clear cause of his infection is the severe cellulitis involving the right lower extremity.    -Ultrasound of right lower extremity did not reveal evidence of abscess or gas.    -General surgery was consulted and recommended serial exams for now and no surgical intervention at this time.    -Cellulitis continues to improve but still with notable erythema.  WBC has normalized and bood cultures no growth to date.    -Continue to elevate right lower extremity as tolerated.    -Patient completed a course of antibiotics on 2/11/2019.  Continue with wound care.  -Patient stable to be discharged.  Plan d/c tomorrow if remains stable.

## 2019-02-24 NOTE — ASSESSMENT & PLAN NOTE
-Due to ATN from sepsis with shock and rhabdomyolysis.    -During his stay he required dialysis and it was suspected that he may have progressed to ESRD.  We were working on finding placement for HD as an outpatient.  -Fortunately, on 2/22 his creatinine suddenly began improving and he began making urine.  His creatinine continues to improve and is 1.7 today.  -Per nephrology notes he will not need outpatient dialysis.  Will consult general surgery and ask that tunneled dialysis line be removed.  -Expect discharge home tomorrow after line removed.

## 2019-02-24 NOTE — PLAN OF CARE
Problem: Adult Inpatient Plan of Care  Goal: Plan of Care Review  Outcome: Ongoing (interventions implemented as appropriate)  Patient refused to wear Bipap last night. Patient sitting up on the side of the bed. Blood work drawn from right arm PICC line. Both red port and purple port flush sluggish, blood return noted, normal saline locked, and a a new cap placed on both ports. Patient refused to have dressing change to right arm PICC line done. Right sepideh split catheter intact. Call light within reach. Encouraged patient to call for any and all needs. Patient verbalized understanding of instructions. Will continue to monitor patient.

## 2019-02-25 VITALS
DIASTOLIC BLOOD PRESSURE: 86 MMHG | HEART RATE: 88 BPM | BODY MASS INDEX: 39.17 KG/M2 | RESPIRATION RATE: 22 BRPM | TEMPERATURE: 98 F | HEIGHT: 75 IN | WEIGHT: 315 LBS | SYSTOLIC BLOOD PRESSURE: 155 MMHG | OXYGEN SATURATION: 93 %

## 2019-02-25 LAB
ALBUMIN SERPL BCP-MCNC: 3.2 G/DL
ANION GAP SERPL CALC-SCNC: 13 MMOL/L
ANION GAP SERPL CALC-SCNC: 13 MMOL/L
BASOPHILS # BLD AUTO: 0.02 K/UL
BASOPHILS NFR BLD: 0.3 %
BUN SERPL-MCNC: 30 MG/DL
BUN SERPL-MCNC: 30 MG/DL
CALCIUM SERPL-MCNC: 8.4 MG/DL
CALCIUM SERPL-MCNC: 8.4 MG/DL
CHLORIDE SERPL-SCNC: 96 MMOL/L
CHLORIDE SERPL-SCNC: 96 MMOL/L
CO2 SERPL-SCNC: 25 MMOL/L
CO2 SERPL-SCNC: 25 MMOL/L
CREAT SERPL-MCNC: 1.4 MG/DL
CREAT SERPL-MCNC: 1.4 MG/DL
DIFFERENTIAL METHOD: ABNORMAL
EOSINOPHIL # BLD AUTO: 0.3 K/UL
EOSINOPHIL NFR BLD: 3.8 %
ERYTHROCYTE [DISTWIDTH] IN BLOOD BY AUTOMATED COUNT: 15.4 %
EST. GFR  (AFRICAN AMERICAN): >60 ML/MIN/1.73 M^2
EST. GFR  (AFRICAN AMERICAN): >60 ML/MIN/1.73 M^2
EST. GFR  (NON AFRICAN AMERICAN): 58 ML/MIN/1.73 M^2
EST. GFR  (NON AFRICAN AMERICAN): 58 ML/MIN/1.73 M^2
GLUCOSE SERPL-MCNC: 109 MG/DL
GLUCOSE SERPL-MCNC: 109 MG/DL
HCT VFR BLD AUTO: 30.6 %
HGB BLD-MCNC: 9.6 G/DL
LYMPHOCYTES # BLD AUTO: 2 K/UL
LYMPHOCYTES NFR BLD: 29.9 %
MAGNESIUM SERPL-MCNC: 1.1 MG/DL
MCH RBC QN AUTO: 27.6 PG
MCHC RBC AUTO-ENTMCNC: 31.4 G/DL
MCV RBC AUTO: 88 FL
MONOCYTES # BLD AUTO: 0.8 K/UL
MONOCYTES NFR BLD: 12.4 %
NEUTROPHILS # BLD AUTO: 3.4 K/UL
NEUTROPHILS NFR BLD: 51.9 %
PHOSPHATE SERPL-MCNC: 3.2 MG/DL
PHOSPHATE SERPL-MCNC: 3.2 MG/DL
PLATELET # BLD AUTO: 258 K/UL
PMV BLD AUTO: 9.4 FL
POTASSIUM SERPL-SCNC: 3.6 MMOL/L
POTASSIUM SERPL-SCNC: 3.6 MMOL/L
RBC # BLD AUTO: 3.48 M/UL
SODIUM SERPL-SCNC: 134 MMOL/L
SODIUM SERPL-SCNC: 134 MMOL/L
WBC # BLD AUTO: 6.53 K/UL

## 2019-02-25 PROCEDURE — 80069 RENAL FUNCTION PANEL: CPT

## 2019-02-25 PROCEDURE — 83735 ASSAY OF MAGNESIUM: CPT

## 2019-02-25 PROCEDURE — 99239 PR HOSPITAL DISCHARGE DAY,>30 MIN: ICD-10-PCS | Mod: ,,, | Performed by: HOSPITALIST

## 2019-02-25 PROCEDURE — 85025 COMPLETE CBC W/AUTO DIFF WBC: CPT

## 2019-02-25 PROCEDURE — 99239 HOSP IP/OBS DSCHRG MGMT >30: CPT | Mod: ,,, | Performed by: HOSPITALIST

## 2019-02-25 PROCEDURE — 63600175 PHARM REV CODE 636 W HCPCS: Performed by: NURSE PRACTITIONER

## 2019-02-25 PROCEDURE — 63600175 PHARM REV CODE 636 W HCPCS: Performed by: STUDENT IN AN ORGANIZED HEALTH CARE EDUCATION/TRAINING PROGRAM

## 2019-02-25 PROCEDURE — 99900035 HC TECH TIME PER 15 MIN (STAT)

## 2019-02-25 PROCEDURE — 36589 REMOVAL TUNNELED CV CATH: CPT | Performed by: INTERNAL MEDICINE

## 2019-02-25 PROCEDURE — 25000242 PHARM REV CODE 250 ALT 637 W/ HCPCS: Performed by: HOSPITALIST

## 2019-02-25 PROCEDURE — 94640 AIRWAY INHALATION TREATMENT: CPT

## 2019-02-25 PROCEDURE — 25000003 PHARM REV CODE 250: Performed by: NURSE PRACTITIONER

## 2019-02-25 PROCEDURE — 92507 TX SP LANG VOICE COMM INDIV: CPT

## 2019-02-25 PROCEDURE — 94761 N-INVAS EAR/PLS OXIMETRY MLT: CPT

## 2019-02-25 PROCEDURE — 97116 GAIT TRAINING THERAPY: CPT

## 2019-02-25 RX ORDER — FLUTICASONE FUROATE AND VILANTEROL 100; 25 UG/1; UG/1
1 POWDER RESPIRATORY (INHALATION) DAILY
Qty: 14 EACH | Refills: 2 | Status: SHIPPED | OUTPATIENT
Start: 2019-02-25

## 2019-02-25 RX ORDER — TIOTROPIUM BROMIDE 18 UG/1
1 CAPSULE ORAL; RESPIRATORY (INHALATION) DAILY
Qty: 30 CAPSULE | Refills: 1 | Status: SHIPPED | OUTPATIENT
Start: 2019-02-25 | End: 2020-02-25

## 2019-02-25 RX ORDER — CARVEDILOL 3.12 MG/1
3.12 TABLET ORAL 2 TIMES DAILY
Qty: 60 TABLET | Refills: 11 | Status: SHIPPED | OUTPATIENT
Start: 2019-02-25 | End: 2020-02-25

## 2019-02-25 RX ORDER — MAGNESIUM SULFATE HEPTAHYDRATE 40 MG/ML
2 INJECTION, SOLUTION INTRAVENOUS
Status: DISPENSED | OUTPATIENT
Start: 2019-02-25 | End: 2019-02-25

## 2019-02-25 RX ADMIN — MAGNESIUM SULFATE IN WATER 2 G: 40 INJECTION, SOLUTION INTRAVENOUS at 09:02

## 2019-02-25 RX ADMIN — TIOTROPIUM BROMIDE 18 MCG: 18 CAPSULE ORAL; RESPIRATORY (INHALATION) at 08:02

## 2019-02-25 RX ADMIN — FLUTICASONE FUROATE AND VILANTEROL TRIFENATATE 1 PUFF: 100; 25 POWDER RESPIRATORY (INHALATION) at 08:02

## 2019-02-25 RX ADMIN — CARVEDILOL 3.12 MG: 3.12 TABLET, FILM COATED ORAL at 09:02

## 2019-02-25 RX ADMIN — HEPARIN SODIUM 5000 UNITS: 5000 INJECTION, SOLUTION INTRAVENOUS; SUBCUTANEOUS at 05:02

## 2019-02-25 RX ADMIN — HEPARIN SODIUM 5000 UNITS: 5000 INJECTION, SOLUTION INTRAVENOUS; SUBCUTANEOUS at 02:02

## 2019-02-25 NOTE — PROCEDURES
[facility]     Tunneled Dialysis Catheter Removal Procedure Note     Date of Procedure:  02/25/2019 10:53 AM    Primary Surgeon: Varinder Barcenas MD  Assist: Min Duran MD    Procedure Performed:  1. Tunneled Dialysis Catheter Removal    Indication: Catheter not required; recovery of acute kidney injury, no longer requiring renal replacement therapy    Location of catheter: R internal jugular vein     Patient was prepped and draped in a sterile fashion.  1% lidocaine was used for local anesthesia.  Blunt hemostat was used to dissect the exit site and fibrin sheath from the catheter cuff.  Once the cuff was freed, catheter was pulled and pressure was applied to the venotomy site for at least 5 minutes.  Hemostasis was achieved, tegaderm dressing applied.      Blood Loss: < 10 mL  Discharge instructions given verbally.    Varinder Barcenas MD  [phone]

## 2019-02-25 NOTE — PROGRESS NOTES
"Nephrology  Progress Note    Admit Date: 1/28/2019   LOS: 28 days     SUBJECTIVE:     Follow-up For:  Septic shock/ATN    Interval History:    Awaiting CHERIE cath removal then DC home today.  No c/o this am.  Discussed with team.  Renal fxn continues to improve and UOP adequate.      Review of Systems:   Constitutional: no fever or chills   Respiratory: cough improved and resolving  Cardiovascular: no chest pain or palpitations   Gastrointestinal: no nausea or vomiting, no abdominal pain or change in bowel habits   Genitourinary: no hematuria or dysuria   Musculoskeletal: no arthralgias or myalgias   Neurological: no seizures or tremors    OBJECTIVE:     Vital Signs Range (Last 24H):  /77 (Patient Position: Lying)   Pulse 92   Temp 97.8 °F (36.6 °C) (Oral)   Resp 18   Ht 6' 3" (1.905 m)   Wt (!) 156 kg (343 lb 14.7 oz)   SpO2 95%   BMI 42.99 kg/m²     Temp:  [97.6 °F (36.4 °C)-98.7 °F (37.1 °C)]   Pulse:  []   Resp:  [16-20]   BP: (113-127)/(58-77)   SpO2:  [92 %-95 %]     I & O (Last 24H):    Intake/Output Summary (Last 24 hours) at 2/25/2019 1031  Last data filed at 2/25/2019 0549  Gross per 24 hour   Intake 480 ml   Output 1240 ml   Net -760 ml       Physical Exam:  General appearance:  NAD and likely back to baseline.     Eyes:  Conjunctivae/corneas clear. EOMI.  Lungs:  Clear to auscultation.   Heart: Regular rate and rhythm, distant heart tones.  Abdomen: Soft, non-tender non-distended; bowel sounds normal; no masses,  no organomegaly, morbidly obese  Extremities:  Chronic Woody edema.   peeling/flaky LE's (R>L).  Overall much improved  Skin:  Right lower extremity cellulitis improved.   Right IJ CHERIE       Laboratory Data:  Recent Labs   Lab 02/25/19 0418   WBC 6.53   RBC 3.48*   HGB 9.6*   HCT 30.6*      MCV 88   MCH 27.6   MCHC 31.4*       BMP:   Recent Labs   Lab 02/25/19 0418     109   *  134*   K 3.6  3.6   CL 96  96   CO2 25  25   BUN 30*  30*   CREATININE 1.4 "  1.4   CALCIUM 8.4*  8.4*   MG 1.1*     Lab Results   Component Value Date    CALCIUM 8.4 (L) 02/25/2019    CALCIUM 8.4 (L) 02/25/2019    PHOS 3.2 02/25/2019    PHOS 3.2 02/25/2019       Lab Results   Component Value Date    PTH 24.3 02/14/2019    CALCIUM 8.4 (L) 02/25/2019    CALCIUM 8.4 (L) 02/25/2019    CAION 1.32 02/14/2019    PHOS 3.2 02/25/2019    PHOS 3.2 02/25/2019       No results found for: URICACID    BNP  No results for input(s): BNP, BNPTRIAGEBLO in the last 168 hours.    Medications:  Medication list was reviewed and changes noted under Assessment/Plan.    Diagnostic Results:    X-Ray Chest 1 View for PICC_Central line   Final Result      As above         Electronically signed by: Kalyan Galvez MD   Date:    02/08/2019   Time:    14:46      X-Ray Chest AP Portable   Final Result      NG tube in place.         Electronically signed by: Viviane Persaud MD   Date:    02/02/2019   Time:    23:59      X-Ray Chest 1 View   Final Result      As above.         Electronically signed by: Keon Jonas MD   Date:    01/31/2019   Time:    19:14      X-Ray Chest AP Portable   Final Result      Lines and tubes are grossly unchanged.      Interval improvement in aeration of the lungs.         Electronically signed by: Connor Babcock MD   Date:    01/31/2019   Time:    16:42      X-Ray Chest 1 View   Final Result      Overall no significant interval change         Electronically signed by: Connor Babcock MD   Date:    01/30/2019   Time:    08:24      US Extremity Non Vascular Limited Right   Final Result      Nonspecific subcutaneous edema, cutaneous thickening, could represent cellulitis changes.  No definite necrotizing fasciitis, gas or abscess.         Electronically signed by: Tin Leone MD   Date:    01/29/2019   Time:    13:09      X-Ray Chest AP Portable   Final Result   Addendum 1 of 1      Enteric tube projects in unchanged radiographic position with tip coursing    below the diaphragm and  appearing to extend beyond the field of view.         Electronically signed by: Trena Sena MD   Date:    01/29/2019   Time:    00:39      Final      As above.         Electronically signed by: Trena Sena MD   Date:    01/29/2019   Time:    00:04      US Lower Extremity Veins Right   Final Result      Limited examination.  No evidence of deep venous thrombosis in the right lower extremity.         Electronically signed by: Vanita Oviedo   Date:    01/28/2019   Time:    21:10      X-Ray Chest AP Portable   Final Result      Cardiomegaly with findings suggesting pulmonary edema, correlation advised.  Differential would include infection.         Electronically signed by: Kalyan Galvez MD   Date:    01/28/2019   Time:    20:19          ASSESSMENT/PLAN:     Resolved Multifactorial initially anuric and now non-oliguric acute renal failure secondary to ATN from septic shock, rhabdomyolysis, with hyponatremia, and hyperkalemia (N17.0, N17.9, E87.5, E87.1, M62.82, A41.9): Transferred from outlying facility for CRRT and was transitioned to standard hemodialysis.  Has not dialyzed since last Wednesday, and it appears that he truly is in renal recovery given excellent UOP and creatinine continues to trend downward.  Consulted [facility] nephrology for line removal today.    Hyperphosphatemia: resolved with improved renal fxn.  DC binders.     RLE Cellulitis/woody edema (L03.115): Defer to wound care and ID.  Much improved.     Anemia of illness:  Iron/b12/folate wnl.  No need for ONDINA as renal fxn back to baseline.        HTN: Low-normal BPs.  BB only for now.     Morbid obesity (E44):  Severe morbid obesity the root of most of this patients' medical issues.  Defer.    Hypokalemia secondary to hypomag: replace mag IV today prior to DC.        Dispo:  -ok to DC home today after CHERIE removal.   -Needs BMP and f/u with nephrology in [place] next week.     Sterling Lopez, DAVIDP  Nephrology

## 2019-02-25 NOTE — PLAN OF CARE
1400  [facility] unable to find a home health company in pt's area who accepts his medicaid plan.  Pt to follow-up with his PCP, apt is scheduled and is in Pt's AVS.    Transport arrangements entered into ADT30    Will advise nurses and pt for time of pick-up.    CM scheduled pt an apt with his PCP, Dr.Jake Ramirez, [phone], in [place], [place] for 3/7/19 for 1045. Information entered into pt's avs.

## 2019-02-25 NOTE — H&P
[facility]  History & Physical    Subjective:      Chief Complaint/Reason for Admission: Tunneled dialysis catheter removal    Jones Red is a 53 y.o. male with history of HTN, presented to [facility] 3 weeks ago with NICK in the setting of severe cellulitis.  He has since had recovery of his renal function after requiring 3 weeks of renal replacement therapy.  We were consulted for removal of his R internal jugular tunneled dialysis catheter.  He is ready for discharge today.    Past Medical History:   Diagnosis Date    Obesity     LYNDON (obstructive sleep apnea)     Rhabdomyolysis 1/28/2019    Tobacco abuse     Venous stasis      Past Surgical History:   Procedure Laterality Date    Insertion,catheter,tunneled Right 2/6/2019    Performed by Varinder Barcenas MD at [facility] CATH LAB     History reviewed. No pertinent family history.  Social History     Tobacco Use    Smoking status: Current Every Day Smoker     Packs/day: 0.50     Types: Cigarettes   Substance Use Topics    Alcohol use: Yes    Drug use: Not on file       PTA Medications   Medication Sig    albuterol (VENTOLIN HFA) 90 mcg/actuation inhaler Inhale 2 puffs into the lungs every 4 (four) hours as needed for Wheezing or Shortness of Breath. Rescue    [DISCONTINUED] allopurinol (ZYLOPRIM) 100 MG tablet Take 100 mg by mouth 2 (two) times daily.    [DISCONTINUED] bumetanide (BUMEX) 1 MG tablet Take 1 mg by mouth once daily.    [DISCONTINUED] carvedilol (COREG) 25 MG tablet Take 25 mg by mouth 2 (two) times daily with meals.    [DISCONTINUED] losartan-hydrochlorothiazide 100-25 mg (HYZAAR) 100-25 mg per tablet Take 1 tablet by mouth once daily.    [DISCONTINUED] metOLazone (ZAROXOLYN) 5 MG tablet Take 5 mg by mouth once daily.    [DISCONTINUED] montelukast (SINGULAIR) 10 mg tablet Take 10 mg by mouth once daily.    [DISCONTINUED] pantoprazole (PROTONIX) 40 MG tablet Take 40 mg by mouth once daily.    [DISCONTINUED] potassium  chloride (KLOR-CON) 10 MEQ TbSR Take 10 mEq by mouth once daily.    [DISCONTINUED] ranitidine (ZANTAC) 150 MG tablet Take 150 mg by mouth 2 (two) times daily.    [DISCONTINUED] traZODone (DESYREL) 100 MG tablet Take 100 mg by mouth every evening.     Review of patient's allergies indicates:  No Known Allergies     Review of Systems   Constitutional: Negative.    Respiratory: Negative.    Cardiovascular: Negative.        Objective:      Vital Signs (Most Recent)  Temp: 97.8 °F (36.6 °C) (02/25/19 0811)  Pulse: 92 (02/25/19 0854)  Resp: 18 (02/25/19 0854)  BP: 125/77 (02/25/19 0811)  SpO2: 95 % (02/25/19 0854)    Vital Signs Range (Last 24H):  Temp:  [97.6 °F (36.4 °C)-98.7 °F (37.1 °C)]   Pulse:  []   Resp:  [16-20]   BP: (113-127)/(58-77)   SpO2:  [92 %-95 %]     Physical Exam   Constitutional: He is oriented to person, place, and time. He appears well-developed and well-nourished. No distress.   HENT:   Head: Normocephalic and atraumatic.   Eyes: EOM are normal.   Neck: Neck supple.   Cardiovascular:   R IJ TDC, exit site clean with no erythema or purulence.   Pulmonary/Chest: Effort normal. No respiratory distress.   Neurological: He is alert and oriented to person, place, and time.   Skin: Skin is warm and dry. He is not diaphoretic.   Psychiatric: He has a normal mood and affect. His behavior is normal.       Assessment:      Active Hospital Problems    Diagnosis  POA    *Septic shock [A41.9, R65.21]  Yes    Debility [R53.81]  Yes    Encephalopathy, metabolic [G93.41]  Yes    Essential hypertension [I10]  No    Tobacco abuse [Z72.0]  Yes    Acute renal failure [N17.9]  Yes    Acute on chronic diastolic heart failure [I50.33]  Yes    Acute on chronic respiratory failure with hypoxia and hypercapnia [J96.21, J96.22]  Yes      Resolved Hospital Problems    Diagnosis Date Resolved POA    Encounter for fitting and adjustment of dialysis catheter [Z49.01] 02/07/2019 Not Applicable    Increased  oropharyngeal secretions [K11.7] 02/03/2019 Unknown    Acute respiratory failure with hypoxia and hypercapnia [J96.01, J96.02] 02/01/2019 Yes    Cellulitis of right lower extremity [L03.115] 02/12/2019 Yes    Rhabdomyolysis [M62.82] 02/01/2019 Yes    Hyponatremia [E87.1] 02/01/2019 Yes       Plan:      TDC removal today.  Risks explained, consent signed.

## 2019-02-25 NOTE — ASSESSMENT & PLAN NOTE
-Admitted to inpatient status in ICU upon transfer from [place] with septic shock.  Treated with pressors and IV antibiotics.  Ultimately weaned off of pressors and transferred to the floor on 2/5.  -Possible component of pneumonia but clear cause of his infection is the severe cellulitis involving the right lower extremity.    -Ultrasound of right lower extremity did not reveal evidence of abscess or gas.    -General surgery was consulted and recommended serial exams for now and no surgical intervention at this time.    -Cellulitis continues to improve but still with notable erythema.  WBC has normalized and bood cultures no growth to date.    -Continue to elevate right lower extremity as tolerated.    -Patient completed a course of antibiotics on 2/11/2019.  Continue with wound care.  -Patient stable to be discharged.  Plan d/c today.

## 2019-02-25 NOTE — PLAN OF CARE
Transportation arrangements have changed and pt will be picked up at 1600.    Pt's nurse informed to instruct in wound care for his leg, and also to provide lunch pack for transport home.    CM called pt's spouse, Nivia, to inform her of delay and when to expect pt.    No further CM needs for discharge.

## 2019-02-25 NOTE — PLAN OF CARE
Problem: Physical Therapy Goal  Goal: Physical Therapy Goal  Goals to be met by: 3/3/19    Patient will increase functional independence with mobility by performin. Supine to sit with min A. -- MET 2/10/19  REVISED: Supine>sit modified independently.-MET 19   2. Sit to supine with min A. -- MET 2/10/19  REVISED: Sit>supine modified independently. MET 19  3. Rolling R and L with min A. -- MET 2/10/19  4. Sitting at edge of bed >5 minutes with min A. -- MET 2/10/19  5. PT will assess sit<>stand. -- MET 2/10/19  REVISED: Sit<>stand with supervision and standard or rolling walker. MET 19  6. Gait > 50 ft with standard or rolling walker with min A. MET 19  REVISED: Patient will ambulate >150' with RW with CGA for safety-MET 19  7. UPDATED: patient will t/f bed to wheelchair via stand-step t/f with RW with SBA for safety  8. UPDATED: Patient will propel himself in wheelchair in all directions > 150' while in open environment with Mod I and no verbal cues for safety        Outcome: Ongoing (interventions implemented as appropriate)    Progressing well towards goals

## 2019-02-25 NOTE — PROGRESS NOTES
"Nephrology  Progress Note    Admit Date: 1/28/2019   LOS: 27 days     SUBJECTIVE:     Follow-up For:  Septic shock/ATN    Interval History:    Patient frustrated because he feels like he should be discharged home since it's not clear if and where he will be getting HD.  Awaiting outpatient dialysis arrangements.  No chest pain or shortness of Breath.      Review of Systems:   Constitutional: no fever or chills   Respiratory: + cough but less  Cardiovascular: no chest pain or palpitations   Gastrointestinal: no nausea or vomiting, no abdominal pain or change in bowel habits   Genitourinary: no hematuria or dysuria   Musculoskeletal: no arthralgias or myalgias   Neurological: no seizures or tremors    OBJECTIVE:     Vital Signs Range (Last 24H):  /72 (BP Location: Left arm, Patient Position: Lying)   Pulse 87   Temp 98.5 °F (36.9 °C) (Oral)   Resp 18   Ht 6' 3" (1.905 m)   Wt (!) 159.9 kg (352 lb 8.3 oz)   SpO2 95%   BMI 44.06 kg/m²     Temp:  [96.1 °F (35.6 °C)-98.7 °F (37.1 °C)]   Pulse:  [70-97]   Resp:  [16-20]   BP: (105-127)/(60-72)   SpO2:  [92 %-97 %]     I & O (Last 24H):    Intake/Output Summary (Last 24 hours) at 2/24/2019 1805  Last data filed at 2/24/2019 0659  Gross per 24 hour   Intake 360 ml   Output 675 ml   Net -315 ml       Physical Exam:  General appearance:  NAD and likely back to baseline.     Eyes:  Conjunctivae/corneas clear. EOMI.  Lungs:  Clear to auscultation.   Heart: Regular rate and rhythm, distant heart tones.  Abdomen: Soft, non-tender non-distended; bowel sounds normal; no masses,  no organomegaly, morbidly obese  Extremities:  Chronic Woody edema.   peeling/flaky LE's (R>L).  Overall much improved  Skin:  Right lower extremity cellulitis improved.   Right IJ CHERIE       Laboratory Data:  No results for input(s): WBC, RBC, HGB, HCT, PLT, MCV, MCH, MCHC in the last 24 hours.    BMP:   Recent Labs   Lab 02/22/19  0520  02/24/19  0420   GLU 97   < > 104   *   < > 134*   K " 3.7   < > 3.6   CL 97   < > 96   CO2 26   < > 25   BUN 37*   < > 29*   CREATININE 3.6*   < > 1.7*   CALCIUM 8.4*   < > 8.2*   MG 1.8  --   --     < > = values in this interval not displayed.     Lab Results   Component Value Date    CALCIUM 8.2 (L) 02/24/2019    PHOS 3.6 02/24/2019       Lab Results   Component Value Date    PTH 24.3 02/14/2019    CALCIUM 8.2 (L) 02/24/2019    CAION 1.32 02/14/2019    PHOS 3.6 02/24/2019       No results found for: URICACID    BNP  No results for input(s): BNP, BNPTRIAGEBLO in the last 168 hours.    Medications:  Medication list was reviewed and changes noted under Assessment/Plan.    Diagnostic Results:    X-Ray Chest 1 View for PICC_Central line   Final Result      As above         Electronically signed by: Kalyan Galvez MD   Date:    02/08/2019   Time:    14:46      X-Ray Chest AP Portable   Final Result      NG tube in place.         Electronically signed by: Viviane Persaud MD   Date:    02/02/2019   Time:    23:59      X-Ray Chest 1 View   Final Result      As above.         Electronically signed by: Keon Jonas MD   Date:    01/31/2019   Time:    19:14      X-Ray Chest AP Portable   Final Result      Lines and tubes are grossly unchanged.      Interval improvement in aeration of the lungs.         Electronically signed by: Connor Babcock MD   Date:    01/31/2019   Time:    16:42      X-Ray Chest 1 View   Final Result      Overall no significant interval change         Electronically signed by: Connor Babcock MD   Date:    01/30/2019   Time:    08:24      US Extremity Non Vascular Limited Right   Final Result      Nonspecific subcutaneous edema, cutaneous thickening, could represent cellulitis changes.  No definite necrotizing fasciitis, gas or abscess.         Electronically signed by: Tin Leone MD   Date:    01/29/2019   Time:    13:09      X-Ray Chest AP Portable   Final Result   Addendum 1 of 1      Enteric tube projects in unchanged radiographic position  with tip coursing    below the diaphragm and appearing to extend beyond the field of view.         Electronically signed by: Trena Sena MD   Date:    01/29/2019   Time:    00:39      Final      As above.         Electronically signed by: Trena Sena MD   Date:    01/29/2019   Time:    00:04      US Lower Extremity Veins Right   Final Result      Limited examination.  No evidence of deep venous thrombosis in the right lower extremity.         Electronically signed by: Vanita Oviedo   Date:    01/28/2019   Time:    21:10      X-Ray Chest AP Portable   Final Result      Cardiomegaly with findings suggesting pulmonary edema, correlation advised.  Differential would include infection.         Electronically signed by: Kalyan Galvez MD   Date:    01/28/2019   Time:    20:19          ASSESSMENT/PLAN:     Multifactorial initially anuric and now oliguric acute renal failure secondary to ATN from septic shock, rhabdomyolysis, with hyponatremia, and hyperkalemia (N17.0, N17.9, E87.5, E87.1, M62.82, A41.9): Transferred from outlying facility for CRRT and was transitioned to standard hemodialysis.   Timeframe of renal recovery unclear at this time.  Tunneled line placed. Has not dialyzed since last Wednesday, and it appears that he truly is in renal recovery given excellent UOP and creatinine continues to trend downward. Placed Consult for line removal-interventional Nephro since was placed by them.    Hyperphosphatemia: Changed to Renvela tabs with meals.    RLE Cellulitis/woody edema (L03.115): Defer to wound care and ID.  Much improved.     Anemia of illness:  Iron/b12/folate wnl.  Started ONDINA. Nephrocaps.       HTN: Low-normal BPs.  BB only for now.     Morbid obesity (E44):  Severe morbid obesity the root of most of this patients' medical issues.  Defer.    Hypokalemia:  Replace PO.    Disposition:  I think it would be reasonable to discharge the patient home given significant spontaneous improvement of renal  function.  Will need to have line removed prior to discharge, and he will need very close follow up with his primary nephrologist with a repeat BMP 5-7days post discharge to ensure trends continue.    Jeninfer Bullock MD  Nephrology

## 2019-02-25 NOTE — ASSESSMENT & PLAN NOTE
-Continue with therapy.    -Patient refuses SNF placement  -Will order outpatient pt and ot  -Will order HH for skilled nursing.

## 2019-02-25 NOTE — DISCHARGE SUMMARY
[facility] Hospital Medicine  Discharge Summary      Patient Name: Mili Red  MRN: [id number]  Admission Date: 1/28/2019  Hospital Length of Stay: 28 days  Discharge Date and Time:  02/25/2019 9:04 AM  Attending Physician: Mili Milner MD   Discharging Provider: Mili Milner MD  Primary Care Provider: PEE Romano MD      HPI:   Mr. Mili Red is a 53 y.o. male, with PMH of HTN, COPD, chronic respiratory failure (on home O2), Diastolic CHF, LYNDON, chronic venous insufficiency, and questionable seizures history (suspected to be 2/2 alcohol withdrawal), who preset tia to [facility] on 1/22/19 2/2 SOB. This was associated with cough productive of brown sputum. He was started in BiPap, but did ultimately degrade, and was intubated, and maintained on a ventilator. He was additionally altered upon arrival to the ED. History was reportedly obtained by a friend, and it was reported that he was found down at home, and was unable to get up. He as admitted to the ICU, started on IV fluids and pressors.     Procedure(s) (LRB):  Insertion,catheter,tunneled (Right)      Consults:   Consults (From admission, onward)        Status Ordering Provider     Inpatient consult to General Surgery  Once     Provider:  Mili Chavez Jr., MD    Completed USHA BEASLEY     Inpatient consult to Infectious Diseases  Once     Provider:  Nancy Franco MD    Completed SETIVEN GALLO     Inpatient consult to Nephrology-[facility]  Once     Provider:  Varinder Barcenas MD    Acknowledged CLARISSE HART     Inpatient consult to Nephrology-[facility]  Once     Provider:  Jose Pollard Jr., MD    Completed ESTIVEN GALOL     [facility]  Once     Provider:  (Not yet assigned)    Acknowledged LOUIE SCHWAB     Inpatient consult to PICC team ([facility])  Once     Provider:  (Not yet assigned)    Acknowledged MILI MILNER     Inpatient consult to Pulmonology  Once      Provider:  (Not yet assigned)    Completed GABRIEL ARCE     Inpatient consult to Social Work  Once     Provider:  (Not yet assigned)    Completed USHA BEASLEY     Inpatient consult to Social Work  Once     Provider:  (Not yet assigned)    Completed USHA BEASLEY     Inpatient consult to Social Work  Once     Provider:  (Not yet assigned)    Acknowledged MILI HANCOCK     Inpatient consult to Social Work/Case Management  Once     Provider:  (Not yet assigned)    Completed LOUIE SCHWAB     Pharmacy to renally dose medication  Once     Provider:  (Not yet assigned)    Acknowledged LOUIE SCHWAB        Hospital Course By Problem:     * Septic shock    -Admitted to inpatient status in ICU upon transfer from [place] with septic shock.  Treated with pressors and IV antibiotics.  Ultimately weaned off of pressors and transferred to the floor on 2/5.  -Possible component of pneumonia but clear cause of his infection is the severe cellulitis involving the right lower extremity.    -Ultrasound of right lower extremity did not reveal evidence of abscess or gas.    -General surgery was consulted and recommended serial exams for now and no surgical intervention at this time.    -Cellulitis continues to improve but still with notable erythema.  WBC has normalized and bood cultures no growth to date.    -Continue to elevate right lower extremity as tolerated.    -Patient completed a course of antibiotics on 2/11/2019.  Continue with wound care.  -Patient stable to be discharged.  Plan d/c today.     Debility    -Continue with therapy.    -Patient refuses SNF placement  -Will order outpatient pt and ot  -Will order HH for skilled nursing.     Encephalopathy, metabolic    -Much improved.    -At baseline.     Essential hypertension    -Reasonably controlled with current regimen.    -Will continue with current regimen of coreg alone     Tobacco abuse    -Patient counseled on the importance of smoking  cessation.  -NRT offered     Acute on chronic respiratory failure with hypoxia and hypercapnia    -Successfully extubated 2/2/2018.  Patient did well on BiPAP following extubation.  He has needed intermittent BiPAP.    -Continue with PRN bronchodilator treatments as I suspect patient has COPD -Continue volume removal with CRRT as able.  -continue supplemental O2 requirements for goal sat 88-92%  -Completed course of steroids   -Will need outpatient PFTs  -have started on spiriva and breo which he will need to continue.       Acute on chronic diastolic heart failure    -No ARB due to renal dysfunction.  -No lasix as he appears to be self-diuresing well  -Unclear how his body will continue to handle salt and fluids, so will require close follow up with pcp in [place].  -Continue coreg.  -Monitor daily weight at home.     Acute renal failure    -Due to ATN from sepsis with shock and rhabdomyolysis.    -During his stay he required dialysis and it was suspected that he may have progressed to ESRD.  We were working on finding placement for HD as an outpatient.  -Fortunately, on 2/22 his creatinine suddenly began improving and he began making urine.  His creatinine continues to improve and is 1.4 on the day of discharge.  -Per nephrology notes he will not need outpatient dialysis.    -Will have tunneled dialysis catheter removed today  -Expect discharge home after line removed.       Final Active Diagnoses:    Diagnosis Date Noted POA    PRINCIPAL PROBLEM:  Septic shock [A41.9, R65.21] 01/28/2019 Yes    Debility [R53.81] 02/15/2019 Yes    Encephalopathy, metabolic [G93.41] 02/07/2019 Yes    Essential hypertension [I10] 02/05/2019 No    Tobacco abuse [Z72.0] 02/03/2019 Yes    Acute renal failure [N17.9] 01/28/2019 Yes    Acute on chronic diastolic heart failure [I50.33] 01/28/2019 Yes    Acute on chronic respiratory failure with hypoxia and hypercapnia [J96.21, J96.22] 01/28/2019 Yes      Problems Resolved  "During this Admission:    Diagnosis Date Noted Date Resolved POA    Encounter for fitting and adjustment of dialysis catheter [Z49.01]  02/07/2019 Not Applicable    Increased oropharyngeal secretions [K11.7] 02/01/2019 02/03/2019 Unknown    Acute respiratory failure with hypoxia and hypercapnia [J96.01, J96.02]  02/01/2019 Yes    Cellulitis of right lower extremity [L03.115] 01/28/2019 02/12/2019 Yes    Rhabdomyolysis [M62.82] 01/28/2019 02/01/2019 Yes    Hyponatremia [E87.1] 01/28/2019 02/01/2019 Yes       Discharged Condition: good    Disposition: Home or Self Care    Follow Up:  Follow-up Information     PEE Romano MD In 1 week.    Specialty:  Internal Medicine  Contact information:  [place] [id number]  [phone]             Go on 3/7/2019 to follow up.    Why:  APT IS SCHEDULED FOR 10:45 am, WITH DR ZOEY IYER,[address], [place].               Patient Instructions:      COMMODE FOR HOME USE     Order Specific Question Answer Comments   Type: Standard    Height: 6' 3" (1.905 m)    Weight: 159.9 kg (352 lb 8.3 oz)    Does patient have medical equipment at home? walker, standard    Length of need (1-99 months): 99      WHEELCHAIR FOR HOME USE     Order Specific Question Answer Comments   Hours in W/C per day: 8    Type of Wheelchair: Standard    Size(Width): 24"    Leg Support: Elevating leg rests    Lap Belt: Velcro    Cushion: Basic    Height: 6' 3" (1.905 m)    Weight: 159.9 kg (352 lb 8.3 oz)    Does patient have medical equipment at home? walker, standard    Length of need (1-99 months): 99    Please check all that apply: Caregiver is capable and willing to operate wheelchair safely.    Please check all that apply: Patient's upper body strength is sufficient for propulsion.      Diet renal     Diet Cardiac     Diet Cardiac     Diet renal     Notify your health care provider if you experience any of the following:  increased confusion or weakness "     Notify your health care provider if you experience any of the following:  persistent dizziness, light-headedness, or visual disturbances     Notify your health care provider if you experience any of the following:  worsening rash     Notify your health care provider if you experience any of the following:  severe persistent headache     Notify your health care provider if you experience any of the following:  difficulty breathing or increased cough     Notify your health care provider if you experience any of the following:  severe uncontrolled pain     Notify your health care provider if you experience any of the following:  persistent nausea and vomiting or diarrhea     Notify your health care provider if you experience any of the following:  temperature >100.4     Notify your health care provider if you experience any of the following:  increased confusion or weakness     Notify your health care provider if you experience any of the following:  persistent dizziness, light-headedness, or visual disturbances     Notify your health care provider if you experience any of the following:  worsening rash     Notify your health care provider if you experience any of the following:  severe persistent headache     Notify your health care provider if you experience any of the following:  difficulty breathing or increased cough     Notify your health care provider if you experience any of the following:  severe uncontrolled pain     Notify your health care provider if you experience any of the following:  persistent nausea and vomiting or diarrhea     Notify your health care provider if you experience any of the following:  temperature >100.4     Activity as tolerated     Activity as tolerated       Significant Diagnostic Studies: Labs:   BMP:   Recent Labs   Lab 02/23/19  1543 02/24/19  0420 02/25/19  0418   GLU  --  104 109  109   NA  --  134* 134*  134*   K  --  3.6 3.6  3.6   CL  --  96 96  96   CO2  --  25 25   25   BUN  --  29* 30*  30*   CREATININE 1.9* 1.7* 1.4  1.4   CALCIUM  --  8.2* 8.4*  8.4*   MG  --   --  1.1*   , CMP   Recent Labs   Lab 02/23/19  1543 02/24/19  0420 02/25/19  0418   NA  --  134* 134*  134*   K  --  3.6 3.6  3.6   CL  --  96 96  96   CO2  --  25 25  25   GLU  --  104 109  109   BUN  --  29* 30*  30*   CREATININE 1.9* 1.7* 1.4  1.4   CALCIUM  --  8.2* 8.4*  8.4*   ALBUMIN  --  2.9* 3.2*   ANIONGAP  --  13 13  13   ESTGFRAFRICA  --  53* >60  >60   EGFRNONAA  --  46* 58*  58*   , CBC   Recent Labs   Lab 02/25/19 0418   WBC 6.53   HGB 9.6*   HCT 30.6*       and A1C: No results for input(s): HGBA1C in the last 4320 hours.    Pending Diagnostic Studies:     Procedure Component Value Units Date/Time    Creatinine Clearance, Timed 24 Hours [[id number]] Collected:  02/23/19    Order Status:  Sent Lab Status:  No result     Specimen:  Urine, Clean Catch          Medications:  Reconciled Home Medications:      Medication List      START taking these medications    fluticasone-vilanterol 100-25 mcg/dose diskus inhaler  Commonly known as:  BREO  Inhale 1 puff into the lungs once daily. Controller     tiotropium 18 mcg inhalation capsule  Commonly known as:  SPIRIVA  Inhale 1 capsule (18 mcg total) into the lungs once daily. Controller        CHANGE how you take these medications    carvedilol 3.125 MG tablet  Commonly known as:  COREG  Take 1 tablet (3.125 mg total) by mouth 2 (two) times daily.  What changed:    · medication strength  · how much to take  · when to take this        CONTINUE taking these medications    VENTOLIN HFA 90 mcg/actuation inhaler  Generic drug:  albuterol  Inhale 2 puffs into the lungs every 4 (four) hours as needed for Wheezing or Shortness of Breath. Rescue        STOP taking these medications    allopurinol 100 MG tablet  Commonly known as:  ZYLOPRIM     bumetanide 1 MG tablet  Commonly known as:  BUMEX     losartan-hydrochlorothiazide 100-25 mg 100-25 mg per  tablet  Commonly known as:  HYZAAR     metOLazone 5 MG tablet  Commonly known as:  ZAROXOLYN     montelukast 10 mg tablet  Commonly known as:  SINGULAIR     pantoprazole 40 MG tablet  Commonly known as:  PROTONIX     potassium chloride 10 MEQ Tbsr  Commonly known as:  KLOR-CON     ranitidine 150 MG tablet  Commonly known as:  ZANTAC     traZODone 100 MG tablet  Commonly known as:  DESYREL            Indwelling Lines/Drains at time of discharge:   Lines/Drains/Airways     Peripherally Inserted Central Catheter Line                 PICC Double Lumen 02/08/19 1400 right cephalic 16 days          Central Venous Catheter Line                 Hemodialysis Catheter 02/06/19 1124 right internal jugular 18 days                Time spent on the discharge of patient: 35 minutes  Patient was seen and examined on the date of discharge and determined to be suitable for discharge.         Jones Milner MD  Department of Hospital Medicine  [facility]

## 2019-02-25 NOTE — PLAN OF CARE
Pt to discharge home today after tunneled HD cath removed. Pt will no longer need out pt HD.    CM to make follow-up apt with pt's MD in [place], Dr.Jake Hightower, [phone].    Transportation to be arranged thru [employer] for wheel chair van. Pt has his own wheelchair.    No further CM needs or barriers needed for discharge.     02/25/19 0857   Final Note   Assessment Type Final Discharge Note   Anticipated Discharge Disposition Home   What phone number can be called within the next 1-3 days to see how you are doing after discharge? [phone]   Hospital Follow Up  Appt(s) scheduled? Yes   Discharge plans and expectations educations in teach back method with documentation complete? Yes   Right Care Referral Info   Post Acute Recommendation No Care

## 2019-02-25 NOTE — PT/OT/SLP PROGRESS
"Physical Therapy Treatment    Patient Name:  Jones Red   MRN:  [id number]    Recommendations:     Discharge Recommendations:  nursing facility, skilled (home with friends and family for assistance as needed  Discharge Equipment Recommendations: (commode and tub bench if not delivered yet)   Barriers to discharge: None    Assessment:     Jones Red is a 53 y.o. male admitted with a medical diagnosis of Septic shock.  He presents with the following impairments/functional limitations:  weakness, impaired self care skills, impaired endurance, decreased ROM, edema patient was agreeable to participate after minimum encouragement.     Rehab Prognosis: Good; patient would benefit from acute skilled PT services to address these deficits and reach maximum level of function.    Recent Surgery: Procedure(s) (LRB):  Insertion,catheter,tunneled (Right) 19 Days Post-Op    Plan:     During this hospitalization, patient to be seen 5 x/week to address the identified rehab impairments via gait training, therapeutic activities, therapeutic exercises and progress toward the following goals:    · Plan of Care Expires:  03/03/19    Subjective     Chief Complaint: patient stated " I'm leaving today I need this line out"   Patient/Family Comments/goals: home  Pain/Comfort:  · Pain Rating 1: 0/10  · Pain Rating Post-Intervention 1: 0/10      Objective:     Communicated with nurse prior to session.  Patient found seated in hallway in personal wheelchair (HD catheter )  upon PT entry to room.     General Precautions: Standard, aspiration   Orthopedic Precautions:N/A   Braces: N/A     Functional Mobility:  · Sit <> stand with Rolling walker with Supervision   · Patient gait trained 200 feet with Rolling walker with SBA for safety patient demo slow pace, decrease step length and no LOB.     AM-PAC 6 CLICK MOBILITY  Turning over in bed (including adjusting bedclothes, sheets and blankets)?: 4  Sitting down on and standing up from a chair with " arms (e.g., wheelchair, bedside commode, etc.): 3  Moving from lying on back to sitting on the side of the bed?: 4  Moving to and from a bed to a chair (including a wheelchair)?: 3  Need to walk in hospital room?: 3  Climbing 3-5 steps with a railing?: 3  Basic Mobility Total Score: 20       Therapeutic Activities and Exercises:  Patient session was limited secondary to breakfast being delivered and patient wanting to eat.     Patient left seated on edge of bed with all lines intact, call button in reach and nurse notified..    GOALS:   Multidisciplinary Problems     Physical Therapy Goals        Problem: Physical Therapy Goal    Goal Priority Disciplines Outcome Goal Variances Interventions   Physical Therapy Goal     PT, PT/OT Ongoing (interventions implemented as appropriate)     Description:  Goals to be met by: 3/3/19    Patient will increase functional independence with mobility by performin. Supine to sit with min A. -- MET 2/10/19  REVISED: Supine>sit modified independently.-MET 19   2. Sit to supine with min A. -- MET 2/10/19  REVISED: Sit>supine modified independently. MET 19  3. Rolling R and L with min A. -- MET 2/10/19  4. Sitting at edge of bed >5 minutes with min A. -- MET 2/10/19  5. PT will assess sit<>stand. -- MET 2/10/19  REVISED: Sit<>stand with supervision and standard or rolling walker. MET 19  6. Gait > 50 ft with standard or rolling walker with min A. MET 19  REVISED: Patient will ambulate >150' with RW with CGA for safety-MET 19  7. UPDATED: patient will t/f bed to wheelchair via stand-step t/f with RW with SBA for safety  8. UPDATED: Patient will propel himself in wheelchair in all directions > 150' while in open environment with Mod I and no verbal cues for safety                         Time Tracking:     PT Received On: 19  PT Start Time: 841     PT Stop Time: 856  PT Total Time (min): 15 min     Billable Minutes: Gait Training 15    Treatment  Type: Treatment  PT/PTA: PTA     PTA Visit Number: 3     Anabell Brown, PTA  02/25/2019

## 2019-02-25 NOTE — DISCHARGE INSTRUCTIONS
Discharge Instructions: Caring for Your Wound  Taking proper care of your wound will help it heal. Your healthcare provider or nurse may show you specifically how to clean and dress the wound and how to tell if the wound is healing normally. This sheet will help you remember those guidelines when you are at home.  Getting ready  · Put pets and children in another room, away from your work area.  · Wash your hands before touching any of your supplies:  ¨ Turn on the water.  ¨ Wet your hands and wrists.  ¨ Use liquid soap from a pump dispenser. Work up a lather.  ¨ Scrub your hands thoroughly.  ¨ Rinse your hands with your fingers pointing toward the drain.  ¨ Dry your hands with a clean cloth or paper towel. Use this towel to turn off the faucet.  ¨ Remember, once you have washed your hands, dont touch anything other than your supplies. Wash your hands again if you touch anything, like furniture or your clothes.  · Clean your work area:  ¨ Clean washable surfaces with soap and water, and dry with a clean cloth or paper towel.  ¨ Wipe surfaces that are not washable (like fabric or wood) so that they are free of dust. Spread a clean cloth or paper towel over your work surface.  ¨ Move away from the clean surface, if you need to cough or sneeze.  · Gather your bandage supplies:  ¨ Gauze dressing or other bandage material  ¨ Medical tape  ¨ Disposable gloves  · Wash your hands again.   Dressing the wound  · Remove the old dressing:  ¨ Put on disposable gloves if youre dressing a wound for someone else or if your wound is infected.  ¨ Pull gently toward the wound to loosen the tape.  ¨ One layer at a time, gently remove the dressing.  ¨ If you have a drain or tube, be careful not to pull on it.  ¨ Look at the dressing. Make sure that you are seeing a decreasing amount of blood, and that the blood is turning into a clear, osmani fluid.  ¨ If your wound has stitches, look for loose ones.  ¨ Put the dressing in a plastic  bag. Then remove your gloves.  · Inspect the wound. Look for signs that it isn't healing normally. A wound that isnt healing properly may be dark in color or have white streaks.  · Dress the wound:  ¨ Wash your hands again.  ¨ Clean and dress the wound as you were shown by your healthcare provider or nurse.  ¨ If youre dressing a wound for someone else or if your wound is infected, put on a new pair of disposable gloves .  ¨ If you have a drain or tube, be careful not to pull on it.  · Discard any used materials or trash in a plastic bag before placing in a trash can.  Follow-up  Make a follow-up appointment as directed by our staff.  When to call your healthcare provider  Call your healthcare provider right away if you have any of the following:  · Shaking chills or fever above 100.4°F (38°C)  · Bleeding that soaks the dressing  · Stitches that are pulling away from the wound or pulling apart  · Pink fluid weeping from the wound  · Increased drainage from the wound or drainage that is yellow, yellow-green, or smelly  · Increased swelling, pain, or redness in the skin around the wound  · A change in the color or size of the wound  · Increased fatigue  · Loss of appetite   Date Last Reviewed: 8/5/2015  © 4587-4612 [employer]. [address] [place] [address]. All rights reserved. This information is not intended as a substitute for professional medical care. Always follow your healthcare professional's instructions.        Thank you for choosing [facility] as your Health Care Provider. [facility] strives to provide the best healthcare available to you. In the next few days you may receive a Survey, either by mail or email,  asking you to rate our care that was provided to you during your stay.  Please return the survey to us, as your feedback is important. We aim to meet your expectations of safe, quality health care.    From your [facility] Care Team.

## 2019-02-25 NOTE — BRIEF OP NOTE
[facility]  Brief Operative Note    SUMMARY     Surgery Date: 2/25/2019     Surgeon(s) and Role:     * Varinder Barcenas MD - Primary    Assisting Surgeon: Min White MD    Pre-op Diagnosis:  Acute kidney injury [N17.9]    Post-op Diagnosis:  Post-Op Diagnosis Codes:     * Acute kidney injury [N17.9]    Procedure(s) (LRB):  REMOVAL, CATHETER, CENTRAL VENOUS, TUNNELED (N/A)    Anesthesia: 1% lidocaine    Description of Procedure: Patient was prepped and draped in a sterile fashion.  This was a successful removal of his R IJ tunneled dialysis catheter.  Patient tolerated the procedure well and no immediate complications noted.    Description of the findings of the procedure: Patient was prepped and draped in a sterile fashion.  This was a successful removal of his R IJ tunneled dialysis catheter.  Patient tolerated the procedure well and no immediate complications noted.      Estimated Blood Loss: < 10 mL         Specimens:   Specimen (12h ago, onward)    None

## 2019-02-25 NOTE — ASSESSMENT & PLAN NOTE
-Reasonably controlled with current regimen.    -Will continue with current regimen of coreg alone

## 2019-02-25 NOTE — NURSING
HD removal procedure complete at this time. Pressure dressing applied by MD. No acute distress noted.

## 2019-02-25 NOTE — PLAN OF CARE
Problem: Adult Inpatient Plan of Care  Goal: Plan of Care Review  Outcome: Ongoing (interventions implemented as appropriate)  Patient on room air saturations WML DPI's given tolerated well.

## 2019-02-25 NOTE — PLAN OF CARE
Problem: SLP Goal  Goal: SLP Goal  1. Pt will be able to follow simple 1 step commands 75% of time with moderate verbal and visual cues. (MET)  2. Increase speech intelligibility to 75% at the simple sentence level with moderate verbal cues to slow rate, raise volume.  3. Pt will be able to consume thin liquids in small single sips via cup or straw, with no signs of airway threat or aspiration given max assistance. (MET)   4. Pt will be able to advance to purees and solid consistencies with no signs of airway threat or aspiration given max assistance. (MET)  5. Increase orientation to month, date, day of week, place and reason for hospitalization with 75% acc given max verbal and written cues.  6. Increase ability to express basic wants and needs 75% of time given min assistance   7. Pt will be able to sustain attention to task during simple functional tasks for 10 mins given min cues.     Outcome: Ongoing (interventions implemented as appropriate)  Pt seen for ongoing assessment and remediation of cognitive communication deficits.     Comments: Speech therapy to follow up 3-5x per week for ongoing assessment and remediation of cognitive communication deficits.

## 2019-02-25 NOTE — PLAN OF CARE
I spoke with Taylor Finney at the [facility] re: patient not picked up for transport yet. Per Taylor, [facility] said their  is [place], but will pick the patient up.

## 2019-02-25 NOTE — PLAN OF CARE
Patient resting in NAD. VSS on RA. Pt voiding per urinal. Safety measures maintained. Pt without needs at this time. Will continue to monitor.

## 2019-02-25 NOTE — ASSESSMENT & PLAN NOTE
-No ARB due to renal dysfunction.  -No lasix as he appears to be self-diuresing well  -Unclear how his body will continue to handle salt and fluids, so will require close follow up with pcp in [place].  -Continue coreg.  -Monitor daily weight at home.

## 2019-02-25 NOTE — ASSESSMENT & PLAN NOTE
-Due to ATN from sepsis with shock and rhabdomyolysis.    -During his stay he required dialysis and it was suspected that he may have progressed to ESRD.  We were working on finding placement for HD as an outpatient.  -Fortunately, on 2/22 his creatinine suddenly began improving and he began making urine.  His creatinine continues to improve and is 1.4 on the day of discharge.  -Per nephrology notes he will not need outpatient dialysis.    -Will have tunneled dialysis catheter removed today  -Expect discharge home after line removed.

## 2019-02-25 NOTE — PLAN OF CARE
Problem: Adult Inpatient Plan of Care  Goal: Plan of Care Review  Outcome: Ongoing (interventions implemented as appropriate)  Adequate saturation on room air. Patient largely noncompliant with therapy, tends to take cannula off anyway.

## 2019-02-25 NOTE — PT/OT/SLP PROGRESS
"Speech Language Pathology Treatment    Patient Name:  Jones Red   MRN:  [id number]  Admitting Diagnosis: Septic shock    Recommendations:                 General Recommendations:                1. Speech Pathology 3-5x/week for ongoing assessment of diet tolerance and remediation of cognitive communication deficits and motor speech deficits               2. Continuation of speech therapy services upon d/c      Diet recommendations:  Regular, Liquid Diet Level: Thin      Aspiration Precautions: 1 bite/sip at a time, Assistance with meals, Avoid talking while eating, Eliminate distractions, Feed only when awake/alert, HOB to 90 degrees, Meds whole 1 at a time, Remain upright 30 minutes post meal, Small bites/sips and Standard aspiration precautions      General Precautions: Standard, aspiration    Subjective     Pt awake, reclined in bed, watching television. Originally declining participation; however, provided encouragement, pt agreeable to session. Primary goal stated as to d/c from acute care.     Pain/Comfort:  · Pain Rating 1: 0/10; no pain reported     Objective:     Has the patient been evaluated by SLP for swallowing?   Yes  Keep patient NPO? No   Current Respiratory Status: nasal cannula      Cognitive Communication: Pt awake, alert, agreeable to session. Oriented to name, , place, hospital, month, and year with 100% acc. Off by two days for exact date. Not oriented to reason for hospitalization or length of stay, stating "I can't remember." Pt able to sustain attention for entire session in quiet environment provided mod cues. Improved eye contact this session with min cueing. Pt willing to discuss with SLP potential difficulties he may encounter following d/c, such as managing bills, calendars, and appts; however, denies potential difficulty. Dcr insight to deficits and dcr safety awareness this date. SLP reviewed and provided handout and explanation of external memory aid strategies. Although " presenting with slightly improving orientation, attention, safety awareness, and mental manipulation, pt will continue to require 24 hour supervision and assistance post-acute care d/c.     Motor Speech: Speech c/b heavy regional dialect. Pt is able 80% intelligible this date at the simple conversational level to an unfamiliar listener. Intelligibility improves to 85-90% given min-mod cues to increase volume of speech. Pt continues to demonstrate dcr self-monitoring of rate and volume of speech. Pt continues to require mod-max cues to repair communication breakdowns. Requires extensive explanation regarding dialect vs. awareness of conversation partner comprehension.    Assessment:     Jones Red is a 53 y.o. male with an SLP diagnosis of Cognitive Communication Impairment. Pt continues to present significantly dcr safety awareness. Pt will require 24 hour supervision and assistance following d/c. Speech therapy recommended to continue at the next level of care.     Goals:   Multidisciplinary Problems     SLP Goals        Problem: SLP Goal    Goal Priority Disciplines Outcome   SLP Goal     SLP Ongoing (interventions implemented as appropriate)   Description:  1. Pt will be able to follow simple 1 step commands 75% of time with moderate verbal and visual cues. (MET)  2. Increase speech intelligibility to 75% at the simple sentence level with moderate verbal cues to slow rate, raise volume.  3. Pt will be able to consume thin liquids in small single sips via cup or straw, with no signs of airway threat or aspiration given max assistance. (MET)   4. Pt will be able to advance to purees and solid consistencies with no signs of airway threat or aspiration given max assistance. (MET)  5. Increase orientation to month, date, day of week, place and reason for hospitalization with 75% acc given max verbal and written cues.  6. Increase ability to express basic wants and needs 75% of time given min assistance   7. Pt will  be able to sustain attention to task during simple functional tasks for 10 mins given min cues.                      Plan:     · Patient to be seen:  3 x/week, 5 x/week   · Plan of Care expires:  03/02/19  · Plan of Care reviewed with:  patient   · SLP Follow-Up:  Yes       Discharge recommendations:  nursing facility, skilled     Time Tracking:     SLP Treatment Date:   02/25/19  Speech Start Time:  1254  Speech Stop Time:  1315     Speech Total Time (min):  21 min    Billable Minutes: Speech Therapy Individual 21 minutes    Nolan Aguirre CCC-SLP  02/25/2019

## 2019-02-27 NOTE — PT/OT/SLP DISCHARGE
Occupational Therapy Discharge Summary    Jones Red  MRN: [id number]   Principal Problem: Septic shock      Patient Discharged from acute Occupational Therapy on 2/25/19.  Please refer to prior OT note dated 2/21/19 for functional status.    Assessment:      Patient appropriate for care in another setting.    Objective:     GOALS:   Multidisciplinary Problems     Occupational Therapy Goals     Not on file          Multidisciplinary Problems (Resolved)        Problem: Occupational Therapy Goal    Goal Priority Disciplines Outcome Interventions   Occupational Therapy Goal   (Resolved)     OT, PT/OT Outcome(s) achieved    Description:  Goals to be met by 3/3/19  1. Max A self-feeding   2. Mod A G/H (wash face and hands) supported sitting (MET 2/8/19)     REVISED: Mod A G/H (wash face and hands and mouth wash) sitting EOB. GOAL MET 2/11/19.  3. Min A hospital gown change bed level - MET 2/21/19  3. 50% assist with UE ROM exercises MET 2/8/19       REVISED: UE AROM exercises  HHA with Min A VC for continuation of task  5.  Assess bed mobility.  GOAL MET 2/11/19.  6. Step transfer to [facility] with MIN A.  MET 2/21/19  7. MAX A with toileting. In progress; PT reports patient toileted with her this AM, and patient didn't need (A).   8. Grooming/hygiene in stance with CGA at sink with seated rest breaks as needed.  GOAL MET 2/14/19.                             Reasons for Discontinuation of Therapy Services  hospital discharge      Plan:     Patient Discharged to: home with family care (Home Halth agency that carries his insurance not fount); Home Health PT/OT recommended.    FIONA Can  2/27/2019

## 2019-02-27 NOTE — PT/OT/SLP DISCHARGE
Physical Therapy Discharge Summary    Name: Jones Red  MRN: [id number]   Principal Problem: Septic shock     Patient Discharged from acute Physical Therapy on 19.  Please refer to prior PT noted date on 19 for functional status.     Assessment:     Goals partially met. Patient appropriate for care in another setting.    Objective:     GOALS:   Multidisciplinary Problems     Physical Therapy Goals     Not on file          Multidisciplinary Problems (Resolved)        Problem: Physical Therapy Goal    Goal Priority Disciplines Outcome Goal Variances Interventions   Physical Therapy Goal   (Resolved)     PT, PT/OT Outcome(s) achieved     Description:  Goals to be met by: 3/3/19    Patient will increase functional independence with mobility by performin. Supine to sit with min A. -- MET 2/10/19  REVISED: Supine>sit modified independently.-MET 19   2. Sit to supine with min A. -- MET 2/10/19  REVISED: Sit>supine modified independently. MET 19  3. Rolling R and L with min A. -- MET 2/10/19  4. Sitting at edge of bed >5 minutes with min A. -- MET 2/10/19  5. PT will assess sit<>stand. -- MET 2/10/19  REVISED: Sit<>stand with supervision and standard or rolling walker. MET 19  6. Gait > 50 ft with standard or rolling walker with min A. MET 19  REVISED: Patient will ambulate >150' with RW with CGA for safety-MET 19  7. UPDATED: patient will t/f bed to wheelchair via stand-step t/f with RW with SBA for safety  8. UPDATED: Patient will propel himself in wheelchair in all directions > 150' while in open environment with Mod I and no verbal cues for safety                         Reasons for Discontinuation of Therapy Services  Transfer to alternate level of care.      Plan:     Patient Discharged to: Home with Home Health Service.    PT of record not available for documentation.    Wendy Santillan, PT  2019

## 2019-03-01 ENCOUNTER — TELEPHONE (OUTPATIENT)
Dept: HEPATOLOGY | Facility: OTHER | Age: 54
End: 2019-03-01

## 2019-03-01 NOTE — TELEPHONE ENCOUNTER
Notified by nurse that the patient has called that his health insurance company does not cover the cost of prescribed inhalers:    Breo (fluticasone/vilanterol) 100-25 mcg/dose 1 puff daily  Sprivia (tiotropium) 18 mcg 1 capsule daily    I spoke with his pharmacist who informs me that his insurance will cover the following similar regimen which I ordered by phone:    Incruse Ellipta (umeclidinium) 62.5 mcg 1 puff inhaler daily  Flovent HFA (fluticasone propionate) 1 puff 110 mcg twice daily  Serevent Disku (salmeterol) 50 mcg 1 puff twice daily    I have asked the pharmacist to advise the patient to start this regimen today.

## 2019-10-06 NOTE — ASSESSMENT & PLAN NOTE
- Mr. Jones Red is admitted to inpatient status to the ICU  - suspect 2/2 acute on chronic respiratory failure, UTI, RLE Cellulitis, pneumonia, acute renal failure  - has been off of pressors per sending facility notes, but did require restarting pressors upon arrival  - random vanc level is 23, will order next dose for now, renally dosed   - zosyn ordered   - continue vancomycin    - monitor random level Qday @ 8am   - d/w Dr. Pollard: patient will likely need QOD dosing of vancomycin, monitor random level QD    - dosing based upon random level:     <15 then give 1 g      -15-20 500 mg    - No dose now           no

## 2021-09-01 NOTE — ASSESSMENT & PLAN NOTE
-At home takes coreg, losartan, hctz, metolazone and bumex  -BP mildly elevatd at times.  -Continue coreg and PRN hydralazine   Consent (Near Eyelid Margin)/Introductory Paragraph: The rationale for Mohs was explained to the patient and consent was obtained. The risks, benefits and alternatives to therapy were discussed in detail. Specifically, the risks of ectropion or eyelid deformity, infection, scarring, bleeding, prolonged wound healing, incomplete removal, allergy to anesthesia, nerve injury and recurrence were addressed. Prior to the procedure, the treatment site was clearly identified and confirmed by the patient. All components of Universal Protocol/PAUSE Rule completed.

## 2022-08-24 NOTE — ASSESSMENT & PLAN NOTE
-Patient counseled on the importance of smoking cessation.  -NRT offered   Vital Signs Last 24 Hrs  T(C): 36.6 (08-20-22 @ 07:15), Max: 36.8 (08-19-22 @ 20:41)  T(F): 97.9 (08-20-22 @ 07:15), Max: 98.2 (08-19-22 @ 20:41)  HR: --  BP: --  BP(mean): --  RR: 16 (08-20-22 @ 07:15) (16 - 18)  SpO2: 98% (08-20-22 @ 07:15) (98% - 100%)    Orthostatic VS  08-20-22 @ 07:15  Lying BP: --/-- HR: --  Sitting BP: 129/76 HR: 72  Standing BP: 114/75 HR: 82  Site: upper right arm  Mode: electronic  Orthostatic VS  08-19-22 @ 20:41  Lying BP: --/-- HR: --  Sitting BP: 124/79 HR: 67  Standing BP: 142/117 HR: 72  Site: upper left arm  Mode: electronic   Vital Signs Last 24 Hrs  T(C): 36.7 (08-22-22 @ 07:10), Max: 36.7 (08-22-22 @ 07:10)  T(F): 98 (08-22-22 @ 07:10), Max: 98 (08-22-22 @ 07:10)  HR: --  BP: --  BP(mean): --  RR: 16 (08-22-22 @ 07:10) (16 - 16)  SpO2: 100% (08-22-22 @ 07:10) (100% - 100%)    Orthostatic VS  08-22-22 @ 07:10  Lying BP: --/-- HR: --  Sitting BP: 128/77 HR: 81  Standing BP: 111/74 HR: 93  Site: upper right arm  Mode: electronic  Orthostatic VS  08-21-22 @ 07:32  Lying BP: --/-- HR: --  Sitting BP: 109/70 HR: 65  Standing BP: 103/68 HR: 75  Site: upper right arm  Mode: electronic   Vital Signs Last 24 Hrs  T(C): 36.7 (08-21-22 @ 07:32), Max: 36.7 (08-21-22 @ 07:32)  T(F): 98.1 (08-21-22 @ 07:32), Max: 98.1 (08-21-22 @ 07:32)  HR: --  BP: --  BP(mean): --  RR: 16 (08-21-22 @ 07:32) (16 - 16)  SpO2: 98% (08-21-22 @ 07:32) (98% - 98%)    Orthostatic VS  08-21-22 @ 07:32  Lying BP: --/-- HR: --  Sitting BP: 109/70 HR: 65  Standing BP: 103/68 HR: 75  Site: upper right arm  Mode: electronic  Orthostatic VS  08-20-22 @ 07:15  Lying BP: --/-- HR: --  Sitting BP: 129/76 HR: 72  Standing BP: 114/75 HR: 82  Site: upper right arm  Mode: electronic  Orthostatic VS  08-19-22 @ 20:41  Lying BP: --/-- HR: --  Sitting BP: 124/79 HR: 67  Standing BP: 142/117 HR: 72  Site: upper left arm  Mode: electronic   Vital Signs Last 24 Hrs  T(C): 36.8 (08-24-22 @ 07:28), Max: 36.8 (08-24-22 @ 07:28)  T(F): 98.2 (08-24-22 @ 07:28), Max: 98.2 (08-24-22 @ 07:28)  HR: --  BP: --  BP(mean): --  RR: 16 (08-24-22 @ 07:28) (16 - 16)  SpO2: 98% (08-24-22 @ 07:28) (98% - 98%)    Orthostatic VS  08-24-22 @ 07:28  Lying BP: --/-- HR: --  Sitting BP: 113/76 HR: 64  Standing BP: 111/73 HR: 72  Site: upper right arm  Mode: electronic  Orthostatic VS  08-23-22 @ 07:06  Lying BP: --/-- HR: --  Sitting BP: 108/77 HR: 81  Standing BP: 107/72 HR: 91  Site: upper right arm  Mode: electronic

## 2022-11-10 NOTE — ASSESSMENT & PLAN NOTE
-Due to ATN from sepsis with shock and rhabdomyolysis.    -Unfortunately it looks as if he has progressed to ESRD  -We were unable to do HD on 2/5 due to clotted trialysis catheter.  Received new HD catheter on 2/6.    -Continue with dialysis per nephrology.  -Working on outpatient placement as he will need ongoing dialysis   [FreeTextEntry1] : Check labs\par Mammogram and GYN  check-up

## (undated) DEVICE — DRESSING ANTIMICROBIAL 1 INCH

## (undated) DEVICE — CHLORAPREP 10.5 ML APPLICATOR

## (undated) DEVICE — DRESSING TRANS 4X4 TEGADERM

## (undated) DEVICE — GLOVE PROTEXIS PI CLASSIC 7.0

## (undated) DEVICE — GLOVE PROTEXIS PI CLASSIC 8.5

## (undated) DEVICE — SUT ETHILON 3-0 FS-1 30

## (undated) DEVICE — SUT 3/0 27IN COATED VICRYL

## (undated) DEVICE — GLOVE PROTEXIS PI CLASSIC 9.0

## (undated) DEVICE — SEE MEDLINE ITEM 156918

## (undated) DEVICE — PRECISION RT CHRONIC CATHETER KIT,SYMMETRICAL TIP, REVERSE-TUNNELED,15 FR/CH (5.0 MM) X 19 CM
Type: IMPLANTABLE DEVICE | Site: NECK | Status: NON-FUNCTIONAL
Brand: PALINDROME

## (undated) DEVICE — GUIDEWIRE LAUREATE 035IN 180CM